# Patient Record
Sex: FEMALE | Race: WHITE | NOT HISPANIC OR LATINO | Employment: OTHER | URBAN - NONMETROPOLITAN AREA
[De-identification: names, ages, dates, MRNs, and addresses within clinical notes are randomized per-mention and may not be internally consistent; named-entity substitution may affect disease eponyms.]

---

## 2017-01-05 ENCOUNTER — OFFICE VISIT (OUTPATIENT)
Dept: GASTROENTEROLOGY | Facility: CLINIC | Age: 32
End: 2017-01-05

## 2017-01-05 VITALS
BODY MASS INDEX: 23.54 KG/M2 | WEIGHT: 150 LBS | SYSTOLIC BLOOD PRESSURE: 119 MMHG | HEART RATE: 108 BPM | DIASTOLIC BLOOD PRESSURE: 71 MMHG | OXYGEN SATURATION: 98 % | HEIGHT: 67 IN

## 2017-01-05 DIAGNOSIS — R74.8 ABNORMAL AST AND ALT: ICD-10-CM

## 2017-01-05 DIAGNOSIS — R10.11 RIGHT UPPER QUADRANT ABDOMINAL PAIN: ICD-10-CM

## 2017-01-05 DIAGNOSIS — K90.0 CELIAC DISEASE: ICD-10-CM

## 2017-01-05 DIAGNOSIS — B17.10 ACUTE HEPATITIS C VIRUS INFECTION WITHOUT HEPATIC COMA: Primary | ICD-10-CM

## 2017-01-05 DIAGNOSIS — E08.00 DIABETES MELLITUS DUE TO UNDERLYING CONDITION WITH HYPEROSMOLARITY WITHOUT COMA, WITHOUT LONG-TERM CURRENT USE OF INSULIN (HCC): ICD-10-CM

## 2017-01-05 DIAGNOSIS — R11.0 NAUSEA: ICD-10-CM

## 2017-01-05 PROCEDURE — 99244 OFF/OP CNSLTJ NEW/EST MOD 40: CPT | Performed by: INTERNAL MEDICINE

## 2017-01-05 RX ORDER — BUPRENORPHINE HYDROCHLORIDE AND NALOXONE HYDROCHLORIDE 5.7; 1.4 MG/1; MG/1
1 TABLET, ORALLY DISINTEGRATING SUBLINGUAL 2 TIMES DAILY
Refills: 0 | COMMUNITY
Start: 2016-12-22

## 2017-01-05 RX ORDER — INSULIN LISPRO 100 [IU]/ML
INJECTION, SOLUTION INTRAVENOUS; SUBCUTANEOUS 3 TIMES DAILY
Refills: 3 | COMMUNITY
Start: 2016-11-21 | End: 2020-08-30

## 2017-01-05 RX ORDER — LEVOTHYROXINE SODIUM 0.1 MG/1
1 TABLET ORAL DAILY
Refills: 2 | COMMUNITY
Start: 2016-12-22 | End: 2020-08-30

## 2017-01-05 RX ORDER — ONDANSETRON 4 MG/1
TABLET, FILM COATED ORAL
Qty: 90 TABLET | Refills: 0 | Status: SHIPPED | OUTPATIENT
Start: 2017-01-05

## 2017-01-05 RX ORDER — INSULIN GLARGINE 100 [IU]/ML
25 INJECTION, SOLUTION SUBCUTANEOUS DAILY
Refills: 0 | COMMUNITY
Start: 2016-11-28 | End: 2020-08-30

## 2017-01-05 NOTE — LETTER
2017     RONY Celis  [address]  [address]  [facility] [address]    Patient: Jes Moreno   YOB: 1985   Date of Visit: 2017       Dear RONY Adams:    Jes Moreno was in my office today. Below is a copy of my note.    If you have questions, please do not hesitate to call me. I look forward to following Jes along with you.         Sincerely,        Myrna Perera,         CC: No Recipients    : 1985    Chief Complaint   Patient presents with   • Hepatitis C       Jes Moreno is a 31 y.o. female who presents to the office today as a consultation from RONY Celis for evaluation of Hepatitis C    History of Present Illness:    She was diagnosed with hepatitis C in 2016.  She was nauseated all the time that prompted her PCP to draw labs on her.  She has used intranasal illicit drugs since she was 21. She does not drink alcohol.  The last 6 months she has been taking Zubsolv 8 mg daily and participates at a clinic. She plans to taper off the Zubsolv.     She was diagnosed with diabetes mellitus at the age of 7 years of age. She has average glucose of 209 with a hemoglobin A1C of 8.9 drawn on 2016.  On the same date and acute hepatitis profile reveals hepatitis B surface antigen nonreactive, hepatitis C antibody reactive.  CMP revealed a glucose of 223 and AST of 43.  She is aware that she had celiac disease and tries to maintain a gluten free diet. She is on Synthroid for hypothyroidism.    She has abdominal pain in the right upper quadrant.      Review of Systems   Constitutional: Positive for appetite change and chills. Negative for fatigue, fever and unexpected weight change.   HENT: Negative for hearing loss, mouth sores and nosebleeds.    Eyes: Negative for itching and visual disturbance.   Respiratory: Positive for chest tightness. Negative for cough, shortness of breath and wheezing.    Cardiovascular: Positive for leg swelling.  Negative for chest pain and palpitations.   Endocrine: Negative for cold intolerance, heat intolerance, polydipsia and polyuria.   Genitourinary: Negative for dysuria, frequency and hematuria.   Musculoskeletal: Negative for arthralgias, joint swelling and myalgias.   Skin: Negative for rash and wound.   Allergic/Immunologic: Negative for food allergies and immunocompromised state.   Neurological: Negative for seizures, syncope, weakness and light-headedness.   Hematological: Negative for adenopathy. Does not bruise/bleed easily.   Psychiatric/Behavioral: Negative for confusion and sleep disturbance. The patient is not nervous/anxious.         Depression   Gastrointestinal: Positive for abdominal pain and nausea.    Past Medical History   Diagnosis Date   • Asthma    • Celiac disease    • Diabetes mellitus type 1    • Disease of thyroid gland    • Hepatitis C    • Infectious viral hepatitis      hepititis C   • Neuropathy    • Reflux gastritis        Past Surgical History   Procedure Laterality Date   •  section       X 2       Family History   Problem Relation Age of Onset   • Diabetes type I Maternal Grandmother    • Breast cancer Maternal Grandmother    • Hypertension Maternal Grandmother    • Diabetes type II Paternal Grandfather        History   Smoking Status   • Current Every Day Smoker   • Packs/day: 1.00   • Years: 10.00   • Types: Cigarettes   • Start date:    Smokeless Tobacco   • Never Used     History   Alcohol Use No     History   Drug Use No     Marital Status: Single      Current Outpatient Prescriptions:   •  albuterol (ACCUNEB) 1.25 MG/3ML nebulizer solution, Take 1 ampule by nebulization every 6 (six) hours as needed for wheezing., Disp: , Rfl:   •  albuterol (PROVENTIL HFA;VENTOLIN HFA) 108 (90 BASE) MCG/ACT inhaler, Inhale 2 puffs every 4 (four) hours as needed for wheezing., Disp: , Rfl:   •  gabapentin (NEURONTIN) 300 MG capsule, Take 300 mg by mouth 3 (three) times a day., Disp: ,  "Rfl:   •  HUMALOG KWIKPEN 100 UNIT/ML solution pen-injector, 3 (Three) Times a Day. SLIDING SCALE, Disp: , Rfl: 3  •  ipratropium-albuterol (COMBIVENT RESPIMAT)  MCG/ACT inhaler, Inhale 1 puff 4 (four) times a day as needed for wheezing., Disp: , Rfl:   •  ipratropium-albuterol (COMBIVENT)  MCG/ACT inhaler, Inhale 2 puffs every 6 (six) hours as needed for wheezing., Disp: , Rfl:   •  LANTUS SOLOSTAR 100 UNIT/ML injection pen, Inject 25 Units as directed Daily., Disp: , Rfl: 0  •  levothyroxine (SYNTHROID, LEVOTHROID) 100 MCG tablet, Take 1 tablet by mouth Daily., Disp: , Rfl: 2  •  omeprazole (PriLOSEC) 20 MG capsule, Take 20 mg by mouth daily., Disp: , Rfl:   •  ZUBSOLV 5.7-1.4 MG sublingual tablet, Take 1 tablet by mouth 2 (Two) Times a Day., Disp: , Rfl: 0  •  insulin aspart (NOVOLOG FLEXPEN) 100 UNIT/ML solution pen-injector sc pen, Inject  under the skin 3 (three) times a day with meals., Disp: , Rfl:   •  insulin detemir (LEVEMIR) 100 UNIT/ML injection, Inject  under the skin daily., Disp: , Rfl:   •  Insulin Glargine (TOUJEO SOLOSTAR SC), Inject  under the skin., Disp: , Rfl:   •  levothyroxine (SYNTHROID, LEVOTHROID) 88 MCG tablet, Take 88 mcg by mouth daily., Disp: , Rfl:   •  ondansetron (ZOFRAN) 4 MG tablet, Take 1 tablet PRN nausea every 8 hours., Disp: 90 tablet, Rfl: 0    Allergies:   Sulfa antibiotics    Visit Vitals   • /71 (BP Location: Left arm)   • Pulse 108   • Ht 67\" (170.2 cm)   • Wt 150 lb (68 kg)   • SpO2 98%   • BMI 23.49 kg/m2       Physical Exam   Constitutional: She is oriented to person, place, and time. She appears well-developed and well-nourished. No distress.   HENT:   Head: Normocephalic and atraumatic.   Right Ear: External ear normal.   Left Ear: External ear normal.   Nose: Nose normal.   Mouth/Throat: Oropharynx is clear and moist.   Eyes: Conjunctivae and EOM are normal. Right eye exhibits no discharge. Left eye exhibits no discharge. No scleral icterus. "   Neck: Normal range of motion. Neck supple.   Cardiovascular: Normal rate, regular rhythm and normal heart sounds.  Exam reveals no gallop and no friction rub.    No murmur heard.  Pulmonary/Chest: Effort normal and breath sounds normal. No respiratory distress. She has no wheezes. She has no rales. She exhibits no tenderness.   Abdominal: Soft. Normal appearance and bowel sounds are normal. She exhibits no distension, no ascites and no mass. There is no tenderness. There is no rigidity and no guarding. No hernia.   Musculoskeletal: Normal range of motion. She exhibits no edema or deformity.   Neurological: She is alert and oriented to person, place, and time. She exhibits normal muscle tone. Coordination normal.   Skin: Skin is warm and dry. No rash noted. No erythema. No pallor.   A burn on her forehead   Psychiatric: She has a normal mood and affect. Her behavior is normal. Judgment and thought content normal.   Nursing note and vitals reviewed.      Assessment:  1. Acute hepatitis C virus infection without hepatic coma    2. Abnormal AST and ALT    3. Right upper quadrant abdominal pain    4. Nausea    5. Celiac disease    6. Diabetes mellitus due to underlying condition with hyperosmolarity without coma, without long-term current use of insulin        Plan:  · Jes Moreno has abnormal AST and ALT. She will be tested for the common liver diagnoses including; Alpha 1 antitrypsin deficiency, autoimmune hepatitis, primary biliary cirrhosis, Franklin's disease, hereditary hemochromatosis, celiac disease, hepatitis A immunity, hepatitis B, and hepatitis C with serum tests.   · She will have an ultrasound of her liver.  · We discussed the progression of liver disease.  Celiac disease can cause problems with the liver also.  She has both diabetes and hypothyroidism placing her at greater risk of having autoimmune hepatitis.  She will be checked for the common liver disorders.  She is aware of the importance of getting  off the Zubsolv. After her genotype has been determined, a treatment plan will be set up.   · She will be need to be immunized against hepatitis A and B if she has not been exposed to the virus or immunized against hepatitis A and B.   · She will continue current medications.  · I discussed the patient’s findings and my recommendations with the patient. All of their questions were answered to their satisfaction and they understand the plan. She will call with any interval concerns. The patient will continue their current medications.    Return in about 2 weeks (around 1/19/2017) for follow up labs.            Electronically signed by: Myrna Perera D.O. 1/5/2017 at 10:27 AM  .

## 2017-01-05 NOTE — MR AVS SNAPSHOT
Jes Josh   1/5/2017 9:40 AM   Office Visit    Dept Phone:  [phone]   Encounter #:  [id number]    Provider:  Myrna Perera DO   Department:  [facility]                Your Full Care Plan              Today's Medication Changes          These changes are accurate as of: 1/5/17 10:49 AM.  If you have any questions, ask your nurse or doctor.               New Medication(s)Ordered:     ondansetron 4 MG tablet   Commonly known as:  ZOFRAN   Take 1 tablet PRN nausea every 8 hours.   Started by:  Myrna Perera DO            Where to Get Your Medications      These medications were sent to [place], [place] - [address] - [phone] [facility] [phone]   [address] [facility] [address]     Phone:  [phone]     ondansetron 4 MG tablet                  Your Updated Medication List          This list is accurate as of: 1/5/17 10:49 AM.  Always use your most recent med list.                * albuterol 1.25 MG/3ML nebulizer solution   Commonly known as:  ACCUNEB       * albuterol 108 (90 BASE) MCG/ACT inhaler   Commonly known as:  PROVENTIL HFA;VENTOLIN HFA       gabapentin 300 MG capsule   Commonly known as:  NEURONTIN       HUMALOG KWIKPEN 100 UNIT/ML solution pen-injector   Generic drug:  Insulin Lispro       insulin aspart 100 UNIT/ML solution pen-injector sc pen   Commonly known as:  novoLOG FLEXPEN       * ipratropium-albuterol  MCG/ACT inhaler   Commonly known as:  COMBIVENT RESPIMAT       * COMBIVENT  MCG/ACT inhaler   Generic drug:  ipratropium-albuterol       LEVEMIR 100 UNIT/ML injection   Generic drug:  insulin detemir       * levothyroxine 88 MCG tablet   Commonly known as:  SYNTHROID, LEVOTHROID       * levothyroxine 100 MCG tablet   Commonly known as:  SYNTHROID, LEVOTHROID       omeprazole 20 MG capsule   Commonly known as:  priLOSEC       ondansetron 4 MG tablet   Commonly known as:   ZOFRAN   Take 1 tablet PRN nausea every 8 hours.       * TOUJEO SOLOSTAR SC       * LANTUS SOLOSTAR 100 UNIT/ML injection pen   Generic drug:  Insulin Glargine       ZUBSOLV 5.7-1.4 MG sublingual tablet   Generic drug:  Buprenorphine HCl-Naloxone HCl       * Notice:  This list has 8 medication(s) that are the same as other medications prescribed for you. Read the directions carefully, and ask your doctor or other care provider to review them with you.            We Performed the Following     Alpha - 1 - Antitrypsin Phenotype     Anti-Smooth Muscle Antibody Titer     C-reactive Protein     CBC (No Diff)     Celiac Comprehensive Panel     Ceruloplasmin     Comprehensive Metabolic Panel     Ferritin     HCV RNA By PCR, Qn Rfx Mayra     Hepatitis A Antibody, Total     Hepatitis B E Antigen     Hepatitis B Surface Antibody     Hepatitis B Surface Antigen     Iron Profile     Mitochondrial Antibodies, M2     Nuclear Antigen Antibody, IFA       You Were Diagnosed With        Codes Comments    Acute hepatitis C virus infection without hepatic coma    -  Primary ICD-10-CM: B17.10  ICD-9-CM: 070.51     Abnormal AST and ALT     ICD-10-CM: R74.0  ICD-9-CM: 790.4     Right upper quadrant abdominal pain     ICD-10-CM: R10.11  ICD-9-CM: 789.01     Nausea     ICD-10-CM: R11.0  ICD-9-CM: 787.02       Instructions     None    Patient Instructions History      Upcoming Appointments     Visit Type Date Time Department    NEW PATIENT 1/5/2017  9:40 AM [facility] [place]    FOLLOW UP 2/10/2017 10:40 AM [facility]     [facility] [employer] allows you to send messages to your doctor, view your test results, renew your prescriptions, schedule appointments, and more. To sign up, go to [employer] and click on the Sign Up Now link in the New User? box. Enter your [employer] Activation Code exactly as it appears below along with the last four digits of your Social Security Number and your Date of  "Birth () to complete the sign-up process. If you do not sign up before the expiration date, you must request a new code.    [employer] Activation Code: [email or web address]  Expires: 2017 10:49 AM    If you have questions, you can email [email or web address] or call [phone] to talk to our [employer] staff. Remember, [employer] is NOT to be used for urgent needs. For medical emergencies, dial 911.               Other Info from Your Visit           Your Appointments     Feb 10, 2017 10:40 AM EST   Follow Up with Cassandra Dunn PA-C   [facility] (--)    [address] [place] [address] [phone]           Arrive 15 minutes prior to appointment.              Allergies     Sulfa Antibiotics        Reason for Visit     Hepatitis C           Vital Signs     Blood Pressure Pulse Height Weight Oxygen Saturation Body Mass Index    119/71 (BP Location: Left arm) 108 67\" (170.2 cm) 150 lb (68 kg) 98% 23.49 kg/m2    Smoking Status                   Current Every Day Smoker           Problems and Diagnoses Noted     Hepatitis C    -  Primary    Abnormal AST and ALT        Abdominal pain, right upper quadrant        Nauseous            "

## 2017-01-05 NOTE — PROGRESS NOTES
: 1985    Chief Complaint   Patient presents with   • Hepatitis C       Jes Moreno is a 31 y.o. female who presents to the office today as a consultation from RONY Celis for evaluation of Hepatitis C    History of Present Illness:    She was diagnosed with hepatitis C in 2016.  She was nauseated all the time that prompted her PCP to draw labs on her.  She has used intranasal illicit drugs since she was 21. She does not drink alcohol.  The last 6 months she has been taking Zubsolv 8 mg daily and participates at a clinic. She plans to taper off the Zubsolv.     She was diagnosed with diabetes mellitus at the age of 7 years of age. She has average glucose of 209 with a hemoglobin A1C of 8.9 drawn on 2016.  On the same date and acute hepatitis profile reveals hepatitis B surface antigen nonreactive, hepatitis C antibody reactive.  CMP revealed a glucose of 223 and AST of 43.  She is aware that she had celiac disease and tries to maintain a gluten free diet. She is on Synthroid for hypothyroidism.    She has abdominal pain in the right upper quadrant.      Review of Systems   Constitutional: Positive for appetite change and chills. Negative for fatigue, fever and unexpected weight change.   HENT: Negative for hearing loss, mouth sores and nosebleeds.    Eyes: Negative for itching and visual disturbance.   Respiratory: Positive for chest tightness. Negative for cough, shortness of breath and wheezing.    Cardiovascular: Positive for leg swelling. Negative for chest pain and palpitations.   Endocrine: Negative for cold intolerance, heat intolerance, polydipsia and polyuria.   Genitourinary: Negative for dysuria, frequency and hematuria.   Musculoskeletal: Negative for arthralgias, joint swelling and myalgias.   Skin: Negative for rash and wound.   Allergic/Immunologic: Negative for food allergies and immunocompromised state.   Neurological: Negative for seizures, syncope, weakness and  light-headedness.   Hematological: Negative for adenopathy. Does not bruise/bleed easily.   Psychiatric/Behavioral: Negative for confusion and sleep disturbance. The patient is not nervous/anxious.         Depression   Gastrointestinal: Positive for abdominal pain and nausea.    Past Medical History   Diagnosis Date   • Asthma    • Celiac disease    • Diabetes mellitus type 1    • Disease of thyroid gland    • Hepatitis C    • Infectious viral hepatitis      hepititis C   • Neuropathy    • Reflux gastritis        Past Surgical History   Procedure Laterality Date   •  section       X 2       Family History   Problem Relation Age of Onset   • Diabetes type I Maternal Grandmother    • Breast cancer Maternal Grandmother    • Hypertension Maternal Grandmother    • Diabetes type II Paternal Grandfather        History   Smoking Status   • Current Every Day Smoker   • Packs/day: 1.00   • Years: 10.00   • Types: Cigarettes   • Start date:    Smokeless Tobacco   • Never Used     History   Alcohol Use No     History   Drug Use No     Marital Status: Single      Current Outpatient Prescriptions:   •  albuterol (ACCUNEB) 1.25 MG/3ML nebulizer solution, Take 1 ampule by nebulization every 6 (six) hours as needed for wheezing., Disp: , Rfl:   •  albuterol (PROVENTIL HFA;VENTOLIN HFA) 108 (90 BASE) MCG/ACT inhaler, Inhale 2 puffs every 4 (four) hours as needed for wheezing., Disp: , Rfl:   •  gabapentin (NEURONTIN) 300 MG capsule, Take 300 mg by mouth 3 (three) times a day., Disp: , Rfl:   •  HUMALOG KWIKPEN 100 UNIT/ML solution pen-injector, 3 (Three) Times a Day. SLIDING SCALE, Disp: , Rfl: 3  •  ipratropium-albuterol (COMBIVENT RESPIMAT)  MCG/ACT inhaler, Inhale 1 puff 4 (four) times a day as needed for wheezing., Disp: , Rfl:   •  ipratropium-albuterol (COMBIVENT)  MCG/ACT inhaler, Inhale 2 puffs every 6 (six) hours as needed for wheezing., Disp: , Rfl:   •  LANTUS SOLOSTAR 100 UNIT/ML injection pen,  "Inject 25 Units as directed Daily., Disp: , Rfl: 0  •  levothyroxine (SYNTHROID, LEVOTHROID) 100 MCG tablet, Take 1 tablet by mouth Daily., Disp: , Rfl: 2  •  omeprazole (PriLOSEC) 20 MG capsule, Take 20 mg by mouth daily., Disp: , Rfl:   •  ZUBSOLV 5.7-1.4 MG sublingual tablet, Take 1 tablet by mouth 2 (Two) Times a Day., Disp: , Rfl: 0  •  insulin aspart (NOVOLOG FLEXPEN) 100 UNIT/ML solution pen-injector sc pen, Inject  under the skin 3 (three) times a day with meals., Disp: , Rfl:   •  insulin detemir (LEVEMIR) 100 UNIT/ML injection, Inject  under the skin daily., Disp: , Rfl:   •  Insulin Glargine (TOUJEO SOLOSTAR SC), Inject  under the skin., Disp: , Rfl:   •  levothyroxine (SYNTHROID, LEVOTHROID) 88 MCG tablet, Take 88 mcg by mouth daily., Disp: , Rfl:   •  ondansetron (ZOFRAN) 4 MG tablet, Take 1 tablet PRN nausea every 8 hours., Disp: 90 tablet, Rfl: 0    Allergies:   Sulfa antibiotics    Visit Vitals   • /71 (BP Location: Left arm)   • Pulse 108   • Ht 67\" (170.2 cm)   • Wt 150 lb (68 kg)   • SpO2 98%   • BMI 23.49 kg/m2       Physical Exam   Constitutional: She is oriented to person, place, and time. She appears well-developed and well-nourished. No distress.   HENT:   Head: Normocephalic and atraumatic.   Right Ear: External ear normal.   Left Ear: External ear normal.   Nose: Nose normal.   Mouth/Throat: Oropharynx is clear and moist.   Eyes: Conjunctivae and EOM are normal. Right eye exhibits no discharge. Left eye exhibits no discharge. No scleral icterus.   Neck: Normal range of motion. Neck supple.   Cardiovascular: Normal rate, regular rhythm and normal heart sounds.  Exam reveals no gallop and no friction rub.    No murmur heard.  Pulmonary/Chest: Effort normal and breath sounds normal. No respiratory distress. She has no wheezes. She has no rales. She exhibits no tenderness.   Abdominal: Soft. Normal appearance and bowel sounds are normal. She exhibits no distension, no ascites and no mass. " There is no tenderness. There is no rigidity and no guarding. No hernia.   Musculoskeletal: Normal range of motion. She exhibits no edema or deformity.   Neurological: She is alert and oriented to person, place, and time. She exhibits normal muscle tone. Coordination normal.   Skin: Skin is warm and dry. No rash noted. No erythema. No pallor.   A burn on her forehead   Psychiatric: She has a normal mood and affect. Her behavior is normal. Judgment and thought content normal.   Nursing note and vitals reviewed.      Assessment:  1. Acute hepatitis C virus infection without hepatic coma    2. Abnormal AST and ALT    3. Right upper quadrant abdominal pain    4. Nausea    5. Celiac disease    6. Diabetes mellitus due to underlying condition with hyperosmolarity without coma, without long-term current use of insulin        Plan:  · Jes Moreno has abnormal AST and ALT. She will be tested for the common liver diagnoses including; Alpha 1 antitrypsin deficiency, autoimmune hepatitis, primary biliary cirrhosis, Franklin's disease, hereditary hemochromatosis, celiac disease, hepatitis A immunity, hepatitis B, and hepatitis C with serum tests.   · She will have an ultrasound of her liver.  · We discussed the progression of liver disease.  Celiac disease can cause problems with the liver also.  She has both diabetes and hypothyroidism placing her at greater risk of having autoimmune hepatitis.  She will be checked for the common liver disorders.  She is aware of the importance of getting off the Zubsolv. After her genotype has been determined, a treatment plan will be set up.   · She will be need to be immunized against hepatitis A and B if she has not been exposed to the virus or immunized against hepatitis A and B.   · She will continue current medications.  · I discussed the patient’s findings and my recommendations with the patient. All of their questions were answered to their satisfaction and they understand the plan. She  will call with any interval concerns. The patient will continue their current medications.    Return in about 2 weeks (around 1/19/2017) for follow up labs.            Electronically signed by: Myrna Perera D.O. 1/5/2017 at 10:27 AM  .

## 2017-01-13 ENCOUNTER — TELEPHONE (OUTPATIENT)
Dept: GASTROENTEROLOGY | Facility: CLINIC | Age: 32
End: 2017-01-13

## 2017-01-13 NOTE — TELEPHONE ENCOUNTER
I called patient about blood work to see if she had these done she states she is going to have these done on monday

## 2017-01-25 NOTE — TELEPHONE ENCOUNTER
I called to see if she had her labs drawn yet she states she is going this week. Her daughter was sick recently and she wasn't able to go.

## 2017-04-04 NOTE — TELEPHONE ENCOUNTER
US abdomen was ordered at last appt. Please try to call patient again regarding getting this completed. If cannot reach her, please send letter.

## 2017-04-05 NOTE — TELEPHONE ENCOUNTER
I called patient and left message with our phone number to call us back. I also mailed the patient a letter asking her to call the office.

## 2017-05-14 ENCOUNTER — APPOINTMENT (OUTPATIENT)
Dept: CT IMAGING | Facility: HOSPITAL | Age: 32
End: 2017-05-14

## 2017-05-14 ENCOUNTER — APPOINTMENT (OUTPATIENT)
Dept: GENERAL RADIOLOGY | Facility: HOSPITAL | Age: 32
End: 2017-05-14

## 2017-05-14 ENCOUNTER — HOSPITAL ENCOUNTER (EMERGENCY)
Facility: HOSPITAL | Age: 32
Discharge: ANOTHER HOSPITAL | End: 2017-05-14
Attending: EMERGENCY MEDICINE | Admitting: EMERGENCY MEDICINE

## 2017-05-14 VITALS
HEIGHT: 62 IN | OXYGEN SATURATION: 100 % | SYSTOLIC BLOOD PRESSURE: 112 MMHG | HEART RATE: 121 BPM | WEIGHT: 115 LBS | DIASTOLIC BLOOD PRESSURE: 63 MMHG | TEMPERATURE: 98.5 F | BODY MASS INDEX: 21.16 KG/M2 | RESPIRATION RATE: 20 BRPM

## 2017-05-14 DIAGNOSIS — F19.10 POLYSUBSTANCE ABUSE (HCC): ICD-10-CM

## 2017-05-14 DIAGNOSIS — E10.11 DIABETIC KETOACIDOSIS WITH COMA ASSOCIATED WITH TYPE 1 DIABETES MELLITUS (HCC): Primary | ICD-10-CM

## 2017-05-14 DIAGNOSIS — N17.9 ACUTE RENAL FAILURE, UNSPECIFIED ACUTE RENAL FAILURE TYPE (HCC): ICD-10-CM

## 2017-05-14 LAB
6-ACETYL MORPHINE: NEGATIVE
A-A DO2: 10.9 MMHG (ref 0–300)
A-A DO2: 39.2 MMHG (ref 0–300)
ACETONE BLD QL: ABNORMAL
ALBUMIN SERPL-MCNC: 4.2 G/DL (ref 3.5–5)
ALBUMIN/GLOB SERPL: 1.4 G/DL (ref 1.5–2.5)
ALP SERPL-CCNC: 116 U/L (ref 35–104)
ALT SERPL W P-5'-P-CCNC: 28 U/L (ref 10–36)
AMPHET+METHAMPHET UR QL: POSITIVE
ANION GAP SERPL CALCULATED.3IONS-SCNC: ABNORMAL MMOL/L (ref 3.6–11.2)
ANION GAP SERPL CALCULATED.3IONS-SCNC: ABNORMAL MMOL/L (ref 3.6–11.2)
ARTERIAL PATENCY WRIST A: ABNORMAL
ARTERIAL PATENCY WRIST A: ABNORMAL
AST SERPL-CCNC: 33 U/L (ref 10–30)
ATMOSPHERIC PRESS: 723 MMHG
ATMOSPHERIC PRESS: 723 MMHG
B-HCG UR QL: NEGATIVE
BACTERIA UR QL AUTO: ABNORMAL /HPF
BARBITURATES UR QL SCN: NEGATIVE
BASE EXCESS BLDA CALC-SCNC: -20 MMOL/L
BASE EXCESS BLDA CALC-SCNC: -22.3 MMOL/L
BASOPHILS # BLD AUTO: 0.04 10*3/MM3 (ref 0–0.3)
BASOPHILS NFR BLD AUTO: 0.2 % (ref 0–2)
BDY SITE: ABNORMAL
BDY SITE: ABNORMAL
BENZODIAZ UR QL SCN: POSITIVE
BILIRUB SERPL-MCNC: 0.4 MG/DL (ref 0.2–1.8)
BILIRUB UR QL STRIP: NEGATIVE
BODY TEMPERATURE: 98.6 C
BODY TEMPERATURE: 98.6 C
BUN BLD-MCNC: 46 MG/DL (ref 7–21)
BUN BLD-MCNC: 47 MG/DL (ref 7–21)
BUN/CREAT SERPL: 24.6 (ref 7–25)
BUN/CREAT SERPL: 28.3 (ref 7–25)
BUPRENORPHINE SERPL-MCNC: POSITIVE NG/ML
CALCIUM SPEC-SCNC: 8 MG/DL (ref 7.7–10)
CALCIUM SPEC-SCNC: 9.4 MG/DL (ref 7.7–10)
CANNABINOIDS SERPL QL: NEGATIVE
CHLORIDE SERPL-SCNC: 112 MMOL/L (ref 99–112)
CHLORIDE SERPL-SCNC: 122 MMOL/L (ref 99–112)
CK MB SERPL-CCNC: 6.55 NG/ML (ref 0–5)
CLARITY UR: ABNORMAL
CO2 SERPL-SCNC: <10 MMOL/L (ref 24.3–31.9)
CO2 SERPL-SCNC: <10 MMOL/L (ref 24.3–31.9)
COCAINE UR QL: NEGATIVE
COHGB MFR BLD: 0.6 % (ref 0–5)
COHGB MFR BLD: 0.9 % (ref 0–5)
COLOR UR: YELLOW
CREAT BLD-MCNC: 1.66 MG/DL (ref 0.43–1.29)
CREAT BLD-MCNC: 1.87 MG/DL (ref 0.43–1.29)
D-LACTATE SERPL-SCNC: 2 MMOL/L (ref 0.5–2)
DEPRECATED RDW RBC AUTO: 47.6 FL (ref 37–54)
EOSINOPHIL # BLD AUTO: 0.02 10*3/MM3 (ref 0–0.7)
EOSINOPHIL NFR BLD AUTO: 0.1 % (ref 0–5)
ERYTHROCYTE [DISTWIDTH] IN BLOOD BY AUTOMATED COUNT: 13.2 % (ref 11.5–14.5)
ETHANOL BLD-MCNC: <10 MG/DL
ETHANOL UR QL: <0.01 %
GFR SERPL CREATININE-BSD FRML MDRD: 32 ML/MIN/1.73
GFR SERPL CREATININE-BSD FRML MDRD: 37 ML/MIN/1.73
GLOBULIN UR ELPH-MCNC: 3.1 GM/DL
GLUCOSE BLD-MCNC: 602 MG/DL (ref 70–110)
GLUCOSE BLD-MCNC: 718 MG/DL (ref 70–110)
GLUCOSE BLDC GLUCOMTR-MCNC: 450 MG/DL (ref 70–130)
GLUCOSE BLDC GLUCOMTR-MCNC: 494 MG/DL (ref 70–130)
GLUCOSE BLDC GLUCOMTR-MCNC: 544 MG/DL (ref 70–130)
GLUCOSE UR STRIP-MCNC: ABNORMAL MG/DL
HCO3 BLDA-SCNC: 5.3 MMOL/L (ref 22–26)
HCO3 BLDA-SCNC: 7.8 MMOL/L (ref 22–26)
HCT VFR BLD AUTO: 42.5 % (ref 37–47)
HCT VFR BLD CALC: 40 % (ref 37–47)
HCT VFR BLD CALC: 43 % (ref 37–47)
HGB BLD-MCNC: 13.5 G/DL (ref 12–16)
HGB BLDA-MCNC: 13.6 G/DL (ref 12–16)
HGB BLDA-MCNC: 14.6 G/DL (ref 12–16)
HGB UR QL STRIP.AUTO: ABNORMAL
HOROWITZ INDEX BLD+IHG-RTO: 21 %
HOROWITZ INDEX BLD+IHG-RTO: 50 %
HYALINE CASTS UR QL AUTO: ABNORMAL /LPF
IMM GRANULOCYTES # BLD: 0.17 10*3/MM3 (ref 0–0.03)
IMM GRANULOCYTES NFR BLD: 0.9 % (ref 0–0.5)
KETONES UR QL STRIP: ABNORMAL
LEUKOCYTE ESTERASE UR QL STRIP.AUTO: NEGATIVE
LYMPHOCYTES # BLD AUTO: 2.08 10*3/MM3 (ref 1–3)
LYMPHOCYTES NFR BLD AUTO: 11.3 % (ref 21–51)
MAGNESIUM SERPL-MCNC: 2.6 MG/DL (ref 1.7–2.6)
MCH RBC QN AUTO: 32.1 PG (ref 27–33)
MCHC RBC AUTO-ENTMCNC: 31.8 G/DL (ref 33–37)
MCV RBC AUTO: 101 FL (ref 80–94)
MDMA UR QL SCN: NEGATIVE
METHADONE UR QL SCN: NEGATIVE
METHGB BLD QL: 0.4 % (ref 0–3)
METHGB BLD QL: 0.6 % (ref 0–3)
MODALITY: ABNORMAL
MODALITY: ABNORMAL
MONOCYTES # BLD AUTO: 1.49 10*3/MM3 (ref 0.1–0.9)
MONOCYTES NFR BLD AUTO: 8.1 % (ref 0–10)
NEUTROPHILS # BLD AUTO: 14.55 10*3/MM3 (ref 1.4–6.5)
NEUTROPHILS NFR BLD AUTO: 79.4 % (ref 30–70)
NITRITE UR QL STRIP: NEGATIVE
OPIATES UR QL: NEGATIVE
OSMOLALITY SERPL CALC.SUM OF ELEC: 336.9 MOSM/KG (ref 273–305)
OSMOLALITY SERPL CALC.SUM OF ELEC: 342 MOSM/KG (ref 273–305)
OXYCODONE UR QL SCN: NEGATIVE
OXYHGB MFR BLDV: 96.4 % (ref 85–100)
OXYHGB MFR BLDV: 98.1 % (ref 85–100)
PCO2 BLDA: 13.6 MM HG (ref 35–45)
PCO2 BLDA: 31.4 MM HG (ref 35–45)
PCP UR QL SCN: NEGATIVE
PEEP RESPIRATORY: 5 CM[H2O]
PH BLDA: 7.01 PH UNITS (ref 7.35–7.45)
PH BLDA: 7.21 PH UNITS (ref 7.35–7.45)
PH UR STRIP.AUTO: <=5 [PH] (ref 5–8)
PHOSPHATE SERPL-MCNC: 4.8 MG/DL (ref 2.7–4.5)
PLATELET # BLD AUTO: 485 10*3/MM3 (ref 130–400)
PMV BLD AUTO: 10.4 FL (ref 6–10)
PO2 BLDA: 114.8 MM HG (ref 80–100)
PO2 BLDA: 263.5 MM HG (ref 80–100)
POTASSIUM BLD-SCNC: 4.6 MMOL/L (ref 3.5–5.3)
POTASSIUM BLD-SCNC: 5.5 MMOL/L (ref 3.5–5.3)
PROT SERPL-MCNC: 7.3 G/DL (ref 6–8)
PROT UR QL STRIP: ABNORMAL
RBC # BLD AUTO: 4.21 10*6/MM3 (ref 4.2–5.4)
RBC # UR: ABNORMAL /HPF
REF LAB TEST METHOD: ABNORMAL
SAO2 % BLDCOA: 97.7 % (ref 90–100)
SAO2 % BLDCOA: 99.3 % (ref 90–100)
SET MECH RESP RATE: 20
SODIUM BLD-SCNC: 146 MMOL/L (ref 135–153)
SODIUM BLD-SCNC: 152 MMOL/L (ref 135–153)
SP GR UR STRIP: 1.02 (ref 1–1.03)
SQUAMOUS #/AREA URNS HPF: ABNORMAL /HPF
TOTAL RATE: 20 BREATHS/MINUTE
TROPONIN I SERPL-MCNC: <0.006 NG/ML
UROBILINOGEN UR QL STRIP: ABNORMAL
VENTILATOR MODE: ABNORMAL
VT ON VENT VENT: 450 ML
WBC NRBC COR # BLD: 18.35 10*3/MM3 (ref 4.5–12.5)
WBC UR QL AUTO: ABNORMAL /HPF

## 2017-05-14 PROCEDURE — 84484 ASSAY OF TROPONIN QUANT: CPT | Performed by: NURSE PRACTITIONER

## 2017-05-14 PROCEDURE — 83735 ASSAY OF MAGNESIUM: CPT | Performed by: NURSE PRACTITIONER

## 2017-05-14 PROCEDURE — 80307 DRUG TEST PRSMV CHEM ANLYZR: CPT | Performed by: NURSE PRACTITIONER

## 2017-05-14 PROCEDURE — 25010000002 PROPOFOL 10 MG/ML EMULSION: Performed by: NURSE PRACTITIONER

## 2017-05-14 PROCEDURE — 83605 ASSAY OF LACTIC ACID: CPT | Performed by: NURSE PRACTITIONER

## 2017-05-14 PROCEDURE — 82553 CREATINE MB FRACTION: CPT | Performed by: NURSE PRACTITIONER

## 2017-05-14 PROCEDURE — 94799 UNLISTED PULMONARY SVC/PX: CPT

## 2017-05-14 PROCEDURE — 81001 URINALYSIS AUTO W/SCOPE: CPT | Performed by: NURSE PRACTITIONER

## 2017-05-14 PROCEDURE — 70450 CT HEAD/BRAIN W/O DYE: CPT

## 2017-05-14 PROCEDURE — 93005 ELECTROCARDIOGRAM TRACING: CPT | Performed by: NURSE PRACTITIONER

## 2017-05-14 PROCEDURE — 84100 ASSAY OF PHOSPHORUS: CPT | Performed by: NURSE PRACTITIONER

## 2017-05-14 PROCEDURE — 25010000002 MIDAZOLAM PER 1 MG: Performed by: EMERGENCY MEDICINE

## 2017-05-14 PROCEDURE — 82805 BLOOD GASES W/O2 SATURATION: CPT | Performed by: NURSE PRACTITIONER

## 2017-05-14 PROCEDURE — 96366 THER/PROPH/DIAG IV INF ADDON: CPT

## 2017-05-14 PROCEDURE — 71010 HC CHEST PA OR AP: CPT

## 2017-05-14 PROCEDURE — 96375 TX/PRO/DX INJ NEW DRUG ADDON: CPT

## 2017-05-14 PROCEDURE — 83050 HGB METHEMOGLOBIN QUAN: CPT | Performed by: NURSE PRACTITIONER

## 2017-05-14 PROCEDURE — 36600 WITHDRAWAL OF ARTERIAL BLOOD: CPT | Performed by: NURSE PRACTITIONER

## 2017-05-14 PROCEDURE — 94002 VENT MGMT INPAT INIT DAY: CPT

## 2017-05-14 PROCEDURE — 82009 KETONE BODYS QUAL: CPT | Performed by: NURSE PRACTITIONER

## 2017-05-14 PROCEDURE — 96376 TX/PRO/DX INJ SAME DRUG ADON: CPT

## 2017-05-14 PROCEDURE — 87040 BLOOD CULTURE FOR BACTERIA: CPT | Performed by: NURSE PRACTITIONER

## 2017-05-14 PROCEDURE — 82375 ASSAY CARBOXYHB QUANT: CPT | Performed by: NURSE PRACTITIONER

## 2017-05-14 PROCEDURE — 82962 GLUCOSE BLOOD TEST: CPT

## 2017-05-14 PROCEDURE — 96368 THER/DIAG CONCURRENT INF: CPT

## 2017-05-14 PROCEDURE — 31500 INSERT EMERGENCY AIRWAY: CPT | Performed by: EMERGENCY MEDICINE

## 2017-05-14 PROCEDURE — 96365 THER/PROPH/DIAG IV INF INIT: CPT

## 2017-05-14 PROCEDURE — 93010 ELECTROCARDIOGRAM REPORT: CPT | Performed by: INTERNAL MEDICINE

## 2017-05-14 PROCEDURE — 63710000001 INSULIN REGULAR HUMAN PER 5 UNITS: Performed by: NURSE PRACTITIONER

## 2017-05-14 PROCEDURE — 85025 COMPLETE CBC W/AUTO DIFF WBC: CPT | Performed by: NURSE PRACTITIONER

## 2017-05-14 PROCEDURE — 99291 CRITICAL CARE FIRST HOUR: CPT

## 2017-05-14 PROCEDURE — 80053 COMPREHEN METABOLIC PANEL: CPT | Performed by: NURSE PRACTITIONER

## 2017-05-14 PROCEDURE — 81025 URINE PREGNANCY TEST: CPT | Performed by: NURSE PRACTITIONER

## 2017-05-14 PROCEDURE — 51702 INSERT TEMP BLADDER CATH: CPT

## 2017-05-14 RX ORDER — POTASSIUM CHLORIDE 1.5 G/1.77G
20 POWDER, FOR SOLUTION ORAL AS NEEDED
Status: DISCONTINUED | OUTPATIENT
Start: 2017-05-14 | End: 2017-05-14 | Stop reason: HOSPADM

## 2017-05-14 RX ORDER — SODIUM CHLORIDE 450 MG/100ML
INJECTION, SOLUTION INTRAVENOUS
Status: DISCONTINUED
Start: 2017-05-14 | End: 2017-05-14 | Stop reason: HOSPADM

## 2017-05-14 RX ORDER — SODIUM CHLORIDE 450 MG/100ML
250 INJECTION, SOLUTION INTRAVENOUS CONTINUOUS
Status: DISCONTINUED | OUTPATIENT
Start: 2017-05-14 | End: 2017-05-14 | Stop reason: HOSPADM

## 2017-05-14 RX ORDER — DEXTROSE AND SODIUM CHLORIDE 5; .45 G/100ML; G/100ML
150 INJECTION, SOLUTION INTRAVENOUS CONTINUOUS PRN
Status: DISCONTINUED | OUTPATIENT
Start: 2017-05-14 | End: 2017-05-14 | Stop reason: HOSPADM

## 2017-05-14 RX ORDER — POTASSIUM CHLORIDE 7.46 G/1000ML
10 INJECTION, SOLUTION INTRAVENOUS
Status: DISCONTINUED | OUTPATIENT
Start: 2017-05-14 | End: 2017-05-14 | Stop reason: HOSPADM

## 2017-05-14 RX ORDER — MIDAZOLAM HYDROCHLORIDE 1 MG/ML
10 INJECTION INTRAMUSCULAR; INTRAVENOUS ONCE
Status: COMPLETED | OUTPATIENT
Start: 2017-05-14 | End: 2017-05-14

## 2017-05-14 RX ORDER — POTASSIUM CHLORIDE 1.5 G/1.77G
10 POWDER, FOR SOLUTION ORAL AS NEEDED
Status: DISCONTINUED | OUTPATIENT
Start: 2017-05-14 | End: 2017-05-14 | Stop reason: HOSPADM

## 2017-05-14 RX ORDER — ETOMIDATE 2 MG/ML
40 INJECTION INTRAVENOUS ONCE
Status: COMPLETED | OUTPATIENT
Start: 2017-05-14 | End: 2017-05-14

## 2017-05-14 RX ORDER — ETOMIDATE 2 MG/ML
10 INJECTION INTRAVENOUS ONCE
Status: DISCONTINUED | OUTPATIENT
Start: 2017-05-14 | End: 2017-05-14

## 2017-05-14 RX ORDER — SODIUM CHLORIDE AND POTASSIUM CHLORIDE 150; 450 MG/100ML; MG/100ML
250 INJECTION, SOLUTION INTRAVENOUS CONTINUOUS PRN
Status: DISCONTINUED | OUTPATIENT
Start: 2017-05-14 | End: 2017-05-14 | Stop reason: HOSPADM

## 2017-05-14 RX ORDER — MIDAZOLAM HYDROCHLORIDE 1 MG/ML
INJECTION INTRAMUSCULAR; INTRAVENOUS
Status: DISCONTINUED
Start: 2017-05-14 | End: 2017-05-14 | Stop reason: HOSPADM

## 2017-05-14 RX ORDER — DEXTROSE, SODIUM CHLORIDE, AND POTASSIUM CHLORIDE 5; .45; .15 G/100ML; G/100ML; G/100ML
150 INJECTION INTRAVENOUS CONTINUOUS PRN
Status: DISCONTINUED | OUTPATIENT
Start: 2017-05-14 | End: 2017-05-14 | Stop reason: HOSPADM

## 2017-05-14 RX ORDER — VECURONIUM BROMIDE 1 MG/ML
10 INJECTION, POWDER, LYOPHILIZED, FOR SOLUTION INTRAVENOUS ONCE
Status: COMPLETED | OUTPATIENT
Start: 2017-05-14 | End: 2017-05-14

## 2017-05-14 RX ORDER — MIDAZOLAM IN 0.9 % SOD.CHLORID 1 MG/ML
PLASTIC BAG, INJECTION (ML) INTRAVENOUS
Status: DISCONTINUED
Start: 2017-05-14 | End: 2017-05-14 | Stop reason: HOSPADM

## 2017-05-14 RX ORDER — POTASSIUM CHLORIDE 1.5 G/1.77G
40 POWDER, FOR SOLUTION ORAL AS NEEDED
Status: DISCONTINUED | OUTPATIENT
Start: 2017-05-14 | End: 2017-05-14 | Stop reason: HOSPADM

## 2017-05-14 RX ORDER — POTASSIUM CHLORIDE 750 MG/1
40 CAPSULE, EXTENDED RELEASE ORAL AS NEEDED
Status: DISCONTINUED | OUTPATIENT
Start: 2017-05-14 | End: 2017-05-14 | Stop reason: HOSPADM

## 2017-05-14 RX ORDER — DEXTROSE MONOHYDRATE 25 G/50ML
12.5 INJECTION, SOLUTION INTRAVENOUS
Status: DISCONTINUED | OUTPATIENT
Start: 2017-05-14 | End: 2017-05-14 | Stop reason: HOSPADM

## 2017-05-14 RX ORDER — SODIUM CHLORIDE 0.9 % (FLUSH) 0.9 %
10 SYRINGE (ML) INJECTION AS NEEDED
Status: DISCONTINUED | OUTPATIENT
Start: 2017-05-14 | End: 2017-05-14 | Stop reason: HOSPADM

## 2017-05-14 RX ORDER — POTASSIUM CHLORIDE 750 MG/1
10 CAPSULE, EXTENDED RELEASE ORAL AS NEEDED
Status: DISCONTINUED | OUTPATIENT
Start: 2017-05-14 | End: 2017-05-14 | Stop reason: HOSPADM

## 2017-05-14 RX ORDER — POTASSIUM CHLORIDE 750 MG/1
20 CAPSULE, EXTENDED RELEASE ORAL AS NEEDED
Status: DISCONTINUED | OUTPATIENT
Start: 2017-05-14 | End: 2017-05-14 | Stop reason: HOSPADM

## 2017-05-14 RX ADMIN — SODIUM CHLORIDE 1000 ML: 9 INJECTION, SOLUTION INTRAVENOUS at 03:45

## 2017-05-14 RX ADMIN — SODIUM CHLORIDE 0.1 UNITS/KG/HR: 9 INJECTION, SOLUTION INTRAVENOUS at 04:10

## 2017-05-14 RX ADMIN — SODIUM CHLORIDE 1000 ML: 9 INJECTION, SOLUTION INTRAVENOUS at 02:52

## 2017-05-14 RX ADMIN — SODIUM BICARBONATE 50 MEQ: 84 INJECTION, SOLUTION INTRAVENOUS at 03:15

## 2017-05-14 RX ADMIN — SODIUM CHLORIDE 1000 ML: 9 INJECTION, SOLUTION INTRAVENOUS at 01:54

## 2017-05-14 RX ADMIN — ETOMIDATE 40 MG: 2 INJECTION, SOLUTION INTRAVENOUS at 01:18

## 2017-05-14 RX ADMIN — MIDAZOLAM HYDROCHLORIDE 10 MG: 1 INJECTION, SOLUTION INTRAMUSCULAR; INTRAVENOUS at 01:16

## 2017-05-14 RX ADMIN — HUMAN INSULIN 5.2 UNITS: 100 INJECTION, SOLUTION SUBCUTANEOUS at 03:50

## 2017-05-14 RX ADMIN — VECURONIUM BROMIDE 10 MG: 1 INJECTION, POWDER, LYOPHILIZED, FOR SOLUTION INTRAVENOUS at 01:26

## 2017-05-14 RX ADMIN — PROPOFOL 5 MCG/KG/MIN: 10 INJECTION, EMULSION INTRAVENOUS at 01:50

## 2017-05-14 RX ADMIN — SODIUM CHLORIDE 250 ML/HR: 4.5 INJECTION, SOLUTION INTRAVENOUS at 05:01

## 2017-05-19 LAB
BACTERIA SPEC AEROBE CULT: NORMAL
BACTERIA SPEC AEROBE CULT: NORMAL

## 2017-05-25 ENCOUNTER — HOSPITAL ENCOUNTER (EMERGENCY)
Facility: HOSPITAL | Age: 32
Discharge: LEFT BEFORE DISCHARGE | End: 2017-05-25
Attending: FAMILY MEDICINE | Admitting: FAMILY MEDICINE

## 2017-05-25 ENCOUNTER — APPOINTMENT (OUTPATIENT)
Dept: ULTRASOUND IMAGING | Facility: HOSPITAL | Age: 32
End: 2017-05-25

## 2017-05-25 ENCOUNTER — APPOINTMENT (OUTPATIENT)
Dept: CT IMAGING | Facility: HOSPITAL | Age: 32
End: 2017-05-25

## 2017-05-25 ENCOUNTER — APPOINTMENT (OUTPATIENT)
Dept: GENERAL RADIOLOGY | Facility: HOSPITAL | Age: 32
End: 2017-05-25

## 2017-05-25 VITALS
TEMPERATURE: 97.8 F | DIASTOLIC BLOOD PRESSURE: 82 MMHG | SYSTOLIC BLOOD PRESSURE: 121 MMHG | HEART RATE: 119 BPM | BODY MASS INDEX: 21.97 KG/M2 | HEIGHT: 67 IN | RESPIRATION RATE: 20 BRPM | OXYGEN SATURATION: 96 % | WEIGHT: 140 LBS

## 2017-05-25 DIAGNOSIS — R07.9 CHEST PAIN, UNSPECIFIED TYPE: ICD-10-CM

## 2017-05-25 DIAGNOSIS — E11.8 TYPE 2 DIABETES MELLITUS WITH COMPLICATION, UNSPECIFIED LONG TERM INSULIN USE STATUS: ICD-10-CM

## 2017-05-25 DIAGNOSIS — R10.11 RIGHT UPPER QUADRANT ABDOMINAL PAIN: Primary | ICD-10-CM

## 2017-05-25 LAB
A-A DO2: 23.3 MMHG (ref 0–300)
ACETONE BLD QL: NEGATIVE
ALBUMIN SERPL-MCNC: 3.3 G/DL (ref 3.5–5)
ALBUMIN/GLOB SERPL: 1.3 G/DL (ref 1.5–2.5)
ALP SERPL-CCNC: 81 U/L (ref 35–104)
ALT SERPL W P-5'-P-CCNC: 33 U/L (ref 10–36)
AMYLASE SERPL-CCNC: 24 U/L (ref 28–100)
ANION GAP SERPL CALCULATED.3IONS-SCNC: 9.5 MMOL/L (ref 3.6–11.2)
ARTERIAL PATENCY WRIST A: ABNORMAL
AST SERPL-CCNC: 34 U/L (ref 10–30)
ATMOSPHERIC PRESS: 717 MMHG
B-HCG UR QL: NEGATIVE
BACTERIA UR QL AUTO: ABNORMAL /HPF
BASE EXCESS BLDA CALC-SCNC: 1.6 MMOL/L
BASOPHILS # BLD AUTO: 0.05 10*3/MM3 (ref 0–0.3)
BASOPHILS NFR BLD AUTO: 0.6 % (ref 0–2)
BDY SITE: ABNORMAL
BILIRUB SERPL-MCNC: 0.3 MG/DL (ref 0.2–1.8)
BILIRUB UR QL STRIP: NEGATIVE
BODY TEMPERATURE: 98.6 C
BUN BLD-MCNC: 11 MG/DL (ref 7–21)
BUN/CREAT SERPL: 16.7 (ref 7–25)
CALCIUM SPEC-SCNC: 8.2 MG/DL (ref 7.7–10)
CHLORIDE SERPL-SCNC: 108 MMOL/L (ref 99–112)
CLARITY UR: ABNORMAL
CO2 SERPL-SCNC: 24.5 MMOL/L (ref 24.3–31.9)
COHGB MFR BLD: 2.4 % (ref 0–5)
COLOR UR: YELLOW
CREAT BLD-MCNC: 0.66 MG/DL (ref 0.43–1.29)
D DIMER PPP FEU-MCNC: 0.6 MG/L (FEU) (ref 0–0.5)
DEPRECATED RDW RBC AUTO: 43.1 FL (ref 37–54)
EOSINOPHIL # BLD AUTO: 0.23 10*3/MM3 (ref 0–0.7)
EOSINOPHIL NFR BLD AUTO: 2.7 % (ref 0–5)
ERYTHROCYTE [DISTWIDTH] IN BLOOD BY AUTOMATED COUNT: 13.1 % (ref 11.5–14.5)
GFR SERPL CREATININE-BSD FRML MDRD: 106 ML/MIN/1.73
GLOBULIN UR ELPH-MCNC: 2.5 GM/DL
GLUCOSE BLD-MCNC: 131 MG/DL (ref 70–110)
GLUCOSE BLDC GLUCOMTR-MCNC: 45 MG/DL (ref 70–130)
GLUCOSE BLDC GLUCOMTR-MCNC: 536 MG/DL (ref 70–130)
GLUCOSE UR STRIP-MCNC: ABNORMAL MG/DL
HCO3 BLDA-SCNC: 24.8 MMOL/L (ref 22–26)
HCT VFR BLD AUTO: 34.4 % (ref 37–47)
HCT VFR BLD CALC: 36 % (ref 37–47)
HGB BLD-MCNC: 11.3 G/DL (ref 12–16)
HGB BLDA-MCNC: 12.2 G/DL (ref 12–16)
HGB UR QL STRIP.AUTO: NEGATIVE
HOROWITZ INDEX BLD+IHG-RTO: 21 %
HYALINE CASTS UR QL AUTO: ABNORMAL /LPF
IMM GRANULOCYTES # BLD: 0.11 10*3/MM3 (ref 0–0.03)
IMM GRANULOCYTES NFR BLD: 1.3 % (ref 0–0.5)
KETONES UR QL STRIP: NEGATIVE
LEUKOCYTE ESTERASE UR QL STRIP.AUTO: ABNORMAL
LIPASE SERPL-CCNC: 25 U/L (ref 13–60)
LYMPHOCYTES # BLD AUTO: 1.83 10*3/MM3 (ref 1–3)
LYMPHOCYTES NFR BLD AUTO: 21.4 % (ref 21–51)
MCH RBC QN AUTO: 32.7 PG (ref 27–33)
MCHC RBC AUTO-ENTMCNC: 32.8 G/DL (ref 33–37)
MCV RBC AUTO: 99.4 FL (ref 80–94)
METHGB BLD QL: 0.3 % (ref 0–3)
MODALITY: ABNORMAL
MONOCYTES # BLD AUTO: 0.57 10*3/MM3 (ref 0.1–0.9)
MONOCYTES NFR BLD AUTO: 6.7 % (ref 0–10)
NEUTROPHILS # BLD AUTO: 5.77 10*3/MM3 (ref 1.4–6.5)
NEUTROPHILS NFR BLD AUTO: 67.3 % (ref 30–70)
NITRITE UR QL STRIP: NEGATIVE
OSMOLALITY SERPL CALC.SUM OF ELEC: 284.3 MOSM/KG (ref 273–305)
OXYHGB MFR BLDV: 92.7 % (ref 85–100)
PCO2 BLDA: 34.2 MM HG (ref 35–45)
PH BLDA: 7.48 PH UNITS (ref 7.35–7.45)
PH UR STRIP.AUTO: 6.5 [PH] (ref 5–8)
PLATELET # BLD AUTO: 450 10*3/MM3 (ref 130–400)
PMV BLD AUTO: 9.2 FL (ref 6–10)
PO2 BLDA: 76.4 MM HG (ref 80–100)
POTASSIUM BLD-SCNC: 3.8 MMOL/L (ref 3.5–5.3)
PROT SERPL-MCNC: 5.8 G/DL (ref 6–8)
PROT UR QL STRIP: NEGATIVE
RBC # BLD AUTO: 3.46 10*6/MM3 (ref 4.2–5.4)
RBC # UR: ABNORMAL /HPF
REF LAB TEST METHOD: ABNORMAL
SAO2 % BLDCOA: 95.3 % (ref 90–100)
SODIUM BLD-SCNC: 142 MMOL/L (ref 135–153)
SP GR UR STRIP: 1.03 (ref 1–1.03)
SQUAMOUS #/AREA URNS HPF: ABNORMAL /HPF
TROPONIN I SERPL-MCNC: <0.006 NG/ML
UROBILINOGEN UR QL STRIP: ABNORMAL
WBC NRBC COR # BLD: 8.56 10*3/MM3 (ref 4.5–12.5)
WBC UR QL AUTO: ABNORMAL /HPF

## 2017-05-25 PROCEDURE — 99284 EMERGENCY DEPT VISIT MOD MDM: CPT

## 2017-05-25 PROCEDURE — 96375 TX/PRO/DX INJ NEW DRUG ADDON: CPT

## 2017-05-25 PROCEDURE — 71020 XR CHEST 2 VW: CPT | Performed by: RADIOLOGY

## 2017-05-25 PROCEDURE — 25010000002 HYDROMORPHONE PER 4 MG: Performed by: FAMILY MEDICINE

## 2017-05-25 PROCEDURE — 93005 ELECTROCARDIOGRAM TRACING: CPT | Performed by: PHYSICIAN ASSISTANT

## 2017-05-25 PROCEDURE — 96376 TX/PRO/DX INJ SAME DRUG ADON: CPT

## 2017-05-25 PROCEDURE — 96374 THER/PROPH/DIAG INJ IV PUSH: CPT

## 2017-05-25 PROCEDURE — 36415 COLL VENOUS BLD VENIPUNCTURE: CPT

## 2017-05-25 PROCEDURE — 76705 ECHO EXAM OF ABDOMEN: CPT | Performed by: RADIOLOGY

## 2017-05-25 PROCEDURE — 87086 URINE CULTURE/COLONY COUNT: CPT | Performed by: PHYSICIAN ASSISTANT

## 2017-05-25 PROCEDURE — 82150 ASSAY OF AMYLASE: CPT | Performed by: PHYSICIAN ASSISTANT

## 2017-05-25 PROCEDURE — 82009 KETONE BODYS QUAL: CPT | Performed by: PHYSICIAN ASSISTANT

## 2017-05-25 PROCEDURE — 63710000001 INSULIN REGULAR HUMAN PER 5 UNITS: Performed by: PHYSICIAN ASSISTANT

## 2017-05-25 PROCEDURE — 81025 URINE PREGNANCY TEST: CPT | Performed by: PHYSICIAN ASSISTANT

## 2017-05-25 PROCEDURE — 36600 WITHDRAWAL OF ARTERIAL BLOOD: CPT | Performed by: PHYSICIAN ASSISTANT

## 2017-05-25 PROCEDURE — C1751 CATH, INF, PER/CENT/MIDLINE: HCPCS

## 2017-05-25 PROCEDURE — 96361 HYDRATE IV INFUSION ADD-ON: CPT

## 2017-05-25 PROCEDURE — 83050 HGB METHEMOGLOBIN QUAN: CPT | Performed by: PHYSICIAN ASSISTANT

## 2017-05-25 PROCEDURE — 71020 HC CHEST PA AND LATERAL: CPT

## 2017-05-25 PROCEDURE — 82375 ASSAY CARBOXYHB QUANT: CPT | Performed by: PHYSICIAN ASSISTANT

## 2017-05-25 PROCEDURE — 81001 URINALYSIS AUTO W/SCOPE: CPT | Performed by: PHYSICIAN ASSISTANT

## 2017-05-25 PROCEDURE — 82962 GLUCOSE BLOOD TEST: CPT

## 2017-05-25 PROCEDURE — 85379 FIBRIN DEGRADATION QUANT: CPT | Performed by: PHYSICIAN ASSISTANT

## 2017-05-25 PROCEDURE — 85025 COMPLETE CBC W/AUTO DIFF WBC: CPT | Performed by: PHYSICIAN ASSISTANT

## 2017-05-25 PROCEDURE — 25010000002 HYDROMORPHONE PER 4 MG

## 2017-05-25 PROCEDURE — 80053 COMPREHEN METABOLIC PANEL: CPT | Performed by: PHYSICIAN ASSISTANT

## 2017-05-25 PROCEDURE — 84484 ASSAY OF TROPONIN QUANT: CPT | Performed by: PHYSICIAN ASSISTANT

## 2017-05-25 PROCEDURE — 76705 ECHO EXAM OF ABDOMEN: CPT

## 2017-05-25 PROCEDURE — 82805 BLOOD GASES W/O2 SATURATION: CPT | Performed by: PHYSICIAN ASSISTANT

## 2017-05-25 PROCEDURE — 25010000002 ONDANSETRON PER 1 MG: Performed by: PHYSICIAN ASSISTANT

## 2017-05-25 PROCEDURE — 83690 ASSAY OF LIPASE: CPT | Performed by: PHYSICIAN ASSISTANT

## 2017-05-25 RX ORDER — SODIUM CHLORIDE 0.9 % (FLUSH) 0.9 %
10 SYRINGE (ML) INJECTION AS NEEDED
Status: DISCONTINUED | OUTPATIENT
Start: 2017-05-25 | End: 2017-05-25 | Stop reason: HOSPADM

## 2017-05-25 RX ORDER — ONDANSETRON 2 MG/ML
4 INJECTION INTRAMUSCULAR; INTRAVENOUS ONCE
Status: COMPLETED | OUTPATIENT
Start: 2017-05-25 | End: 2017-05-25

## 2017-05-25 RX ORDER — DEXTROSE MONOHYDRATE 25 G/50ML
25 INJECTION, SOLUTION INTRAVENOUS ONCE
Status: COMPLETED | OUTPATIENT
Start: 2017-05-25 | End: 2017-05-25

## 2017-05-25 RX ORDER — SODIUM CHLORIDE 0.9 % (FLUSH) 0.9 %
10 SYRINGE (ML) INJECTION EVERY 12 HOURS SCHEDULED
Status: DISCONTINUED | OUTPATIENT
Start: 2017-05-25 | End: 2017-05-25 | Stop reason: HOSPADM

## 2017-05-25 RX ADMIN — HYDROMORPHONE HYDROCHLORIDE 1 MG: 1 INJECTION, SOLUTION INTRAMUSCULAR; INTRAVENOUS; SUBCUTANEOUS at 13:15

## 2017-05-25 RX ADMIN — ONDANSETRON 4 MG: 2 INJECTION, SOLUTION INTRAMUSCULAR; INTRAVENOUS at 11:45

## 2017-05-25 RX ADMIN — HYDROMORPHONE HYDROCHLORIDE 1 MG: 1 INJECTION, SOLUTION INTRAMUSCULAR; INTRAVENOUS; SUBCUTANEOUS at 11:44

## 2017-05-25 RX ADMIN — DEXTROSE MONOHYDRATE 25 G: 25 INJECTION, SOLUTION INTRAVENOUS at 14:00

## 2017-05-25 RX ADMIN — SODIUM CHLORIDE 1000 ML: 9 INJECTION, SOLUTION INTRAVENOUS at 11:45

## 2017-05-25 RX ADMIN — HUMAN INSULIN 10 UNITS: 100 INJECTION, SOLUTION SUBCUTANEOUS at 11:44

## 2017-05-25 RX ADMIN — Medication 1 MG: at 13:15

## 2017-05-25 RX ADMIN — Medication 10 ML: at 13:15

## 2017-05-27 LAB — BACTERIA SPEC AEROBE CULT: NORMAL

## 2018-03-14 ENCOUNTER — APPOINTMENT (OUTPATIENT)
Dept: GENERAL RADIOLOGY | Facility: HOSPITAL | Age: 33
End: 2018-03-14

## 2018-03-14 ENCOUNTER — APPOINTMENT (OUTPATIENT)
Dept: CT IMAGING | Facility: HOSPITAL | Age: 33
End: 2018-03-14

## 2018-03-14 ENCOUNTER — HOSPITAL ENCOUNTER (INPATIENT)
Facility: HOSPITAL | Age: 33
LOS: 1 days | Discharge: LEFT BEFORE DISCHARGE | End: 2018-03-14
Attending: EMERGENCY MEDICINE | Admitting: INTERNAL MEDICINE

## 2018-03-14 VITALS
SYSTOLIC BLOOD PRESSURE: 112 MMHG | HEIGHT: 67 IN | RESPIRATION RATE: 18 BRPM | WEIGHT: 140 LBS | TEMPERATURE: 98.6 F | OXYGEN SATURATION: 99 % | HEART RATE: 115 BPM | DIASTOLIC BLOOD PRESSURE: 64 MMHG | BODY MASS INDEX: 21.97 KG/M2

## 2018-03-14 DIAGNOSIS — T39.011A ASPIRIN TOXICITY, ACCIDENTAL OR UNINTENTIONAL, INITIAL ENCOUNTER: Primary | ICD-10-CM

## 2018-03-14 DIAGNOSIS — E16.2 HYPOGLYCEMIA: ICD-10-CM

## 2018-03-14 PROBLEM — B18.2 CHRONIC HEPATITIS C VIRUS INFECTION: Chronic | Status: ACTIVE | Noted: 2018-03-14

## 2018-03-14 PROBLEM — E10.9 TYPE I DIABETES MELLITUS (HCC): Chronic | Status: ACTIVE | Noted: 2018-03-14

## 2018-03-14 PROBLEM — E03.9 HYPOTHYROIDISM: Chronic | Status: ACTIVE | Noted: 2018-03-14

## 2018-03-14 PROBLEM — K21.9 GASTROESOPHAGEAL REFLUX DISEASE WITHOUT ESOPHAGITIS: Chronic | Status: ACTIVE | Noted: 2018-03-14

## 2018-03-14 PROBLEM — J45.909 ASTHMA: Chronic | Status: ACTIVE | Noted: 2018-03-14

## 2018-03-14 PROBLEM — K90.0 CELIAC DISEASE: Chronic | Status: ACTIVE | Noted: 2018-03-14

## 2018-03-14 PROBLEM — E11.42 DIABETIC PERIPHERAL NEUROPATHY (HCC): Chronic | Status: ACTIVE | Noted: 2018-03-14

## 2018-03-14 LAB
6-ACETYL MORPHINE: NEGATIVE
A-A DO2: 9.9 MMHG (ref 0–300)
ALBUMIN SERPL-MCNC: 3.8 G/DL (ref 3.5–5)
ALBUMIN/GLOB SERPL: 1.7 G/DL (ref 1.5–2.5)
ALP SERPL-CCNC: 71 U/L (ref 35–104)
ALT SERPL W P-5'-P-CCNC: 34 U/L (ref 10–36)
AMPHET+METHAMPHET UR QL: POSITIVE
ANION GAP SERPL CALCULATED.3IONS-SCNC: 3.2 MMOL/L (ref 3.6–11.2)
APAP SERPL-MCNC: <10 MCG/ML (ref 0–200)
APTT PPP: 30.5 SECONDS (ref 23.8–36.1)
ARTERIAL PATENCY WRIST A: ABNORMAL
AST SERPL-CCNC: 34 U/L (ref 10–30)
ATMOSPHERIC PRESS: 729 MMHG
B-HCG UR QL: NEGATIVE
BARBITURATES UR QL SCN: NEGATIVE
BASE EXCESS BLDA CALC-SCNC: -2.8 MMOL/L
BASOPHILS # BLD AUTO: 0.02 10*3/MM3 (ref 0–0.3)
BASOPHILS NFR BLD AUTO: 0.2 % (ref 0–2)
BDY SITE: ABNORMAL
BENZODIAZ UR QL SCN: POSITIVE
BILIRUB SERPL-MCNC: 0.1 MG/DL (ref 0.2–1.8)
BILIRUB UR QL STRIP: NEGATIVE
BODY TEMPERATURE: 98.6 C
BUN BLD-MCNC: 18 MG/DL (ref 7–21)
BUN/CREAT SERPL: 24 (ref 7–25)
BUPRENORPHINE SERPL-MCNC: POSITIVE NG/ML
CALCIUM SPEC-SCNC: 8.4 MG/DL (ref 7.7–10)
CANNABINOIDS SERPL QL: NEGATIVE
CHLORIDE SERPL-SCNC: 115 MMOL/L (ref 99–112)
CLARITY UR: CLEAR
CO2 SERPL-SCNC: 20.8 MMOL/L (ref 24.3–31.9)
COCAINE UR QL: NEGATIVE
COHGB MFR BLD: 1.8 % (ref 0–5)
COLOR UR: YELLOW
CREAT BLD-MCNC: 0.75 MG/DL (ref 0.43–1.29)
DEPRECATED RDW RBC AUTO: 51 FL (ref 37–54)
EOSINOPHIL # BLD AUTO: 0.14 10*3/MM3 (ref 0–0.7)
EOSINOPHIL NFR BLD AUTO: 1.6 % (ref 0–5)
ERYTHROCYTE [DISTWIDTH] IN BLOOD BY AUTOMATED COUNT: 15.8 % (ref 11.5–14.5)
ETHANOL BLD-MCNC: <10 MG/DL
ETHANOL UR QL: <0.01 %
GFR SERPL CREATININE-BSD FRML MDRD: 91 ML/MIN/1.73
GLOBULIN UR ELPH-MCNC: 2.3 GM/DL
GLUCOSE BLD-MCNC: 228 MG/DL (ref 70–110)
GLUCOSE BLDC GLUCOMTR-MCNC: 236 MG/DL (ref 70–130)
GLUCOSE BLDC GLUCOMTR-MCNC: 49 MG/DL (ref 70–130)
GLUCOSE UR STRIP-MCNC: ABNORMAL MG/DL
HCO3 BLDA-SCNC: 20.3 MMOL/L (ref 22–26)
HCT VFR BLD AUTO: 37.5 % (ref 37–47)
HCT VFR BLD CALC: 37 % (ref 37–47)
HGB BLD-MCNC: 12 G/DL (ref 12–16)
HGB BLDA-MCNC: 12.5 G/DL (ref 12–16)
HGB UR QL STRIP.AUTO: NEGATIVE
HOROWITZ INDEX BLD+IHG-RTO: 21 %
IMM GRANULOCYTES # BLD: 0.01 10*3/MM3 (ref 0–0.03)
IMM GRANULOCYTES NFR BLD: 0.1 % (ref 0–0.5)
INR PPP: 1.17 (ref 0.9–1.1)
KETONES UR QL STRIP: ABNORMAL
LEUKOCYTE ESTERASE UR QL STRIP.AUTO: NEGATIVE
LYMPHOCYTES # BLD AUTO: 1.05 10*3/MM3 (ref 1–3)
LYMPHOCYTES NFR BLD AUTO: 12.2 % (ref 21–51)
MCH RBC QN AUTO: 28 PG (ref 27–33)
MCHC RBC AUTO-ENTMCNC: 32 G/DL (ref 33–37)
MCV RBC AUTO: 87.4 FL (ref 80–94)
METHADONE UR QL SCN: NEGATIVE
METHGB BLD QL: 0.1 % (ref 0–3)
MODALITY: ABNORMAL
MONOCYTES # BLD AUTO: 0.37 10*3/MM3 (ref 0.1–0.9)
MONOCYTES NFR BLD AUTO: 4.3 % (ref 0–10)
NEUTROPHILS # BLD AUTO: 7.04 10*3/MM3 (ref 1.4–6.5)
NEUTROPHILS NFR BLD AUTO: 81.6 % (ref 30–70)
NITRITE UR QL STRIP: NEGATIVE
OPIATES UR QL: NEGATIVE
OSMOLALITY SERPL CALC.SUM OF ELEC: 286.6 MOSM/KG (ref 273–305)
OXYCODONE UR QL SCN: NEGATIVE
OXYHGB MFR BLDV: 95.1 % (ref 85–100)
PCO2 BLDA: 30.3 MM HG (ref 35–45)
PCP UR QL SCN: NEGATIVE
PH BLDA: 7.44 PH UNITS (ref 7.35–7.45)
PH UR STRIP.AUTO: <=5 [PH] (ref 5–8)
PLATELET # BLD AUTO: 323 10*3/MM3 (ref 130–400)
PMV BLD AUTO: 9.4 FL (ref 6–10)
PO2 BLDA: 97 MM HG (ref 80–100)
POTASSIUM BLD-SCNC: 3.7 MMOL/L (ref 3.5–5.3)
PROT SERPL-MCNC: 6.1 G/DL (ref 6–8)
PROT UR QL STRIP: NEGATIVE
PROTHROMBIN TIME: 15.1 SECONDS (ref 11–15.4)
RBC # BLD AUTO: 4.29 10*6/MM3 (ref 4.2–5.4)
SALICYLATES SERPL-MCNC: 53.9 MG/DL (ref 0–30)
SAO2 % BLDCOA: 96.9 % (ref 90–100)
SODIUM BLD-SCNC: 139 MMOL/L (ref 135–153)
SP GR UR STRIP: 1.03 (ref 1–1.03)
UROBILINOGEN UR QL STRIP: ABNORMAL
WBC NRBC COR # BLD: 8.63 10*3/MM3 (ref 4.5–12.5)

## 2018-03-14 PROCEDURE — 70450 CT HEAD/BRAIN W/O DYE: CPT | Performed by: RADIOLOGY

## 2018-03-14 PROCEDURE — 85610 PROTHROMBIN TIME: CPT | Performed by: PHYSICIAN ASSISTANT

## 2018-03-14 PROCEDURE — 71045 X-RAY EXAM CHEST 1 VIEW: CPT | Performed by: RADIOLOGY

## 2018-03-14 PROCEDURE — 80307 DRUG TEST PRSMV CHEM ANLYZR: CPT | Performed by: PHYSICIAN ASSISTANT

## 2018-03-14 PROCEDURE — 82375 ASSAY CARBOXYHB QUANT: CPT | Performed by: PHYSICIAN ASSISTANT

## 2018-03-14 PROCEDURE — 70450 CT HEAD/BRAIN W/O DYE: CPT

## 2018-03-14 PROCEDURE — 81003 URINALYSIS AUTO W/O SCOPE: CPT | Performed by: PHYSICIAN ASSISTANT

## 2018-03-14 PROCEDURE — 82805 BLOOD GASES W/O2 SATURATION: CPT | Performed by: PHYSICIAN ASSISTANT

## 2018-03-14 PROCEDURE — 99285 EMERGENCY DEPT VISIT HI MDM: CPT

## 2018-03-14 PROCEDURE — 81025 URINE PREGNANCY TEST: CPT | Performed by: PHYSICIAN ASSISTANT

## 2018-03-14 PROCEDURE — 82962 GLUCOSE BLOOD TEST: CPT

## 2018-03-14 PROCEDURE — 80053 COMPREHEN METABOLIC PANEL: CPT | Performed by: PHYSICIAN ASSISTANT

## 2018-03-14 PROCEDURE — 36600 WITHDRAWAL OF ARTERIAL BLOOD: CPT | Performed by: PHYSICIAN ASSISTANT

## 2018-03-14 PROCEDURE — 85730 THROMBOPLASTIN TIME PARTIAL: CPT | Performed by: PHYSICIAN ASSISTANT

## 2018-03-14 PROCEDURE — 85025 COMPLETE CBC W/AUTO DIFF WBC: CPT | Performed by: PHYSICIAN ASSISTANT

## 2018-03-14 PROCEDURE — 83050 HGB METHEMOGLOBIN QUAN: CPT | Performed by: PHYSICIAN ASSISTANT

## 2018-03-14 PROCEDURE — 71045 X-RAY EXAM CHEST 1 VIEW: CPT

## 2018-03-14 RX ORDER — DEXTROSE MONOHYDRATE 25 G/50ML
25 INJECTION, SOLUTION INTRAVENOUS ONCE
Status: COMPLETED | OUTPATIENT
Start: 2018-03-14 | End: 2018-03-14

## 2018-03-14 RX ORDER — NICOTINE 21 MG/24HR
1 PATCH, TRANSDERMAL 24 HOURS TRANSDERMAL ONCE
Status: DISCONTINUED | OUTPATIENT
Start: 2018-03-14 | End: 2018-03-14 | Stop reason: HOSPADM

## 2018-03-14 RX ORDER — SODIUM CHLORIDE 0.9 % (FLUSH) 0.9 %
1-10 SYRINGE (ML) INJECTION AS NEEDED
Status: CANCELLED | OUTPATIENT
Start: 2018-03-14

## 2018-03-14 RX ORDER — DEXTROSE MONOHYDRATE 25 G/50ML
25 INJECTION, SOLUTION INTRAVENOUS
Status: CANCELLED | OUTPATIENT
Start: 2018-03-14

## 2018-03-14 RX ORDER — NITROGLYCERIN 0.4 MG/1
0.4 TABLET SUBLINGUAL
Status: CANCELLED | OUTPATIENT
Start: 2018-03-14

## 2018-03-14 RX ORDER — SODIUM CHLORIDE 0.9 % (FLUSH) 0.9 %
10 SYRINGE (ML) INJECTION AS NEEDED
Status: DISCONTINUED | OUTPATIENT
Start: 2018-03-14 | End: 2018-03-14

## 2018-03-14 RX ORDER — SODIUM CHLORIDE 9 MG/ML
125 INJECTION, SOLUTION INTRAVENOUS CONTINUOUS
Status: DISCONTINUED | OUTPATIENT
Start: 2018-03-14 | End: 2018-03-14 | Stop reason: HOSPADM

## 2018-03-14 RX ORDER — NICOTINE POLACRILEX 4 MG
15 LOZENGE BUCCAL
Status: CANCELLED | OUTPATIENT
Start: 2018-03-14

## 2018-03-14 RX ORDER — HEPARIN SODIUM 5000 [USP'U]/ML
5000 INJECTION, SOLUTION INTRAVENOUS; SUBCUTANEOUS EVERY 12 HOURS SCHEDULED
Status: CANCELLED | OUTPATIENT
Start: 2018-03-14

## 2018-03-14 RX ADMIN — SODIUM CHLORIDE 1000 ML: 9 INJECTION, SOLUTION INTRAVENOUS at 15:30

## 2018-03-14 RX ADMIN — NICOTINE 1 PATCH: 21 PATCH TRANSDERMAL at 16:45

## 2018-03-14 RX ADMIN — DEXTROSE MONOHYDRATE 25 G: 25 INJECTION, SOLUTION INTRAVENOUS at 13:39

## 2018-03-14 RX ADMIN — SODIUM BICARBONATE: 84 INJECTION, SOLUTION INTRAVENOUS at 16:45

## 2018-03-14 RX ADMIN — SODIUM CHLORIDE 125 ML/HR: 9 INJECTION, SOLUTION INTRAVENOUS at 15:30

## 2018-03-14 NOTE — H&P
[facility] HOSPITALIST HISTORY AND PHYSICAL    Patient Identification:  Name:  Jes Moreno  Age:  32 y.o.  Sex:  female  :  1985  MRN:  [id number]   Visit Number:  [id number]  Primary Care Physician:  RONY Moe     Chief complaint:   Chief Complaint   Patient presents with   • Hypoglycemia and Salicylate toxicity      History of presenting illness:   Patient is a 32 y.o. female with past medical history significant for type I diabetes mellitus, hypothyroidism, hepatitis C, diabetic peripheral neuropathy, Celiac disease, and GERD that presented to the [facility] emergency department via EMS for evaluation of Altered mental status.    Per report from ER physician, patient was not waking up and there was a concern for drug overdose.  EMS was called and patient was found to have low blood sugar.  She was given D50 and her blood sugar improved.  Patient woke up and did not had any further episodes of altered mental status thereafter.  In the ER patient complained of ringing in her ear as well as numbness in her fingers and lab data showed elevated salicylate level.    Patient had stable vital in the emergency department.  Initial blood glucose was 49 which improved with D50.  Rest of her lab data was pretty unremarkable.  EtOH within normal limits.  HCG negative.  Urine drug screen positive for amphetamines, benzodiazepines, and buprenorphine.  Urinalysis with no signs of infection, 1+ glucose and 1+ ketones.  Acetaminophen level within normal limits.  Salicylate level elevated at 53.9.  ABG with pH 7.443, PCO2 30.3, PO2 97.0, HCO3 20.3, oxygen saturation 96.9% on room air.  CT of the head without contrast with no evidence of acute intracranial abnormality.  Chest x-ray with no evidence of acute cardiopulmonary disease.  While in the emergency department, patient was given 25 g of dextrose as well as a 1 L bolus of normal saline.  Per poison control recommendations,  patient was started on a sodium bicarbonate drip    Patient was transferred to PCU and within a few minutes of arrival she started taking off her telemetry monitor as well as IV lines and was wanting to leave the floor to go smoke and leave the hospital.  I went in to talk to the patient and at that time patient was just very 2 leave the hospital.  She stated that she is wanting to go to [facility] where she is allowed to go out to smoke whenever she wants and she does not want to stay in [facility].  I explained to her that her salicylate level was elevated which can cause acid accumulation as well as end organ damage.  Patient patient stated that she will go to [facility] and does not want to stay here and signed paper to leave AGAINST MEDICAL ADVICE.    Patient has been admitted to the progressive care unit for further evaluation and treatment.   ---------------------------------------------------------------------------------------------------------------------   Review of Systems   Could not be done since patient refused.  ---------------------------------------------------------------------------------------------------------------------   Past Medical History:   Diagnosis Date   • Asthma    • Celiac disease    • Diabetes mellitus type 1    • Diabetic ketoacidosis    • Disease of thyroid gland    • Hepatitis C    • Infectious viral hepatitis     hepititis C   • Neuropathy    • Reflux gastritis      Past Surgical History:   Procedure Laterality Date   •  SECTION      X 2     Family History   Problem Relation Age of Onset   • No Known Problems Mother    • No Known Problems Father    • No Known Problems Sister    • No Known Problems Brother    • No Known Problems Son    • No Known Problems Daughter    • No Known Problems Maternal Grandfather    • No Known Problems Paternal Grandmother    • No Known Problems Cousin    • Rheum arthritis Neg Hx    • Osteoarthritis Neg Hx    • Asthma Neg Hx     • Diabetes Neg Hx    • Heart failure Neg Hx    • Hyperlipidemia Neg Hx    • Migraines Neg Hx    • Rashes / Skin problems Neg Hx    • Seizures Neg Hx    • Stroke Neg Hx    • Thyroid disease Neg Hx    • Diabetes type I Maternal Grandmother    • Breast cancer Maternal Grandmother    • Hypertension Maternal Grandmother    • Diabetes type II Paternal Grandfather      Social History     Social History   • Marital status: Single     Social History Main Topics   • Smoking status: Unknown If Ever Smoked   • Smokeless tobacco: Never Used      Comment: unable to assess    • Alcohol use No      Comment: unable to assess    • Drug use: No      Comment: unable to assess    • Sexual activity: Defer      Comment: unable to assess      Other Topics Concern   • Not on file     Social History Narrative    ** Merged History Encounter **          ---------------------------------------------------------------------------------------------------------------------   Allergies:  Sulfa antibiotics  ---------------------------------------------------------------------------------------------------------------------   Prior to Admission Medications     Prescriptions Last Dose Informant Patient Reported? Taking?    albuterol (ACCUNEB) 1.25 MG/3ML nebulizer solution   Yes No    Take 1 ampule by nebulization every 6 (six) hours as needed for wheezing.    albuterol (PROVENTIL HFA;VENTOLIN HFA) 108 (90 BASE) MCG/ACT inhaler   Yes No    Inhale 2 puffs every 4 (four) hours as needed for wheezing.    gabapentin (NEURONTIN) 300 MG capsule   Yes No    Take 300 mg by mouth 3 (three) times a day.    HUMALOG KWIKPEN 100 UNIT/ML solution pen-injector   Yes No    3 (Three) Times a Day. SLIDING SCALE    insulin aspart (NOVOLOG FLEXPEN) 100 UNIT/ML solution pen-injector sc pen   Yes No    Inject  under the skin 3 (three) times a day with meals.    insulin detemir (LEVEMIR) 100 UNIT/ML injection   Yes No    Inject  under the skin daily.    Insulin Glargine  (TOUJEO SOLOSTAR SC)   Yes No    Inject  under the skin.    ipratropium-albuterol (COMBIVENT RESPIMAT)  MCG/ACT inhaler   Yes No    Inhale 1 puff 4 (four) times a day as needed for wheezing.    ipratropium-albuterol (COMBIVENT)  MCG/ACT inhaler   Yes No    Inhale 2 puffs every 6 (six) hours as needed for wheezing.    LANTUS SOLOSTAR 100 UNIT/ML injection pen   Yes No    Inject 25 Units as directed Daily.    levothyroxine (SYNTHROID, LEVOTHROID) 100 MCG tablet   Yes No    Take 1 tablet by mouth Daily.    levothyroxine (SYNTHROID, LEVOTHROID) 88 MCG tablet   Yes No    Take 88 mcg by mouth daily.    omeprazole (PriLOSEC) 20 MG capsule   Yes No    Take 20 mg by mouth daily.    ondansetron (ZOFRAN) 4 MG tablet   No No    Take 1 tablet PRN nausea every 8 hours.    ZUBSOLV 5.7-1.4 MG sublingual tablet   Yes No    Take 1 tablet by mouth 2 (Two) Times a Day.        Hospital Scheduled Meds:    nicotine 1 patch Transdermal Once       custom IV infusion builder  Last Rate: 125 mL/hr at 03/14/18 1645   sodium chloride 125 mL/hr Last Rate: 125 mL/hr (03/14/18 1530)     ---------------------------------------------------------------------------------------------------------------------   Vital Signs:  Temp:  [98.6 °F (37 °C)] 98.6 °F (37 °C)  Heart Rate:  [115] 115  Resp:  [18] 18  BP: (101-126)/(56-75) 109/56  1    03/14/18  1339   Weight: 63.5 kg (140 lb)     Body mass index is 21.93 kg/m².    SpO2 Percentage    03/14/18 1608 03/14/18 1617 03/14/18 1631   SpO2: 100% 99% 99%     ---------------------------------------------------------------------------------------------------------------------   Physical Exam:    Physical examination could not be performed due to the above reason.  ---------------------------------------------------------------------------------------------------------------------  EKG:      Could not be  done  ---------------------------------------------------------------------------------------------------------------------     Results from last 7 days  Lab Units 03/14/18  1513 03/14/18  1404   WBC 10*3/mm3  --  8.63   HEMOGLOBIN g/dL  --  12.0   HEMATOCRIT %  --  37.5   MCV fL  --  87.4   MCHC g/dL  --  32.0*   PLATELETS 10*3/mm3  --  323   INR  1.17*  --        Results from last 7 days  Lab Units 03/14/18  1502   PH, ARTERIAL pH units 7.443   PO2 ART mm Hg 97.0   PCO2, ARTERIAL mm Hg 30.3*   HCO3 ART mmol/L 20.3*           Results from last 7 days  Lab Units 03/14/18  1404   SODIUM mmol/L 139   POTASSIUM mmol/L 3.7   CHLORIDE mmol/L 115*   CO2 mmol/L 20.8*   BUN mg/dL 18   CREATININE mg/dL 0.75   EGFR IF NONAFRICN AM mL/min/1.73 91   CALCIUM mg/dL 8.4   GLUCOSE mg/dL 228*   ALBUMIN g/dL 3.80   BILIRUBIN mg/dL 0.1*   ALK PHOS U/L 71   AST (SGOT) U/L 34*   ALT (SGPT) U/L 34   Estimated Creatinine Clearance: 109.9 mL/min (by C-G formula based on SCr of 0.75 mg/dL).  No results found for: AMMONIA          No results found for: HGBA1C  No results found for: TSH, FREET4  Lab Results   Component Value Date    PREGTESTUR Negative 03/14/2018     Pain Management Panel     Pain Management Panel Latest Ref Rng & Units 3/14/2018 5/14/2017    AMPHETAMINES SCREEN, URINE Negative Positive(A) Positive(A)    BARBITURATES SCREEN Negative Negative Negative    BENZODIAZEPINE SCREEN, URINE Negative Positive(A) Positive(A)    BUPRENORPHINE Negative Positive(A) Positive(A)    COCAINE SCREEN, URINE Negative Negative Negative    METHADONE SCREEN, URINE Negative Negative Negative              I have personally reviewed the above laboratory results.   ---------------------------------------------------------------------------------------------------------------------  Imaging Results (last 7 days)     Procedure Component Value Units Date/Time    CT Head Without Contrast [[id number]] Collected:  03/14/18 1551     Updated:  03/14/18 1460     Narrative:       FINDINGS:   No acute intracranial abnormality. No midline shift or mass  effect. No hydrocephalus or intracranial hemorrhage. There is no CT  evidence of acute vascular territory infarct.  Bone windows show no  acute osseous abnormality.    Impression:       Stable exam. No CT evidence of acute intracranial  abnormality.     This report was finalized on 3/14/2018 3:55 PM by Dr. Madhu Crockett MD.    XR Chest 1 View [[id number]] Collected:  03/14/18 1600     Updated:  03/14/18 1616    Narrative:       FINDINGS:   Lungs are aerated.   Heart and mediastinal contours are unremarkable.   No pneumothorax.   No pleural effusion.   No acute osseous findings.    Impression:       Stable appearance of the chest. No acute cardiopulmonary  findings identified.     This report was finalized on 3/14/2018 4:00 PM by Dr. Madhu Crockett MD.      I have personally reviewed the above radiology results.   ---------------------------------------------------------------------------------------------------------------------  Assessment:    -Salicylate toxicity  -Type 1 diabetes mellitus with hypoglycemia present on admission  -Diabetic peripheral neuropathy  -Hypothyroidism  -History of celiac disease  -History of asthma  -Hepatitis C  -GERD  -Tobacco abuse  -Substance abuse,  urine drug screen positive for amphetamine, benzodiazepines, and Suboxone on admission      Pertinent discussion with the ER physician poison control was contacted who had advised to monitor salicylate level every 2 hours along with electrolytes and BMP.  Poison control has also advised to keep patient on bicarbonate drip.  However, patient refused any treatment and any evaluation and left AGAINST MEDICAL ADVICE and therefore no further management could be carried out.  Patient was explained the risks of leaving without completing treatment by the nursing staff as well as by myself but she refused to stay for any type of treatment.      Rachele EVANS  RONY Henson  03/14/18  4:49 PM  ---------------------------------------------------------------------------------------------------------------------

## 2018-03-14 NOTE — ED PROVIDER NOTES
Subjective     Hypoglycemia   Initial blood sugar:  20  Severity:  Moderate  Onset quality:  Sudden  Duration:  1 hour  Timing:  Constant  Progression:  Unchanged  Chronicity:  New  Diabetic status:  Controlled with insulin  Context comment:  Patient reports that she had taken insulin earlier though has not eaten since last evening.  She states that she has had problems with her sugar dropping before.  Her initial glucose was 20.  EMS was unable to obtain an IV and gave her oral glucose.  Relieved by:  Nothing  Associated symptoms: altered mental status    Associated symptoms: no seizures        Review of Systems   Constitutional: Negative.  Negative for fever.   HENT: Negative.    Respiratory: Negative.    Cardiovascular: Negative.  Negative for chest pain.   Gastrointestinal: Negative.  Negative for abdominal pain.   Endocrine: Negative.    Genitourinary: Negative.  Negative for dysuria.   Skin: Negative.    Neurological: Negative.  Negative for seizures.   All other systems reviewed and are negative.      Past Medical History:   Diagnosis Date   • Asthma    • Celiac disease    • Diabetes mellitus type 1    • Diabetic ketoacidosis    • Disease of thyroid gland    • Hepatitis C    • Infectious viral hepatitis     hepititis C   • Neuropathy    • Reflux gastritis        Allergies   Allergen Reactions   • Sulfa Antibiotics        Past Surgical History:   Procedure Laterality Date   •  SECTION      X 2       Family History   Problem Relation Age of Onset   • No Known Problems Mother    • No Known Problems Father    • No Known Problems Sister    • No Known Problems Brother    • No Known Problems Son    • No Known Problems Daughter    • No Known Problems Maternal Grandfather    • No Known Problems Paternal Grandmother    • No Known Problems Cousin    • Rheum arthritis Neg Hx    • Osteoarthritis Neg Hx    • Asthma Neg Hx    • Diabetes Neg Hx    • Heart failure Neg Hx    • Hyperlipidemia Neg Hx    • Migraines Neg  Hx    • Rashes / Skin problems Neg Hx    • Seizures Neg Hx    • Stroke Neg Hx    • Thyroid disease Neg Hx    • Diabetes type I Maternal Grandmother    • Breast cancer Maternal Grandmother    • Hypertension Maternal Grandmother    • Diabetes type II Paternal Grandfather        Social History     Social History   • Marital status: Single     Social History Main Topics   • Smoking status: Unknown If Ever Smoked   • Smokeless tobacco: Never Used      Comment: unable to assess    • Alcohol use No      Comment: unable to assess    • Drug use: No      Comment: unable to assess    • Sexual activity: Defer      Comment: unable to assess      Other Topics Concern   • Not on file     Social History Narrative    ** Merged History Encounter **                Objective   Physical Exam   Constitutional: She is oriented to person, place, and time. She appears well-developed and well-nourished. No distress.   Patient was somnolent prior to D50 administration and subsequently became alert and oriented.  She was able to provide history that she had taken her insulin earlier though unfortunately had not eaten.   HENT:   Head: Normocephalic and atraumatic.   Right Ear: External ear normal.   Left Ear: External ear normal.   Nose: Nose normal.   Eyes: Conjunctivae and EOM are normal. Pupils are equal, round, and reactive to light.   Neck: Normal range of motion. Neck supple. No JVD present. No tracheal deviation present.   Cardiovascular: Normal rate, regular rhythm and normal heart sounds.    No murmur heard.  Pulmonary/Chest: Effort normal and breath sounds normal. No respiratory distress. She has no wheezes.   Abdominal: Soft. Bowel sounds are normal. There is no tenderness.   Musculoskeletal: Normal range of motion. She exhibits no edema or deformity.   Neurological: She is alert and oriented to person, place, and time. No cranial nerve deficit.   Skin: Skin is warm and dry. No rash noted. She is not diaphoretic. No erythema. No  pallor.   Psychiatric: She has a normal mood and affect. Her behavior is normal. Thought content normal.   Nursing note and vitals reviewed.      Procedures         ED Course  ED Course   Comment By Time   Patient is feeling much better.  She is alert and oriented ×3.  She states that this morning she took insulin and her sugar was 126.  She unfortunately did not eat RONY Gorman 03/14 1422   Upon receiving patient's elevated salicylate level further,  inquiry reveals patient has been taking at least 5 extra strength [employer] powders daily for 2 weeks.to combat her withdrawal symptoms from Suboxone.  She denies acute ingestion.  She has had ringing in her ears and hearing loss along with nausea. RONY Gorman 03/14 1537   Paged RONY Cole 03/14 1616                  MDM  Number of Diagnoses or Management Options  Aspirin toxicity, accidental or unintentional, initial encounter: new and requires workup  Hypoglycemia: new and requires workup     Amount and/or Complexity of Data Reviewed  Clinical lab tests: reviewed and ordered  Tests in the radiology section of CPT®: reviewed and ordered  Tests in the medicine section of CPT®: ordered and reviewed  Discuss the patient with other providers: yes  Independent visualization of images, tracings, or specimens: yes    Risk of Complications, Morbidity, and/or Mortality  Presenting problems: moderate    Critical Care  Total time providing critical care: 30-74 minutes (45)      Final diagnoses:   Aspirin toxicity, accidental or unintentional, initial encounter   Hypoglycemia            RONY Gorman  03/14/18 1713

## 2018-03-14 NOTE — DISCHARGE SUMMARY
[facility] HOSPITALIST MEDICINE DISCHARGE SUMMARY    Patient Identification:  Name:  Jes Moreno  Age:  32 y.o.  Sex:  female  :  1985  MRN:  [id number]  Visit Number:  [id number]    Date of Admission: 3/14/2018  Date of leaving AMA:  3/14/2018     PCP: RONY Moe    DIAGNOSIS  -Salicylate toxicity  -Type 1 diabetes mellitus with hypoglycemia present on admission  -Diabetic peripheral neuropathy  -Hypothyroidism  -History of celiac disease  -History of asthma  -Hepatitis C  -GERD  -Tobacco abuse  -Substance abuse,  urine drug screen positive for amphetamine, benzodiazepines, and Suboxone on admission      CONSULTS   · None    PROCEDURES PERFORMED  · None    REASON FOR ADMISSION  Patient is a 32 y.o. female presented to [facility] with altered mental status.  Please see the admitting history and physical for further details.       HOSPITAL COURSE   EMS was called due to patient not waking up and she was found to have low blood sugar.  She was given D50 and her blood sugar improved.  Patient woke up and did not had any further episodes of altered mental status thereafter.  In the ER patient complained of ringing in her ear as well as numbness in her fingers and lab data showed elevated salicylate level.    Patient was transferred to PCU and within a few minutes of arrival she started taking off her telemetry monitor as well as IV lines and was wanting to leave the floor to go smoke and leave the hospital.  I went in to talk to the patient and at that time patient was just very 2 leave the hospital.  She stated that she is wanting to go to [facility] where she is allowed to go out to smoke whenever she wants and she does not want to stay in [facility].  I explained to her that her salicylate level was elevated which can cause acid accumulation as well as end organ damage.  Patient patient stated that she will go to [facility] and does not want to stay  here and signed paper to leave AGAINST MEDICAL ADVICE.    I called and discussed with ER provider who told me that patient had not mentioned transferred to [facility] in the ER and the plan for inpatient admission was discussed with her and she had agreed in the emergency department.       DISPOSITION   Patient left AMA.  No discharge medications of follow-up appointments could be made as patient did not complete her treatment.  Pt was advised to fup with her PCP.     TEST  RESULTS PENDING AT DISCHARGE       Nel Renteria MD  03/14/18  7:09 PM    Please note that this discharge summary required more than 30 minutes to complete.    Please send a copy of this dictation to the following providers:    RONY Moe

## 2018-03-14 NOTE — ED NOTES
Pt advised of the need for urine and given urine cup with clean catch wipes      Taylor Kelly  03/14/18 3798

## 2018-03-15 NOTE — PAYOR COMM NOTE
"  [facility]  NPI: [id number]    Utilization Review   Contact:Guadalupe Colunga RN, BSN  Phone: [phone]  Fax: [phone]    Henna cs/Attn: Mary  Inpatient Auth Req  REF: [id number]  Dx: T39.01, E16.2    Jes Adams (32 y.o. Female)     Date of Birth Social Security Number Address Home Phone MRN    1985  [address] [facility] [address] [phone] [id number]    [employer] Mar[employer] Status          None Single       Admission Date Admission Type Admitting Provider Attending Provider Department, Room/Bed    3/14/18 Emergency Nel Renteria MD  [facility] PROGRESS CARE, P202/S1    Discharge Date Discharge Disposition Discharge Destination        3/14/2018 Left Against Medical Advice              Attending Provider:  (none)   Allergies:  Sulfa Antibiotics    Isolation:  None   Infection:  None   Code Status:  Not on file    Ht:  170.2 cm (67\")   Wt:  63.5 kg (140 lb)    Admission Cmt:  None   Principal Problem:  None                Active Insurance as of 3/14/2018     Primary Coverage     Payor Plan Insurance Group Employer/Plan Group    [employer] MEDICAID [employer] [id number]     Payor Plan Address Payor Plan Phone Number Effective From Effective To    PO  [phone] 1/3/2017     [place] [address]       Subscriber Name Subscriber Birth Date Member ID       JES ADAMS 1985 [id number]                 Emergency Contacts      (Rel.) Home Phone Work Phone Mobile Phone    Jumana Carbone (Mother) [phone] -- --               History & Physical      Nel Renteria MD at 3/14/2018  4:49 PM               [facility] HOSPITALIST HISTORY AND PHYSICAL    Patient Identification:  Name:  Jes Adams  Age:  32 y.o.  Sex:  female  :  1985  MRN:  [id number]   Visit Number:  [id number]  Primary Care Physician:  RONY Moe     Chief complaint:   Chief Complaint   Patient presents with   • Hypoglycemia and Salicylate " toxicity      History of presenting illness:   Patient is a 32 y.o. female with past medical history significant for type I diabetes mellitus, hypothyroidism, hepatitis C, diabetic peripheral neuropathy, Celiac disease, and GERD that presented to the [facility] emergency department via EMS for evaluation of Altered mental status.    Per report from ER physician, patient was not waking up and there was a concern for drug overdose.  EMS was called and patient was found to have low blood sugar.  She was given D50 and her blood sugar improved.  Patient woke up and did not had any further episodes of altered mental status thereafter.  In the ER patient complained of ringing in her ear as well as numbness in her fingers and lab data showed elevated salicylate level.    Patient had stable vital in the emergency department.  Initial blood glucose was 49 which improved with D50.  Rest of her lab data was pretty unremarkable.  EtOH within normal limits.  HCG negative.  Urine drug screen positive for amphetamines, benzodiazepines, and buprenorphine.  Urinalysis with no signs of infection, 1+ glucose and 1+ ketones.  Acetaminophen level within normal limits.  Salicylate level elevated at 53.9.  ABG with pH 7.443, PCO2 30.3, PO2 97.0, HCO3 20.3, oxygen saturation 96.9% on room air.  CT of the head without contrast with no evidence of acute intracranial abnormality.  Chest x-ray with no evidence of acute cardiopulmonary disease.  While in the emergency department, patient was given 25 g of dextrose as well as a 1 L bolus of normal saline.  Per poison control recommendations, patient was started on a sodium bicarbonate drip    Patient was transferred to PCU and within a few minutes of arrival she started taking off her telemetry monitor as well as IV lines and was wanting to leave the floor to go smoke and leave the hospital.  I went in to talk to the patient and at that time patient was just very 2 leave the hospital.   She stated that she is wanting to go to [facility] where she is allowed to go out to smoke whenever she wants and she does not want to stay in [facility].  I explained to her that her salicylate level was elevated which can cause acid accumulation as well as end organ damage.  Patient patient stated that she will go to [facility] and does not want to stay here and signed paper to leave AGAINST MEDICAL ADVICE.    Patient has been admitted to the progressive care unit for further evaluation and treatment.   ---------------------------------------------------------------------------------------------------------------------   Review of Systems   Could not be done since patient refused.  ---------------------------------------------------------------------------------------------------------------------   Past Medical History:   Diagnosis Date   • Asthma    • Celiac disease    • Diabetes mellitus type 1    • Diabetic ketoacidosis    • Disease of thyroid gland    • Hepatitis C    • Infectious viral hepatitis     hepititis C   • Neuropathy    • Reflux gastritis      Past Surgical History:   Procedure Laterality Date   •  SECTION      X 2     Family History   Problem Relation Age of Onset   • No Known Problems Mother    • No Known Problems Father    • No Known Problems Sister    • No Known Problems Brother    • No Known Problems Son    • No Known Problems Daughter    • No Known Problems Maternal Grandfather    • No Known Problems Paternal Grandmother    • No Known Problems Cousin    • Rheum arthritis Neg Hx    • Osteoarthritis Neg Hx    • Asthma Neg Hx    • Diabetes Neg Hx    • Heart failure Neg Hx    • Hyperlipidemia Neg Hx    • Migraines Neg Hx    • Rashes / Skin problems Neg Hx    • Seizures Neg Hx    • Stroke Neg Hx    • Thyroid disease Neg Hx    • Diabetes type I Maternal Grandmother    • Breast cancer Maternal Grandmother    • Hypertension Maternal Grandmother    • Diabetes type II Paternal  Grandfather      Social History     Social History   • Marital status: Single     Social History Main Topics   • Smoking status: Unknown If Ever Smoked   • Smokeless tobacco: Never Used      Comment: unable to assess    • Alcohol use No      Comment: unable to assess    • Drug use: No      Comment: unable to assess    • Sexual activity: Defer      Comment: unable to assess      Other Topics Concern   • Not on file     Social History Narrative    ** Merged History Encounter **          ---------------------------------------------------------------------------------------------------------------------   Allergies:  Sulfa antibiotics  ---------------------------------------------------------------------------------------------------------------------   Prior to Admission Medications     Prescriptions Last Dose Informant Patient Reported? Taking?    albuterol (ACCUNEB) 1.25 MG/3ML nebulizer solution   Yes No    Take 1 ampule by nebulization every 6 (six) hours as needed for wheezing.    albuterol (PROVENTIL HFA;VENTOLIN HFA) 108 (90 BASE) MCG/ACT inhaler   Yes No    Inhale 2 puffs every 4 (four) hours as needed for wheezing.    gabapentin (NEURONTIN) 300 MG capsule   Yes No    Take 300 mg by mouth 3 (three) times a day.    HUMALOG KWIKPEN 100 UNIT/ML solution pen-injector   Yes No    3 (Three) Times a Day. SLIDING SCALE    insulin aspart (NOVOLOG FLEXPEN) 100 UNIT/ML solution pen-injector sc pen   Yes No    Inject  under the skin 3 (three) times a day with meals.    insulin detemir (LEVEMIR) 100 UNIT/ML injection   Yes No    Inject  under the skin daily.    Insulin Glargine (TOUJEO SOLOSTAR SC)   Yes No    Inject  under the skin.    ipratropium-albuterol (COMBIVENT RESPIMAT)  MCG/ACT inhaler   Yes No    Inhale 1 puff 4 (four) times a day as needed for wheezing.    ipratropium-albuterol (COMBIVENT)  MCG/ACT inhaler   Yes No    Inhale 2 puffs every 6 (six) hours as needed for wheezing.    LANTUS SOLOSTAR 100  UNIT/ML injection pen   Yes No    Inject 25 Units as directed Daily.    levothyroxine (SYNTHROID, LEVOTHROID) 100 MCG tablet   Yes No    Take 1 tablet by mouth Daily.    levothyroxine (SYNTHROID, LEVOTHROID) 88 MCG tablet   Yes No    Take 88 mcg by mouth daily.    omeprazole (PriLOSEC) 20 MG capsule   Yes No    Take 20 mg by mouth daily.    ondansetron (ZOFRAN) 4 MG tablet   No No    Take 1 tablet PRN nausea every 8 hours.    ZUBSOLV 5.7-1.4 MG sublingual tablet   Yes No    Take 1 tablet by mouth 2 (Two) Times a Day.        [facility] Scheduled Meds:    nicotine 1 patch Transdermal Once       custom IV infusion builder  Last Rate: 125 mL/hr at 03/14/18 1645   sodium chloride 125 mL/hr Last Rate: 125 mL/hr (03/14/18 1530)     ---------------------------------------------------------------------------------------------------------------------   Vital Signs:  Temp:  [98.6 °F (37 °C)] 98.6 °F (37 °C)  Heart Rate:  [115] 115  Resp:  [18] 18  BP: (101-126)/(56-75) 109/56  1    03/14/18  1339   Weight: 63.5 kg (140 lb)     Body mass index is 21.93 kg/m².    SpO2 Percentage    03/14/18 1608 03/14/18 1617 03/14/18 1631   SpO2: 100% 99% 99%     ---------------------------------------------------------------------------------------------------------------------   Physical Exam:    Physical examination could not be performed due to the above reason.  ---------------------------------------------------------------------------------------------------------------------  EKG:      Could not be done  ---------------------------------------------------------------------------------------------------------------------     Results from last 7 days  Lab Units 03/14/18  1513 03/14/18  1404   WBC 10*3/mm3  --  8.63   HEMOGLOBIN g/dL  --  12.0   HEMATOCRIT %  --  37.5   MCV fL  --  87.4   MCHC g/dL  --  32.0*   PLATELETS 10*3/mm3  --  323   INR  1.17*  --        Results from last 7 days  Lab Units 03/14/18  1502   PH, ARTERIAL pH units 7.443    PO2 ART mm Hg 97.0   PCO2, ARTERIAL mm Hg 30.3*   HCO3 ART mmol/L 20.3*           Results from last 7 days  Lab Units 03/14/18  1404   SODIUM mmol/L 139   POTASSIUM mmol/L 3.7   CHLORIDE mmol/L 115*   CO2 mmol/L 20.8*   BUN mg/dL 18   CREATININE mg/dL 0.75   EGFR IF NONAFRICN AM mL/min/1.73 91   CALCIUM mg/dL 8.4   GLUCOSE mg/dL 228*   ALBUMIN g/dL 3.80   BILIRUBIN mg/dL 0.1*   ALK PHOS U/L 71   AST (SGOT) U/L 34*   ALT (SGPT) U/L 34   Estimated Creatinine Clearance: 109.9 mL/min (by C-G formula based on SCr of 0.75 mg/dL).  No results found for: AMMONIA          No results found for: HGBA1C  No results found for: TSH, FREET4  Lab Results   Component Value Date    PREGTESTUR Negative 03/14/2018     Pain Management Panel     Pain Management Panel Latest Ref Rng & Units 3/14/2018 5/14/2017    AMPHETAMINES SCREEN, URINE Negative Positive(A) Positive(A)    BARBITURATES SCREEN Negative Negative Negative    BENZODIAZEPINE SCREEN, URINE Negative Positive(A) Positive(A)    BUPRENORPHINE Negative Positive(A) Positive(A)    COCAINE SCREEN, URINE Negative Negative Negative    METHADONE SCREEN, URINE Negative Negative Negative              I have personally reviewed the above laboratory results.   ---------------------------------------------------------------------------------------------------------------------  Imaging Results (last 7 days)     Procedure Component Value Units Date/Time    CT Head Without Contrast [[id number]] Collected:  03/14/18 1554     Updated:  03/14/18 1616    Narrative:       FINDINGS:   No acute intracranial abnormality. No midline shift or mass  effect. No hydrocephalus or intracranial hemorrhage. There is no CT  evidence of acute vascular territory infarct.  Bone windows show no  acute osseous abnormality.    Impression:       Stable exam. No CT evidence of acute intracranial  abnormality.     This report was finalized on 3/14/2018 3:55 PM by Dr. Madhu Crockett MD.    XR Chest 1 View [[id number]]  Collected:  03/14/18 1600     Updated:  03/14/18 1616    Narrative:       FINDINGS:   Lungs are aerated.   Heart and mediastinal contours are unremarkable.   No pneumothorax.   No pleural effusion.   No acute osseous findings.    Impression:       Stable appearance of the chest. No acute cardiopulmonary  findings identified.     This report was finalized on 3/14/2018 4:00 PM by Dr. Madhu Crockett MD.      I have personally reviewed the above radiology results.   ---------------------------------------------------------------------------------------------------------------------  Assessment:    -Salicylate toxicity  -Type 1 diabetes mellitus with hypoglycemia present on admission  -Diabetic peripheral neuropathy  -Hypothyroidism  -History of celiac disease  -History of asthma  -Hepatitis C  -GERD  -Tobacco abuse  -Substance abuse,  urine drug screen positive for amphetamine, benzodiazepines, and Suboxone on admission      Pertinent discussion with the ER physician poison control was contacted who had advised to monitor salicylate level every 2 hours along with electrolytes and BMP.  Poison control has also advised to keep patient on bicarbonate drip.  However, patient refused any treatment and any evaluation and left AGAINST MEDICAL ADVICE and therefore no further management could be carried out.  Patient was explained the risks of leaving without completing treatment by the nursing staff as well as by myself but she refused to stay for any type of treatment.      RONY Gillis  03/14/18  4:49 PM  ---------------------------------------------------------------------------------------------------------------------         Electronically signed by Nel Renteria MD at 3/14/2018  7:08 PM          Emergency Department Notes      Yony Talamantes at 3/14/2018  1:48 PM        Contacted nutrition for tray per provider      Yony Talamantes  03/14/18 1348      Electronically signed by Yony Talamantes at 3/14/2018   1:48 PM     RONY Gorman at 3/14/2018  1:48 PM     Attestation signed by Isidro eBst MD at 3/14/2018  7:17 PM          For this patient encounter, I reviewed the NP or PA documentation, treatment plan, and medical decision making. Isidro Best MD 3/14/2018 7:17 PM                  Subjective     Hypoglycemia   Initial blood sugar:  20  Severity:  Moderate  Onset quality:  Sudden  Duration:  1 hour  Timing:  Constant  Progression:  Unchanged  Chronicity:  New  Diabetic status:  Controlled with insulin  Context comment:  Patient reports that she had taken insulin earlier though has not eaten since last evening.  She states that she has had problems with her sugar dropping before.  Her initial glucose was 20.  EMS was unable to obtain an IV and gave her oral glucose.  Relieved by:  Nothing  Associated symptoms: altered mental status    Associated symptoms: no seizures        Review of Systems   Constitutional: Negative.  Negative for fever.   HENT: Negative.    Respiratory: Negative.    Cardiovascular: Negative.  Negative for chest pain.   Gastrointestinal: Negative.  Negative for abdominal pain.   Endocrine: Negative.    Genitourinary: Negative.  Negative for dysuria.   Skin: Negative.    Neurological: Negative.  Negative for seizures.   All other systems reviewed and are negative.      Past Medical History:   Diagnosis Date   • Asthma    • Celiac disease    • Diabetes mellitus type 1    • Diabetic ketoacidosis    • Disease of thyroid gland    • Hepatitis C    • Infectious viral hepatitis     hepititis C   • Neuropathy    • Reflux gastritis        Allergies   Allergen Reactions   • Sulfa Antibiotics        Past Surgical History:   Procedure Laterality Date   •  SECTION      X 2       Family History   Problem Relation Age of Onset   • No Known Problems Mother    • No Known Problems Father    • No Known Problems Sister    • No Known Problems Brother    • No Known Problems Son    • No Known Problems  Daughter    • No Known Problems Maternal Grandfather    • No Known Problems Paternal Grandmother    • No Known Problems Cousin    • Rheum arthritis Neg Hx    • Osteoarthritis Neg Hx    • Asthma Neg Hx    • Diabetes Neg Hx    • Heart failure Neg Hx    • Hyperlipidemia Neg Hx    • Migraines Neg Hx    • Rashes / Skin problems Neg Hx    • Seizures Neg Hx    • Stroke Neg Hx    • Thyroid disease Neg Hx    • Diabetes type I Maternal Grandmother    • Breast cancer Maternal Grandmother    • Hypertension Maternal Grandmother    • Diabetes type II Paternal Grandfather        Social History     Social History   • Marital status: Single     Social History Main Topics   • Smoking status: Unknown If Ever Smoked   • Smokeless tobacco: Never Used      Comment: unable to assess    • Alcohol use No      Comment: unable to assess    • Drug use: No      Comment: unable to assess    • Sexual activity: Defer      Comment: unable to assess      Other Topics Concern   • Not on file     Social History Narrative    ** Merged History Encounter **                Objective   Physical Exam   Constitutional: She is oriented to person, place, and time. She appears well-developed and well-nourished. No distress.   Patient was somnolent prior to D50 administration and subsequently became alert and oriented.  She was able to provide history that she had taken her insulin earlier though unfortunately had not eaten.   HENT:   Head: Normocephalic and atraumatic.   Right Ear: External ear normal.   Left Ear: External ear normal.   Nose: Nose normal.   Eyes: Conjunctivae and EOM are normal. Pupils are equal, round, and reactive to light.   Neck: Normal range of motion. Neck supple. No JVD present. No tracheal deviation present.   Cardiovascular: Normal rate, regular rhythm and normal heart sounds.    No murmur heard.  Pulmonary/Chest: Effort normal and breath sounds normal. No respiratory distress. She has no wheezes.   Abdominal: Soft. Bowel sounds are  normal. There is no tenderness.   Musculoskeletal: Normal range of motion. She exhibits no edema or deformity.   Neurological: She is alert and oriented to person, place, and time. No cranial nerve deficit.   Skin: Skin is warm and dry. No rash noted. She is not diaphoretic. No erythema. No pallor.   Psychiatric: She has a normal mood and affect. Her behavior is normal. Thought content normal.   Nursing note and vitals reviewed.      Procedures        ED Course  ED Course   Comment By Time   Patient is feeling much better.  She is alert and oriented ×3.  She states that this morning she took insulin and her sugar was 126.  She unfortunately did not eat RONY Gorman 03/14 1422   Upon receiving patient's elevated salicylate level further,  inquiry reveals patient has been taking at least 5 extra strength Goody's powders daily for 2 weeks.to combat her withdrawal symptoms from Suboxone.  She denies acute ingestion.  She has had ringing in her ears and hearing loss along with nausea. RONY Gorman 03/14 1537   Paged RONY Cole 03/14 1616                  MDM  Number of Diagnoses or Management Options  Aspirin toxicity, accidental or unintentional, initial encounter: new and requires workup  Hypoglycemia: new and requires workup     Amount and/or Complexity of Data Reviewed  Clinical lab tests: reviewed and ordered  Tests in the radiology section of CPT®:  reviewed and ordered  Tests in the medicine section of CPT®:  ordered and reviewed  Discuss the patient with other providers: yes  Independent visualization of images, tracings, or specimens: yes    Risk of Complications, Morbidity, and/or Mortality  Presenting problems: moderate    Critical Care  Total time providing critical care: 30-74 minutes (45)      Final diagnoses:   Aspirin toxicity, accidental or unintentional, initial encounter   Hypoglycemia            RONY Gorman  03/14/18 1713      Electronically signed by Isidro Best  MD at 3/14/2018  7:17 PM     Dee Gifford RN at 3/14/2018  2:06 PM        Diet offered, pt eating at this time.     Dee Gifford RN  03/14/18 1406      Electronically signed by Dee Gifford RN at 3/14/2018  2:06 PM     Taylor Kelly at 3/14/2018  2:08 PM        Pt advised of the need for urine and given urine cup with clean catch wipes      Taylor Kelly  03/14/18 1409      Electronically signed by Taylor Kelly at 3/14/2018  2:09 PM     Callie Blanca RN at 3/14/2018  3:30 PM        Patient asking to speak with the provider notified Yazan Blanca RN  03/14/18 1545      Electronically signed by Callie Blanca RN at 3/14/2018  3:45 PM       Scheduled Meds Sorted by Name   for Jes Moreno as of 3/12/18 through 3/14/18     1 Day 3 Days 7 Days 10 Days < Today >    Legend:                           Inactive     Active     Other Encounter    Linked               Medications 03/12/18 03/13/18 03/14/18   dextrose (D50W) solution 25 g  Dose: 25 g Freq: Once Route: IV  Start: 03/14/18 1349 End: 03/14/18 1339      1339              nicotine (NICODERM CQ) 21 MG/24HR patch 1 patch  Dose: 1 patch Freq: Once Route: TD  Start: 03/14/18 1638 End: 03/14/18 1954    Admin Instructions:   Apply to clean, dry, nonhairy area of skin (typically upper arm or shoulder)        1645 [C]     1954-D/C'd        sodium chloride 0.9 % bolus 1,000 mL  Dose: 1,000 mL Freq: Once Route: IV  Last Dose: Stopped (03/14/18 1641)  Start: 03/14/18 1459 End: 03/14/18 1641      1530     1641                Continuous Meds Sorted by Name   for Jes Moreno as of 3/12/18 through 3/14/18    Legend:                           Inactive     Active     Other Encounter    Linked               Medications 03/12/18 03/13/18 03/14/18   dextrose 5 % 850 mL with sodium bicarbonate 8.4 % 150 mEq infusion  Rate: 125 mL/hr Freq: Continuous Route: IV  Start: 03/14/18 1519 End: 03/14/18 1954       1645     1737     -D/C'd      sodium chloride 0.9 % infusion  Rate: 125 mL/hr Freq: Continuous Route: IV  Last Dose: 125 mL/hr (18 1530)  Start: 18 1459 End: 18      1530     1737-D/C'd          PRN Meds Sorted by Name   for Jes Moreno as of 3/12/18 through 3/14/18    Legend:                           Inactive     Active     Other Encounter    Linked               Medications 18   sodium chloride 0.9 % flush 10 mL  Dose: 10 mL Freq: As Needed Route: IV  PRN Reason: Line Care  Start: 18 1347 End: 18-D/C'd                   Nel Renteria MD Physician Signed Medicine  Discharge Summaries Date of Service: 3/14/2018  5:37 PM         ManualTemplate   Copied       [facility] HOSPITALIST MEDICINE DISCHARGE SUMMARY     Patient Identification:  Name:  Jes Moreno  Age:  32 y.o.  Sex:  female  :  1985  MRN:  [id number]  Visit Number:  [id number]     Date of Admission: 3/14/2018  Date of leaving AMA:  3/14/2018      PCP: RONY Moe     DIAGNOSIS  -Salicylate toxicity  -Type 1 diabetes mellitus with hypoglycemia present on admission  -Diabetic peripheral neuropathy  -Hypothyroidism  -History of celiac disease  -History of asthma  -Hepatitis C  -GERD  -Tobacco abuse  -Substance abuse,  urine drug screen positive for amphetamine, benzodiazepines, and Suboxone on admission        CONSULTS   · None     PROCEDURES PERFORMED  · None     REASON FOR ADMISSION  Patient is a 32 y.o. female presented to [facility] with altered mental status.  Please see the admitting history and physical for further details.         HOSPITAL COURSE   EMS was called due to patient not waking up and she was found to have low blood sugar.  She was given D50 and her blood sugar improved.  Patient woke up and did not had any further episodes of altered mental status thereafter.  In the ER patient complained of ringing in  her ear as well as numbness in her fingers and lab data showed elevated salicylate level.     Patient was transferred to PCU and within a few minutes of arrival she started taking off her telemetry monitor as well as IV lines and was wanting to leave the floor to go smoke and leave the hospital.  I went in to talk to the patient and at that time patient was just very 2 leave the hospital.  She stated that she is wanting to go to [facility] where she is allowed to go out to smoke whenever she wants and she does not want to stay in [facility].  I explained to her that her salicylate level was elevated which can cause acid accumulation as well as end organ damage.  Patient patient stated that she will go to [facility] and does not want to stay here and signed paper to leave AGAINST MEDICAL ADVICE.     I called and discussed with ER provider who told me that patient had not mentioned transferred to [facility] in the ER and the plan for inpatient admission was discussed with her and she had agreed in the emergency department.         DISPOSITION   Patient left AMA.  No discharge medications of follow-up appointments could be made as patient did not complete her treatment.  Pt was advised to fup with her PCP.      TEST  RESULTS PENDING AT DISCHARGE     Nel Renteria MD  03/14/18  7:09 PM     Please note that this discharge summary required more than 30 minutes to complete.     Please send a copy of this dictation to the following providers:    RONY Moe            Routing History    Date/Time From To Method   3/14/2018  7:11 PM MD Denita Solis PA Fax

## 2018-03-15 NOTE — PROGRESS NOTES
Discharge Planning Assessment  SUE Garcia     Patient Name: Jes Moreno  MRN: [id number]  Today's Date: 3/15/2018    Admit Date: 3/14/2018    Discharge Plan     Row Name 03/15/18 0815       Plan    Patient/Family in Agreement with Plan yes    Final Discharge Disposition Code 07 - left AMA    Final Note Pt left against medical advice. No other needs identified.      Tanisha Niño

## 2019-02-04 ENCOUNTER — TRANSCRIBE ORDERS (OUTPATIENT)
Dept: ADMINISTRATIVE | Facility: HOSPITAL | Age: 34
End: 2019-02-04

## 2019-02-04 DIAGNOSIS — R01.1 CARDIAC MURMUR: Primary | ICD-10-CM

## 2019-02-15 ENCOUNTER — APPOINTMENT (OUTPATIENT)
Dept: CARDIOLOGY | Facility: HOSPITAL | Age: 34
End: 2019-02-15

## 2019-02-22 ENCOUNTER — APPOINTMENT (OUTPATIENT)
Dept: CARDIOLOGY | Facility: HOSPITAL | Age: 34
End: 2019-02-22

## 2019-03-12 ENCOUNTER — HOSPITAL ENCOUNTER (OUTPATIENT)
Dept: CARDIOLOGY | Facility: HOSPITAL | Age: 34
Setting detail: HOSPITAL OUTPATIENT SURGERY
Discharge: HOME | End: 2019-03-12
Admitting: PHYSICIAN ASSISTANT

## 2019-03-12 DIAGNOSIS — R01.1 CARDIAC MURMUR: ICD-10-CM

## 2019-03-12 PROCEDURE — 93306 TTE W/DOPPLER COMPLETE: CPT | Performed by: INTERNAL MEDICINE

## 2019-03-12 PROCEDURE — 93306 TTE W/DOPPLER COMPLETE: CPT

## 2019-03-13 LAB
BH CV ECHO MEAS - % IVS THICK: -6.8 %
BH CV ECHO MEAS - % LVPW THICK: -1.3 %
BH CV ECHO MEAS - ACS: 1.6 CM
BH CV ECHO MEAS - AO MAX PG: 12.5 MMHG
BH CV ECHO MEAS - AO MEAN PG: 6.1 MMHG
BH CV ECHO MEAS - AO ROOT AREA (BSA CORRECTED): 1.5
BH CV ECHO MEAS - AO ROOT AREA: 5.3 CM^2
BH CV ECHO MEAS - AO ROOT DIAM: 2.6 CM
BH CV ECHO MEAS - AO V2 MAX: 176.5 CM/SEC
BH CV ECHO MEAS - AO V2 MEAN: 113.5 CM/SEC
BH CV ECHO MEAS - AO V2 VTI: 40.4 CM
BH CV ECHO MEAS - BSA(HAYCOCK): 1.7 M^2
BH CV ECHO MEAS - BSA: 1.7 M^2
BH CV ECHO MEAS - BZI_BMI: 21.9 KILOGRAMS/M^2
BH CV ECHO MEAS - BZI_METRIC_HEIGHT: 170.2 CM
BH CV ECHO MEAS - BZI_METRIC_WEIGHT: 63.5 KG
BH CV ECHO MEAS - EDV(CUBED): 85.8 ML
BH CV ECHO MEAS - EDV(MOD-SP4): 74 ML
BH CV ECHO MEAS - EDV(TEICH): 88.2 ML
BH CV ECHO MEAS - EF(CUBED): 79.2 %
BH CV ECHO MEAS - EF(MOD-SP4): 75.7 %
BH CV ECHO MEAS - EF(TEICH): 71.8 %
BH CV ECHO MEAS - ESV(CUBED): 17.8 ML
BH CV ECHO MEAS - ESV(MOD-SP4): 18 ML
BH CV ECHO MEAS - ESV(TEICH): 24.9 ML
BH CV ECHO MEAS - FS: 40.8 %
BH CV ECHO MEAS - IVS/LVPW: 1.1
BH CV ECHO MEAS - IVSD: 1 CM
BH CV ECHO MEAS - IVSS: 0.95 CM
BH CV ECHO MEAS - LA DIMENSION: 2.6 CM
BH CV ECHO MEAS - LA/AO: 0.99
BH CV ECHO MEAS - LV DIASTOLIC VOL/BSA (35-75): 42.6 ML/M^2
BH CV ECHO MEAS - LV MASS(C)D: 139.6 GRAMS
BH CV ECHO MEAS - LV MASS(C)DI: 80.3 GRAMS/M^2
BH CV ECHO MEAS - LV MASS(C)S: 58.8 GRAMS
BH CV ECHO MEAS - LV MASS(C)SI: 33.9 GRAMS/M^2
BH CV ECHO MEAS - LV SYSTOLIC VOL/BSA (12-30): 10.4 ML/M^2
BH CV ECHO MEAS - LVIDD: 4.4 CM
BH CV ECHO MEAS - LVIDS: 2.6 CM
BH CV ECHO MEAS - LVLD AP4: 7 CM
BH CV ECHO MEAS - LVLS AP4: 4.5 CM
BH CV ECHO MEAS - LVOT AREA (M): 2.3 CM^2
BH CV ECHO MEAS - LVOT AREA: 2.3 CM^2
BH CV ECHO MEAS - LVOT DIAM: 1.7 CM
BH CV ECHO MEAS - LVPWD: 0.9 CM
BH CV ECHO MEAS - LVPWS: 0.89 CM
BH CV ECHO MEAS - MV A MAX VEL: 64.7 CM/SEC
BH CV ECHO MEAS - MV E MAX VEL: 106.1 CM/SEC
BH CV ECHO MEAS - MV E/A: 1.6
BH CV ECHO MEAS - PA ACC SLOPE: 1110 CM/SEC^2
BH CV ECHO MEAS - PA ACC TIME: 0.12 SEC
BH CV ECHO MEAS - PA PR(ACCEL): 26.7 MMHG
BH CV ECHO MEAS - RAP SYSTOLE: 10 MMHG
BH CV ECHO MEAS - RVSP: 34.7 MMHG
BH CV ECHO MEAS - SI(AO): 124.1 ML/M^2
BH CV ECHO MEAS - SI(CUBED): 39.1 ML/M^2
BH CV ECHO MEAS - SI(MOD-SP4): 32.2 ML/M^2
BH CV ECHO MEAS - SI(TEICH): 36.4 ML/M^2
BH CV ECHO MEAS - SV(AO): 215.6 ML
BH CV ECHO MEAS - SV(CUBED): 68 ML
BH CV ECHO MEAS - SV(MOD-SP4): 56 ML
BH CV ECHO MEAS - SV(TEICH): 63.3 ML
BH CV ECHO MEAS - TR MAX VEL: 248.4 CM/SEC

## 2019-09-21 ENCOUNTER — HOSPITAL ENCOUNTER (EMERGENCY)
Facility: HOSPITAL | Age: 34
Discharge: HOME | End: 2019-09-21
Attending: EMERGENCY MEDICINE | Admitting: EMERGENCY MEDICINE

## 2019-09-21 VITALS
SYSTOLIC BLOOD PRESSURE: 117 MMHG | TEMPERATURE: 98.7 F | RESPIRATION RATE: 18 BRPM | BODY MASS INDEX: 22.76 KG/M2 | HEIGHT: 67 IN | WEIGHT: 145 LBS | DIASTOLIC BLOOD PRESSURE: 70 MMHG | OXYGEN SATURATION: 99 % | HEART RATE: 80 BPM

## 2019-09-21 DIAGNOSIS — M25.532 LEFT WRIST PAIN: Primary | ICD-10-CM

## 2019-09-21 PROCEDURE — 96372 THER/PROPH/DIAG INJ SC/IM: CPT

## 2019-09-21 PROCEDURE — 93005 ELECTROCARDIOGRAM TRACING: CPT | Performed by: EMERGENCY MEDICINE

## 2019-09-21 PROCEDURE — 99284 EMERGENCY DEPT VISIT MOD MDM: CPT

## 2019-09-21 PROCEDURE — 25010000002 DEXAMETHASONE PER 1 MG: Performed by: EMERGENCY MEDICINE

## 2019-09-21 RX ORDER — CYCLOBENZAPRINE HCL 5 MG
5 TABLET ORAL 3 TIMES DAILY PRN
Qty: 15 TABLET | Refills: 0 | OUTPATIENT
Start: 2019-09-21 | End: 2020-08-30

## 2019-09-21 RX ORDER — DEXAMETHASONE SODIUM PHOSPHATE 4 MG/ML
4 INJECTION, SOLUTION INTRA-ARTICULAR; INTRALESIONAL; INTRAMUSCULAR; INTRAVENOUS; SOFT TISSUE ONCE
Status: COMPLETED | OUTPATIENT
Start: 2019-09-21 | End: 2019-09-21

## 2019-09-21 RX ORDER — HYDROCODONE BITARTRATE AND ACETAMINOPHEN 7.5; 325 MG/1; MG/1
1 TABLET ORAL ONCE
Status: COMPLETED | OUTPATIENT
Start: 2019-09-21 | End: 2019-09-21

## 2019-09-21 RX ADMIN — HYDROCODONE BITARTRATE AND ACETAMINOPHEN 1 TABLET: 7.5; 325 TABLET ORAL at 21:07

## 2019-09-21 RX ADMIN — DEXAMETHASONE SODIUM PHOSPHATE 4 MG: 4 INJECTION, SOLUTION INTRAMUSCULAR; INTRAVENOUS at 21:11

## 2019-09-22 NOTE — ED PROVIDER NOTES
Subjective   pATIENT PRESENTS TO er WITH LEFT WRIST PAIN        Wrist Pain   Severity:  Severe  Onset quality:  Gradual  Chronicity:  Chronic  Associated symptoms: fatigue        Review of Systems   Constitutional: Positive for activity change and fatigue.   HENT: Negative.    Eyes: Negative.    Respiratory: Negative.    Cardiovascular: Negative.    Gastrointestinal: Negative.    Endocrine: Negative.    Genitourinary: Negative.    Musculoskeletal:        LEFT WRIST PAIN   Allergic/Immunologic: Negative.    Neurological: Negative.    Hematological: Negative.    Psychiatric/Behavioral: Negative.        Past Medical History:   Diagnosis Date   • Asthma    • Celiac disease    • Diabetes mellitus type 1 (CMS/HCC)    • Diabetic ketoacidosis (CMS/HCC)    • Disease of thyroid gland    • Hepatitis C    • Infectious viral hepatitis     hepititis C   • Neuropathy    • Reflux gastritis        Allergies   Allergen Reactions   • Sulfa Antibiotics        Past Surgical History:   Procedure Laterality Date   •  SECTION      X 2       Family History   Problem Relation Age of Onset   • No Known Problems Mother    • No Known Problems Father    • No Known Problems Sister    • No Known Problems Brother    • No Known Problems Son    • No Known Problems Daughter    • No Known Problems Maternal Grandfather    • No Known Problems Paternal Grandmother    • No Known Problems Cousin    • Rheum arthritis Neg Hx    • Osteoarthritis Neg Hx    • Asthma Neg Hx    • Diabetes Neg Hx    • Heart failure Neg Hx    • Hyperlipidemia Neg Hx    • Migraines Neg Hx    • Rashes / Skin problems Neg Hx    • Seizures Neg Hx    • Stroke Neg Hx    • Thyroid disease Neg Hx    • Diabetes type I Maternal Grandmother    • Breast cancer Maternal Grandmother    • Hypertension Maternal Grandmother    • Diabetes type II Paternal Grandfather        Social History     Socioeconomic History   • Marital status: Single     Spouse name: Not on file   • Number of children:  Not on file   • Years of education: Not on file   • Highest education level: Not on file   Tobacco Use   • Smoking status: Unknown If Ever Smoked   • Smokeless tobacco: Never Used   • Tobacco comment: unable to assess    Substance and Sexual Activity   • Alcohol use: No     Comment: unable to assess    • Drug use: No     Comment: unable to assess    • Sexual activity: Defer     Comment: unable to assess    Social History Narrative    ** Merged History Encounter **                Objective   Physical Exam   Constitutional: She appears well-developed and well-nourished.   HENT:   Head: Normocephalic and atraumatic.   Eyes: Pupils are equal, round, and reactive to light.   Neck: Normal range of motion.   Cardiovascular: Regular rhythm and normal pulses.   Pulmonary/Chest: Effort normal.   Abdominal: Soft.   Musculoskeletal:   Left wrist swollen, tender   Neurological: She is alert.   Skin: Skin is warm.   Nursing note and vitals reviewed.      Procedures           ED Course                  MDM    Final diagnoses:   Left wrist pain              Matias Caicedo MD  09/21/19 4012

## 2020-08-20 ENCOUNTER — HOSPITAL ENCOUNTER (EMERGENCY)
Facility: HOSPITAL | Age: 35
Discharge: HOME | End: 2020-08-20
Attending: EMERGENCY MEDICINE | Admitting: EMERGENCY MEDICINE

## 2020-08-20 VITALS
HEIGHT: 67 IN | BODY MASS INDEX: 23.7 KG/M2 | TEMPERATURE: 97.9 F | HEART RATE: 87 BPM | WEIGHT: 151 LBS | SYSTOLIC BLOOD PRESSURE: 116 MMHG | OXYGEN SATURATION: 100 % | DIASTOLIC BLOOD PRESSURE: 68 MMHG | RESPIRATION RATE: 18 BRPM

## 2020-08-20 DIAGNOSIS — E11.649 HYPOGLYCEMIC EPISODE IN PATIENT WITH DIABETES MELLITUS (HCC): Primary | ICD-10-CM

## 2020-08-20 LAB
6-ACETYL MORPHINE: NEGATIVE
AMPHET+METHAMPHET UR QL: POSITIVE
B-HCG UR QL: NEGATIVE
BARBITURATES UR QL SCN: POSITIVE
BENZODIAZ UR QL SCN: POSITIVE
BILIRUB UR QL STRIP: NEGATIVE
BUPRENORPHINE SERPL-MCNC: POSITIVE NG/ML
CANNABINOIDS SERPL QL: NEGATIVE
CLARITY UR: CLEAR
COCAINE UR QL: NEGATIVE
COLOR UR: YELLOW
GLUCOSE BLDC GLUCOMTR-MCNC: 244 MG/DL (ref 70–130)
GLUCOSE BLDC GLUCOMTR-MCNC: 458 MG/DL (ref 70–130)
GLUCOSE BLDC GLUCOMTR-MCNC: 462 MG/DL (ref 70–130)
GLUCOSE UR STRIP-MCNC: ABNORMAL MG/DL
HGB UR QL STRIP.AUTO: NEGATIVE
KETONES UR QL STRIP: NEGATIVE
LEUKOCYTE ESTERASE UR QL STRIP.AUTO: NEGATIVE
METHADONE UR QL SCN: NEGATIVE
NITRITE UR QL STRIP: NEGATIVE
OPIATES UR QL: NEGATIVE
OXYCODONE UR QL SCN: NEGATIVE
PCP UR QL SCN: NEGATIVE
PH UR STRIP.AUTO: 6.5 [PH] (ref 5–8)
PROT UR QL STRIP: NEGATIVE
SP GR UR STRIP: 1.01 (ref 1–1.03)
UROBILINOGEN UR QL STRIP: ABNORMAL

## 2020-08-20 PROCEDURE — 80307 DRUG TEST PRSMV CHEM ANLYZR: CPT | Performed by: EMERGENCY MEDICINE

## 2020-08-20 PROCEDURE — 63710000001 INSULIN ASPART PER 5 UNITS: Performed by: EMERGENCY MEDICINE

## 2020-08-20 PROCEDURE — 82962 GLUCOSE BLOOD TEST: CPT

## 2020-08-20 PROCEDURE — 81003 URINALYSIS AUTO W/O SCOPE: CPT | Performed by: EMERGENCY MEDICINE

## 2020-08-20 PROCEDURE — 99285 EMERGENCY DEPT VISIT HI MDM: CPT

## 2020-08-20 PROCEDURE — 81025 URINE PREGNANCY TEST: CPT | Performed by: EMERGENCY MEDICINE

## 2020-08-20 RX ADMIN — INSULIN ASPART 8 UNITS: 100 INJECTION, SOLUTION INTRAVENOUS; SUBCUTANEOUS at 14:11

## 2020-08-20 NOTE — ED NOTES
Pts mother on phone at this time. She states she will be on her way to transport pt home.     Jes Grullon, RN  08/20/20 6808

## 2020-08-20 NOTE — ED NOTES
Pt's mother calls ED, states that she will be coming to ED.      Mitali Villanueva RN  08/20/20 6353

## 2020-08-20 NOTE — DISCHARGE INSTRUCTIONS
Home in care of family.  Eat a healthy diet.  Monitor your blood sugar closely.  See your primary care provider, Lauren Moreno, tomorrow for recheck.  Return to the emergency department right away if symptoms worsen/any problems.

## 2020-08-20 NOTE — ED NOTES
Pt given Orange Juice 8 oz. Pt begins drinking, tolerates well.      Mitali Villanueva RN  08/20/20 1111

## 2020-08-20 NOTE — ED PROVIDER NOTES
"Subjective   Patient is a 35-year-old diabetic female who was found at home unresponsive, EMS was called.  They found her to be unresponsive, diaphoretic, blood sugar read \"low\" on their glucometer, they advised that this is less than 20.  They were unable to establish IV access, administered intramuscular glucagon.  Patient began to wake up, they gave her oral glucose.  Upon arrival here she is awake, alert, well oriented.  She states that she does not want to be here, she wants to be discharged home soon as possible, she is extremely fearful of being here as she is afraid that she will catch the coronavirus here.  Patient refuses an IV, she refuses any sort of diagnostic work-up.  She agrees to eat a meal and to allow us to recheck her sugar afterwards, but this is all that she will allow us to do.  Patient is alert and well oriented, able to make her own decisions.  Fingerstick blood sugar on arrival here is 66.          Review of Systems   Constitutional: Negative for chills, diaphoresis and fever.   HENT: Negative for ear pain, sore throat and trouble swallowing.    Eyes: Negative for photophobia and pain.   Respiratory: Negative for shortness of breath, wheezing and stridor.    Cardiovascular: Negative for chest pain and palpitations.   Gastrointestinal: Negative for abdominal distention, abdominal pain, blood in stool, diarrhea, nausea and vomiting.   Endocrine: Negative for polydipsia and polyphagia.   Genitourinary: Negative for difficulty urinating and flank pain.   Musculoskeletal: Negative for back pain, neck pain and neck stiffness.   Skin: Negative for color change and pallor.   Neurological: Negative for facial asymmetry, weakness, numbness and headaches.   Psychiatric/Behavioral: Negative for confusion.   All other systems reviewed and are negative.      Past Medical History:   Diagnosis Date   • Asthma    • Celiac disease    • Diabetes mellitus type 1 (CMS/HCC)    • Diabetic ketoacidosis (CMS/HCC)  "   • Disease of thyroid gland    • Hepatitis C    • Infectious viral hepatitis     hepititis C   • Neuropathy    • Reflux gastritis        Allergies   Allergen Reactions   • Sulfa Antibiotics        Past Surgical History:   Procedure Laterality Date   •  SECTION      X 2       Family History   Problem Relation Age of Onset   • No Known Problems Mother    • No Known Problems Father    • No Known Problems Sister    • No Known Problems Brother    • No Known Problems Son    • No Known Problems Daughter    • No Known Problems Maternal Grandfather    • No Known Problems Paternal Grandmother    • No Known Problems Cousin    • Rheum arthritis Neg Hx    • Osteoarthritis Neg Hx    • Asthma Neg Hx    • Diabetes Neg Hx    • Heart failure Neg Hx    • Hyperlipidemia Neg Hx    • Migraines Neg Hx    • Rashes / Skin problems Neg Hx    • Seizures Neg Hx    • Stroke Neg Hx    • Thyroid disease Neg Hx    • Diabetes type I Maternal Grandmother    • Breast cancer Maternal Grandmother    • Hypertension Maternal Grandmother    • Diabetes type II Paternal Grandfather        Social History     Socioeconomic History   • Marital status: Single     Spouse name: Not on file   • Number of children: Not on file   • Years of education: Not on file   • Highest education level: Not on file   Tobacco Use   • Smoking status: Unknown If Ever Smoked   • Smokeless tobacco: Never Used   • Tobacco comment: unable to assess    Substance and Sexual Activity   • Alcohol use: No     Comment: unable to assess    • Drug use: No     Comment: unable to assess    • Sexual activity: Defer     Comment: unable to assess    Social History Narrative    ** Merged History Encounter **                Objective   Physical Exam   Constitutional: She is oriented to person, place, and time. She appears well-developed and well-nourished. No distress.   Well-developed well-nourished female, alert and well oriented.  Still mildly diaphoretic on arrival.  She does not appear  to be in acute distress.   HENT:   Head: Normocephalic and atraumatic.   Eyes: Pupils are equal, round, and reactive to light. EOM are normal. No scleral icterus.   Neck: Normal range of motion. Neck supple. No neck rigidity. No tracheal deviation present.   Cardiovascular: Normal rate, regular rhythm and intact distal pulses.   Pulmonary/Chest: Effort normal and breath sounds normal. No respiratory distress. She exhibits no tenderness.   Abdominal: Soft. Bowel sounds are normal. There is no tenderness. There is no rebound and no guarding.   Musculoskeletal: Normal range of motion. She exhibits no tenderness.   Neurological: She is alert and oriented to person, place, and time. She has normal strength. No sensory deficit. She exhibits normal muscle tone. Coordination normal. GCS eye subscore is 4. GCS verbal subscore is 5. GCS motor subscore is 6.   Skin: Skin is warm. Capillary refill takes less than 2 seconds. No cyanosis. No pallor.   Psychiatric: She has a normal mood and affect. Her behavior is normal.   Nursing note and vitals reviewed.      Procedures  Results for orders placed or performed during the hospital encounter of 08/20/20   Urinalysis With Microscopic If Indicated (No Culture) - Urine, Clean Catch   Result Value Ref Range    Color, UA Yellow Yellow, Straw    Appearance, UA Clear Clear    pH, UA 6.5 5.0 - 8.0    Specific Gravity, UA 1.013 1.005 - 1.030    Glucose,  mg/dL (Trace) (A) Negative    Ketones, UA Negative Negative    Bilirubin, UA Negative Negative    Blood, UA Negative Negative    Protein, UA Negative Negative    Leuk Esterase, UA Negative Negative    Nitrite, UA Negative Negative    Urobilinogen, UA 0.2 E.U./dL 0.2 - 1.0 E.U./dL   Pregnancy, Urine - Urine, Clean Catch   Result Value Ref Range    HCG, Urine QL Negative Negative   POC Glucose Once   Result Value Ref Range    Glucose 244 (H) 70 - 130 mg/dL              ED Course  ED Course as of Aug 20 1305   Thu Aug 20, 2020   1242  Patient ate a lunch tray, tolerated this well.  Fingerstick blood sugar afterwards 244.  Patient continues to refuse further work-up, insists on being discharged now.  Patient did urinate, we sent this to the lab for studies.    [CM]      ED Course User Index  [CM] Ochoa Lazar MD                                           [facility]    Final diagnoses:   Hypoglycemic episode in patient with diabetes mellitus (CMS/[facility])             Please note that portions of this note were completed with a voice recognition program.        Ochoa Lazar MD  08/20/20 0010       Ochoa Lazar MD  08/20/20 3192

## 2020-08-20 NOTE — ED NOTES
Pt refused IV access en route via EMS. Pt continues to refuse IV access and blood work at this time. Dr Lazar at bedside and aware. RBVO per Dr Lazar to order pt a food tray with orange juice at this time.      Jes Grullon, SARIKA  08/20/20 1111

## 2020-08-20 NOTE — ED NOTES
Attempted to call pt's mother Jumana carey with no answer/voicemail. Will attempt again.  [phone]     Jes Grullon, RN  08/20/20 6879

## 2020-08-20 NOTE — ED NOTES
Pt continues to remove all physiological monitoring. Discussed need and importance for physiological monitoring.     Jes Grullon RN  08/20/20 2001

## 2020-08-30 ENCOUNTER — HOSPITAL ENCOUNTER (EMERGENCY)
Facility: HOSPITAL | Age: 35
Discharge: HOME | End: 2020-08-30
Attending: EMERGENCY MEDICINE | Admitting: EMERGENCY MEDICINE

## 2020-08-30 ENCOUNTER — APPOINTMENT (OUTPATIENT)
Dept: CT IMAGING | Facility: HOSPITAL | Age: 35
End: 2020-08-30

## 2020-08-30 VITALS
BODY MASS INDEX: 24.01 KG/M2 | HEIGHT: 67 IN | OXYGEN SATURATION: 98 % | TEMPERATURE: 98.3 F | SYSTOLIC BLOOD PRESSURE: 116 MMHG | DIASTOLIC BLOOD PRESSURE: 94 MMHG | HEART RATE: 78 BPM | RESPIRATION RATE: 18 BRPM | WEIGHT: 153 LBS

## 2020-08-30 DIAGNOSIS — J01.00 ACUTE NON-RECURRENT MAXILLARY SINUSITIS: ICD-10-CM

## 2020-08-30 DIAGNOSIS — E16.2 HYPOGLYCEMIA: Primary | ICD-10-CM

## 2020-08-30 DIAGNOSIS — E10.649 TYPE 1 DIABETES MELLITUS WITH HYPOGLYCEMIA AND WITHOUT COMA (HCC): ICD-10-CM

## 2020-08-30 DIAGNOSIS — N39.0 URINARY TRACT INFECTION WITHOUT HEMATURIA, SITE UNSPECIFIED: ICD-10-CM

## 2020-08-30 LAB
B-HCG UR QL: NEGATIVE
BACTERIA UR QL AUTO: ABNORMAL /HPF
BILIRUB UR QL STRIP: NEGATIVE
CLARITY UR: CLEAR
COLOR UR: YELLOW
GLUCOSE BLDC GLUCOMTR-MCNC: 105 MG/DL (ref 70–130)
GLUCOSE BLDC GLUCOMTR-MCNC: 199 MG/DL (ref 70–130)
GLUCOSE UR STRIP-MCNC: NEGATIVE MG/DL
HGB UR QL STRIP.AUTO: NEGATIVE
HYALINE CASTS UR QL AUTO: ABNORMAL /LPF
KETONES UR QL STRIP: NEGATIVE
LEUKOCYTE ESTERASE UR QL STRIP.AUTO: ABNORMAL
NITRITE UR QL STRIP: NEGATIVE
PH UR STRIP.AUTO: 6.5 [PH] (ref 5–8)
PROT UR QL STRIP: NEGATIVE
RBC # UR: ABNORMAL /HPF
REF LAB TEST METHOD: ABNORMAL
SP GR UR STRIP: 1.01 (ref 1–1.03)
SQUAMOUS #/AREA URNS HPF: ABNORMAL /HPF
UROBILINOGEN UR QL STRIP: ABNORMAL
WBC UR QL AUTO: ABNORMAL /HPF

## 2020-08-30 PROCEDURE — 82962 GLUCOSE BLOOD TEST: CPT

## 2020-08-30 PROCEDURE — 81001 URINALYSIS AUTO W/SCOPE: CPT | Performed by: EMERGENCY MEDICINE

## 2020-08-30 PROCEDURE — 99283 EMERGENCY DEPT VISIT LOW MDM: CPT

## 2020-08-30 PROCEDURE — 81025 URINE PREGNANCY TEST: CPT | Performed by: EMERGENCY MEDICINE

## 2020-08-30 PROCEDURE — 70450 CT HEAD/BRAIN W/O DYE: CPT

## 2020-08-30 RX ORDER — CIPROFLOXACIN 500 MG/1
500 TABLET, FILM COATED ORAL 2 TIMES DAILY
Qty: 20 TABLET | Refills: 0 | Status: SHIPPED | OUTPATIENT
Start: 2020-08-30

## 2020-09-01 LAB — GLUCOSE BLDC GLUCOMTR-MCNC: 85 MG/DL (ref 70–130)

## 2021-06-20 ENCOUNTER — APPOINTMENT (OUTPATIENT)
Dept: GENERAL RADIOLOGY | Facility: HOSPITAL | Age: 36
End: 2021-06-20

## 2021-06-20 ENCOUNTER — HOSPITAL ENCOUNTER (EMERGENCY)
Facility: HOSPITAL | Age: 36
Discharge: LEFT BEFORE DISCHARGE | End: 2021-06-21
Attending: EMERGENCY MEDICINE | Admitting: EMERGENCY MEDICINE

## 2021-06-20 DIAGNOSIS — E16.2 HYPOGLYCEMIA: Primary | ICD-10-CM

## 2021-06-20 DIAGNOSIS — S90.31XA CONTUSION OF RIGHT FOOT, INITIAL ENCOUNTER: ICD-10-CM

## 2021-06-20 DIAGNOSIS — N39.0 BACTERIAL UTI: ICD-10-CM

## 2021-06-20 DIAGNOSIS — F19.10 POLYSUBSTANCE ABUSE (HCC): ICD-10-CM

## 2021-06-20 DIAGNOSIS — A49.9 BACTERIAL UTI: ICD-10-CM

## 2021-06-20 LAB
ALBUMIN SERPL-MCNC: 4.2 G/DL (ref 3.5–5.2)
ALBUMIN/GLOB SERPL: 1.5 G/DL
ALP SERPL-CCNC: 119 U/L (ref 39–117)
ALT SERPL W P-5'-P-CCNC: 39 U/L (ref 1–33)
ANION GAP SERPL CALCULATED.3IONS-SCNC: 13.4 MMOL/L (ref 5–15)
AST SERPL-CCNC: 45 U/L (ref 1–32)
BASOPHILS # BLD AUTO: 0.06 10*3/MM3 (ref 0–0.2)
BASOPHILS NFR BLD AUTO: 0.5 % (ref 0–1.5)
BILIRUB SERPL-MCNC: 0.5 MG/DL (ref 0–1.2)
BILIRUB UR QL STRIP: ABNORMAL
BUN SERPL-MCNC: 12 MG/DL (ref 6–20)
BUN/CREAT SERPL: 12.5 (ref 7–25)
CALCIUM SPEC-SCNC: 9.6 MG/DL (ref 8.6–10.5)
CHLORIDE SERPL-SCNC: 106 MMOL/L (ref 98–107)
CK SERPL-CCNC: 299 U/L (ref 20–180)
CLARITY UR: ABNORMAL
CO2 SERPL-SCNC: 23.6 MMOL/L (ref 22–29)
COLOR UR: ABNORMAL
CREAT SERPL-MCNC: 0.96 MG/DL (ref 0.57–1)
CRP SERPL-MCNC: 0.31 MG/DL (ref 0–0.5)
D-LACTATE SERPL-SCNC: 1.8 MMOL/L (ref 0.5–2)
DEPRECATED RDW RBC AUTO: 41.2 FL (ref 37–54)
EOSINOPHIL # BLD AUTO: 0.09 10*3/MM3 (ref 0–0.4)
EOSINOPHIL NFR BLD AUTO: 0.8 % (ref 0.3–6.2)
ERYTHROCYTE [DISTWIDTH] IN BLOOD BY AUTOMATED COUNT: 12.9 % (ref 12.3–15.4)
ERYTHROCYTE [SEDIMENTATION RATE] IN BLOOD: 7 MM/HR (ref 0–20)
ETHANOL BLD-MCNC: <10 MG/DL (ref 0–10)
ETHANOL UR QL: <0.01 %
GFR SERPL CREATININE-BSD FRML MDRD: 67 ML/MIN/1.73
GLOBULIN UR ELPH-MCNC: 2.8 GM/DL
GLUCOSE BLDC GLUCOMTR-MCNC: 37 MG/DL (ref 70–130)
GLUCOSE SERPL-MCNC: 111 MG/DL (ref 65–99)
GLUCOSE UR STRIP-MCNC: ABNORMAL MG/DL
HCG SERPL QL: NEGATIVE
HCT VFR BLD AUTO: 39.4 % (ref 34–46.6)
HGB BLD-MCNC: 13.5 G/DL (ref 12–15.9)
HGB UR QL STRIP.AUTO: NEGATIVE
IMM GRANULOCYTES # BLD AUTO: 0.03 10*3/MM3 (ref 0–0.05)
IMM GRANULOCYTES NFR BLD AUTO: 0.3 % (ref 0–0.5)
KETONES UR QL STRIP: ABNORMAL
LEUKOCYTE ESTERASE UR QL STRIP.AUTO: ABNORMAL
LIPASE SERPL-CCNC: 6 U/L (ref 13–60)
LYMPHOCYTES # BLD AUTO: 2.07 10*3/MM3 (ref 0.7–3.1)
LYMPHOCYTES NFR BLD AUTO: 18.4 % (ref 19.6–45.3)
MAGNESIUM SERPL-MCNC: 1.8 MG/DL (ref 1.6–2.6)
MCH RBC QN AUTO: 30 PG (ref 26.6–33)
MCHC RBC AUTO-ENTMCNC: 34.3 G/DL (ref 31.5–35.7)
MCV RBC AUTO: 87.6 FL (ref 79–97)
MONOCYTES # BLD AUTO: 1.03 10*3/MM3 (ref 0.1–0.9)
MONOCYTES NFR BLD AUTO: 9.1 % (ref 5–12)
NEUTROPHILS NFR BLD AUTO: 70.9 % (ref 42.7–76)
NEUTROPHILS NFR BLD AUTO: 8 10*3/MM3 (ref 1.7–7)
NITRITE UR QL STRIP: POSITIVE
NRBC BLD AUTO-RTO: 0 /100 WBC (ref 0–0.2)
PH UR STRIP.AUTO: <=5 [PH] (ref 5–8)
PLATELET # BLD AUTO: 323 10*3/MM3 (ref 140–450)
PMV BLD AUTO: 8.9 FL (ref 6–12)
POTASSIUM SERPL-SCNC: 3.6 MMOL/L (ref 3.5–5.2)
PROT SERPL-MCNC: 7 G/DL (ref 6–8.5)
PROT UR QL STRIP: ABNORMAL
RBC # BLD AUTO: 4.5 10*6/MM3 (ref 3.77–5.28)
SODIUM SERPL-SCNC: 143 MMOL/L (ref 136–145)
SP GR UR STRIP: >1.03 (ref 1–1.03)
T4 FREE SERPL-MCNC: 1.8 NG/DL (ref 0.93–1.7)
TROPONIN T SERPL-MCNC: <0.01 NG/ML (ref 0–0.03)
TSH SERPL DL<=0.05 MIU/L-ACNC: 2.3 UIU/ML (ref 0.27–4.2)
UROBILINOGEN UR QL STRIP: ABNORMAL
WBC # BLD AUTO: 11.28 10*3/MM3 (ref 3.4–10.8)

## 2021-06-20 PROCEDURE — 93010 ELECTROCARDIOGRAM REPORT: CPT | Performed by: INTERNAL MEDICINE

## 2021-06-20 PROCEDURE — 84439 ASSAY OF FREE THYROXINE: CPT | Performed by: EMERGENCY MEDICINE

## 2021-06-20 PROCEDURE — 80307 DRUG TEST PRSMV CHEM ANLYZR: CPT | Performed by: EMERGENCY MEDICINE

## 2021-06-20 PROCEDURE — 83605 ASSAY OF LACTIC ACID: CPT | Performed by: EMERGENCY MEDICINE

## 2021-06-20 PROCEDURE — 87040 BLOOD CULTURE FOR BACTERIA: CPT | Performed by: EMERGENCY MEDICINE

## 2021-06-20 PROCEDURE — 86140 C-REACTIVE PROTEIN: CPT | Performed by: EMERGENCY MEDICINE

## 2021-06-20 PROCEDURE — 82077 ASSAY SPEC XCP UR&BREATH IA: CPT | Performed by: EMERGENCY MEDICINE

## 2021-06-20 PROCEDURE — 83690 ASSAY OF LIPASE: CPT | Performed by: EMERGENCY MEDICINE

## 2021-06-20 PROCEDURE — 83735 ASSAY OF MAGNESIUM: CPT | Performed by: EMERGENCY MEDICINE

## 2021-06-20 PROCEDURE — 87186 SC STD MICRODIL/AGAR DIL: CPT | Performed by: EMERGENCY MEDICINE

## 2021-06-20 PROCEDURE — 73610 X-RAY EXAM OF ANKLE: CPT

## 2021-06-20 PROCEDURE — 71045 X-RAY EXAM CHEST 1 VIEW: CPT

## 2021-06-20 PROCEDURE — 80053 COMPREHEN METABOLIC PANEL: CPT | Performed by: EMERGENCY MEDICINE

## 2021-06-20 PROCEDURE — 84443 ASSAY THYROID STIM HORMONE: CPT | Performed by: EMERGENCY MEDICINE

## 2021-06-20 PROCEDURE — 87086 URINE CULTURE/COLONY COUNT: CPT | Performed by: EMERGENCY MEDICINE

## 2021-06-20 PROCEDURE — 85652 RBC SED RATE AUTOMATED: CPT | Performed by: EMERGENCY MEDICINE

## 2021-06-20 PROCEDURE — 93005 ELECTROCARDIOGRAM TRACING: CPT | Performed by: EMERGENCY MEDICINE

## 2021-06-20 PROCEDURE — 84484 ASSAY OF TROPONIN QUANT: CPT | Performed by: EMERGENCY MEDICINE

## 2021-06-20 PROCEDURE — 82550 ASSAY OF CK (CPK): CPT | Performed by: EMERGENCY MEDICINE

## 2021-06-20 PROCEDURE — 84145 PROCALCITONIN (PCT): CPT | Performed by: EMERGENCY MEDICINE

## 2021-06-20 PROCEDURE — 84703 CHORIONIC GONADOTROPIN ASSAY: CPT | Performed by: EMERGENCY MEDICINE

## 2021-06-20 PROCEDURE — 73630 X-RAY EXAM OF FOOT: CPT

## 2021-06-20 PROCEDURE — 82962 GLUCOSE BLOOD TEST: CPT

## 2021-06-20 PROCEDURE — 99283 EMERGENCY DEPT VISIT LOW MDM: CPT

## 2021-06-20 PROCEDURE — 99284 EMERGENCY DEPT VISIT MOD MDM: CPT

## 2021-06-20 PROCEDURE — 87088 URINE BACTERIA CULTURE: CPT | Performed by: EMERGENCY MEDICINE

## 2021-06-20 PROCEDURE — 81001 URINALYSIS AUTO W/SCOPE: CPT | Performed by: EMERGENCY MEDICINE

## 2021-06-20 PROCEDURE — 85025 COMPLETE CBC W/AUTO DIFF WBC: CPT | Performed by: EMERGENCY MEDICINE

## 2021-06-20 RX ORDER — SODIUM CHLORIDE 0.9 % (FLUSH) 0.9 %
10 SYRINGE (ML) INJECTION AS NEEDED
Status: DISCONTINUED | OUTPATIENT
Start: 2021-06-20 | End: 2021-06-21 | Stop reason: HOSPADM

## 2021-06-20 RX ORDER — DEXTROSE MONOHYDRATE 25 G/50ML
25 INJECTION, SOLUTION INTRAVENOUS ONCE
Status: DISCONTINUED | OUTPATIENT
Start: 2021-06-20 | End: 2021-06-21 | Stop reason: HOSPADM

## 2021-06-21 VITALS
SYSTOLIC BLOOD PRESSURE: 133 MMHG | DIASTOLIC BLOOD PRESSURE: 62 MMHG | HEIGHT: 64 IN | WEIGHT: 135 LBS | OXYGEN SATURATION: 100 % | RESPIRATION RATE: 18 BRPM | BODY MASS INDEX: 23.05 KG/M2 | HEART RATE: 128 BPM | TEMPERATURE: 97.8 F

## 2021-06-21 LAB
6-ACETYL MORPHINE: NEGATIVE
AMPHET+METHAMPHET UR QL: POSITIVE
BACTERIA UR QL AUTO: ABNORMAL /HPF
BARBITURATES UR QL SCN: NEGATIVE
BENZODIAZ UR QL SCN: NEGATIVE
BUPRENORPHINE SERPL-MCNC: POSITIVE NG/ML
CANNABINOIDS SERPL QL: NEGATIVE
COCAINE UR QL: NEGATIVE
HYALINE CASTS UR QL AUTO: ABNORMAL /LPF
METHADONE UR QL SCN: NEGATIVE
OPIATES UR QL: NEGATIVE
OXYCODONE UR QL SCN: NEGATIVE
PCP UR QL SCN: NEGATIVE
PROCALCITONIN SERPL-MCNC: 0.19 NG/ML (ref 0–0.25)
RBC # UR: ABNORMAL /HPF
REF LAB TEST METHOD: ABNORMAL
SQUAMOUS #/AREA URNS HPF: ABNORMAL /HPF
WBC UR QL AUTO: ABNORMAL /HPF

## 2021-06-21 NOTE — ED PROVIDER NOTES
Subjective     History provided by:  Patient   used: No    Foot Pain  Location:  Right foot pain.  Quality:  Reports her pain is a 5/10 on the pain severity scale.  Severity:  Moderate  Onset quality:  Gradual  Timing:  Constant  Progression:  Worsening  Chronicity:  New  Context:  Patient reports that she hit her right foot earlier today.  Relieved by:  Nothing.  Worsened by:  Movement and ambulation.  Ineffective treatments:  None tried at this time.  Associated symptoms: no abdominal pain, no chest pain, no congestion, no cough, no diarrhea, no ear pain, no fatigue, no fever, no headaches, no loss of consciousness, no myalgias, no nausea, no rash, no rhinorrhea, no shortness of breath, no sore throat, no vomiting and no wheezing    Risk factors:  Polysubstance abuse and hypoglycemia.      Review of Systems   Constitutional: Negative for activity change, appetite change, chills, diaphoresis, fatigue and fever.   HENT: Negative for congestion, ear pain, rhinorrhea and sore throat.    Eyes: Negative for redness.   Respiratory: Negative for cough, chest tightness, shortness of breath and wheezing.    Cardiovascular: Negative for chest pain, palpitations and leg swelling.   Gastrointestinal: Negative for abdominal pain, diarrhea, nausea and vomiting.   Genitourinary: Negative for dysuria and urgency.   Musculoskeletal: Negative for arthralgias, back pain, myalgias and neck pain.   Skin: Negative for pallor, rash and wound.   Neurological: Negative for dizziness, loss of consciousness, speech difficulty, weakness and headaches.   Psychiatric/Behavioral: Positive for agitation. Negative for behavioral problems, confusion, decreased concentration, self-injury and suicidal ideas. The patient is nervous/anxious.    All other systems reviewed and are negative.      Past Medical History:   Diagnosis Date   • Asthma    • Celiac disease    • Diabetes mellitus type 1 (CMS/HCC)    • Diabetic ketoacidosis  (CMS/HCC)    • Disease of thyroid gland    • Hepatitis C    • Infectious viral hepatitis     hepititis C   • Neuropathy    • Reflux gastritis        Allergies   Allergen Reactions   • Sulfa Antibiotics        Past Surgical History:   Procedure Laterality Date   •  SECTION      X 2       Family History   Problem Relation Age of Onset   • No Known Problems Mother    • No Known Problems Father    • No Known Problems Sister    • No Known Problems Brother    • No Known Problems Son    • No Known Problems Daughter    • No Known Problems Maternal Grandfather    • No Known Problems Paternal Grandmother    • No Known Problems Cousin    • Rheum arthritis Neg Hx    • Osteoarthritis Neg Hx    • Asthma Neg Hx    • Diabetes Neg Hx    • Heart failure Neg Hx    • Hyperlipidemia Neg Hx    • Migraines Neg Hx    • Rashes / Skin problems Neg Hx    • Seizures Neg Hx    • Stroke Neg Hx    • Thyroid disease Neg Hx    • Diabetes type I Maternal Grandmother    • Breast cancer Maternal Grandmother    • Hypertension Maternal Grandmother    • Diabetes type II Paternal Grandfather        Social History     Socioeconomic History   • Marital status: Single     Spouse name: Not on file   • Number of children: Not on file   • Years of education: Not on file   • Highest education level: Not on file   Tobacco Use   • Smoking status: Unknown If Ever Smoked   • Smokeless tobacco: Never Used   • Tobacco comment: unable to assess    Vaping Use   • Vaping Use: Never used   Substance and Sexual Activity   • Alcohol use: No     Comment: unable to assess    • Drug use: No     Comment: unable to assess    • Sexual activity: Defer     Comment: unable to assess            Objective   Physical Exam  Vitals and nursing note reviewed.   Constitutional:       General: She is not in acute distress.     Appearance: Normal appearance. She is well-developed. She is not toxic-appearing or diaphoretic.   HENT:      Head: Normocephalic and atraumatic.      Right  Ear: External ear normal.      Left Ear: External ear normal.      Nose: Nose normal.      Mouth/Throat:      Pharynx: No oropharyngeal exudate.      Tonsils: No tonsillar exudate.   Eyes:      General: Lids are normal.      Conjunctiva/sclera: Conjunctivae normal.      Pupils: Pupils are equal, round, and reactive to light.   Neck:      Thyroid: No thyromegaly.   Cardiovascular:      Rate and Rhythm: Normal rate and regular rhythm.      Pulses: Normal pulses.      Heart sounds: Normal heart sounds, S1 normal and S2 normal.   Pulmonary:      Effort: Pulmonary effort is normal. No tachypnea or respiratory distress.      Breath sounds: Normal breath sounds. No decreased breath sounds, wheezing or rales.   Chest:      Chest wall: No tenderness.   Abdominal:      General: Bowel sounds are normal. There is no distension.      Palpations: Abdomen is soft.      Tenderness: There is no abdominal tenderness. There is no guarding or rebound.   Musculoskeletal:         General: No tenderness or deformity. Normal range of motion.      Cervical back: Full passive range of motion without pain, normal range of motion and neck supple.   Lymphadenopathy:      Cervical: No cervical adenopathy.   Skin:     General: Skin is warm and dry.      Coloration: Skin is not pale.      Findings: No erythema or rash.   Neurological:      Mental Status: She is alert and oriented to person, place, and time.      GCS: GCS eye subscore is 4. GCS verbal subscore is 5. GCS motor subscore is 6.      Cranial Nerves: No cranial nerve deficit.      Sensory: No sensory deficit.   Psychiatric:         Speech: Speech normal.         Behavior: Behavior normal.         Thought Content: Thought content normal.         Judgment: Judgment normal.         Procedures           ED Course  ED Course as of Jun 20 2358   Sun Jun 20, 2021   2356 Patient left AGAINST MEDICAL ADVICE.  Patient was alert and oriented x4 with a GCS of 15.  Verbal of making all of her own  medical decisions at this time.  Informed her of risk which included worsening blood glucose level and level of consciousness which could lead to sudden death.  Patient still decided to leave AGAINST MEDICAL ADVICE at this time, and was instructed to return to the emergency department with any worsening symptoms.    [ES]   2356 Sinus tachycardia  Otherwise normal ECG  When compared with ECG of 21-SEP-2019 19:29,  No significant change was found  Vent. rate 111 BPM  OK interval 144 ms  QRS duration 80 ms  QT/QTcB 354/481 ms  P-R-T axes 75 87 61   ECG 12 Lead [ES]   2357 IMPRESSION:  Negative plain film assessment of the right foot and ankle.   XR Ankle 3+ View Right [ES]   2357 IMPRESSION:  Negative plain film assessment of the right foot and ankle.   XR Foot 3+ View Right [ES]   2357 IMPRESSION:  No acute cardiopulmonary findings.   XR Chest 1 View [ES]      ED Course User Index  [ES] Michael King MD                                           MDM  Number of Diagnoses or Management Options  Bacterial UTI: new and requires workup  Contusion of right foot, initial encounter: new and requires workup  Hypoglycemia: new and requires workup  Polysubstance abuse (CMS/HCC): new and requires workup     Amount and/or Complexity of Data Reviewed  Clinical lab tests: reviewed  Tests in the radiology section of CPT®: reviewed  Tests in the medicine section of CPT®: reviewed        Final diagnoses:   Hypoglycemia   Bacterial UTI   Polysubstance abuse (CMS/HCC)   Contusion of right foot, initial encounter       ED Disposition  ED Disposition     ED Disposition Condition Comment    Juany Ventura, APRN  [address] [address]  [phone]    Schedule an appointment as soon as possible for a visit in 1 day  EVALUATE         Medication List      No changes were made to your prescriptions during this visit.          Michael King MD  06/20/21 1239

## 2021-06-21 NOTE — ED NOTES
Iv attempts x 3 unsuccessful, will get another nurse to try to attempt access       Marleni Mckeon, SARIKA  06/20/21 2286

## 2021-06-21 NOTE — ED NOTES
Dr. King aware that multiple attempts per nursing staff unsuccessful and pt does not have an iv.      Marleni Mckeon, RN  06/20/21 9647

## 2021-06-21 NOTE — ED NOTES
Pt given large snack, pt given two sandwich boxes, 2 applesauces, 2 orange juices, and a milk. Also peanut butter and crackers given. Pt alert, awake, moving around in gbed, completely oriented and following commands. Pt tolerating po intake well.      Marleni Mckeon, SARIKA  06/20/21 8692

## 2021-06-21 NOTE — ED NOTES
Pt eloped. Pt told staff as she came by nursing station that she was leaving. Dr. King spoke with her as she walked out and states she was aaorx3. Pt refused to stop and sign AMA as she walked out.      Marleni Mckeon, RN  06/20/21 1345

## 2021-06-22 LAB
QT INTERVAL: 354 MS
QTC INTERVAL: 481 MS

## 2021-06-23 LAB — BACTERIA SPEC AEROBE CULT: ABNORMAL

## 2021-06-25 LAB
BACTERIA SPEC AEROBE CULT: NORMAL
BACTERIA SPEC AEROBE CULT: NORMAL

## 2022-11-21 ENCOUNTER — TRANSCRIBE ORDERS (OUTPATIENT)
Dept: ADMINISTRATIVE | Facility: HOSPITAL | Age: 37
End: 2022-11-21

## 2022-11-21 DIAGNOSIS — R10.32 ABDOMINAL PAIN, LEFT LOWER QUADRANT: Primary | ICD-10-CM

## 2022-11-22 ENCOUNTER — HOSPITAL ENCOUNTER (OUTPATIENT)
Dept: ULTRASOUND IMAGING | Facility: HOSPITAL | Age: 37
Discharge: HOME | End: 2022-11-22
Admitting: NURSE PRACTITIONER

## 2022-11-22 DIAGNOSIS — R10.32 ABDOMINAL PAIN, LEFT LOWER QUADRANT: ICD-10-CM

## 2022-11-22 PROCEDURE — 76700 US EXAM ABDOM COMPLETE: CPT

## 2022-11-22 PROCEDURE — 76700 US EXAM ABDOM COMPLETE: CPT | Performed by: RADIOLOGY

## 2023-08-11 ENCOUNTER — PREP FOR SURGERY (OUTPATIENT)
Dept: OTHER | Facility: HOSPITAL | Age: 38
End: 2023-08-11
Payer: MEDICAID

## 2023-08-11 ENCOUNTER — HOSPITAL ENCOUNTER (INPATIENT)
Facility: HOSPITAL | Age: 38
LOS: 2 days | Discharge: HOME | DRG: 637 | End: 2023-08-13
Attending: INTERNAL MEDICINE | Admitting: INTERNAL MEDICINE
Payer: MEDICAID

## 2023-08-12 ENCOUNTER — APPOINTMENT (OUTPATIENT)
Dept: GENERAL RADIOLOGY | Facility: HOSPITAL | Age: 38
DRG: 637 | End: 2023-08-12
Payer: MEDICAID

## 2023-08-12 ENCOUNTER — APPOINTMENT (OUTPATIENT)
Dept: CT IMAGING | Facility: HOSPITAL | Age: 38
DRG: 637 | End: 2023-08-12
Payer: MEDICAID

## 2023-08-12 PROBLEM — E11.10 DKA (DIABETIC KETOACIDOSIS): Status: ACTIVE | Noted: 2023-08-12

## 2023-08-12 LAB
A-A DO2: 73.4 MMHG (ref 0–300)
ACETONE BLD QL: ABNORMAL
ALBUMIN SERPL-MCNC: 4.2 G/DL (ref 3.5–5.2)
ALBUMIN/GLOB SERPL: 1.8 G/DL
ALP SERPL-CCNC: 139 U/L (ref 39–117)
ALT SERPL W P-5'-P-CCNC: 55 U/L (ref 1–33)
AMPHET+METHAMPHET UR QL: NEGATIVE
AMPHETAMINES UR QL: NEGATIVE
AMYLASE SERPL-CCNC: 10 U/L (ref 28–100)
ANION GAP SERPL CALCULATED.3IONS-SCNC: 10.6 MMOL/L (ref 5–15)
ANION GAP SERPL CALCULATED.3IONS-SCNC: 12.3 MMOL/L (ref 5–15)
ANION GAP SERPL CALCULATED.3IONS-SCNC: 12.5 MMOL/L (ref 5–15)
ANION GAP SERPL CALCULATED.3IONS-SCNC: 15.1 MMOL/L (ref 5–15)
ANION GAP SERPL CALCULATED.3IONS-SCNC: 8.9 MMOL/L (ref 5–15)
APTT PPP: 20 SECONDS (ref 26.5–34.5)
ARTERIAL PATENCY WRIST A: ABNORMAL
AST SERPL-CCNC: 32 U/L (ref 1–32)
ATMOSPHERIC PRESS: 724 MMHG
BACTERIA UR QL AUTO: ABNORMAL /HPF
BARBITURATES UR QL SCN: NEGATIVE
BASE EXCESS BLDA CALC-SCNC: 1.8 MMOL/L (ref 0–2)
BASOPHILS # BLD AUTO: 0.03 10*3/MM3 (ref 0–0.2)
BASOPHILS NFR BLD AUTO: 0.2 % (ref 0–1.5)
BDY SITE: ABNORMAL
BENZODIAZ UR QL SCN: POSITIVE
BILIRUB SERPL-MCNC: 0.2 MG/DL (ref 0–1.2)
BILIRUB UR QL STRIP: NEGATIVE
BUN SERPL-MCNC: 18 MG/DL (ref 6–20)
BUN SERPL-MCNC: 20 MG/DL (ref 6–20)
BUN SERPL-MCNC: 22 MG/DL (ref 6–20)
BUN SERPL-MCNC: 24 MG/DL (ref 6–20)
BUN SERPL-MCNC: 28 MG/DL (ref 6–20)
BUN/CREAT SERPL: 20.9 (ref 7–25)
BUN/CREAT SERPL: 22 (ref 7–25)
BUN/CREAT SERPL: 23.3 (ref 7–25)
BUN/CREAT SERPL: 23.7 (ref 7–25)
BUN/CREAT SERPL: 25 (ref 7–25)
BUPRENORPHINE SERPL-MCNC: POSITIVE NG/ML
CALCIUM SPEC-SCNC: 8.1 MG/DL (ref 8.6–10.5)
CALCIUM SPEC-SCNC: 8.2 MG/DL (ref 8.6–10.5)
CALCIUM SPEC-SCNC: 8.3 MG/DL (ref 8.6–10.5)
CALCIUM SPEC-SCNC: 8.9 MG/DL (ref 8.6–10.5)
CALCIUM SPEC-SCNC: 9.6 MG/DL (ref 8.6–10.5)
CANNABINOIDS SERPL QL: NEGATIVE
CHLORIDE SERPL-SCNC: 103 MMOL/L (ref 98–107)
CHLORIDE SERPL-SCNC: 103 MMOL/L (ref 98–107)
CHLORIDE SERPL-SCNC: 110 MMOL/L (ref 98–107)
CHLORIDE SERPL-SCNC: 112 MMOL/L (ref 98–107)
CHLORIDE SERPL-SCNC: 98 MMOL/L (ref 98–107)
CLARITY UR: ABNORMAL
CO2 BLDA-SCNC: 29 MMOL/L (ref 22–33)
CO2 SERPL-SCNC: 21.7 MMOL/L (ref 22–29)
CO2 SERPL-SCNC: 22.5 MMOL/L (ref 22–29)
CO2 SERPL-SCNC: 23.4 MMOL/L (ref 22–29)
CO2 SERPL-SCNC: 24.9 MMOL/L (ref 22–29)
CO2 SERPL-SCNC: 25.1 MMOL/L (ref 22–29)
COCAINE UR QL: NEGATIVE
COHGB MFR BLD: 1.4 % (ref 0–5)
COLOR UR: ABNORMAL
CREAT SERPL-MCNC: 0.86 MG/DL (ref 0.57–1)
CREAT SERPL-MCNC: 0.91 MG/DL (ref 0.57–1)
CREAT SERPL-MCNC: 0.93 MG/DL (ref 0.57–1)
CREAT SERPL-MCNC: 0.96 MG/DL (ref 0.57–1)
CREAT SERPL-MCNC: 1.2 MG/DL (ref 0.57–1)
D-LACTATE SERPL-SCNC: 1.8 MMOL/L (ref 0.5–2)
DEPRECATED RDW RBC AUTO: 38.6 FL (ref 37–54)
EGFRCR SERPLBLD CKD-EPI 2021: 60.7 ML/MIN/1.73
EGFRCR SERPLBLD CKD-EPI 2021: 79.3 ML/MIN/1.73
EGFRCR SERPLBLD CKD-EPI 2021: 82.4 ML/MIN/1.73
EGFRCR SERPLBLD CKD-EPI 2021: 84.5 ML/MIN/1.73
EGFRCR SERPLBLD CKD-EPI 2021: 90.5 ML/MIN/1.73
EOSINOPHIL # BLD AUTO: 0 10*3/MM3 (ref 0–0.4)
EOSINOPHIL NFR BLD AUTO: 0 % (ref 0.3–6.2)
ERYTHROCYTE [DISTWIDTH] IN BLOOD BY AUTOMATED COUNT: 11.9 % (ref 12.3–15.4)
GEN 5 2HR TROPONIN T REFLEX: 9 NG/L
GLOBULIN UR ELPH-MCNC: 2.4 GM/DL
GLUCOSE BLDC GLUCOMTR-MCNC: 104 MG/DL (ref 70–130)
GLUCOSE BLDC GLUCOMTR-MCNC: 119 MG/DL (ref 70–130)
GLUCOSE BLDC GLUCOMTR-MCNC: 143 MG/DL (ref 70–130)
GLUCOSE BLDC GLUCOMTR-MCNC: 170 MG/DL (ref 70–130)
GLUCOSE BLDC GLUCOMTR-MCNC: 172 MG/DL (ref 70–130)
GLUCOSE BLDC GLUCOMTR-MCNC: 183 MG/DL (ref 70–130)
GLUCOSE BLDC GLUCOMTR-MCNC: 223 MG/DL (ref 70–130)
GLUCOSE BLDC GLUCOMTR-MCNC: 226 MG/DL (ref 70–130)
GLUCOSE BLDC GLUCOMTR-MCNC: 264 MG/DL (ref 70–130)
GLUCOSE BLDC GLUCOMTR-MCNC: 323 MG/DL (ref 70–130)
GLUCOSE BLDC GLUCOMTR-MCNC: 324 MG/DL (ref 70–130)
GLUCOSE BLDC GLUCOMTR-MCNC: 352 MG/DL (ref 70–130)
GLUCOSE BLDC GLUCOMTR-MCNC: 492 MG/DL (ref 70–130)
GLUCOSE BLDC GLUCOMTR-MCNC: 89 MG/DL (ref 70–130)
GLUCOSE SERPL-MCNC: 148 MG/DL (ref 65–99)
GLUCOSE SERPL-MCNC: 172 MG/DL (ref 65–99)
GLUCOSE SERPL-MCNC: 203 MG/DL (ref 65–99)
GLUCOSE SERPL-MCNC: 269 MG/DL (ref 65–99)
GLUCOSE SERPL-MCNC: 702 MG/DL (ref 65–99)
GLUCOSE UR STRIP-MCNC: ABNORMAL MG/DL
GRAN CASTS URNS QL MICRO: ABNORMAL /LPF
HBA1C MFR BLD: 10.2 % (ref 4.8–5.6)
HCO3 BLDA-SCNC: 27.6 MMOL/L (ref 20–26)
HCT VFR BLD AUTO: 35.8 % (ref 34–46.6)
HCT VFR BLD CALC: 39.6 % (ref 38–51)
HGB BLD-MCNC: 12.4 G/DL (ref 12–15.9)
HGB BLDA-MCNC: 12.9 G/DL (ref 13.5–17.5)
HGB UR QL STRIP.AUTO: ABNORMAL
HYALINE CASTS UR QL AUTO: ABNORMAL /LPF
IMM GRANULOCYTES # BLD AUTO: 0.06 10*3/MM3 (ref 0–0.05)
IMM GRANULOCYTES NFR BLD AUTO: 0.5 % (ref 0–0.5)
INHALED O2 CONCENTRATION: 32 %
INR PPP: 1 (ref 0.9–1.1)
KETONES UR QL STRIP: ABNORMAL
LEUKOCYTE ESTERASE UR QL STRIP.AUTO: ABNORMAL
LYMPHOCYTES # BLD AUTO: 1.18 10*3/MM3 (ref 0.7–3.1)
LYMPHOCYTES NFR BLD AUTO: 9.6 % (ref 19.6–45.3)
Lab: ABNORMAL
MAGNESIUM SERPL-MCNC: 2.5 MG/DL (ref 1.6–2.6)
MAGNESIUM SERPL-MCNC: 2.7 MG/DL (ref 1.6–2.6)
MCH RBC QN AUTO: 30.4 PG (ref 26.6–33)
MCHC RBC AUTO-ENTMCNC: 34.6 G/DL (ref 31.5–35.7)
MCV RBC AUTO: 87.7 FL (ref 79–97)
METHADONE UR QL SCN: NEGATIVE
METHGB BLD QL: 0.1 % (ref 0–3)
MODALITY: ABNORMAL
MONOCYTES # BLD AUTO: 1.35 10*3/MM3 (ref 0.1–0.9)
MONOCYTES NFR BLD AUTO: 10.9 % (ref 5–12)
NEUTROPHILS NFR BLD AUTO: 78.8 % (ref 42.7–76)
NEUTROPHILS NFR BLD AUTO: 9.71 10*3/MM3 (ref 1.7–7)
NITRITE UR QL STRIP: NEGATIVE
NOTE: ABNORMAL
NRBC BLD AUTO-RTO: 0 /100 WBC (ref 0–0.2)
OPIATES UR QL: NEGATIVE
OXYCODONE UR QL SCN: NEGATIVE
OXYHGB MFR BLDV: 96.5 % (ref 94–99)
PCO2 BLDA: 46.7 MM HG (ref 35–45)
PCO2 TEMP ADJ BLD: ABNORMAL MM[HG]
PCP UR QL SCN: NEGATIVE
PH BLDA: 7.38 PH UNITS (ref 7.35–7.45)
PH UR STRIP.AUTO: 5.5 [PH] (ref 5–8)
PH, TEMP CORRECTED: ABNORMAL
PHOSPHATE SERPL-MCNC: 3.2 MG/DL (ref 2.5–4.5)
PLATELET # BLD AUTO: 314 10*3/MM3 (ref 140–450)
PMV BLD AUTO: 9.8 FL (ref 6–12)
PO2 BLDA: 91.5 MM HG (ref 83–108)
PO2 TEMP ADJ BLD: ABNORMAL MM[HG]
POTASSIUM SERPL-SCNC: 4 MMOL/L (ref 3.5–5.2)
POTASSIUM SERPL-SCNC: 4.2 MMOL/L (ref 3.5–5.2)
POTASSIUM SERPL-SCNC: 4.2 MMOL/L (ref 3.5–5.2)
POTASSIUM SERPL-SCNC: 4.3 MMOL/L (ref 3.5–5.2)
POTASSIUM SERPL-SCNC: 4.4 MMOL/L (ref 3.5–5.2)
PROCALCITONIN SERPL-MCNC: 7.31 NG/ML (ref 0–0.25)
PROPOXYPH UR QL: NEGATIVE
PROT SERPL-MCNC: 6.6 G/DL (ref 6–8.5)
PROT UR QL STRIP: ABNORMAL
PROTHROMBIN TIME: 13.7 SECONDS (ref 12.1–14.7)
QT INTERVAL: 370 MS
QTC INTERVAL: 472 MS
RBC # BLD AUTO: 4.08 10*6/MM3 (ref 3.77–5.28)
RBC # UR STRIP: ABNORMAL /HPF
REF LAB TEST METHOD: ABNORMAL
SAO2 % BLDCOA: 98 % (ref 94–99)
SODIUM SERPL-SCNC: 137 MMOL/L (ref 136–145)
SODIUM SERPL-SCNC: 138 MMOL/L (ref 136–145)
SODIUM SERPL-SCNC: 138 MMOL/L (ref 136–145)
SODIUM SERPL-SCNC: 144 MMOL/L (ref 136–145)
SODIUM SERPL-SCNC: 146 MMOL/L (ref 136–145)
SP GR UR STRIP: 1.03 (ref 1–1.03)
SQUAMOUS #/AREA URNS HPF: ABNORMAL /HPF
TRICYCLICS UR QL SCN: NEGATIVE
TROPONIN T DELTA: -1 NG/L
TROPONIN T SERPL HS-MCNC: 10 NG/L
TSH SERPL DL<=0.05 MIU/L-ACNC: 0.73 UIU/ML (ref 0.27–4.2)
UROBILINOGEN UR QL STRIP: ABNORMAL
VENTILATOR MODE: ABNORMAL
WBC # UR STRIP: ABNORMAL /HPF
WBC NRBC COR # BLD: 12.33 10*3/MM3 (ref 3.4–10.8)

## 2023-08-12 PROCEDURE — 0 CEFEPIME PER 500 MG: Performed by: INTERNAL MEDICINE

## 2023-08-12 PROCEDURE — 71045 X-RAY EXAM CHEST 1 VIEW: CPT

## 2023-08-12 PROCEDURE — 80306 DRUG TEST PRSMV INSTRMNT: CPT | Performed by: STUDENT IN AN ORGANIZED HEALTH CARE EDUCATION/TRAINING PROGRAM

## 2023-08-12 PROCEDURE — 63710000001 INSULIN GLARGINE PER 5 UNITS: Performed by: STUDENT IN AN ORGANIZED HEALTH CARE EDUCATION/TRAINING PROGRAM

## 2023-08-12 PROCEDURE — 93005 ELECTROCARDIOGRAM TRACING: CPT | Performed by: INTERNAL MEDICINE

## 2023-08-12 PROCEDURE — 84484 ASSAY OF TROPONIN QUANT: CPT | Performed by: INTERNAL MEDICINE

## 2023-08-12 PROCEDURE — 85025 COMPLETE CBC W/AUTO DIFF WBC: CPT | Performed by: INTERNAL MEDICINE

## 2023-08-12 PROCEDURE — 94799 UNLISTED PULMONARY SVC/PX: CPT

## 2023-08-12 PROCEDURE — 82150 ASSAY OF AMYLASE: CPT | Performed by: INTERNAL MEDICINE

## 2023-08-12 PROCEDURE — 70450 CT HEAD/BRAIN W/O DYE: CPT

## 2023-08-12 PROCEDURE — 83605 ASSAY OF LACTIC ACID: CPT | Performed by: INTERNAL MEDICINE

## 2023-08-12 PROCEDURE — 25010000002 HEPARIN (PORCINE) PER 1000 UNITS: Performed by: INTERNAL MEDICINE

## 2023-08-12 PROCEDURE — 80053 COMPREHEN METABOLIC PANEL: CPT | Performed by: INTERNAL MEDICINE

## 2023-08-12 PROCEDURE — 36600 WITHDRAWAL OF ARTERIAL BLOOD: CPT

## 2023-08-12 PROCEDURE — 70450 CT HEAD/BRAIN W/O DYE: CPT | Performed by: RADIOLOGY

## 2023-08-12 PROCEDURE — 85730 THROMBOPLASTIN TIME PARTIAL: CPT | Performed by: INTERNAL MEDICINE

## 2023-08-12 PROCEDURE — 82375 ASSAY CARBOXYHB QUANT: CPT

## 2023-08-12 PROCEDURE — 82948 REAGENT STRIP/BLOOD GLUCOSE: CPT

## 2023-08-12 PROCEDURE — 84100 ASSAY OF PHOSPHORUS: CPT | Performed by: INTERNAL MEDICINE

## 2023-08-12 PROCEDURE — 84145 PROCALCITONIN (PCT): CPT | Performed by: INTERNAL MEDICINE

## 2023-08-12 PROCEDURE — 83050 HGB METHEMOGLOBIN QUAN: CPT

## 2023-08-12 PROCEDURE — 63710000001 INSULIN LISPRO (HUMAN) PER 5 UNITS: Performed by: INTERNAL MEDICINE

## 2023-08-12 PROCEDURE — 63710000001 INSULIN GLARGINE PER 5 UNITS: Performed by: INTERNAL MEDICINE

## 2023-08-12 PROCEDURE — 94761 N-INVAS EAR/PLS OXIMETRY MLT: CPT

## 2023-08-12 PROCEDURE — 81001 URINALYSIS AUTO W/SCOPE: CPT | Performed by: INTERNAL MEDICINE

## 2023-08-12 PROCEDURE — 84443 ASSAY THYROID STIM HORMONE: CPT | Performed by: INTERNAL MEDICINE

## 2023-08-12 PROCEDURE — 85610 PROTHROMBIN TIME: CPT | Performed by: INTERNAL MEDICINE

## 2023-08-12 PROCEDURE — 94664 DEMO&/EVAL PT USE INHALER: CPT

## 2023-08-12 PROCEDURE — 83036 HEMOGLOBIN GLYCOSYLATED A1C: CPT | Performed by: INTERNAL MEDICINE

## 2023-08-12 PROCEDURE — 87040 BLOOD CULTURE FOR BACTERIA: CPT | Performed by: INTERNAL MEDICINE

## 2023-08-12 PROCEDURE — 25010000002 VANCOMYCIN 5 G RECONSTITUTED SOLUTION: Performed by: INTERNAL MEDICINE

## 2023-08-12 PROCEDURE — 71045 X-RAY EXAM CHEST 1 VIEW: CPT | Performed by: RADIOLOGY

## 2023-08-12 PROCEDURE — 82805 BLOOD GASES W/O2 SATURATION: CPT

## 2023-08-12 PROCEDURE — 93010 ELECTROCARDIOGRAM REPORT: CPT | Performed by: SPECIALIST

## 2023-08-12 PROCEDURE — 83735 ASSAY OF MAGNESIUM: CPT | Performed by: INTERNAL MEDICINE

## 2023-08-12 PROCEDURE — 82009 KETONE BODYS QUAL: CPT | Performed by: INTERNAL MEDICINE

## 2023-08-12 PROCEDURE — 25010000002 VANCOMYCIN 1 G RECONSTITUTED SOLUTION 1 EACH VIAL: Performed by: STUDENT IN AN ORGANIZED HEALTH CARE EDUCATION/TRAINING PROGRAM

## 2023-08-12 RX ORDER — NICOTINE POLACRILEX 4 MG
15 LOZENGE BUCCAL
Status: DISCONTINUED | OUTPATIENT
Start: 2023-08-12 | End: 2023-08-13 | Stop reason: HOSPADM

## 2023-08-12 RX ORDER — BISACODYL 10 MG
10 SUPPOSITORY, RECTAL RECTAL DAILY PRN
Status: CANCELLED | OUTPATIENT
Start: 2023-08-11

## 2023-08-12 RX ORDER — SODIUM CHLORIDE 0.9 % (FLUSH) 0.9 %
10 SYRINGE (ML) INJECTION AS NEEDED
Status: CANCELLED | OUTPATIENT
Start: 2023-08-11

## 2023-08-12 RX ORDER — SODIUM CHLORIDE 450 MG/100ML
250 INJECTION, SOLUTION INTRAVENOUS CONTINUOUS PRN
Status: DISCONTINUED | OUTPATIENT
Start: 2023-08-12 | End: 2023-08-12

## 2023-08-12 RX ORDER — BUDESONIDE AND FORMOTEROL FUMARATE DIHYDRATE 160; 4.5 UG/1; UG/1
2 AEROSOL RESPIRATORY (INHALATION)
Status: DISCONTINUED | OUTPATIENT
Start: 2023-08-12 | End: 2023-08-13 | Stop reason: HOSPADM

## 2023-08-12 RX ORDER — HYDROXYZINE HYDROCHLORIDE 25 MG/1
25 TABLET, FILM COATED ORAL 4 TIMES DAILY PRN
Status: DISCONTINUED | OUTPATIENT
Start: 2023-08-12 | End: 2023-08-13 | Stop reason: HOSPADM

## 2023-08-12 RX ORDER — INSULIN LISPRO 100 [IU]/ML
16 INJECTION, SOLUTION INTRAVENOUS; SUBCUTANEOUS 3 TIMES DAILY
Status: CANCELLED | OUTPATIENT
Start: 2023-08-12

## 2023-08-12 RX ORDER — MONTELUKAST SODIUM 10 MG/1
10 TABLET ORAL NIGHTLY
Status: DISCONTINUED | OUTPATIENT
Start: 2023-08-12 | End: 2023-08-13 | Stop reason: HOSPADM

## 2023-08-12 RX ORDER — POLYETHYLENE GLYCOL 3350 17 G/17G
17 POWDER, FOR SOLUTION ORAL DAILY
Status: DISCONTINUED | OUTPATIENT
Start: 2023-08-12 | End: 2023-08-13 | Stop reason: HOSPADM

## 2023-08-12 RX ORDER — DEXTROSE AND SODIUM CHLORIDE 5; .9 G/100ML; G/100ML
150 INJECTION, SOLUTION INTRAVENOUS CONTINUOUS PRN
Status: DISCONTINUED | OUTPATIENT
Start: 2023-08-12 | End: 2023-08-12

## 2023-08-12 RX ORDER — FLUOXETINE HYDROCHLORIDE 20 MG/1
40 CAPSULE ORAL DAILY
Status: DISCONTINUED | OUTPATIENT
Start: 2023-08-12 | End: 2023-08-13 | Stop reason: HOSPADM

## 2023-08-12 RX ORDER — FUROSEMIDE 20 MG/1
20 TABLET ORAL DAILY
COMMUNITY
Start: 2023-07-10

## 2023-08-12 RX ORDER — GABAPENTIN 300 MG/1
300 CAPSULE ORAL 3 TIMES DAILY
Status: DISCONTINUED | OUTPATIENT
Start: 2023-08-12 | End: 2023-08-13 | Stop reason: HOSPADM

## 2023-08-12 RX ORDER — CETIRIZINE HYDROCHLORIDE 10 MG/1
10 TABLET ORAL DAILY
Status: DISCONTINUED | OUTPATIENT
Start: 2023-08-12 | End: 2023-08-13 | Stop reason: HOSPADM

## 2023-08-12 RX ORDER — POTASSIUM CHLORIDE 20 MEQ/1
10 TABLET, EXTENDED RELEASE ORAL DAILY
Status: DISCONTINUED | OUTPATIENT
Start: 2023-08-12 | End: 2023-08-13 | Stop reason: HOSPADM

## 2023-08-12 RX ORDER — ATORVASTATIN CALCIUM 20 MG/1
20 TABLET, FILM COATED ORAL NIGHTLY
COMMUNITY
Start: 2023-07-24

## 2023-08-12 RX ORDER — DEXTROSE MONOHYDRATE 25 G/50ML
10-50 INJECTION, SOLUTION INTRAVENOUS
Status: CANCELLED | OUTPATIENT
Start: 2023-08-11

## 2023-08-12 RX ORDER — DEXTROSE MONOHYDRATE, SODIUM CHLORIDE, AND POTASSIUM CHLORIDE 50; 2.98; 9 G/1000ML; G/1000ML; G/1000ML
150 INJECTION, SOLUTION INTRAVENOUS CONTINUOUS PRN
Status: DISCONTINUED | OUTPATIENT
Start: 2023-08-12 | End: 2023-08-12

## 2023-08-12 RX ORDER — SODIUM CHLORIDE 9 MG/ML
40 INJECTION, SOLUTION INTRAVENOUS AS NEEDED
Status: CANCELLED | OUTPATIENT
Start: 2023-08-11

## 2023-08-12 RX ORDER — TOPIRAMATE 25 MG/1
25 TABLET ORAL DAILY
Status: DISCONTINUED | OUTPATIENT
Start: 2023-08-12 | End: 2023-08-13 | Stop reason: HOSPADM

## 2023-08-12 RX ORDER — INSULIN LISPRO 100 [IU]/ML
1-200 INJECTION, SOLUTION INTRAVENOUS; SUBCUTANEOUS
Status: DISCONTINUED | OUTPATIENT
Start: 2023-08-12 | End: 2023-08-13 | Stop reason: HOSPADM

## 2023-08-12 RX ORDER — BISACODYL 10 MG
10 SUPPOSITORY, RECTAL RECTAL DAILY PRN
Status: DISCONTINUED | OUTPATIENT
Start: 2023-08-12 | End: 2023-08-13 | Stop reason: HOSPADM

## 2023-08-12 RX ORDER — SODIUM CHLORIDE AND POTASSIUM CHLORIDE 300; 900 MG/100ML; MG/100ML
250 INJECTION, SOLUTION INTRAVENOUS CONTINUOUS PRN
Status: DISCONTINUED | OUTPATIENT
Start: 2023-08-12 | End: 2023-08-12

## 2023-08-12 RX ORDER — ACETAMINOPHEN 325 MG/1
650 TABLET ORAL EVERY 6 HOURS PRN
Status: DISCONTINUED | OUTPATIENT
Start: 2023-08-12 | End: 2023-08-13 | Stop reason: HOSPADM

## 2023-08-12 RX ORDER — PANTOPRAZOLE SODIUM 40 MG/1
1 TABLET, DELAYED RELEASE ORAL DAILY
COMMUNITY
Start: 2023-07-15

## 2023-08-12 RX ORDER — AMOXICILLIN 250 MG
2 CAPSULE ORAL 2 TIMES DAILY
Status: DISCONTINUED | OUTPATIENT
Start: 2023-08-12 | End: 2023-08-13 | Stop reason: HOSPADM

## 2023-08-12 RX ORDER — SODIUM CHLORIDE 0.9 % (FLUSH) 0.9 %
10 SYRINGE (ML) INJECTION AS NEEDED
Status: DISCONTINUED | OUTPATIENT
Start: 2023-08-12 | End: 2023-08-13 | Stop reason: HOSPADM

## 2023-08-12 RX ORDER — GLUCAGON 1 MG/ML
1 KIT INJECTION
Status: CANCELLED | OUTPATIENT
Start: 2023-08-11

## 2023-08-12 RX ORDER — DEXTROSE AND SODIUM CHLORIDE 5; .45 G/100ML; G/100ML
150 INJECTION, SOLUTION INTRAVENOUS CONTINUOUS PRN
Status: DISCONTINUED | OUTPATIENT
Start: 2023-08-12 | End: 2023-08-12

## 2023-08-12 RX ORDER — PROMETHAZINE HYDROCHLORIDE 25 MG/1
25 TABLET ORAL EVERY 8 HOURS PRN
Status: DISCONTINUED | OUTPATIENT
Start: 2023-08-12 | End: 2023-08-13 | Stop reason: HOSPADM

## 2023-08-12 RX ORDER — SODIUM CHLORIDE 9 MG/ML
250 INJECTION, SOLUTION INTRAVENOUS CONTINUOUS PRN
Status: DISCONTINUED | OUTPATIENT
Start: 2023-08-12 | End: 2023-08-12

## 2023-08-12 RX ORDER — BISACODYL 5 MG/1
5 TABLET, DELAYED RELEASE ORAL DAILY PRN
Status: CANCELLED | OUTPATIENT
Start: 2023-08-11

## 2023-08-12 RX ORDER — AMOXICILLIN 250 MG
2 CAPSULE ORAL 2 TIMES DAILY
Status: CANCELLED | OUTPATIENT
Start: 2023-08-12

## 2023-08-12 RX ORDER — SODIUM CHLORIDE AND POTASSIUM CHLORIDE 150; 450 MG/100ML; MG/100ML
250 INJECTION, SOLUTION INTRAVENOUS CONTINUOUS PRN
Status: DISCONTINUED | OUTPATIENT
Start: 2023-08-12 | End: 2023-08-12

## 2023-08-12 RX ORDER — POTASSIUM CHLORIDE 750 MG/1
10 TABLET, FILM COATED, EXTENDED RELEASE ORAL DAILY
COMMUNITY
Start: 2023-07-15

## 2023-08-12 RX ORDER — INSULIN LISPRO 100 [IU]/ML
16 INJECTION, SOLUTION INTRAVENOUS; SUBCUTANEOUS 3 TIMES DAILY
Status: ON HOLD | COMMUNITY
Start: 2023-08-07 | End: 2023-08-13 | Stop reason: SDUPTHER

## 2023-08-12 RX ORDER — HEPARIN SODIUM 5000 [USP'U]/ML
5000 INJECTION, SOLUTION INTRAVENOUS; SUBCUTANEOUS EVERY 12 HOURS SCHEDULED
Status: DISCONTINUED | OUTPATIENT
Start: 2023-08-12 | End: 2023-08-13 | Stop reason: HOSPADM

## 2023-08-12 RX ORDER — INSULIN LISPRO 100 [IU]/ML
1-200 INJECTION, SOLUTION INTRAVENOUS; SUBCUTANEOUS AS NEEDED
Status: DISCONTINUED | OUTPATIENT
Start: 2023-08-12 | End: 2023-08-13 | Stop reason: HOSPADM

## 2023-08-12 RX ORDER — SODIUM CHLORIDE 0.9 % (FLUSH) 0.9 %
10 SYRINGE (ML) INJECTION EVERY 12 HOURS SCHEDULED
Status: CANCELLED | OUTPATIENT
Start: 2023-08-12

## 2023-08-12 RX ORDER — ATORVASTATIN CALCIUM 20 MG/1
20 TABLET, FILM COATED ORAL NIGHTLY
Status: DISCONTINUED | OUTPATIENT
Start: 2023-08-12 | End: 2023-08-13 | Stop reason: HOSPADM

## 2023-08-12 RX ORDER — SODIUM CHLORIDE 9 MG/ML
40 INJECTION, SOLUTION INTRAVENOUS AS NEEDED
Status: DISCONTINUED | OUTPATIENT
Start: 2023-08-12 | End: 2023-08-12

## 2023-08-12 RX ORDER — HEPARIN SODIUM 5000 [USP'U]/ML
5000 INJECTION, SOLUTION INTRAVENOUS; SUBCUTANEOUS EVERY 12 HOURS SCHEDULED
Status: CANCELLED | OUTPATIENT
Start: 2023-08-12

## 2023-08-12 RX ORDER — DEXTROSE MONOHYDRATE 25 G/50ML
10-50 INJECTION, SOLUTION INTRAVENOUS
Status: DISCONTINUED | OUTPATIENT
Start: 2023-08-12 | End: 2023-08-13 | Stop reason: HOSPADM

## 2023-08-12 RX ORDER — ONDANSETRON 4 MG/1
4 TABLET, FILM COATED ORAL EVERY 6 HOURS PRN
COMMUNITY

## 2023-08-12 RX ORDER — CETIRIZINE HYDROCHLORIDE 10 MG/1
1 TABLET ORAL DAILY
COMMUNITY
Start: 2023-07-10

## 2023-08-12 RX ORDER — IPRATROPIUM BROMIDE AND ALBUTEROL SULFATE 2.5; .5 MG/3ML; MG/3ML
3 SOLUTION RESPIRATORY (INHALATION) 4 TIMES DAILY PRN
Status: DISCONTINUED | OUTPATIENT
Start: 2023-08-12 | End: 2023-08-13 | Stop reason: HOSPADM

## 2023-08-12 RX ORDER — INSULIN GLARGINE 100 [IU]/ML
50 INJECTION, SOLUTION SUBCUTANEOUS DAILY
Status: ON HOLD | COMMUNITY
Start: 2023-08-09 | End: 2023-08-13 | Stop reason: SDUPTHER

## 2023-08-12 RX ORDER — POLYETHYLENE GLYCOL 3350 17 G/17G
17 POWDER ORAL DAILY
COMMUNITY
Start: 2023-07-27

## 2023-08-12 RX ORDER — FLUTICASONE PROPIONATE 50 MCG
2 SPRAY, SUSPENSION (ML) NASAL DAILY
Status: DISCONTINUED | OUTPATIENT
Start: 2023-08-12 | End: 2023-08-13 | Stop reason: HOSPADM

## 2023-08-12 RX ORDER — ALBUTEROL SULFATE 2.5 MG/3ML
2.5 SOLUTION RESPIRATORY (INHALATION) EVERY 6 HOURS PRN
Status: DISCONTINUED | OUTPATIENT
Start: 2023-08-12 | End: 2023-08-13 | Stop reason: HOSPADM

## 2023-08-12 RX ORDER — DEXTROSE MONOHYDRATE, SODIUM CHLORIDE, AND POTASSIUM CHLORIDE 50; 1.49; 4.5 G/1000ML; G/1000ML; G/1000ML
150 INJECTION, SOLUTION INTRAVENOUS CONTINUOUS PRN
Status: DISCONTINUED | OUTPATIENT
Start: 2023-08-12 | End: 2023-08-12

## 2023-08-12 RX ORDER — DEXTROSE MONOHYDRATE 25 G/50ML
10-50 INJECTION, SOLUTION INTRAVENOUS
Status: DISCONTINUED | OUTPATIENT
Start: 2023-08-12 | End: 2023-08-13

## 2023-08-12 RX ORDER — DEXTROSE MONOHYDRATE, SODIUM CHLORIDE, AND POTASSIUM CHLORIDE 50; 1.49; 9 G/1000ML; G/1000ML; G/1000ML
150 INJECTION, SOLUTION INTRAVENOUS CONTINUOUS PRN
Status: DISCONTINUED | OUTPATIENT
Start: 2023-08-12 | End: 2023-08-12

## 2023-08-12 RX ORDER — FLUOXETINE HYDROCHLORIDE 20 MG/1
20 CAPSULE ORAL DAILY
COMMUNITY
Start: 2023-07-10

## 2023-08-12 RX ORDER — FLUOXETINE HYDROCHLORIDE 40 MG/1
40 CAPSULE ORAL DAILY
COMMUNITY
Start: 2023-07-27

## 2023-08-12 RX ORDER — VANCOMYCIN/0.9 % SOD CHLORIDE 1.5G/250ML
20 PLASTIC BAG, INJECTION (ML) INTRAVENOUS ONCE
Status: COMPLETED | OUTPATIENT
Start: 2023-08-12 | End: 2023-08-12

## 2023-08-12 RX ORDER — LEVOTHYROXINE SODIUM 0.12 MG/1
125 TABLET ORAL
Status: DISCONTINUED | OUTPATIENT
Start: 2023-08-13 | End: 2023-08-13 | Stop reason: HOSPADM

## 2023-08-12 RX ORDER — MONTELUKAST SODIUM 10 MG/1
10 TABLET ORAL NIGHTLY
COMMUNITY

## 2023-08-12 RX ORDER — DOCUSATE SODIUM 100 MG/1
200 CAPSULE, LIQUID FILLED ORAL DAILY PRN
COMMUNITY
Start: 2023-07-24

## 2023-08-12 RX ORDER — BISACODYL 5 MG/1
5 TABLET, DELAYED RELEASE ORAL DAILY PRN
Status: DISCONTINUED | OUTPATIENT
Start: 2023-08-12 | End: 2023-08-13 | Stop reason: HOSPADM

## 2023-08-12 RX ORDER — DOCUSATE SODIUM 100 MG/1
200 CAPSULE, LIQUID FILLED ORAL DAILY PRN
Status: DISCONTINUED | OUTPATIENT
Start: 2023-08-12 | End: 2023-08-13 | Stop reason: HOSPADM

## 2023-08-12 RX ORDER — SODIUM CHLORIDE 0.9 % (FLUSH) 0.9 %
10 SYRINGE (ML) INJECTION EVERY 12 HOURS SCHEDULED
Status: DISCONTINUED | OUTPATIENT
Start: 2023-08-12 | End: 2023-08-13

## 2023-08-12 RX ORDER — BENZONATATE 100 MG/1
100 CAPSULE ORAL 3 TIMES DAILY PRN
COMMUNITY
Start: 2023-07-27

## 2023-08-12 RX ORDER — HYDROXYZINE HYDROCHLORIDE 25 MG/1
1 TABLET, FILM COATED ORAL 4 TIMES DAILY PRN
COMMUNITY
Start: 2023-07-27

## 2023-08-12 RX ORDER — ONDANSETRON 4 MG/1
4 TABLET, FILM COATED ORAL EVERY 6 HOURS PRN
Status: DISCONTINUED | OUTPATIENT
Start: 2023-08-12 | End: 2023-08-13 | Stop reason: HOSPADM

## 2023-08-12 RX ORDER — NICOTINE POLACRILEX 4 MG
15 LOZENGE BUCCAL
Status: CANCELLED | OUTPATIENT
Start: 2023-08-11

## 2023-08-12 RX ORDER — FLUOXETINE HYDROCHLORIDE 20 MG/1
20 CAPSULE ORAL DAILY
Status: DISCONTINUED | OUTPATIENT
Start: 2023-08-12 | End: 2023-08-13 | Stop reason: HOSPADM

## 2023-08-12 RX ORDER — BENZONATATE 100 MG/1
100 CAPSULE ORAL 3 TIMES DAILY PRN
Status: DISCONTINUED | OUTPATIENT
Start: 2023-08-12 | End: 2023-08-13 | Stop reason: HOSPADM

## 2023-08-12 RX ORDER — BUPRENORPHINE HYDROCHLORIDE AND NALOXONE HYDROCHLORIDE DIHYDRATE 8; 2 MG/1; MG/1
3 TABLET SUBLINGUAL DAILY
Status: DISCONTINUED | OUTPATIENT
Start: 2023-08-12 | End: 2023-08-13 | Stop reason: HOSPADM

## 2023-08-12 RX ORDER — NITROGLYCERIN 0.4 MG/1
0.4 TABLET SUBLINGUAL
Status: CANCELLED | OUTPATIENT
Start: 2023-08-11

## 2023-08-12 RX ORDER — PANTOPRAZOLE SODIUM 40 MG/1
40 TABLET, DELAYED RELEASE ORAL DAILY
Status: DISCONTINUED | OUTPATIENT
Start: 2023-08-12 | End: 2023-08-13 | Stop reason: HOSPADM

## 2023-08-12 RX ORDER — BUPROPION HYDROCHLORIDE 100 MG/1
200 TABLET, EXTENDED RELEASE ORAL 2 TIMES DAILY
Status: DISCONTINUED | OUTPATIENT
Start: 2023-08-12 | End: 2023-08-13 | Stop reason: HOSPADM

## 2023-08-12 RX ORDER — NITROGLYCERIN 0.4 MG/1
0.4 TABLET SUBLINGUAL
Status: DISCONTINUED | OUTPATIENT
Start: 2023-08-12 | End: 2023-08-13 | Stop reason: HOSPADM

## 2023-08-12 RX ORDER — FLUTICASONE PROPIONATE AND SALMETEROL 50; 250 UG/1; UG/1
1 POWDER RESPIRATORY (INHALATION)
COMMUNITY
Start: 2023-05-02

## 2023-08-12 RX ORDER — TOPIRAMATE 25 MG/1
25 TABLET ORAL DAILY
COMMUNITY
Start: 2023-07-24

## 2023-08-12 RX ORDER — DEXTROSE MONOHYDRATE, SODIUM CHLORIDE, AND POTASSIUM CHLORIDE 50; 2.98; 4.5 G/1000ML; G/1000ML; G/1000ML
150 INJECTION, SOLUTION INTRAVENOUS CONTINUOUS PRN
Status: DISCONTINUED | OUTPATIENT
Start: 2023-08-12 | End: 2023-08-12

## 2023-08-12 RX ORDER — POLYETHYLENE GLYCOL 3350 17 G/17G
17 POWDER, FOR SOLUTION ORAL DAILY PRN
Status: DISCONTINUED | OUTPATIENT
Start: 2023-08-12 | End: 2023-08-13 | Stop reason: HOSPADM

## 2023-08-12 RX ORDER — PROMETHAZINE HYDROCHLORIDE 25 MG/1
25 TABLET ORAL EVERY 8 HOURS PRN
COMMUNITY
Start: 2023-08-07

## 2023-08-12 RX ORDER — FUROSEMIDE 20 MG/1
20 TABLET ORAL DAILY
Status: CANCELLED | OUTPATIENT
Start: 2023-08-12

## 2023-08-12 RX ORDER — BUPROPION HYDROCHLORIDE 200 MG/1
1 TABLET, EXTENDED RELEASE ORAL 2 TIMES DAILY
COMMUNITY
Start: 2023-08-07

## 2023-08-12 RX ORDER — POLYETHYLENE GLYCOL 3350 17 G/17G
17 POWDER, FOR SOLUTION ORAL DAILY PRN
Status: CANCELLED | OUTPATIENT
Start: 2023-08-11

## 2023-08-12 RX ORDER — BUPRENORPHINE HYDROCHLORIDE AND NALOXONE HYDROCHLORIDE DIHYDRATE 8; 2 MG/1; MG/1
3 TABLET SUBLINGUAL DAILY
COMMUNITY
Start: 2023-08-07

## 2023-08-12 RX ORDER — SODIUM CHLORIDE AND POTASSIUM CHLORIDE 150; 900 MG/100ML; MG/100ML
250 INJECTION, SOLUTION INTRAVENOUS CONTINUOUS PRN
Status: DISCONTINUED | OUTPATIENT
Start: 2023-08-12 | End: 2023-08-12

## 2023-08-12 RX ORDER — SODIUM CHLORIDE 0.9 % (FLUSH) 0.9 %
10 SYRINGE (ML) INJECTION AS NEEDED
Status: DISCONTINUED | OUTPATIENT
Start: 2023-08-12 | End: 2023-08-13

## 2023-08-12 RX ORDER — FLUTICASONE PROPIONATE 50 MCG
2 SPRAY, SUSPENSION (ML) NASAL DAILY
COMMUNITY
Start: 2023-07-24

## 2023-08-12 RX ORDER — GLUCAGON 3 MG/1
1 POWDER NASAL AS NEEDED
COMMUNITY
Start: 2023-08-07

## 2023-08-12 RX ORDER — NICOTINE POLACRILEX 4 MG
15 LOZENGE BUCCAL
Status: DISCONTINUED | OUTPATIENT
Start: 2023-08-12 | End: 2023-08-13

## 2023-08-12 RX ORDER — SODIUM CHLORIDE 0.9 % (FLUSH) 0.9 %
10 SYRINGE (ML) INJECTION EVERY 12 HOURS SCHEDULED
Status: DISCONTINUED | OUTPATIENT
Start: 2023-08-12 | End: 2023-08-13 | Stop reason: HOSPADM

## 2023-08-12 RX ADMIN — TOPIRAMATE 25 MG: 25 TABLET, FILM COATED ORAL at 16:24

## 2023-08-12 RX ADMIN — Medication 10 ML: at 09:29

## 2023-08-12 RX ADMIN — POTASSIUM CHLORIDE, DEXTROSE MONOHYDRATE AND SODIUM CHLORIDE 150 ML/HR: 150; 5; 450 INJECTION, SOLUTION INTRAVENOUS at 08:30

## 2023-08-12 RX ADMIN — FLUOXETINE HYDROCHLORIDE 40 MG: 20 CAPSULE ORAL at 16:25

## 2023-08-12 RX ADMIN — FLUTICASONE PROPIONATE 2 SPRAY: 50 SPRAY, METERED NASAL at 16:25

## 2023-08-12 RX ADMIN — CETIRIZINE HYDROCHLORIDE 10 MG: 10 TABLET, FILM COATED ORAL at 16:25

## 2023-08-12 RX ADMIN — VANCOMYCIN HYDROCHLORIDE 1500 MG: 5 INJECTION, POWDER, LYOPHILIZED, FOR SOLUTION INTRAVENOUS at 06:05

## 2023-08-12 RX ADMIN — INSULIN GLARGINE 10 UNITS: 100 INJECTION, SOLUTION SUBCUTANEOUS at 20:55

## 2023-08-12 RX ADMIN — INSULIN LISPRO 2 UNITS: 100 INJECTION, SOLUTION INTRAVENOUS; SUBCUTANEOUS at 20:56

## 2023-08-12 RX ADMIN — ACETAMINOPHEN 650 MG: 325 TABLET ORAL at 20:57

## 2023-08-12 RX ADMIN — HEPARIN SODIUM 5000 UNITS: 5000 INJECTION INTRAVENOUS; SUBCUTANEOUS at 20:30

## 2023-08-12 RX ADMIN — ATORVASTATIN CALCIUM 20 MG: 20 TABLET, FILM COATED ORAL at 20:30

## 2023-08-12 RX ADMIN — HEPARIN SODIUM 5000 UNITS: 5000 INJECTION INTRAVENOUS; SUBCUTANEOUS at 09:30

## 2023-08-12 RX ADMIN — Medication 10 ML: at 20:31

## 2023-08-12 RX ADMIN — SODIUM CHLORIDE 1000 ML/HR: 9 INJECTION, SOLUTION INTRAVENOUS at 02:57

## 2023-08-12 RX ADMIN — BUPROPION HYDROCHLORIDE 200 MG: 100 TABLET, EXTENDED RELEASE ORAL at 20:30

## 2023-08-12 RX ADMIN — INSULIN GLARGINE 10 UNITS: 100 INJECTION, SOLUTION SUBCUTANEOUS at 22:04

## 2023-08-12 RX ADMIN — INSULIN LISPRO 1 UNITS: 100 INJECTION, SOLUTION INTRAVENOUS; SUBCUTANEOUS at 11:41

## 2023-08-12 RX ADMIN — CEFEPIME 2 G: 2 INJECTION, POWDER, FOR SOLUTION INTRAVENOUS at 11:54

## 2023-08-12 RX ADMIN — Medication 10 ML: at 02:14

## 2023-08-12 RX ADMIN — INSULIN LISPRO 1 UNITS: 100 INJECTION, SOLUTION INTRAVENOUS; SUBCUTANEOUS at 17:25

## 2023-08-12 RX ADMIN — FLUOXETINE HYDROCHLORIDE 20 MG: 20 CAPSULE ORAL at 16:24

## 2023-08-12 RX ADMIN — DOCUSATE SODIUM 50 MG AND SENNOSIDES 8.6 MG 2 TABLET: 8.6; 5 TABLET, FILM COATED ORAL at 09:29

## 2023-08-12 RX ADMIN — CEFEPIME 2000 MG: 2 INJECTION, POWDER, FOR SOLUTION INTRAVENOUS at 05:23

## 2023-08-12 RX ADMIN — BUDESONIDE AND FORMOTEROL FUMARATE DIHYDRATE 2 PUFF: 160; 4.5 AEROSOL RESPIRATORY (INHALATION) at 18:47

## 2023-08-12 RX ADMIN — POTASSIUM CHLORIDE 10 MEQ: 1500 TABLET, EXTENDED RELEASE ORAL at 16:24

## 2023-08-12 RX ADMIN — INSULIN HUMAN 10.8 UNITS/HR: 1 INJECTION, SOLUTION INTRAVENOUS at 02:13

## 2023-08-12 RX ADMIN — GABAPENTIN 300 MG: 300 CAPSULE ORAL at 20:30

## 2023-08-12 RX ADMIN — BUPRENORPHINE HYDROCHLORIDE AND NALOXONE HYDROCHLORIDE DIHYDRATE 3 TABLET: 8; 2 TABLET SUBLINGUAL at 16:24

## 2023-08-12 RX ADMIN — VANCOMYCIN HYDROCHLORIDE 1000 MG: 1 INJECTION, POWDER, LYOPHILIZED, FOR SOLUTION INTRAVENOUS at 17:26

## 2023-08-12 RX ADMIN — INSULIN GLARGINE 10 UNITS: 100 INJECTION, SOLUTION SUBCUTANEOUS at 09:29

## 2023-08-12 RX ADMIN — GABAPENTIN 300 MG: 300 CAPSULE ORAL at 16:24

## 2023-08-12 RX ADMIN — MONTELUKAST SODIUM 10 MG: 10 TABLET, COATED ORAL at 20:29

## 2023-08-12 RX ADMIN — HEPARIN SODIUM 5000 UNITS: 5000 INJECTION INTRAVENOUS; SUBCUTANEOUS at 02:13

## 2023-08-12 RX ADMIN — PANTOPRAZOLE SODIUM 40 MG: 40 TABLET, DELAYED RELEASE ORAL at 16:24

## 2023-08-12 NOTE — PLAN OF CARE
Problem: Adult Inpatient Plan of Care  Goal: Plan of Care Review  Outcome: Ongoing, Not Progressing  Flowsheets (Taken 8/12/2023 1837)  Progress: no change  Plan of Care Reviewed With: patient  Outcome Evaluation: patient on room air, patient changed to med surg spencer in place.  Goal: Patient-Specific Goal (Individualized)  Outcome: Ongoing, Not Progressing  Goal: Absence of Hospital-Acquired Illness or Injury  Outcome: Ongoing, Not Progressing  Intervention: Identify and Manage Fall Risk  Description: Perform standard risk assessment on admission using a validated tool or comprehensive approach appropriate to the patient; reassess fall risk frequently, with change in status or transfer to another level of care.  Communicate fall injury risk to interprofessional healthcare team.  Determine need for increased observation, equipment and environmental modification, such as low bed, signage and supportive, nonskid footwear.  Adjust safety measures to individual developmental age, stage and identified risk factors.  Reinforce the importance of safety and physical activity with patient and family.  Perform regular intentional rounding to assess need for position change, pain assessment and personal needs, including assistance with toileting.  Recent Flowsheet Documentation  Taken 8/12/2023 1800 by Hesham Hollis, RN  Safety Promotion/Fall Prevention:   safety round/check completed   fall prevention program maintained  Taken 8/12/2023 1700 by Hesham Hollis, RN  Safety Promotion/Fall Prevention:   safety round/check completed   fall prevention program maintained  Taken 8/12/2023 1600 by Hesham Hollis, RN  Safety Promotion/Fall Prevention:   safety round/check completed   fall prevention program maintained  Taken 8/12/2023 1500 by Hesham Hollis, RN  Safety Promotion/Fall Prevention:   safety round/check completed   fall prevention program maintained  Taken 8/12/2023 1400 by Hesham Hollis, RN  Safety  Promotion/Fall Prevention:   safety round/check completed   fall prevention program maintained  Taken 8/12/2023 1300 by Hesham Hollis RN  Safety Promotion/Fall Prevention:   safety round/check completed   fall prevention program maintained  Taken 8/12/2023 1200 by Hesham Hollis RN  Safety Promotion/Fall Prevention:   safety round/check completed   fall prevention program maintained  Taken 8/12/2023 1100 by Hesham Hollis RN  Safety Promotion/Fall Prevention:   safety round/check completed   fall prevention program maintained  Taken 8/12/2023 1000 by Hesham Hollis RN  Safety Promotion/Fall Prevention:   safety round/check completed   fall prevention program maintained  Taken 8/12/2023 0900 by Hesham Hollis RN  Safety Promotion/Fall Prevention:   safety round/check completed   fall prevention program maintained  Taken 8/12/2023 0800 by Hesham Hollis RN  Safety Promotion/Fall Prevention:   safety round/check completed   fall prevention program maintained  Intervention: Prevent Skin Injury  Description: Perform a screening for skin injury risk, such as pressure or moisture associated skin damage on admission and at regular intervals throughout hospital stay.  Keep all areas of skin (especially folds) clean and dry.  Maintain adequate skin hydration.  Relieve and redistribute pressure and protect bony prominences; implement measures based on patient-specific risk factors.  Match turning and repositioning schedule to clinical condition.  Encourage weight shift frequently; assist with reposition if unable to complete independently.  Float heels off bed; avoid pressure on the Achilles tendon.  Keep skin free from extended contact with medical devices.  Encourage functional activity and mobility, as early as tolerated.  Use aids (e.g., slide boards, mechanical lift) during transfer.  Recent Flowsheet Documentation  Taken 8/12/2023 1800 by Hesham Hollis, RN  Body Position: position changed  independently  Taken 8/12/2023 1600 by Hesham Hollis RN  Body Position: position changed independently  Taken 8/12/2023 1400 by Hesham Hollis RN  Body Position: position changed independently  Skin Protection: adhesive use limited  Taken 8/12/2023 1200 by Hesham Hollis RN  Body Position: position changed independently  Taken 8/12/2023 1000 by Hesham Hollis RN  Body Position: position changed independently  Taken 8/12/2023 0800 by Hesham Hollis RN  Body Position: position changed independently  Skin Protection: adhesive use limited  Intervention: Prevent and Manage VTE (Venous Thromboembolism) Risk  Description: Assess for VTE (venous thromboembolism) risk.  Encourage and assist with early ambulation.  Initiate and maintain compression or other therapy, as indicated, based on identified risk in accordance with organizational protocol and provider order.  Encourage both active and passive leg exercises while in bed, if unable to ambulate.  Recent Flowsheet Documentation  Taken 8/12/2023 1400 by Hesham Hollis RN  Range of Motion: active ROM (range of motion) encouraged  Taken 8/12/2023 0800 by Hesham Hollis RN  Activity Management: up in chair  Range of Motion: active ROM (range of motion) encouraged  Goal: Optimal Comfort and Wellbeing  Outcome: Ongoing, Not Progressing  Intervention: Provide Person-Centered Care  Description: Use a family-focused approach to care.  Develop trust and rapport by proactively providing information, encouraging questions, addressing concerns and offering reassurance.  Acknowledge emotional response to hospitalization.  Recognize and utilize personal coping strategies.  Honor spiritual and cultural preferences.  Recent Flowsheet Documentation  Taken 8/12/2023 0800 by Hesham Hollis RN  Trust Relationship/Rapport:   care explained   reassurance provided  Goal: Readiness for Transition of Care  Outcome: Ongoing, Not Progressing   Goal Outcome  Evaluation:  Plan of Care Reviewed With: patient        Progress: no change  Outcome Evaluation: patient on room air, patient changed to med surg spencer in place.

## 2023-08-12 NOTE — H&P
[facility]    Patient Identification:  Name:  Jes Moreno  Age:  37 y.o.  Sex:  female  :  1985  MRN:  [id number]   Visit Number:  [id number]  Admit Date: 2023   Room number:  CC08/1C  Primary Care Physician:  Juany Sheehan APRN    Date of Admission: 2023     Subjective     Chief complaint: Transferred from [facility] for DKA, originally arrived at that facility for confusion    History of presenting illness:  37 y.o. female who was admitted on 2023 directly from [facility] for DKA.  The patient has a past medical history of type 1 diabetes mellitus, hepatitis C, polysubstance abuse, hypothyroidism, medical noncompliance, and tobacco addiction.  The patient arrived at [facility] on 2023 with confusion.  Patient was found to have a glucose level of over 700.  She was given a total of 30 units of IV regular insulin and then she was started on insulin drip at 4 units/h.  Unfortunately, the outside hospital had limited resources and was not able to perform an ABG.  The sodium was reported to be 118 but then 2-1/2 hours later it was reported to be 134.  When she arrived at our facility, her sodium level was 138.  The patient's initial glucose level at our facility was 707 with an anion gap of 15.1.  The patient also met sepsis criteria and had an elevated procalcitonin level.    Labs from [facility] is as follows:  Urinalysis with 1+ ketones, negative nitrites, 1+ leukocytes, 2-5 white blood cells, 3+ glucose  Urine drug screen negative for amphetamines, cocaine, marijuana, benzodiazepines, tricyclic antidepressants, barbiturates, MDMA, opiates, PCP, oxycodone, propoxyphene  Sodium 118 and then 2.5 hours later the sodium was reported as 134, potassium 4.2, anion gap 18, creatinine 1.14, glucose greater than 700, BUN 35, creatinine 1.3, lactic acid 4.04, CO2 24 (please note that both  of these were i-STAT's)  White blood cell count 10,100, hemoglobin 12.6, hematocrit 46.7, .6, platelets 284,000, no bands  Her urine pregnancy test at [place] was negative.  ---------------------------------------------------------------------------------------------------------------------   Review of Systems   Unable to perform ROS: Mental status change   ---------------------------------------------------------------------------------------------------------------------   Past Medical History:   Diagnosis Date    Asthma     Celiac disease     Diabetes mellitus type 1     Diabetic ketoacidosis     Disease of thyroid gland     Hepatitis C     Infectious viral hepatitis     hepititis C    Neuropathy     Reflux gastritis      Past Surgical History:   Procedure Laterality Date     SECTION      X 2     Family History   Problem Relation Age of Onset    Diabetes type I Maternal Grandmother     Breast cancer Maternal Grandmother     Hypertension Maternal Grandmother     Diabetes type II Paternal Grandfather      Social History     Socioeconomic History    Marital status: Single   Tobacco Use    Smoking status: Unknown    Smokeless tobacco: Never    Tobacco comments:     unable to assess    Vaping Use    Vaping Use: Never used   Substance and Sexual Activity    Alcohol use: No     Comment: unable to assess     Drug use: No     Comment: unable to assess     Sexual activity: Defer     Comment: unable to assess      ---------------------------------------------------------------------------------------------------------------------   Allergies:  Sulfa antibiotics  ---------------------------------------------------------------------------------------------------------------------   Medications below are reported home medications pulling from within the system; at this time, these medications have not been reconciled unless otherwise specified and are in the verification process for further verification as  current home medications.      Prior to Admission Medications       Prescriptions Last Dose Informant Patient Reported? Taking?    albuterol (ACCUNEB) 1.25 MG/3ML nebulizer solution   Yes No    Take 1 ampule by nebulization every 6 (six) hours as needed for wheezing.    albuterol (PROVENTIL HFA;VENTOLIN HFA) 108 (90 BASE) MCG/ACT inhaler   Yes No    Inhale 2 puffs every 4 (four) hours as needed for wheezing.    ciprofloxacin (CIPRO) 500 MG tablet   No No    Take 1 tablet by mouth 2 (Two) Times a Day.    gabapentin (NEURONTIN) 300 MG capsule   Yes No    Take 300 mg by mouth 3 (three) times a day.    ipratropium-albuterol (COMBIVENT RESPIMAT)  MCG/ACT inhaler   Yes No    Inhale 1 puff 4 (four) times a day as needed for wheezing.    levothyroxine (SYNTHROID, LEVOTHROID) 88 MCG tablet   Yes No    Take 88 mcg by mouth daily.    omeprazole (PriLOSEC) 20 MG capsule   Yes No    Take 20 mg by mouth daily.    ondansetron (ZOFRAN) 4 MG tablet   No No    Take 1 tablet PRN nausea every 8 hours.    ZUBSOLV 5.7-1.4 MG sublingual tablet   Yes No    Take 1 tablet by mouth 2 (Two) Times a Day.          Objective     Vital Signs:  Temp:  [98.2 øF (36.8 øC)-99.5 øF (37.5 øC)] 98.2 øF (36.8 øC)  Heart Rate:  [] 82  Resp:  [12] 12  BP: (101-127)/() 115/70    Mean Arterial Pressure (Non-Invasive) for the past 24 hrs (Last 3 readings):   Noninvasive MAP (mmHg)   08/12/23 0600 90   08/12/23 0530 91   08/12/23 0500 88     SpO2:  [96 %-100 %] 100 %  on  Flow (L/min):  [3] 3;   Device (Oxygen Therapy): nasal cannula  Body mass index is 23.16 kg/mý.    Wt Readings from Last 3 Encounters:   08/12/23 69.1 kg (152 lb 4.8 oz)   06/20/21 61.2 kg (135 lb)   08/30/20 69.4 kg (153 lb)      ----------------------------------------------------------------------------------------------------------------------  Physical Exam  Vitals and nursing note reviewed. Exam conducted with a chaperone present.   Constitutional:       General: She is  in acute distress.      Appearance: She is well-developed and normal weight. She is ill-appearing. She is not toxic-appearing or diaphoretic.      Comments: Disheveled.  On room air.   HENT:      Head: Normocephalic and atraumatic.      Right Ear: External ear normal.      Left Ear: External ear normal.      Nose: Nose normal.   Eyes:      General: No scleral icterus.        Right eye: No discharge.         Left eye: No discharge.      Pupils: Pupils are equal, round, and reactive to light.   Cardiovascular:      Rate and Rhythm: Normal rate and regular rhythm.      Pulses: Normal pulses.      Heart sounds: No murmur heard.  Pulmonary:      Effort: Pulmonary effort is normal. No respiratory distress.      Breath sounds: No wheezing or rales.   Abdominal:      General: Bowel sounds are normal. There is no distension.      Palpations: Abdomen is soft.   Musculoskeletal:         General: No swelling or deformity.   Skin:     Capillary Refill: Capillary refill takes less than 2 seconds.      Coloration: Skin is not jaundiced or pale.      Findings: No bruising.   Neurological:      Mental Status: She is confused.      Cranial Nerves: No facial asymmetry.      Comments: She was able to move her hands and feet and arms and legs equally on both sides.   Psychiatric:         Attention and Perception: She is inattentive.         Mood and Affect: Affect is flat.         Speech: Speech is slurred.         Behavior: Behavior is slowed and withdrawn.         Cognition and Memory: Cognition is impaired.      Comments: The patient was constantly moving her hands and feet.     ---------------------------------------------------------------------------------------------------------------------  EKG: Sinus tachycardia with a heart rate of 98 and a QTc of 422 ms.  There are inverted T waves in leads V1-V3 and possibly V4.  There are flat T waves in the inferior leads.  When compared to EKGs dated 6/20/2021 and 9/21/2019, the T wave  changes are new findings.  Please note that I have personally looked at the EKG from this admission, the comparison EKGs in the medical records, and the above is my interpretation of this admission's EKG; I await the final cardiology read.      Telemetry:  NS with heart rates in the 80's.  Please note that I personally looked at the telemetry strips.      Last echocardiogram:  Results for orders placed during the hospital encounter of 03/12/19    Adult Transthoracic Echo Complete W/ Cont if Necessary Per Protocol    Interpretation Summary  ú Normal left ventricular cavity size and wall thickness noted. All left ventricular wall segments contract normally.  ú Estimated EF appears to be in the range of 61 - 65%.  ú The aortic valve is structurally normal. No aortic valve regurgitation is present. No aortic valve stenosis is present.  ú The mitral valve is normal in structure. No mitral valve regurgitation is present. No significant mitral valve stenosis is present.  ú The tricuspid valve is normal. No evidence of tricuspid valve stenosis is present. Trace tricuspid valve regurgitation is present. Estimated right ventricular systolic pressure from tricuspid regurgitation is normal (<35 mmHg).  ú There is no evidence of pericardial effusion.    I have personally read the ECHO final report.  --------------------------------------------------------------------------------------------------------------------  LABS:    CBC and coagulation:  Results from last 7 days   Lab Units 08/12/23  0116   PROCALCITONIN ng/mL 7.31*   LACTATE mmol/L 1.8   WBC 10*3/mm3 12.33*   HEMOGLOBIN g/dL 12.4   HEMATOCRIT % 35.8   MCV fL 87.7   MCHC g/dL 34.6   PLATELETS 10*3/mm3 314   INR  1.00     Acid/base balance:  Results from last 7 days   Lab Units 08/12/23  0106   PH, ARTERIAL pH units 7.380   PCO2, ARTERIAL mm Hg 46.7*   PO2 ART mm Hg 91.5   HCO3 ART mmol/L 27.6*     Renal and electrolytes:  Results from last 7 days   Lab Units  08/12/23  0715 08/12/23  0116   SODIUM mmol/L 146* 138   POTASSIUM mmol/L 4.2 4.0   MAGNESIUM mg/dL  --  2.7*   CHLORIDE mmol/L 112* 98   CO2 mmol/L 25.1 24.9   BUN mg/dL 24* 28*   CREATININE mg/dL 0.96 1.20*   CALCIUM mg/dL 8.9 9.6   PHOSPHORUS mg/dL  --  3.2   GLUCOSE mg/dL 148* 702*   ANION GAP mmol/L 8.9 15.1*     Estimated Creatinine Clearance: 89.2 mL/min (by C-G formula based on SCr of 0.96 mg/dL).    Liver and pancreatic function:  Results from last 7 days   Lab Units 08/12/23  0116   ALBUMIN g/dL 4.2   BILIRUBIN mg/dL 0.2   ALK PHOS U/L 139*   AST (SGOT) U/L 32   ALT (SGPT) U/L 55*   AMYLASE U/L 10*     Endocrine function:  Point of care bedside glucose levels:  Results from last 7 days   Lab Units 08/12/23  0825 08/12/23  0721 08/12/23  0618 08/12/23  0543 08/12/23  0438 08/12/23  0405 08/12/23  0316   GLUCOSE mg/dL 89 143* 172* 226* 352* 323* 492*     Cardiac:  Results from last 7 days   Lab Units 08/12/23  0316 08/12/23  0116   HSTROP T ng/L 9 10*       Cultures:  Lab Results   Component Value Date    COLORU Dark Yellow (A) 08/12/2023    CLARITYU Cloudy (A) 08/12/2023    PHUR 5.5 08/12/2023    GLUCOSEU 250 mg/dL (1+) (A) 08/12/2023    KETONESU Trace (A) 08/12/2023    BLOODU Moderate (2+) (A) 08/12/2023    NITRITEU Negative 08/12/2023    LEUKOCYTESUR Small (1+) (A) 08/12/2023    BILIRUBINUR Negative 08/12/2023    UROBILINOGEN 0.2 E.U./dL 08/12/2023    RBCUA 6-12 (A) 08/12/2023    WBCUA 6-12 (A) 08/12/2023    BACTERIA 1+ (A) 08/12/2023       Pain Management Panel  More data exists         I have personally looked at the labs and they are summarized above.  ----------------------------------------------------------------------------------------------------------------------  Detailed radiology reports for the last 24 hours:    Imaging Results (Last 24 Hours)       Procedure Component Value Units Date/Time    CT Head Without Contrast [[id number]] Collected: 08/12/23 0344     Updated: 08/12/23 0347     Narrative:      Procedure: CT of the brain performed without IV contrast on 08/12/2023.  The examination was performed with 3 mm axial imaging and 3 mm sagittal  and coronal reconstruction images. Examination was performed according  to as low as reasonably achievable protocol.     HISTORY: Altered mental status. Sepsis. DKA.     FINDINGS:     There is normal brain volume with preserved gray-white matter  differentiation.  No features to suggest acute intracranial hemorrhage, mass, infarct, or  edema.  No hydrocephalus.  No shift of midline structures.  Mild mucosal thickening in the paranasal sinuses.  The mastoid air cells are clear.  No fracture or foreign body.  No scalp hematoma.       Impression:         1.  No acute process seen in the brain.  2.  No acute hemorrhage or fracture.  3.  Mild mucosal thickening in the paranasal sinuses.     This report was finalized on 8/12/2023 3:45 AM by Ja Harmon MD.       XR Chest 1 View [[id number]] Collected: 08/12/23 0300     Updated: 08/12/23 0303    Narrative:      Procedure: Portable chest x-ray examination performed on 08/12/2023.  Single view. Upright position.     HISTORY: DKA.     FINDINGS:     Normal heart size.  No lobar consolidation or edema.  Possible mild bronchial inflammation.  No features of edema, pleural effusion, or pneumothorax.  No free air in the upper abdomen.       Impression:         1.  Normal heart size.  2.  No lobar consolidation or edema.  3.  Possible mild bronchial inflammation.     This report was finalized on 8/12/2023 3:01 AM by Ja Harmon MD.             I have personally looked at the radiology images and I have read the available final reports.    Assessment & Plan       -DKA that was present on admission  -Systemic inflammatory response syndrome criteria met on admission (white blood cell count 12,330, heart rates 90s to 100s, procalcitonin 7.31) with no obvious infection present  -Acute encephalopathy present on admission,  unclear etiology, suspect due to metabolic cephalopathy from the DKA and Suboxone/benzodiazepine use plus or minus an occult infection  -History of type 1 diabetes mellitus with diabetic peripheral neuropathy  -History of noncompliance  -History of hypothyroidism  -History of celiac disease  -History of hepatitis C, currently with mildly elevated liver enzymes  -History of substance abuse (benzodiazepines, Suboxone, and methamphetamines)  -Tobacco addiction    Admitted to the critical care unit.  Glucomander insulin drip protocol started.  We will start empiric cefepime and vancomycin as patient is high risk for an infection due to the uncontrolled type 1 diabetes mellitus.  Will obtain serial EKGs and troponin levels as acute myocardial infarction's can cause DKA.  Patient will receive IV fluids per Glucomander protocol; she received 2 L while at [facility] and thus we are giving her another 1 L bolus.  Will monitor blood pressures closely, especially since she meets SIRS criteria.  Await admission medicine reconciliation to be performed.  Serial electrolyte determinations will be performed and the electrolytes will be replaced aggressively per the Glucomander protocol.  Because of the encephalopathy, I ordered a stat head CT.    VTE Prophylaxis:  Pharmalogical Order History:        Ordered     Dose Route Frequency Stop    08/12/23 0056  heparin (porcine) 5000 UNIT/ML injection 5,000 Units         5,000 Units SC Every 12 Hours Scheduled --                  The patient is high risk due to the following diagnoses/reasons: DKA    Total critical care time is 66 minutes.    Harry Krishna MD  [facility]  08/12/23  08:50 EDT

## 2023-08-12 NOTE — PROGRESS NOTES
Pharmacy to dose Vancomycin for sepsis - dosed as follows: Vancomycin 1500mg iv x 1 then 750mg iv q12h.  Pharmacy will monitor and adjust dosing to maintain AUC of 400-600.     Demario Bishop RPH  06:45 EDT  08/12/23

## 2023-08-12 NOTE — PROGRESS NOTES
[facility] HOSPITALIST PROGRESS NOTE     Patient Identification:  Name:  Jes Moreno  Age:  37 y.o.  Sex:  female  :  1985  MRN:  [id number]  Visit Number:  [id number]  ROOM: [address]     Primary Care Provider:  Juany Sheehan APRN    Length of stay in inpatient status:  1    Subjective     Chief Compliant:  No chief complaint on file.      History of Presenting Illness: Patient seen evaluated follow-up for DKA and acute metabolic encephalopathy.  Patient with return to baseline mental status and DKA resolved with closure of gap.  Patient talk screen positive for buprenorphine and benzodiazepines and patient not prescribed benzodiazepines however she denies using them.    Objective     Current Hospital Meds:  atorvastatin, 20 mg, Oral, Nightly  budesonide-formoterol, 2 puff, Inhalation, BID - RT  buprenorphine-naloxone, 3 tablet, Sublingual, Daily  buPROPion SR, 200 mg, Oral, BID  cetirizine, 10 mg, Oral, Daily  FLUoxetine, 20 mg, Oral, Daily  FLUoxetine, 40 mg, Oral, Daily  fluticasone, 2 spray, Each Nare, Daily  gabapentin, 300 mg, Oral, TID  heparin (porcine), 5,000 Units, Subcutaneous, Q12H  insulin glargine, 1-200 Units, Subcutaneous, Nightly - Glucommander  insulin glargine, 10 Units, Subcutaneous, Q12H  insulin lispro, 1-200 Units, Subcutaneous, Q4H  [START ON 2023] levothyroxine, 125 mcg, Oral, Q AM  montelukast, 10 mg, Oral, Nightly  pantoprazole, 40 mg, Oral, Daily  polyethylene glycol, 17 g, Oral, Daily  potassium chloride, 10 mEq, Oral, Daily  senna-docusate sodium, 2 tablet, Oral, BID  sodium chloride, 10 mL, Intravenous, Q12H  sodium chloride, 10 mL, Intravenous, Q12H  topiramate, 25 mg, Oral, Daily  vancomycin, 1,000 mg, Intravenous, Q12H      dextrose 5 % and sodium chloride 0.45 %, 150 mL/hr  dextrose 5 % and sodium chloride 0.45 % with KCl 20 mEq/L, 150 mL/hr, Last Rate: Stopped (23 1136)  dextrose 5 % and sodium chloride 0.45 % with KCl 40 mEq/L, 150  mL/hr  dextrose 5 % and sodium chloride 0.9 %, 150 mL/hr  dextrose 5 % and sodium chloride 0.9 % with KCl 20 mEq, 150 mL/hr  dextrose 5% and sodium chloride 0.9% with KCl 40 mEq/L, 150 mL/hr  insulin, 0-100 Units/hr, Last Rate: Stopped (08/12/23 1136)  custom IV KCl infusion builder, 250 mL/hr  sodium chloride, 250 mL/hr  sodium chloride 0.45 % with KCl 20 mEq, 250 mL/hr  sodium chloride, 250 mL/hr  sodium chloride 0.9 % with KCl 20 mEq, 250 mL/hr  sodium chloride 0.9 % with KCl 40 mEq/L, 250 mL/hr      ----------------------------------------------------------------------------------------------------------------------  Vital Signs:  Temp:  [98.1 øF (36.7 øC)-99.5 øF (37.5 øC)] 98.1 øF (36.7 øC)  Heart Rate:  [] 67  Resp:  [12-16] 14  BP: (101-146)/() 122/67  SpO2:  [96 %-100 %] 98 %  on  Flow (L/min):  [3] 3;   Device (Oxygen Therapy): nasal cannula  Body mass index is 23.16 kg/mý.      Intake/Output Summary (Last 24 hours) at 8/12/2023 1902  Last data filed at 8/12/2023 1800  Gross per 24 hour   Intake 2448.64 ml   Output 1000 ml   Net 1448.64 ml      ----------------------------------------------------------------------------------------------------------------------  Physical exam:  Constitutional: Adult female, appears older than stated age, disheveled and unkempt  HENT:  Head:  Normocephalic and atraumatic.  Mouth:  Moist mucous membranes.    Eyes:  Conjunctivae and EOM are normal. No scleral icterus.    Neck:  Neck supple.  No JVD present.    Cardiovascular:  Normal rate, regular rhythm and normal heart sounds with no murmur.  Pulmonary/Chest:  No respiratory distress, no wheezes, no crackles, with normal breath sounds and good air movement.  Abdominal:  Soft.  Bowel sounds are normal.  No distension and no tenderness.   Musculoskeletal:  No tenderness and no deformity.  No red or swollen joints anywhere.  Functional ROM intact.   Neurological:  Alert and oriented to person, place, and time.  No  cranial nerve deficit.  No tongue deviation.  No facial droop.  No slurred speech. Intact Sensation throughout  Skin:  Skin is warm and dry. No rash or lesion noted. No pallor.   Peripheral vascular:  Pulses in all 4 extremities with no clubbing, no cyanosis, no edema.  Psychiatric: Somewhat emotionally labile and frequently tearful.   ----------------------------------------------------------------------------------------------------------------------  WBC/HGB/HCT/PLT   12.33/12.4/35.8/314 (08/12 0116)  BUN/CREAT/GLUC/ALT/AST/ANN/LIP    20/0.91/203/55/32/10/-- (08/12 0116-08/12 1552)  LYTES - Na/K/Cl/CO2: 137/4.3/103/21.7* (08/12 1552)  COAG - PT/INR/PTT: 13.7/1.00/20.0* (08/12 0116)  pH/pCO2/pO2/HCO3   7.380/46.7/91.5/27.6 (08/12 0106)  No results found for: URINECX  No results found for: BLOODCX    I have personally looked at the labs and they are summarized above.  ----------------------------------------------------------------------------------------------------------------------  Detailed radiology reports for the last 24 hours:  CT Head Without Contrast    Result Date: 8/12/2023   1.  No acute process seen in the brain. 2.  No acute hemorrhage or fracture. 3.  Mild mucosal thickening in the paranasal sinuses.  This report was finalized on 8/12/2023 3:45 AM by Ja Harmon MD.      XR Chest 1 View    Result Date: 8/12/2023   1.  Normal heart size. 2.  No lobar consolidation or edema. 3.  Possible mild bronchial inflammation.  This report was finalized on 8/12/2023 3:01 AM by Ja Harmon MD.     Assessment & Plan      DKA  Diabetes mellitus type 1  SIRS without infection  Acute metabolic encephalopathy, resolved  Substance abuse with benzodiazepines  History of medical noncompliance  History of hypothyroidism history of celiac disease  History hepatitis C  Smoking tobacco abuse    Patient gap closed today on IV insulin drip and transition to subcu insulin and eating.  Will monitor overnight and likely  discharge home in the a.m. if gap remains closed and glucose controlled.    Copied text in portions of the note has been reviewed and is accurate as of 08/12/23    VTE Prophylaxis:   Mechanical Order History:       None          Pharmalogical Order History:        Ordered     Dose Route Frequency Stop    08/12/23 0056  heparin (porcine) 5000 UNIT/ML injection 5,000 Units         5,000 Units SC Every 12 Hours Scheduled --                    Disposition likely home in the a.m.    Carroll Mario DO  [facility] Hospitalist  08/12/23  19:02 EDT

## 2023-08-13 ENCOUNTER — READMISSION MANAGEMENT (OUTPATIENT)
Dept: CALL CENTER | Facility: HOSPITAL | Age: 38
End: 2023-08-13
Payer: MEDICAID

## 2023-08-13 VITALS
BODY MASS INDEX: 25.43 KG/M2 | HEART RATE: 79 BPM | SYSTOLIC BLOOD PRESSURE: 109 MMHG | WEIGHT: 162 LBS | HEIGHT: 67 IN | OXYGEN SATURATION: 97 % | DIASTOLIC BLOOD PRESSURE: 67 MMHG | RESPIRATION RATE: 16 BRPM | TEMPERATURE: 98.3 F

## 2023-08-13 LAB
ANION GAP SERPL CALCULATED.3IONS-SCNC: 8 MMOL/L (ref 5–15)
BUN SERPL-MCNC: 14 MG/DL (ref 6–20)
BUN/CREAT SERPL: 20.6 (ref 7–25)
CALCIUM SPEC-SCNC: 8.3 MG/DL (ref 8.6–10.5)
CHLORIDE SERPL-SCNC: 105 MMOL/L (ref 98–107)
CO2 SERPL-SCNC: 23 MMOL/L (ref 22–29)
CREAT SERPL-MCNC: 0.68 MG/DL (ref 0.57–1)
DEPRECATED RDW RBC AUTO: 46.2 FL (ref 37–54)
EGFRCR SERPLBLD CKD-EPI 2021: 115.9 ML/MIN/1.73
ERYTHROCYTE [DISTWIDTH] IN BLOOD BY AUTOMATED COUNT: 12.9 % (ref 12.3–15.4)
GLUCOSE BLDC GLUCOMTR-MCNC: 176 MG/DL (ref 70–130)
GLUCOSE BLDC GLUCOMTR-MCNC: 212 MG/DL (ref 70–130)
GLUCOSE BLDC GLUCOMTR-MCNC: 398 MG/DL (ref 70–130)
GLUCOSE BLDC GLUCOMTR-MCNC: 421 MG/DL (ref 70–130)
GLUCOSE BLDC GLUCOMTR-MCNC: 76 MG/DL (ref 70–130)
GLUCOSE SERPL-MCNC: 194 MG/DL (ref 65–99)
HCT VFR BLD AUTO: 35.6 % (ref 34–46.6)
HGB BLD-MCNC: 11.5 G/DL (ref 12–15.9)
MCH RBC QN AUTO: 31.6 PG (ref 26.6–33)
MCHC RBC AUTO-ENTMCNC: 32.3 G/DL (ref 31.5–35.7)
MCV RBC AUTO: 97.8 FL (ref 79–97)
PLATELET # BLD AUTO: 190 10*3/MM3 (ref 140–450)
PMV BLD AUTO: 9.8 FL (ref 6–12)
POTASSIUM SERPL-SCNC: 3.5 MMOL/L (ref 3.5–5.2)
RBC # BLD AUTO: 3.64 10*6/MM3 (ref 3.77–5.28)
SODIUM SERPL-SCNC: 136 MMOL/L (ref 136–145)
WBC NRBC COR # BLD: 8.17 10*3/MM3 (ref 3.4–10.8)

## 2023-08-13 PROCEDURE — 63710000001 INSULIN LISPRO (HUMAN) PER 5 UNITS

## 2023-08-13 PROCEDURE — 82948 REAGENT STRIP/BLOOD GLUCOSE: CPT

## 2023-08-13 PROCEDURE — 63710000001 INSULIN LISPRO (HUMAN) PER 5 UNITS: Performed by: STUDENT IN AN ORGANIZED HEALTH CARE EDUCATION/TRAINING PROGRAM

## 2023-08-13 PROCEDURE — 80048 BASIC METABOLIC PNL TOTAL CA: CPT | Performed by: STUDENT IN AN ORGANIZED HEALTH CARE EDUCATION/TRAINING PROGRAM

## 2023-08-13 PROCEDURE — 94799 UNLISTED PULMONARY SVC/PX: CPT

## 2023-08-13 PROCEDURE — 94761 N-INVAS EAR/PLS OXIMETRY MLT: CPT

## 2023-08-13 PROCEDURE — 85027 COMPLETE CBC AUTOMATED: CPT | Performed by: STUDENT IN AN ORGANIZED HEALTH CARE EDUCATION/TRAINING PROGRAM

## 2023-08-13 PROCEDURE — 63710000001 INSULIN GLARGINE PER 5 UNITS: Performed by: STUDENT IN AN ORGANIZED HEALTH CARE EDUCATION/TRAINING PROGRAM

## 2023-08-13 PROCEDURE — 25010000002 VANCOMYCIN 1 G RECONSTITUTED SOLUTION 1 EACH VIAL: Performed by: STUDENT IN AN ORGANIZED HEALTH CARE EDUCATION/TRAINING PROGRAM

## 2023-08-13 PROCEDURE — 25010000002 HEPARIN (PORCINE) PER 1000 UNITS: Performed by: STUDENT IN AN ORGANIZED HEALTH CARE EDUCATION/TRAINING PROGRAM

## 2023-08-13 RX ORDER — INSULIN LISPRO 100 [IU]/ML
12 INJECTION, SOLUTION INTRAVENOUS; SUBCUTANEOUS 3 TIMES DAILY
Start: 2023-08-13

## 2023-08-13 RX ORDER — INSULIN GLARGINE 100 [IU]/ML
20 INJECTION, SOLUTION SUBCUTANEOUS DAILY
Start: 2023-08-13

## 2023-08-13 RX ORDER — POTASSIUM CHLORIDE 20 MEQ/1
40 TABLET, EXTENDED RELEASE ORAL EVERY 4 HOURS
Status: COMPLETED | OUTPATIENT
Start: 2023-08-13 | End: 2023-08-13

## 2023-08-13 RX ORDER — INSULIN LISPRO 100 [IU]/ML
5 INJECTION, SOLUTION INTRAVENOUS; SUBCUTANEOUS ONCE
Status: COMPLETED | OUTPATIENT
Start: 2023-08-13 | End: 2023-08-13

## 2023-08-13 RX ADMIN — INSULIN LISPRO 7 UNITS: 100 INJECTION, SOLUTION INTRAVENOUS; SUBCUTANEOUS at 00:38

## 2023-08-13 RX ADMIN — POTASSIUM CHLORIDE 10 MEQ: 1500 TABLET, EXTENDED RELEASE ORAL at 08:39

## 2023-08-13 RX ADMIN — BUDESONIDE AND FORMOTEROL FUMARATE DIHYDRATE 2 PUFF: 160; 4.5 AEROSOL RESPIRATORY (INHALATION) at 07:37

## 2023-08-13 RX ADMIN — Medication 10 ML: at 08:49

## 2023-08-13 RX ADMIN — BENZOCAINE 6 MG-MENTHOL 10 MG LOZENGES 1 LOZENGE: at 10:48

## 2023-08-13 RX ADMIN — POTASSIUM CHLORIDE 40 MEQ: 1500 TABLET, EXTENDED RELEASE ORAL at 08:47

## 2023-08-13 RX ADMIN — INSULIN LISPRO 5 UNITS: 100 INJECTION, SOLUTION INTRAVENOUS; SUBCUTANEOUS at 01:31

## 2023-08-13 RX ADMIN — HEPARIN SODIUM 5000 UNITS: 5000 INJECTION INTRAVENOUS; SUBCUTANEOUS at 08:39

## 2023-08-13 RX ADMIN — DOCUSATE SODIUM 50 MG AND SENNOSIDES 8.6 MG 2 TABLET: 8.6; 5 TABLET, FILM COATED ORAL at 08:47

## 2023-08-13 RX ADMIN — FLUOXETINE HYDROCHLORIDE 40 MG: 20 CAPSULE ORAL at 08:40

## 2023-08-13 RX ADMIN — INSULIN LISPRO 1 UNITS: 100 INJECTION, SOLUTION INTRAVENOUS; SUBCUTANEOUS at 03:53

## 2023-08-13 RX ADMIN — FLUOXETINE HYDROCHLORIDE 20 MG: 20 CAPSULE ORAL at 08:41

## 2023-08-13 RX ADMIN — TOPIRAMATE 25 MG: 25 TABLET, FILM COATED ORAL at 08:39

## 2023-08-13 RX ADMIN — BUPRENORPHINE HYDROCHLORIDE AND NALOXONE HYDROCHLORIDE DIHYDRATE 3 TABLET: 8; 2 TABLET SUBLINGUAL at 08:40

## 2023-08-13 RX ADMIN — VANCOMYCIN HYDROCHLORIDE 1000 MG: 1 INJECTION, POWDER, LYOPHILIZED, FOR SOLUTION INTRAVENOUS at 06:33

## 2023-08-13 RX ADMIN — LEVOTHYROXINE SODIUM 125 MCG: 125 TABLET ORAL at 06:33

## 2023-08-13 RX ADMIN — INSULIN GLARGINE 10 UNITS: 100 INJECTION, SOLUTION SUBCUTANEOUS at 08:42

## 2023-08-13 RX ADMIN — ACETAMINOPHEN 650 MG: 325 TABLET ORAL at 10:48

## 2023-08-13 RX ADMIN — POTASSIUM CHLORIDE 40 MEQ: 1500 TABLET, EXTENDED RELEASE ORAL at 06:08

## 2023-08-13 RX ADMIN — BUPROPION HYDROCHLORIDE 200 MG: 100 TABLET, EXTENDED RELEASE ORAL at 08:40

## 2023-08-13 RX ADMIN — PANTOPRAZOLE SODIUM 40 MG: 40 TABLET, DELAYED RELEASE ORAL at 08:39

## 2023-08-13 RX ADMIN — GABAPENTIN 300 MG: 300 CAPSULE ORAL at 08:40

## 2023-08-13 RX ADMIN — CETIRIZINE HYDROCHLORIDE 10 MG: 10 TABLET, FILM COATED ORAL at 08:40

## 2023-08-13 NOTE — OUTREACH NOTE
Prep Survey      Flowsheet Row Responses   [facility] from? [facility]   Is LACE score < 7 ? No   Eligibility Readm Mgmt   Discharge diagnosis DKA and acute metabolic encephalopathy   Does the patient have one of the following disease processes/diagnoses(primary or secondary)? Other   Does the patient have Home health ordered? No   Is there a DME ordered? No   Prep survey completed? Yes            Margarita KEYS - Registered Nurse

## 2023-08-13 NOTE — PLAN OF CARE
Goal Outcome Evaluation:  Plan of Care Reviewed With: patient        Progress: no change          Pt Has had high blood sugar during shift will continue to monitor and follow current plan of care.

## 2023-08-13 NOTE — PLAN OF CARE
Goal Outcome Evaluation:           Progress: no change          Pt. On insulin drip, 3L O2, no acute events noted

## 2023-08-13 NOTE — DISCHARGE SUMMARY
[facility] HOSPITALISTS DISCHARGE SUMMARY    Patient Identification:  Name:  Jes Moreno  Age:  38 y.o.  Sex:  female  :  1985  MRN:  [id number]  Visit Number:  [id number]    Date of Admission: 2023  Date of Discharge:  2023     PCP: Juany Sheehan APRN    DISCHARGE DIAGNOSIS  DKA  Diabetes mellitus type 1  SIRS without infection  Acute metabolic encephalopathy, resolved  Substance abuse with benzodiazepines  History of medical noncompliance  History of hypothyroidism history of celiac disease  History hepatitis C  Smoking tobacco abuse    CONSULTS   None    PROCEDURES PERFORMED      HOSPITAL COURSE  Patient is a 38 y.o. female presented to [facility] as transfer from outside emergency department for diabetic ketoacidosis and acute encephalopathy.  Please see the admitting history and physical for further details.      Patient presenting from outside facility with altered mental status consistent with acute metabolic encephalopathy in the setting of diabetic ketoacidosis with known history of diabetes mellitus type 1 as well as history of polysubstance abuse.  Patient's outside drug screen both here and at outside facility positive for benzodiazepines and Suboxone.  Patient is prescribed Suboxone however they are not prescribed benzodiazepines.  Patient has a long history of substance abuse and appears to be receiving Suboxone despite recurrent previous drug screens positive for substances she is not prescribed.  Patient in this facility placed on insulin drip with quick closure of gap and by the following morning after being on an insulin drip overnight patient's gap had closed and she was transition to subcu insulin and given a diet which she did tolerate without issue.  By the following day after that patient's glucose was reasonably controlled on subcu basal and bolus insulin regiment and review of her home prescription showed much larger doses than what is required  here in hospital for control consistent with patient's known noncompliance.  Patient was counseled on the importance of avoiding nonprescribed controlled substances as this would only precipitate issues such as DKA as well as her acute metabolic encephalopathy that was secondary to benzodiazepine abuse.  On day of discharge patient was medically stable and no longer in DKA and had return to her baseline mental status and glucose was well controlled on regiment here in hospital and she was therefore felt to achieve maximal benefit of continued hospitalization and discharged home in stable medical condition.  Patient remains at high risk for readmission due to her chronic continued history of medical noncompliance as well as substance abuse with nonprescribed controlled substances.      VITAL SIGNS:  Temp:  [98.1 øF (36.7 øC)-98.6 øF (37 øC)] 98.3 øF (36.8 øC)  Heart Rate:  [58-83] 79  Resp:  [14-18] 16  BP: ()/() 109/67  SpO2:  [96 %-100 %] 97 %  on  Flow (L/min):  [3] 3;   Device (Oxygen Therapy): room air    Body mass index is 25.37 kg/mý.  Wt Readings from Last 3 Encounters:   08/13/23 73.5 kg (162 lb)   06/20/21 61.2 kg (135 lb)   08/30/20 69.4 kg (153 lb)       PHYSICAL EXAM:  Constitutional: Adult female, appears older than stated age, disheveled and unkempt  HENT:  Head:  Normocephalic and atraumatic.  Mouth:  Moist mucous membranes.    Eyes:  Conjunctivae and EOM are normal. No scleral icterus.    Neck:  Neck supple.  No JVD present.    Cardiovascular:  Normal rate, regular rhythm and normal heart sounds with no murmur.  Pulmonary/Chest:  No respiratory distress, no wheezes, no crackles, with normal breath sounds and good air movement.  Abdominal:  Soft.  Bowel sounds are normal.  No distension and no tenderness.   Musculoskeletal:  No tenderness and no deformity.  No red or swollen joints anywhere.  Functional ROM intact.   Neurological:  Alert and oriented to person, place, and time.  No cranial  nerve deficit.  No tongue deviation.  No facial droop.  No slurred speech. Intact Sensation throughout  Skin:  Skin is warm and dry. No rash or lesion noted. No pallor.   Peripheral vascular:  Pulses in all 4 extremities with no clubbing, no cyanosis, no edema.  Psychiatric: Somewhat emotionally labile and frequently tearful.     DISCHARGE DISPOSITION   Stable    DISCHARGE MEDICATIONS:     Discharge Medications        Changes to Medications        Instructions Start Date   BASAGLAR KWIKPEN 100 UNIT/ML injection pen  What changed: how much to take   20 Units, Subcutaneous, Daily      Insulin Lispro (1 Unit Dial) 100 UNIT/ML solution pen-injector  Commonly known as: HUMALOG  What changed: how much to take   12 Units, Subcutaneous, 3 Times Daily             Continue These Medications        Instructions Start Date   Advair Diskus 250-50 MCG/ACT DISKUS  Generic drug: Fluticasone-Salmeterol   1 puff, Inhalation, 2 Times Daily - RT      albuterol (2.5 MG/3ML) 0.083% nebulizer solution  Commonly known as: PROVENTIL   1 ampule, Nebulization, Every 6 Hours PRN      atorvastatin 20 MG tablet  Commonly known as: LIPITOR   20 mg, Oral, Nightly      Baqsimi Two Pack 3 MG/DOSE powder  Generic drug: Glucagon   1 spray into the nostril(s) as directed by provider As Needed (Low Blood Glucose).      benzonatate 100 MG capsule  Commonly known as: TESSALON   100 mg, Oral, 3 Times Daily PRN      buprenorphine-naloxone 8-2 MG per SL tablet  Commonly known as: SUBOXONE   3 tablets, Sublingual, Daily      buPROPion  MG 12 hr tablet  Commonly known as: WELLBUTRIN SR   1 tablet, Oral, 2 Times Daily      cetirizine 10 MG tablet  Commonly known as: zyrTEC   1 tablet, Oral, Daily      docusate sodium 100 MG capsule  Commonly known as: COLACE   200 mg, Oral, Daily PRN      FLUoxetine 20 MG capsule  Commonly known as: PROzac   20 mg, Oral, Daily      FLUoxetine 40 MG capsule  Commonly known as: PROzac   40 mg, Oral, Daily      fluticasone  50 MCG/ACT nasal spray  Commonly known as: FLONASE   2 sprays, Each Nare, Daily      furosemide 20 MG tablet  Commonly known as: LASIX   20 mg, Oral, Daily, Take with potassium      gabapentin 300 MG capsule  Commonly known as: NEURONTIN   300 mg, Oral, 3 Times Daily      hydrOXYzine 25 MG tablet  Commonly known as: ATARAX   1 tablet, Oral, 4 Times Daily PRN      ipratropium-albuterol  MCG/ACT inhaler  Commonly known as: COMBIVENT RESPIMAT   1 puff, Inhalation, 4 Times Daily PRN      levothyroxine 125 MCG tablet  Commonly known as: SYNTHROID, LEVOTHROID   125 mcg, Oral, Daily      montelukast 10 MG tablet  Commonly known as: SINGULAIR   10 mg, Oral, Nightly      ondansetron 4 MG tablet  Commonly known as: ZOFRAN   4 mg, Oral, Every 6 Hours PRN      pantoprazole 40 MG EC tablet  Commonly known as: PROTONIX   1 tablet, Oral, Daily      polyethylene glycol 17 GM/SCOOP powder  Commonly known as: MIRALAX   17 g, Oral, Daily      potassium chloride 10 MEQ CR tablet   10 mEq, Oral, Daily, Take with lasix      promethazine 25 MG tablet  Commonly known as: PHENERGAN   25 mg, Oral, Every 8 Hours PRN, Take 1 tablet by mouth every 6-8 hours as needed      topiramate 25 MG tablet  Commonly known as: TOPAMAX   25 mg, Oral, Daily                 Additional Instructions for the Follow-ups that You Need to Schedule       Discharge Follow-up with PCP   As directed       Currently Documented PCP:    Juany Sheehan APRN    PCP Phone Number:    [phone]     Follow Up Details: 1 week post hospital follow up               Follow-up Information       Juany Sheehan APRN .    Specialty: Nurse Practitioner  Why: 1 week post hospital follow up  Contact information:  [address] [place] [id number]  [phone]                              TEST  RESULTS PENDING AT DISCHARGE  Pending Labs       Order Current Status    Blood Culture - Blood, Arm, Left Preliminary result    Blood Culture - Blood, Arm, Right Preliminary result              The ASCVD Risk score (Anila ARRIAGA, et al., 2019) failed to calculate for the following reasons:    The 2019 ASCVD risk score is only valid for ages 40 to 79     CODE STATUS  Code Status and Medical Interventions:   Ordered at: 08/12/23 0056     Level Of Support Discussed With:    Patient     Code Status (Patient has no pulse and is not breathing):    CPR (Attempt to Resuscitate)     Medical Interventions (Patient has pulse or is breathing):    Full Support       Carroll Mario DO  [facility]  08/13/23  11:48 EDT    Please note that this discharge summary required more than 30 minutes to complete.

## 2023-08-14 ENCOUNTER — TELEPHONE (OUTPATIENT)
Dept: MEDSURG UNIT | Facility: HOSPITAL | Age: 38
End: 2023-08-14
Payer: MEDICAID

## 2023-08-14 LAB — GLUCOSE BLDC GLUCOMTR-MCNC: >599 MG/DL (ref 70–130)

## 2023-08-14 NOTE — PAYOR COMM NOTE
"[facility]  NPI:[id number]    Utilization Review  Contact: Candy Pickett RN  Phone: [phone]  Fax:[phone]    DISCHARGE NOTIFICATION   PENDING AUTH #   [id number]     Jes Adams (38 y.o. Female)       Date of Birth   1985    Social Security Number       Address   [address] [place] [address]    Home Phone   [phone]    MRN   [id number]       Lutheran   None    Marital Status   Single                            Admission Date   8/11/23    Admission Type   Urgent    Admitting Provider   Harry Krishna MD    Attending Provider       Department, Room/Bed   [address], 3350/1S       Discharge Date   8/13/2023    Discharge Disposition   Home or Self Care    Discharge Destination                                 Attending Provider: (none)   Allergies: Sulfa Antibiotics    Isolation: None   Infection: None   Code Status: Prior    Ht: 170.2 cm (67\")   Wt: 73.5 kg (162 lb)    Admission Cmt: None   Principal Problem: DKA (diabetic ketoacidosis) [E11.10]                   Active Insurance as of 8/11/2023       Primary Coverage       Payor Plan Insurance Group Employer/Plan Group    [place] [id number]       Payor Plan Address Payor Plan Phone Number Payor Plan Fax Number Effective Dates    [employer] [address] [address] [phone]  4/1/2020 - None Entered    [facility] [id number]         Subscriber Name Subscriber Birth Date Member ID       JES ADAMS 1985 [id number]                     Emergency Contacts        (Rel.) Home Phone Work Phone Mobile Phone    CHELY ADAMS (Grandparent) [phone] -- --    RafJumana (Mother) [phone] -- --    Jose Alejandro Luciano (Significant Other) -- -- [phone]              Discharge Summary    No notes of this type exist for this encounter.       "

## 2023-08-16 ENCOUNTER — READMISSION MANAGEMENT (OUTPATIENT)
Dept: CALL CENTER | Facility: HOSPITAL | Age: 38
End: 2023-08-16
Payer: MEDICAID

## 2023-08-16 NOTE — OUTREACH NOTE
Medical Week 1 Survey      Flowsheet Row Responses   [facility] from? [facility]   Does the patient have one of the following disease processes/diagnoses(primary or secondary)? Other   Week 1 attempt successful? No   Unsuccessful attempts Attempt 1            Ruth KEYS - Registered Nurse

## 2023-08-17 ENCOUNTER — READMISSION MANAGEMENT (OUTPATIENT)
Dept: CALL CENTER | Facility: HOSPITAL | Age: 38
End: 2023-08-17
Payer: MEDICAID

## 2023-08-17 LAB
BACTERIA SPEC AEROBE CULT: NORMAL
BACTERIA SPEC AEROBE CULT: NORMAL

## 2023-08-17 NOTE — OUTREACH NOTE
Medical Week 1 Survey      Flowsheet Row Responses   [facility] patient discharged from? [facility]   Does the patient have one of the following disease processes/diagnoses(primary or secondary)? Other   Week 1 attempt successful? Yes   Call start time 1539   Call end time 1552   Discharge diagnosis DKA and acute metabolic encephalopathy   Meds reviewed with patient/caregiver? Yes   Is the patient having any side effects they believe may be caused by any medication additions or changes? No   Does the patient have all medications ordered at discharge? Yes   Is the patient taking all medications as directed (includes completed medication regime)? Yes   Does the patient have a primary care provider?  Yes   Does the patient have an appointment with their PCP within 7 days of discharge? Yes   Comments regarding PCP 8/21/23   Has the patient kept scheduled appointments due by today? N/A   Has home health visited the patient within 72 hours of discharge? N/A   Psychosocial issues? No   Did the patient receive a copy of their discharge instructions? Yes   Nursing interventions Reviewed instructions with patient   What is the patient's perception of their health status since discharge? Improving   Is the patient/caregiver able to teach back the hierarchy of who to call/visit for symptoms/problems? PCP, Specialist, Home health nurse, Urgent Care, ED, 911 Yes   Week 1 call completed? Yes   Call end time 1552            Abeba KEYS - Registered Nurse

## 2023-08-25 ENCOUNTER — READMISSION MANAGEMENT (OUTPATIENT)
Dept: CALL CENTER | Facility: HOSPITAL | Age: 38
End: 2023-08-25
Payer: MEDICAID

## 2023-08-25 NOTE — OUTREACH NOTE
Medical Week 2 Survey      Flowsheet Row Responses   [facility] facility patient discharged from? [facility]   Does the patient have one of the following disease processes/diagnoses(primary or secondary)? Other   Week 2 attempt successful? No   Unsuccessful attempts Attempt 1            Ruth KEYS - Registered Nurse

## 2023-08-28 ENCOUNTER — READMISSION MANAGEMENT (OUTPATIENT)
Dept: CALL CENTER | Facility: HOSPITAL | Age: 38
End: 2023-08-28
Payer: MEDICAID

## 2023-08-28 NOTE — OUTREACH NOTE
Medical Week 2 Survey      Flowsheet Row Responses   [facility] facility patient discharged from? [facility]   Does the patient have one of the following disease processes/diagnoses(primary or secondary)? Other   Week 2 attempt successful? No   Unsuccessful attempts Attempt 2            Ruth KEYS - Registered Nurse

## 2023-09-06 ENCOUNTER — READMISSION MANAGEMENT (OUTPATIENT)
Dept: CALL CENTER | Facility: HOSPITAL | Age: 38
End: 2023-09-06
Payer: MEDICAID

## 2023-09-06 NOTE — OUTREACH NOTE
Medical Week 3 Survey      Flowsheet Row Responses   [facility] facility patient discharged from? [facility]   Does the patient have one of the following disease processes/diagnoses(primary or secondary)? Other   Week 3 attempt successful? No   Unsuccessful attempts Attempt 1            Ruth KEYS - Registered Nurse

## 2024-02-21 ENCOUNTER — HOSPITAL ENCOUNTER (OUTPATIENT)
Dept: ULTRASOUND IMAGING | Facility: HOSPITAL | Age: 39
Discharge: HOME | End: 2024-02-21
Payer: MEDICAID

## 2024-02-21 ENCOUNTER — SPECIALTY PHARMACY (OUTPATIENT)
Dept: PHARMACY | Facility: HOSPITAL | Age: 39
End: 2024-02-21
Payer: MEDICAID

## 2024-02-21 ENCOUNTER — LAB (OUTPATIENT)
Dept: LAB | Facility: HOSPITAL | Age: 39
End: 2024-02-21
Payer: MEDICAID

## 2024-02-21 VITALS
WEIGHT: 145.8 LBS | TEMPERATURE: 98.8 F | HEART RATE: 81 BPM | SYSTOLIC BLOOD PRESSURE: 122 MMHG | DIASTOLIC BLOOD PRESSURE: 73 MMHG | BODY MASS INDEX: 22.84 KG/M2 | OXYGEN SATURATION: 97 %

## 2024-02-21 DIAGNOSIS — B18.2 CHRONIC HEPATITIS C WITHOUT HEPATIC COMA: ICD-10-CM

## 2024-02-21 DIAGNOSIS — E10.69 TYPE 1 DIABETES MELLITUS WITH OTHER SPECIFIED COMPLICATION: Primary | ICD-10-CM

## 2024-02-21 LAB
ALBUMIN SERPL-MCNC: 4.4 G/DL (ref 3.5–5.2)
ALBUMIN/GLOB SERPL: 1.6 G/DL
ALP SERPL-CCNC: 121 U/L (ref 39–117)
ALPHA-FETOPROTEIN: <2 NG/ML (ref 0–8.3)
ALT SERPL W P-5'-P-CCNC: 38 U/L (ref 1–33)
AMPHET+METHAMPHET UR QL: NEGATIVE
AMPHETAMINES UR QL: NEGATIVE
ANION GAP SERPL CALCULATED.3IONS-SCNC: 15.6 MMOL/L (ref 5–15)
AST SERPL-CCNC: 43 U/L (ref 1–32)
BARBITURATES UR QL SCN: NEGATIVE
BASOPHILS # BLD AUTO: 0.05 10*3/MM3 (ref 0–0.2)
BASOPHILS NFR BLD AUTO: 0.6 % (ref 0–1.5)
BENZODIAZ UR QL SCN: NEGATIVE
BILIRUB SERPL-MCNC: <0.2 MG/DL (ref 0–1.2)
BUN SERPL-MCNC: 13 MG/DL (ref 6–20)
BUN/CREAT SERPL: 15.3 (ref 7–25)
BUPRENORPHINE SERPL-MCNC: POSITIVE NG/ML
CALCIUM SPEC-SCNC: 10 MG/DL (ref 8.6–10.5)
CANNABINOIDS SERPL QL: NEGATIVE
CHLORIDE SERPL-SCNC: 106 MMOL/L (ref 98–107)
CO2 SERPL-SCNC: 22.4 MMOL/L (ref 22–29)
COCAINE UR QL: NEGATIVE
CREAT SERPL-MCNC: 0.85 MG/DL (ref 0.57–1)
DEPRECATED RDW RBC AUTO: 41.1 FL (ref 37–54)
EGFRCR SERPLBLD CKD-EPI 2021: 91.2 ML/MIN/1.73
EOSINOPHIL # BLD AUTO: 0.26 10*3/MM3 (ref 0–0.4)
EOSINOPHIL NFR BLD AUTO: 3.1 % (ref 0.3–6.2)
ERYTHROCYTE [DISTWIDTH] IN BLOOD BY AUTOMATED COUNT: 12.3 % (ref 12.3–15.4)
FENTANYL UR-MCNC: NEGATIVE NG/ML
GLOBULIN UR ELPH-MCNC: 2.7 GM/DL
GLUCOSE SERPL-MCNC: 139 MG/DL (ref 65–99)
HBV SURFACE AB SER RIA-ACNC: REACTIVE
HBV SURFACE AG SERPL QL IA: NORMAL
HCG SERPL QL: NEGATIVE
HCT VFR BLD AUTO: 44.1 % (ref 34–46.6)
HGB BLD-MCNC: 14.5 G/DL (ref 12–15.9)
HIV 1+2 AB+HIV1 P24 AG SERPL QL IA: NORMAL
IMM GRANULOCYTES # BLD AUTO: 0.03 10*3/MM3 (ref 0–0.05)
IMM GRANULOCYTES NFR BLD AUTO: 0.4 % (ref 0–0.5)
INR PPP: 0.93 (ref 0.9–1.1)
LYMPHOCYTES # BLD AUTO: 2.53 10*3/MM3 (ref 0.7–3.1)
LYMPHOCYTES NFR BLD AUTO: 29.8 % (ref 19.6–45.3)
MCH RBC QN AUTO: 30.3 PG (ref 26.6–33)
MCHC RBC AUTO-ENTMCNC: 32.9 G/DL (ref 31.5–35.7)
MCV RBC AUTO: 92.3 FL (ref 79–97)
METHADONE UR QL SCN: NEGATIVE
MONOCYTES # BLD AUTO: 0.49 10*3/MM3 (ref 0.1–0.9)
MONOCYTES NFR BLD AUTO: 5.8 % (ref 5–12)
NEUTROPHILS NFR BLD AUTO: 5.13 10*3/MM3 (ref 1.7–7)
NEUTROPHILS NFR BLD AUTO: 60.3 % (ref 42.7–76)
NRBC BLD AUTO-RTO: 0 /100 WBC (ref 0–0.2)
OPIATES UR QL: NEGATIVE
OXYCODONE UR QL SCN: NEGATIVE
PCP UR QL SCN: NEGATIVE
PLATELET # BLD AUTO: 355 10*3/MM3 (ref 140–450)
PMV BLD AUTO: 10.3 FL (ref 6–12)
POTASSIUM SERPL-SCNC: 5.1 MMOL/L (ref 3.5–5.2)
PROT SERPL-MCNC: 7.1 G/DL (ref 6–8.5)
PROTHROMBIN TIME: 13 SECONDS (ref 12.1–14.7)
RBC # BLD AUTO: 4.78 10*6/MM3 (ref 3.77–5.28)
SODIUM SERPL-SCNC: 144 MMOL/L (ref 136–145)
TRICYCLICS UR QL SCN: NEGATIVE
WBC NRBC COR # BLD AUTO: 8.49 10*3/MM3 (ref 3.4–10.8)

## 2024-02-21 PROCEDURE — 80053 COMPREHEN METABOLIC PANEL: CPT

## 2024-02-21 PROCEDURE — 82105 ALPHA-FETOPROTEIN SERUM: CPT

## 2024-02-21 PROCEDURE — 85025 COMPLETE CBC W/AUTO DIFF WBC: CPT

## 2024-02-21 PROCEDURE — 80307 DRUG TEST PRSMV CHEM ANLYZR: CPT

## 2024-02-21 PROCEDURE — 84703 CHORIONIC GONADOTROPIN ASSAY: CPT

## 2024-02-21 PROCEDURE — 86706 HEP B SURFACE ANTIBODY: CPT

## 2024-02-21 PROCEDURE — 87522 HEPATITIS C REVRS TRNSCRPJ: CPT

## 2024-02-21 PROCEDURE — 76705 ECHO EXAM OF ABDOMEN: CPT

## 2024-02-21 PROCEDURE — 76705 ECHO EXAM OF ABDOMEN: CPT | Performed by: RADIOLOGY

## 2024-02-21 PROCEDURE — 87902 NFCT AGT GNTYP ALYS HEP C: CPT

## 2024-02-21 PROCEDURE — G0432 EIA HIV-1/HIV-2 SCREEN: HCPCS

## 2024-02-21 PROCEDURE — 87340 HEPATITIS B SURFACE AG IA: CPT

## 2024-02-21 PROCEDURE — 85610 PROTHROMBIN TIME: CPT

## 2024-02-21 NOTE — PROGRESS NOTES
Chief Complaint   Patient presents with    Initial Evaluation     Jes Moreno is a 38 y.o. female who presents to the office today at the request of GAURAV Little for Initial Evaluation.    HPI  She found out about having Hepatitis C infection approx 2 years ago. She has not had prior treatment for hepatitis. Reports Celiac Disease but no known personal history of liver disease including other viral hepatitis. There is no known family history of liver disease or cirrhosis. She admits to previous IVDU and intranasal drug use. She does not have nonprofessional tattoos. Denies having previous alcoholism. She does not currently drink alcohol. She denies current illicit drug use including marijuana. She takes prescribed suboxone. No recent liver imaging. She has had recent labs. She has not had previous vaccinations for Hepatitis A and B. She is currently unemployed. The patient receives support from her significant other and children.      Review of Systems   Constitutional:  Negative for activity change, appetite change and fatigue.   HENT:  Negative for trouble swallowing and voice change.    Respiratory:  Negative for cough and choking.    Cardiovascular:  Negative for leg swelling.   Gastrointestinal:  Negative for abdominal distention, abdominal pain, anal bleeding, blood in stool, constipation, diarrhea, nausea, rectal pain and vomiting.   Endocrine: Negative for polyphagia.   Genitourinary:  Negative for flank pain.   Musculoskeletal:  Negative for back pain.   Skin:  Negative for color change and pallor.   Allergic/Immunologic: Negative for food allergies.   Neurological:  Negative for weakness.   Psychiatric/Behavioral:  Negative for confusion.        ACTIVE PROBLEMS:   Specialty Problems    None      PAST MEDICAL HISTORY:  Past Medical History:   Diagnosis Date    Asthma     Celiac disease     Diabetes mellitus type 1     Diabetic ketoacidosis     Disease of thyroid gland     Hepatitis C     Infectious  viral hepatitis     hepititis C    Neuropathy     Reflux gastritis        SURGICAL HISTORY:  Past Surgical History:   Procedure Laterality Date     SECTION      X 2       FAMILY HISTORY:  Family History   Problem Relation Age of Onset    No Known Problems Mother     Lung cancer Father     No Known Problems Sister     No Known Problems Brother     Diabetes type I Maternal Grandmother     Breast cancer Maternal Grandmother     Hypertension Maternal Grandmother     No Known Problems Maternal Grandfather     No Known Problems Paternal Grandmother     Diabetes type II Paternal Grandfather     No Known Problems Daughter     No Known Problems Son     No Known Problems Cousin     Rheum arthritis Neg Hx     Osteoarthritis Neg Hx     Asthma Neg Hx     Diabetes Neg Hx     Heart failure Neg Hx     Hyperlipidemia Neg Hx     Migraines Neg Hx     Rashes / Skin problems Neg Hx     Seizures Neg Hx     Stroke Neg Hx     Thyroid disease Neg Hx        SOCIAL HISTORY:  Social History     Tobacco Use    Smoking status: Every Day     Packs/day: 1.00     Years: 3.00     Additional pack years: 0.00     Total pack years: 3.00     Types: Cigarettes    Smokeless tobacco: Never    Tobacco comments:     unable to assess    Substance Use Topics    Alcohol use: No     Comment: unable to assess        CURRENT MEDICATION:    Current Outpatient Medications:     Advair Diskus 250-50 MCG/ACT DISKUS, Inhale 1 puff 2 (Two) Times a Day., Disp: , Rfl:     albuterol (PROVENTIL) (2.5 MG/3ML) 0.083% nebulizer solution, Take 1 ampule by nebulization Every 6 (Six) Hours As Needed for Wheezing., Disp: , Rfl:     atorvastatin (LIPITOR) 20 MG tablet, Take 1 tablet by mouth Every Night., Disp: , Rfl:     Baqsimi Two Pack 3 MG/DOSE powder, 1 spray into the nostril(s) as directed by provider As Needed (Low Blood Glucose)., Disp: , Rfl:     benzonatate (TESSALON) 100 MG capsule, Take 1 capsule by mouth 3 (Three) Times a Day As Needed for Cough., Disp: , Rfl:      buprenorphine-naloxone (SUBOXONE) 8-2 MG per SL tablet, Place 3 tablets under the tongue Daily., Disp: , Rfl:     buPROPion SR (WELLBUTRIN SR) 200 MG 12 hr tablet, Take 1 tablet by mouth 2 (Two) Times a Day., Disp: , Rfl:     cetirizine (zyrTEC) 10 MG tablet, Take 1 tablet by mouth Daily., Disp: , Rfl:     docusate sodium (COLACE) 100 MG capsule, Take 2 capsules by mouth Daily As Needed for Constipation., Disp: , Rfl:     FLUoxetine (PROzac) 20 MG capsule, Take 1 capsule by mouth Daily., Disp: , Rfl:     FLUoxetine (PROzac) 40 MG capsule, Take 1 capsule by mouth Daily., Disp: , Rfl:     fluticasone (FLONASE) 50 MCG/ACT nasal spray, 2 sprays by Each Nare route Daily., Disp: , Rfl:     furosemide (LASIX) 20 MG tablet, Take 1 tablet by mouth Daily. Take with potassium, Disp: , Rfl:     Insulin Glargine (BASAGLAR KWIKPEN) 100 UNIT/ML injection pen, Inject 20 Units under the skin into the appropriate area as directed Daily., Disp: , Rfl:     Insulin Lispro, 1 Unit Dial, (HUMALOG) 100 UNIT/ML solution pen-injector, Inject 12 Units under the skin into the appropriate area as directed 3 (Three) Times a Day., Disp: , Rfl:     ipratropium-albuterol (COMBIVENT RESPIMAT)  MCG/ACT inhaler, Inhale 1 puff 4 (Four) Times a Day As Needed for Wheezing., Disp: , Rfl:     levothyroxine (SYNTHROID, LEVOTHROID) 125 MCG tablet, Take 1 tablet by mouth Daily. Pt states she takes 200 mcg, Disp: , Rfl:     montelukast (SINGULAIR) 10 MG tablet, Take 1 tablet by mouth Every Night., Disp: , Rfl:     ondansetron (ZOFRAN) 4 MG tablet, Take 1 tablet by mouth Every 6 (Six) Hours As Needed for Nausea or Vomiting., Disp: , Rfl:     pantoprazole (PROTONIX) 40 MG EC tablet, Take 1 tablet by mouth Daily., Disp: , Rfl:     PEG 3350 17 GM/SCOOP powder, Take 17 g by mouth Daily., Disp: , Rfl:     potassium chloride 10 MEQ CR tablet, Take 1 tablet by mouth Daily. Take with lasix, Disp: , Rfl:     promethazine (PHENERGAN) 25 MG tablet, Take 1 tablet  by mouth Every 8 (Eight) Hours As Needed for Nausea or Vomiting. Take 1 tablet by mouth every 6-8 hours as needed, Disp: , Rfl:     topiramate (TOPAMAX) 25 MG tablet, Take 1 tablet by mouth Daily., Disp: , Rfl:     gabapentin (NEURONTIN) 300 MG capsule, Take 1 capsule by mouth 3 (Three) Times a Day. (Patient not taking: Reported on 2/21/2024), Disp: , Rfl:     hydrOXYzine (ATARAX) 25 MG tablet, Take 1 tablet by mouth 4 (Four) Times a Day As Needed for Anxiety. (Patient not taking: Reported on 2/21/2024), Disp: , Rfl:     ALLERGIES:  Sulfa antibiotics    VISIT VITALS:  Blood Pressure 122/73 (BP Location: Left arm, Patient Position: Sitting, Cuff Size: Adult)   Pulse 81   Temperature 98.8 °F (37.1 °C) (Temporal)   Weight 66.1 kg (145 lb 12.8 oz)   Oxygen Saturation 97%   Body Mass Index 22.84 kg/m²     PHYSICAL EXAMINATION:  Physical Exam  Constitutional:       General: She is not in acute distress.     Appearance: She is well-developed. She is not diaphoretic.   HENT:      Head: Normocephalic and atraumatic.      Right Ear: External ear normal.      Left Ear: External ear normal.      Nose: Nose normal.      Mouth/Throat:      Pharynx: No oropharyngeal exudate.   Eyes:      General: No scleral icterus.        Right eye: No discharge.         Left eye: No discharge.      Conjunctiva/sclera: Conjunctivae normal.      Pupils: Pupils are equal, round, and reactive to light.   Neck:      Thyroid: No thyromegaly.      Vascular: No JVD.      Trachea: No tracheal deviation.   Cardiovascular:      Rate and Rhythm: Normal rate and regular rhythm.      Heart sounds: Normal heart sounds. No murmur heard.     No friction rub. No gallop.   Pulmonary:      Effort: Pulmonary effort is normal. No respiratory distress.      Breath sounds: Normal breath sounds. No stridor. No wheezing or rales.   Chest:      Chest wall: No tenderness.   Abdominal:      General: Bowel sounds are normal. There is no distension.      Palpations:  Abdomen is soft. There is no mass.      Tenderness: There is abdominal tenderness. There is no guarding or rebound.      Hernia: No hernia is present.   Genitourinary:     Rectum: Guaiac result negative.   Musculoskeletal:      Cervical back: Normal range of motion and neck supple.   Lymphadenopathy:      Cervical: No cervical adenopathy.   Skin:     General: Skin is warm and dry.      Coloration: Skin is not pale.      Findings: No erythema or rash.   Neurological:      Mental Status: She is alert and oriented to person, place, and time.      Cranial Nerves: No cranial nerve deficit.      Motor: No abnormal muscle tone.      Coordination: Coordination normal.      Deep Tendon Reflexes: Reflexes are normal and symmetric. Reflexes normal.   Psychiatric:         Behavior: Behavior normal.         Thought Content: Thought content normal.         Judgment: Judgment normal.         Assessment & Plan      Diagnosis Plan   1. Type 1 diabetes mellitus with other specified complication  Ambulatory Referral to Endocrinology      2. Chronic hepatitis C without hepatic coma  AFP Tumor Marker    CBC & Differential    Comprehensive Metabolic Panel    hCG, Serum, Qualitative    HCV FibroSURE    HCV RNA By PCR, Qn Rfx Mayra    Hepatitis A Antibody, Total    Hepatitis B Core Antibody, Total    Hepatitis B Surface Antibody    Hepatitis B Surface Antigen    HIV-1 & HIV-2 Antibodies    Protime-INR    Urine Drug Screen - Urine, Clean Catch    US Liver        The patient will complete initial lab work and liver US in order to begin Hepatitis C treatment.  Patient will be referred to Endocrinology for Diabetes Management. The patient will return in two weeks to discuss results and begin treatment if warranted.   Return in about 2 weeks (around 3/6/2024) for Recheck.           Kathy Hamm PA-C

## 2024-02-24 LAB
HCV GENTYP SERPL NAA+PROBE: 3
HCV GENTYP SERPL NAA+PROBE: NORMAL
HCV RNA SERPL NAA+PROBE-ACNC: NORMAL IU/ML
HCV RNA SERPL NAA+PROBE-LOG IU: 5.89 LOG10 IU/ML
LABORATORY COMMENT REPORT: NORMAL
LABORATORY COMMENT REPORT: NORMAL

## 2024-03-06 ENCOUNTER — TELEPHONE (OUTPATIENT)
Dept: PHARMACY | Facility: HOSPITAL | Age: 39
End: 2024-03-06
Payer: MEDICAID

## 2024-04-19 ENCOUNTER — APPOINTMENT (OUTPATIENT)
Dept: CT IMAGING | Facility: HOSPITAL | Age: 39
DRG: 894 | End: 2024-04-19
Payer: MEDICAID

## 2024-04-19 ENCOUNTER — HOSPITAL ENCOUNTER (INPATIENT)
Facility: HOSPITAL | Age: 39
LOS: 11 days | Discharge: LEFT BEFORE DISCHARGE | DRG: 894 | End: 2024-04-30
Attending: STUDENT IN AN ORGANIZED HEALTH CARE EDUCATION/TRAINING PROGRAM | Admitting: STUDENT IN AN ORGANIZED HEALTH CARE EDUCATION/TRAINING PROGRAM
Payer: MEDICAID

## 2024-04-19 ENCOUNTER — APPOINTMENT (OUTPATIENT)
Dept: GENERAL RADIOLOGY | Facility: HOSPITAL | Age: 39
DRG: 894 | End: 2024-04-19
Payer: MEDICAID

## 2024-04-19 DIAGNOSIS — J96.00 ACUTE RESPIRATORY FAILURE, UNSPECIFIED WHETHER WITH HYPOXIA OR HYPERCAPNIA: ICD-10-CM

## 2024-04-19 DIAGNOSIS — E10.10 DIABETIC KETOACIDOSIS WITHOUT COMA ASSOCIATED WITH TYPE 1 DIABETES MELLITUS: ICD-10-CM

## 2024-04-19 DIAGNOSIS — F19.929: Primary | ICD-10-CM

## 2024-04-19 DIAGNOSIS — A41.9 SEPSIS, DUE TO UNSPECIFIED ORGANISM, UNSPECIFIED WHETHER ACUTE ORGAN DYSFUNCTION PRESENT: ICD-10-CM

## 2024-04-19 PROBLEM — G93.40 ACUTE ENCEPHALOPATHY: Status: ACTIVE | Noted: 2024-04-19

## 2024-04-19 LAB
A-A DO2: 54.3 MMHG (ref 0–300)
ACETONE BLD QL: ABNORMAL
ALBUMIN SERPL-MCNC: 3.5 G/DL (ref 3.5–5.2)
ALBUMIN/GLOB SERPL: 1.4 G/DL
ALP SERPL-CCNC: 131 U/L (ref 39–117)
ALT SERPL W P-5'-P-CCNC: 23 U/L (ref 1–33)
AMPHET+METHAMPHET UR QL: POSITIVE
AMPHETAMINES UR QL: POSITIVE
ANION GAP SERPL CALCULATED.3IONS-SCNC: 15.2 MMOL/L (ref 5–15)
ANION GAP SERPL CALCULATED.3IONS-SCNC: 18.4 MMOL/L (ref 5–15)
ANION GAP SERPL CALCULATED.3IONS-SCNC: 7 MMOL/L (ref 5–15)
ANION GAP SERPL CALCULATED.3IONS-SCNC: 8.3 MMOL/L (ref 5–15)
APTT PPP: 22.3 SECONDS (ref 26.5–34.5)
ARTERIAL PATENCY WRIST A: ABNORMAL
AST SERPL-CCNC: 25 U/L (ref 1–32)
ATMOSPHERIC PRESS: 727 MMHG
BARBITURATES UR QL SCN: NEGATIVE
BASE EXCESS BLDA CALC-SCNC: -6.2 MMOL/L (ref 0–2)
BASOPHILS # BLD AUTO: 0.04 10*3/MM3 (ref 0–0.2)
BASOPHILS NFR BLD AUTO: 0.3 % (ref 0–1.5)
BDY SITE: ABNORMAL
BENZODIAZ UR QL SCN: POSITIVE
BILIRUB SERPL-MCNC: 0.2 MG/DL (ref 0–1.2)
BILIRUB UR QL STRIP: NEGATIVE
BUN SERPL-MCNC: 19 MG/DL (ref 6–20)
BUN SERPL-MCNC: 21 MG/DL (ref 6–20)
BUN SERPL-MCNC: 21 MG/DL (ref 6–20)
BUN SERPL-MCNC: 22 MG/DL (ref 6–20)
BUN/CREAT SERPL: 21.8 (ref 7–25)
BUN/CREAT SERPL: 24.4 (ref 7–25)
BUN/CREAT SERPL: 25.3 (ref 7–25)
BUN/CREAT SERPL: 25.6 (ref 7–25)
BUPRENORPHINE SERPL-MCNC: POSITIVE NG/ML
CALCIUM SPEC-SCNC: 8.1 MG/DL (ref 8.6–10.5)
CALCIUM SPEC-SCNC: 8.1 MG/DL (ref 8.6–10.5)
CALCIUM SPEC-SCNC: 8.3 MG/DL (ref 8.6–10.5)
CALCIUM SPEC-SCNC: 9 MG/DL (ref 8.6–10.5)
CANNABINOIDS SERPL QL: NEGATIVE
CHLORIDE SERPL-SCNC: 102 MMOL/L (ref 98–107)
CHLORIDE SERPL-SCNC: 107 MMOL/L (ref 98–107)
CHLORIDE SERPL-SCNC: 113 MMOL/L (ref 98–107)
CHLORIDE SERPL-SCNC: 113 MMOL/L (ref 98–107)
CLARITY UR: CLEAR
CO2 BLDA-SCNC: 20.4 MMOL/L (ref 22–33)
CO2 SERPL-SCNC: 14.8 MMOL/L (ref 22–29)
CO2 SERPL-SCNC: 16.6 MMOL/L (ref 22–29)
CO2 SERPL-SCNC: 20 MMOL/L (ref 22–29)
CO2 SERPL-SCNC: 20.7 MMOL/L (ref 22–29)
COCAINE UR QL: NEGATIVE
COHGB MFR BLD: 0.9 % (ref 0–5)
COLOR UR: ABNORMAL
CREAT SERPL-MCNC: 0.75 MG/DL (ref 0.57–1)
CREAT SERPL-MCNC: 0.82 MG/DL (ref 0.57–1)
CREAT SERPL-MCNC: 0.86 MG/DL (ref 0.57–1)
CREAT SERPL-MCNC: 1.01 MG/DL (ref 0.57–1)
D-LACTATE SERPL-SCNC: 1.4 MMOL/L (ref 0.5–2)
D-LACTATE SERPL-SCNC: 3.1 MMOL/L (ref 0.5–2)
DEPRECATED RDW RBC AUTO: 42.8 FL (ref 37–54)
EGFRCR SERPLBLD CKD-EPI 2021: 106 ML/MIN/1.73
EGFRCR SERPLBLD CKD-EPI 2021: 74.1 ML/MIN/1.73
EGFRCR SERPLBLD CKD-EPI 2021: 89.9 ML/MIN/1.73
EGFRCR SERPLBLD CKD-EPI 2021: 95.2 ML/MIN/1.73
EOSINOPHIL # BLD AUTO: 0.01 10*3/MM3 (ref 0–0.4)
EOSINOPHIL NFR BLD AUTO: 0.1 % (ref 0.3–6.2)
ERYTHROCYTE [DISTWIDTH] IN BLOOD BY AUTOMATED COUNT: 12.9 % (ref 12.3–15.4)
FENTANYL UR-MCNC: NEGATIVE NG/ML
GEN 5 2HR TROPONIN T REFLEX: 10 NG/L
GLOBULIN UR ELPH-MCNC: 2.5 GM/DL
GLUCOSE BLDC GLUCOMTR-MCNC: 115 MG/DL (ref 70–130)
GLUCOSE BLDC GLUCOMTR-MCNC: 130 MG/DL (ref 70–130)
GLUCOSE BLDC GLUCOMTR-MCNC: 136 MG/DL (ref 70–130)
GLUCOSE BLDC GLUCOMTR-MCNC: 144 MG/DL (ref 70–130)
GLUCOSE BLDC GLUCOMTR-MCNC: 146 MG/DL (ref 70–130)
GLUCOSE BLDC GLUCOMTR-MCNC: 180 MG/DL (ref 70–130)
GLUCOSE BLDC GLUCOMTR-MCNC: 184 MG/DL (ref 70–130)
GLUCOSE BLDC GLUCOMTR-MCNC: 191 MG/DL (ref 70–130)
GLUCOSE BLDC GLUCOMTR-MCNC: 219 MG/DL (ref 70–130)
GLUCOSE BLDC GLUCOMTR-MCNC: 301 MG/DL (ref 70–130)
GLUCOSE BLDC GLUCOMTR-MCNC: 399 MG/DL (ref 70–130)
GLUCOSE BLDC GLUCOMTR-MCNC: 457 MG/DL (ref 70–130)
GLUCOSE SERPL-MCNC: 143 MG/DL (ref 65–99)
GLUCOSE SERPL-MCNC: 172 MG/DL (ref 65–99)
GLUCOSE SERPL-MCNC: 464 MG/DL (ref 65–99)
GLUCOSE SERPL-MCNC: 542 MG/DL (ref 65–99)
GLUCOSE UR STRIP-MCNC: ABNORMAL MG/DL
HBA1C MFR BLD: 9 % (ref 4.8–5.6)
HCG SERPL QL: NEGATIVE
HCO3 BLDA-SCNC: 19.3 MMOL/L (ref 20–26)
HCT VFR BLD AUTO: 36.1 % (ref 34–46.6)
HCT VFR BLD CALC: 35.6 % (ref 38–51)
HGB BLD-MCNC: 11.7 G/DL (ref 12–15.9)
HGB BLDA-MCNC: 11.6 G/DL (ref 13.5–17.5)
HGB UR QL STRIP.AUTO: NEGATIVE
HOLD SPECIMEN: NORMAL
HOLD SPECIMEN: NORMAL
IMM GRANULOCYTES # BLD AUTO: 0.08 10*3/MM3 (ref 0–0.05)
IMM GRANULOCYTES NFR BLD AUTO: 0.5 % (ref 0–0.5)
INHALED O2 CONCENTRATION: 35 %
INR PPP: 1.04 (ref 0.9–1.1)
KETONES UR QL STRIP: ABNORMAL
LEUKOCYTE ESTERASE UR QL STRIP.AUTO: NEGATIVE
LYMPHOCYTES # BLD AUTO: 1.58 10*3/MM3 (ref 0.7–3.1)
LYMPHOCYTES NFR BLD AUTO: 10.1 % (ref 19.6–45.3)
Lab: ABNORMAL
MAGNESIUM SERPL-MCNC: 2 MG/DL (ref 1.6–2.6)
MCH RBC QN AUTO: 29.6 PG (ref 26.6–33)
MCHC RBC AUTO-ENTMCNC: 32.4 G/DL (ref 31.5–35.7)
MCV RBC AUTO: 91.4 FL (ref 79–97)
METHADONE UR QL SCN: NEGATIVE
METHGB BLD QL: 0.2 % (ref 0–3)
MODALITY: ABNORMAL
MONOCYTES # BLD AUTO: 1.14 10*3/MM3 (ref 0.1–0.9)
MONOCYTES NFR BLD AUTO: 7.3 % (ref 5–12)
NEUTROPHILS NFR BLD AUTO: 12.86 10*3/MM3 (ref 1.7–7)
NEUTROPHILS NFR BLD AUTO: 81.7 % (ref 42.7–76)
NITRITE UR QL STRIP: NEGATIVE
NRBC BLD AUTO-RTO: 0 /100 WBC (ref 0–0.2)
OPIATES UR QL: NEGATIVE
OXYCODONE UR QL SCN: NEGATIVE
OXYHGB MFR BLDV: 98.2 % (ref 94–99)
PCO2 BLDA: 37.4 MM HG (ref 35–45)
PCO2 TEMP ADJ BLD: ABNORMAL MM[HG]
PCP UR QL SCN: NEGATIVE
PEEP RESPIRATORY: 5 CM[H2O]
PH BLDA: 7.32 PH UNITS (ref 7.35–7.45)
PH UR STRIP.AUTO: 6 [PH] (ref 5–8)
PH, TEMP CORRECTED: ABNORMAL
PHOSPHATE SERPL-MCNC: 2 MG/DL (ref 2.5–4.5)
PHOSPHATE SERPL-MCNC: 3.1 MG/DL (ref 2.5–4.5)
PHOSPHATE SERPL-MCNC: 3.3 MG/DL (ref 2.5–4.5)
PLATELET # BLD AUTO: 409 10*3/MM3 (ref 140–450)
PMV BLD AUTO: 9.1 FL (ref 6–12)
PO2 BLDA: 142 MM HG (ref 83–108)
PO2 TEMP ADJ BLD: ABNORMAL MM[HG]
POTASSIUM SERPL-SCNC: 3.5 MMOL/L (ref 3.5–5.2)
POTASSIUM SERPL-SCNC: 3.7 MMOL/L (ref 3.5–5.2)
POTASSIUM SERPL-SCNC: 3.7 MMOL/L (ref 3.5–5.2)
POTASSIUM SERPL-SCNC: 3.8 MMOL/L (ref 3.5–5.2)
PROCALCITONIN SERPL-MCNC: 0.08 NG/ML (ref 0–0.25)
PROT SERPL-MCNC: 6 G/DL (ref 6–8.5)
PROT UR QL STRIP: ABNORMAL
PROTHROMBIN TIME: 14.1 SECONDS (ref 12.1–14.7)
QT INTERVAL: 358 MS
QTC INTERVAL: 488 MS
RBC # BLD AUTO: 3.95 10*6/MM3 (ref 3.77–5.28)
SAO2 % BLDCOA: >99.2 % (ref 94–99)
SET MECH RESP RATE: 18
SODIUM SERPL-SCNC: 137 MMOL/L (ref 136–145)
SODIUM SERPL-SCNC: 137 MMOL/L (ref 136–145)
SODIUM SERPL-SCNC: 140 MMOL/L (ref 136–145)
SODIUM SERPL-SCNC: 142 MMOL/L (ref 136–145)
SP GR UR STRIP: >=1.03 (ref 1–1.03)
TRICYCLICS UR QL SCN: NEGATIVE
TROPONIN T DELTA: -1 NG/L
TROPONIN T SERPL HS-MCNC: 11 NG/L
UROBILINOGEN UR QL STRIP: ABNORMAL
VENTILATOR MODE: ABNORMAL
VT ON VENT VENT: 450 ML
WBC NRBC COR # BLD AUTO: 15.71 10*3/MM3 (ref 3.4–10.8)
WHOLE BLOOD HOLD COAG: NORMAL
WHOLE BLOOD HOLD SPECIMEN: NORMAL

## 2024-04-19 PROCEDURE — 36600 WITHDRAWAL OF ARTERIAL BLOOD: CPT

## 2024-04-19 PROCEDURE — 71045 X-RAY EXAM CHEST 1 VIEW: CPT

## 2024-04-19 PROCEDURE — 83930 ASSAY OF BLOOD OSMOLALITY: CPT | Performed by: STUDENT IN AN ORGANIZED HEALTH CARE EDUCATION/TRAINING PROGRAM

## 2024-04-19 PROCEDURE — 80307 DRUG TEST PRSMV CHEM ANLYZR: CPT | Performed by: STUDENT IN AN ORGANIZED HEALTH CARE EDUCATION/TRAINING PROGRAM

## 2024-04-19 PROCEDURE — 82948 REAGENT STRIP/BLOOD GLUCOSE: CPT

## 2024-04-19 PROCEDURE — 85025 COMPLETE CBC W/AUTO DIFF WBC: CPT | Performed by: STUDENT IN AN ORGANIZED HEALTH CARE EDUCATION/TRAINING PROGRAM

## 2024-04-19 PROCEDURE — 94799 UNLISTED PULMONARY SVC/PX: CPT

## 2024-04-19 PROCEDURE — 84484 ASSAY OF TROPONIN QUANT: CPT | Performed by: STUDENT IN AN ORGANIZED HEALTH CARE EDUCATION/TRAINING PROGRAM

## 2024-04-19 PROCEDURE — 93010 ELECTROCARDIOGRAM REPORT: CPT | Performed by: INTERNAL MEDICINE

## 2024-04-19 PROCEDURE — 84703 CHORIONIC GONADOTROPIN ASSAY: CPT | Performed by: STUDENT IN AN ORGANIZED HEALTH CARE EDUCATION/TRAINING PROGRAM

## 2024-04-19 PROCEDURE — 87186 SC STD MICRODIL/AGAR DIL: CPT | Performed by: STUDENT IN AN ORGANIZED HEALTH CARE EDUCATION/TRAINING PROGRAM

## 2024-04-19 PROCEDURE — 93005 ELECTROCARDIOGRAM TRACING: CPT | Performed by: STUDENT IN AN ORGANIZED HEALTH CARE EDUCATION/TRAINING PROGRAM

## 2024-04-19 PROCEDURE — 51702 INSERT TEMP BLADDER CATH: CPT

## 2024-04-19 PROCEDURE — 94002 VENT MGMT INPAT INIT DAY: CPT

## 2024-04-19 PROCEDURE — 83050 HGB METHEMOGLOBIN QUAN: CPT

## 2024-04-19 PROCEDURE — 25810000003 SEPSIS FLUID NS 0.9 % SOLUTION: Performed by: STUDENT IN AN ORGANIZED HEALTH CARE EDUCATION/TRAINING PROGRAM

## 2024-04-19 PROCEDURE — 81003 URINALYSIS AUTO W/O SCOPE: CPT | Performed by: STUDENT IN AN ORGANIZED HEALTH CARE EDUCATION/TRAINING PROGRAM

## 2024-04-19 PROCEDURE — 71045 X-RAY EXAM CHEST 1 VIEW: CPT | Performed by: RADIOLOGY

## 2024-04-19 PROCEDURE — 87040 BLOOD CULTURE FOR BACTERIA: CPT | Performed by: STUDENT IN AN ORGANIZED HEALTH CARE EDUCATION/TRAINING PROGRAM

## 2024-04-19 PROCEDURE — 70450 CT HEAD/BRAIN W/O DYE: CPT

## 2024-04-19 PROCEDURE — 87154 CUL TYP ID BLD PTHGN 6+ TRGT: CPT | Performed by: STUDENT IN AN ORGANIZED HEALTH CARE EDUCATION/TRAINING PROGRAM

## 2024-04-19 PROCEDURE — 36415 COLL VENOUS BLD VENIPUNCTURE: CPT | Performed by: STUDENT IN AN ORGANIZED HEALTH CARE EDUCATION/TRAINING PROGRAM

## 2024-04-19 PROCEDURE — 87147 CULTURE TYPE IMMUNOLOGIC: CPT | Performed by: STUDENT IN AN ORGANIZED HEALTH CARE EDUCATION/TRAINING PROGRAM

## 2024-04-19 PROCEDURE — 94761 N-INVAS EAR/PLS OXIMETRY MLT: CPT

## 2024-04-19 PROCEDURE — 94640 AIRWAY INHALATION TREATMENT: CPT

## 2024-04-19 PROCEDURE — 5A1955Z RESPIRATORY VENTILATION, GREATER THAN 96 CONSECUTIVE HOURS: ICD-10-PCS | Performed by: STUDENT IN AN ORGANIZED HEALTH CARE EDUCATION/TRAINING PROGRAM

## 2024-04-19 PROCEDURE — 99291 CRITICAL CARE FIRST HOUR: CPT

## 2024-04-19 PROCEDURE — 80053 COMPREHEN METABOLIC PANEL: CPT | Performed by: STUDENT IN AN ORGANIZED HEALTH CARE EDUCATION/TRAINING PROGRAM

## 2024-04-19 PROCEDURE — 82805 BLOOD GASES W/O2 SATURATION: CPT

## 2024-04-19 PROCEDURE — 99223 1ST HOSP IP/OBS HIGH 75: CPT | Performed by: INTERNAL MEDICINE

## 2024-04-19 PROCEDURE — 70450 CT HEAD/BRAIN W/O DYE: CPT | Performed by: RADIOLOGY

## 2024-04-19 PROCEDURE — 0BH17EZ INSERTION OF ENDOTRACHEAL AIRWAY INTO TRACHEA, VIA NATURAL OR ARTIFICIAL OPENING: ICD-10-PCS | Performed by: STUDENT IN AN ORGANIZED HEALTH CARE EDUCATION/TRAINING PROGRAM

## 2024-04-19 PROCEDURE — 83735 ASSAY OF MAGNESIUM: CPT | Performed by: STUDENT IN AN ORGANIZED HEALTH CARE EDUCATION/TRAINING PROGRAM

## 2024-04-19 PROCEDURE — 82009 KETONE BODYS QUAL: CPT | Performed by: STUDENT IN AN ORGANIZED HEALTH CARE EDUCATION/TRAINING PROGRAM

## 2024-04-19 PROCEDURE — 83036 HEMOGLOBIN GLYCOSYLATED A1C: CPT | Performed by: STUDENT IN AN ORGANIZED HEALTH CARE EDUCATION/TRAINING PROGRAM

## 2024-04-19 PROCEDURE — 31500 INSERT EMERGENCY AIRWAY: CPT

## 2024-04-19 PROCEDURE — 25010000002 ENOXAPARIN PER 10 MG: Performed by: INTERNAL MEDICINE

## 2024-04-19 PROCEDURE — 0 MIDAZOLAM 1 MG/ML 100ML NS 100 MG/100ML SOLUTION: Performed by: STUDENT IN AN ORGANIZED HEALTH CARE EDUCATION/TRAINING PROGRAM

## 2024-04-19 PROCEDURE — 84100 ASSAY OF PHOSPHORUS: CPT | Performed by: STUDENT IN AN ORGANIZED HEALTH CARE EDUCATION/TRAINING PROGRAM

## 2024-04-19 PROCEDURE — 85730 THROMBOPLASTIN TIME PARTIAL: CPT | Performed by: STUDENT IN AN ORGANIZED HEALTH CARE EDUCATION/TRAINING PROGRAM

## 2024-04-19 PROCEDURE — 25010000002 PROPOFOL 10 MG/ML EMULSION: Performed by: STUDENT IN AN ORGANIZED HEALTH CARE EDUCATION/TRAINING PROGRAM

## 2024-04-19 PROCEDURE — 36415 COLL VENOUS BLD VENIPUNCTURE: CPT

## 2024-04-19 PROCEDURE — 82375 ASSAY CARBOXYHB QUANT: CPT

## 2024-04-19 PROCEDURE — 85610 PROTHROMBIN TIME: CPT | Performed by: STUDENT IN AN ORGANIZED HEALTH CARE EDUCATION/TRAINING PROGRAM

## 2024-04-19 PROCEDURE — 83605 ASSAY OF LACTIC ACID: CPT | Performed by: STUDENT IN AN ORGANIZED HEALTH CARE EDUCATION/TRAINING PROGRAM

## 2024-04-19 PROCEDURE — 25010000002 SUCCINYLCHOLINE PER 20 MG: Performed by: STUDENT IN AN ORGANIZED HEALTH CARE EDUCATION/TRAINING PROGRAM

## 2024-04-19 PROCEDURE — 25010000002 POTASSIUM CHLORIDE PER 2 MEQ: Performed by: STUDENT IN AN ORGANIZED HEALTH CARE EDUCATION/TRAINING PROGRAM

## 2024-04-19 PROCEDURE — 84145 PROCALCITONIN (PCT): CPT | Performed by: STUDENT IN AN ORGANIZED HEALTH CARE EDUCATION/TRAINING PROGRAM

## 2024-04-19 RX ORDER — DEXTROSE MONOHYDRATE 25 G/50ML
10-50 INJECTION, SOLUTION INTRAVENOUS
Status: DISCONTINUED | OUTPATIENT
Start: 2024-04-19 | End: 2024-04-24 | Stop reason: SDUPTHER

## 2024-04-19 RX ORDER — NICOTINE POLACRILEX 4 MG
15 LOZENGE BUCCAL
Status: DISCONTINUED | OUTPATIENT
Start: 2024-04-19 | End: 2024-04-24 | Stop reason: SDUPTHER

## 2024-04-19 RX ORDER — ROPINIROLE 2 MG/1
2 TABLET, FILM COATED ORAL NIGHTLY
COMMUNITY

## 2024-04-19 RX ORDER — POTASSIUM CHLORIDE 1.5 G/1.58G
40 POWDER, FOR SOLUTION ORAL EVERY 4 HOURS
Qty: 4 PACKET | Refills: 0 | Status: COMPLETED | OUTPATIENT
Start: 2024-04-19 | End: 2024-04-19

## 2024-04-19 RX ORDER — CHLORHEXIDINE GLUCONATE 0.12 MG/ML
15 RINSE ORAL EVERY 12 HOURS SCHEDULED
Status: DISCONTINUED | OUTPATIENT
Start: 2024-04-19 | End: 2024-04-28

## 2024-04-19 RX ORDER — SODIUM CHLORIDE 0.9 % (FLUSH) 0.9 %
10 SYRINGE (ML) INJECTION AS NEEDED
Status: DISCONTINUED | OUTPATIENT
Start: 2024-04-19 | End: 2024-04-30 | Stop reason: HOSPADM

## 2024-04-19 RX ORDER — HYDROXYZINE HYDROCHLORIDE 25 MG/1
25 TABLET, FILM COATED ORAL EVERY 6 HOURS PRN
Status: DISCONTINUED | OUTPATIENT
Start: 2024-04-19 | End: 2024-04-30 | Stop reason: HOSPADM

## 2024-04-19 RX ORDER — INSULIN LISPRO 100 [IU]/ML
16 INJECTION, SOLUTION INTRAVENOUS; SUBCUTANEOUS 3 TIMES DAILY
Status: CANCELLED | OUTPATIENT
Start: 2024-04-19

## 2024-04-19 RX ORDER — MONTELUKAST SODIUM 10 MG/1
10 TABLET ORAL NIGHTLY
Status: DISCONTINUED | OUTPATIENT
Start: 2024-04-20 | End: 2024-04-30 | Stop reason: HOSPADM

## 2024-04-19 RX ORDER — LIDOCAINE HYDROCHLORIDE 20 MG/ML
10 SOLUTION OROPHARYNGEAL
COMMUNITY

## 2024-04-19 RX ORDER — FENTANYL CITRATE-0.9 % NACL/PF 10 MCG/ML
50-300 PLASTIC BAG, INJECTION (ML) INTRAVENOUS
Status: DISCONTINUED | OUTPATIENT
Start: 2024-04-19 | End: 2024-04-23

## 2024-04-19 RX ORDER — GLUCAGON 1 MG/ML
1 KIT INJECTION
Status: DISCONTINUED | OUTPATIENT
Start: 2024-04-19 | End: 2024-04-24

## 2024-04-19 RX ORDER — POLYETHYLENE GLYCOL 3350 17 G/17G
17 POWDER, FOR SOLUTION ORAL DAILY PRN
Status: DISCONTINUED | OUTPATIENT
Start: 2024-04-19 | End: 2024-04-30 | Stop reason: HOSPADM

## 2024-04-19 RX ORDER — DEXTROSE MONOHYDRATE, SODIUM CHLORIDE, AND POTASSIUM CHLORIDE 50; 1.49; 4.5 G/1000ML; G/1000ML; G/1000ML
150 INJECTION, SOLUTION INTRAVENOUS CONTINUOUS PRN
Status: DISCONTINUED | OUTPATIENT
Start: 2024-04-19 | End: 2024-04-24

## 2024-04-19 RX ORDER — LEVOTHYROXINE SODIUM 0.1 MG/1
200 TABLET ORAL DAILY
Status: DISCONTINUED | OUTPATIENT
Start: 2024-04-20 | End: 2024-04-30 | Stop reason: HOSPADM

## 2024-04-19 RX ORDER — ENOXAPARIN SODIUM 100 MG/ML
40 INJECTION SUBCUTANEOUS EVERY 24 HOURS
Status: DISCONTINUED | OUTPATIENT
Start: 2024-04-19 | End: 2024-04-30 | Stop reason: HOSPADM

## 2024-04-19 RX ORDER — SODIUM CHLORIDE AND POTASSIUM CHLORIDE 150; 900 MG/100ML; MG/100ML
250 INJECTION, SOLUTION INTRAVENOUS CONTINUOUS PRN
Status: DISCONTINUED | OUTPATIENT
Start: 2024-04-19 | End: 2024-04-24

## 2024-04-19 RX ORDER — SUCCINYLCHOLINE CHLORIDE 20 MG/ML
1.5 INJECTION INTRAMUSCULAR; INTRAVENOUS ONCE
Status: COMPLETED | OUTPATIENT
Start: 2024-04-19 | End: 2024-04-19

## 2024-04-19 RX ORDER — DEXTROSE AND SODIUM CHLORIDE 5; .45 G/100ML; G/100ML
150 INJECTION, SOLUTION INTRAVENOUS CONTINUOUS PRN
Status: DISCONTINUED | OUTPATIENT
Start: 2024-04-19 | End: 2024-04-30 | Stop reason: HOSPADM

## 2024-04-19 RX ORDER — SODIUM CHLORIDE 0.9 % (FLUSH) 0.9 %
10 SYRINGE (ML) INJECTION EVERY 12 HOURS SCHEDULED
Status: DISCONTINUED | OUTPATIENT
Start: 2024-04-19 | End: 2024-04-23

## 2024-04-19 RX ORDER — INSULIN LISPRO 100 [IU]/ML
INJECTION, SOLUTION INTRAVENOUS; SUBCUTANEOUS
Status: ON HOLD | COMMUNITY
End: 2024-04-19

## 2024-04-19 RX ORDER — BUPROPION HYDROCHLORIDE 450 MG/1
450 TABLET, FILM COATED, EXTENDED RELEASE ORAL EVERY MORNING
COMMUNITY

## 2024-04-19 RX ORDER — ONDANSETRON 4 MG/1
4 TABLET, ORALLY DISINTEGRATING ORAL EVERY 8 HOURS PRN
COMMUNITY

## 2024-04-19 RX ORDER — SODIUM CHLORIDE 0.9 % (FLUSH) 0.9 %
10 SYRINGE (ML) INJECTION EVERY 12 HOURS SCHEDULED
Status: DISCONTINUED | OUTPATIENT
Start: 2024-04-19 | End: 2024-04-30 | Stop reason: HOSPADM

## 2024-04-19 RX ORDER — PROMETHAZINE HYDROCHLORIDE 25 MG/1
25 TABLET ORAL EVERY 12 HOURS PRN
Status: DISCONTINUED | OUTPATIENT
Start: 2024-04-19 | End: 2024-04-30 | Stop reason: HOSPADM

## 2024-04-19 RX ORDER — DEXTROSE MONOHYDRATE, SODIUM CHLORIDE, AND POTASSIUM CHLORIDE 50; 1.49; 9 G/1000ML; G/1000ML; G/1000ML
150 INJECTION, SOLUTION INTRAVENOUS CONTINUOUS PRN
Status: DISCONTINUED | OUTPATIENT
Start: 2024-04-19 | End: 2024-04-24

## 2024-04-19 RX ORDER — LIDOCAINE HYDROCHLORIDE 20 MG/ML
10 SOLUTION OROPHARYNGEAL
Status: CANCELLED | OUTPATIENT
Start: 2024-04-19

## 2024-04-19 RX ORDER — FLUOXETINE HYDROCHLORIDE 20 MG/1
40 CAPSULE ORAL DAILY
Status: DISCONTINUED | OUTPATIENT
Start: 2024-04-20 | End: 2024-04-30 | Stop reason: HOSPADM

## 2024-04-19 RX ORDER — GABAPENTIN 800 MG/1
800 TABLET ORAL 3 TIMES DAILY
COMMUNITY

## 2024-04-19 RX ORDER — POTASSIUM CHLORIDE 20 MEQ/1
10 TABLET, EXTENDED RELEASE ORAL DAILY
Status: DISCONTINUED | OUTPATIENT
Start: 2024-04-20 | End: 2024-04-20

## 2024-04-19 RX ORDER — FUROSEMIDE 20 MG/1
20 TABLET ORAL DAILY
Status: DISCONTINUED | OUTPATIENT
Start: 2024-04-20 | End: 2024-04-30 | Stop reason: HOSPADM

## 2024-04-19 RX ORDER — SODIUM CHLORIDE 0.9 % (FLUSH) 0.9 %
20 SYRINGE (ML) INJECTION AS NEEDED
Status: DISCONTINUED | OUTPATIENT
Start: 2024-04-19 | End: 2024-04-30 | Stop reason: HOSPADM

## 2024-04-19 RX ORDER — FLUTICASONE PROPIONATE 50 MCG
2 SPRAY, SUSPENSION (ML) NASAL DAILY PRN
Status: CANCELLED | OUTPATIENT
Start: 2024-04-19

## 2024-04-19 RX ORDER — ONDANSETRON 4 MG/1
4 TABLET, ORALLY DISINTEGRATING ORAL EVERY 8 HOURS PRN
Status: DISCONTINUED | OUTPATIENT
Start: 2024-04-20 | End: 2024-04-30 | Stop reason: HOSPADM

## 2024-04-19 RX ORDER — ROPINIROLE 1 MG/1
2 TABLET, FILM COATED ORAL NIGHTLY
Status: DISCONTINUED | OUTPATIENT
Start: 2024-04-20 | End: 2024-04-30 | Stop reason: HOSPADM

## 2024-04-19 RX ORDER — ALBUTEROL SULFATE 90 UG/1
1 AEROSOL, METERED RESPIRATORY (INHALATION) EVERY 4 HOURS PRN
COMMUNITY

## 2024-04-19 RX ORDER — HYDROXYZINE HYDROCHLORIDE 25 MG/1
25 TABLET, FILM COATED ORAL EVERY 6 HOURS PRN
COMMUNITY

## 2024-04-19 RX ORDER — GABAPENTIN 400 MG/1
800 CAPSULE ORAL EVERY 8 HOURS SCHEDULED
Status: DISCONTINUED | OUTPATIENT
Start: 2024-04-19 | End: 2024-04-30 | Stop reason: HOSPADM

## 2024-04-19 RX ORDER — SODIUM CHLORIDE 9 MG/ML
40 INJECTION, SOLUTION INTRAVENOUS AS NEEDED
Status: DISCONTINUED | OUTPATIENT
Start: 2024-04-19 | End: 2024-04-30 | Stop reason: HOSPADM

## 2024-04-19 RX ORDER — ALBUTEROL SULFATE 90 UG/1
1 AEROSOL, METERED RESPIRATORY (INHALATION) EVERY 4 HOURS PRN
Status: CANCELLED | OUTPATIENT
Start: 2024-04-19

## 2024-04-19 RX ORDER — INSULIN GLARGINE 100 [IU]/ML
35 INJECTION, SOLUTION SUBCUTANEOUS NIGHTLY
COMMUNITY

## 2024-04-19 RX ORDER — SODIUM CHLORIDE 9 MG/ML
250 INJECTION, SOLUTION INTRAVENOUS CONTINUOUS PRN
Status: DISCONTINUED | OUTPATIENT
Start: 2024-04-19 | End: 2024-04-30 | Stop reason: HOSPADM

## 2024-04-19 RX ORDER — CETIRIZINE HYDROCHLORIDE 10 MG/1
10 TABLET ORAL DAILY
Status: DISCONTINUED | OUTPATIENT
Start: 2024-04-19 | End: 2024-04-30 | Stop reason: HOSPADM

## 2024-04-19 RX ORDER — IPRATROPIUM BROMIDE AND ALBUTEROL SULFATE 2.5; .5 MG/3ML; MG/3ML
3 SOLUTION RESPIRATORY (INHALATION) EVERY 6 HOURS PRN
Status: DISCONTINUED | OUTPATIENT
Start: 2024-04-19 | End: 2024-04-30 | Stop reason: HOSPADM

## 2024-04-19 RX ORDER — DEXTROSE MONOHYDRATE, SODIUM CHLORIDE, AND POTASSIUM CHLORIDE 50; 2.98; 4.5 G/1000ML; G/1000ML; G/1000ML
150 INJECTION, SOLUTION INTRAVENOUS CONTINUOUS PRN
Status: DISCONTINUED | OUTPATIENT
Start: 2024-04-19 | End: 2024-04-24

## 2024-04-19 RX ORDER — NITROGLYCERIN 0.4 MG/1
0.4 TABLET SUBLINGUAL
Status: DISCONTINUED | OUTPATIENT
Start: 2024-04-19 | End: 2024-04-30 | Stop reason: HOSPADM

## 2024-04-19 RX ORDER — BUPRENORPHINE HYDROCHLORIDE AND NALOXONE HYDROCHLORIDE DIHYDRATE 8; 2 MG/1; MG/1
3 TABLET SUBLINGUAL DAILY
Status: CANCELLED | OUTPATIENT
Start: 2024-04-20

## 2024-04-19 RX ORDER — BUPRENORPHINE HYDROCHLORIDE AND NALOXONE HYDROCHLORIDE DIHYDRATE 8; 2 MG/1; MG/1
3 TABLET SUBLINGUAL DAILY
Status: DISCONTINUED | OUTPATIENT
Start: 2024-04-19 | End: 2024-04-23

## 2024-04-19 RX ORDER — DEXTROSE AND SODIUM CHLORIDE 5; .9 G/100ML; G/100ML
150 INJECTION, SOLUTION INTRAVENOUS CONTINUOUS PRN
Status: DISCONTINUED | OUTPATIENT
Start: 2024-04-19 | End: 2024-04-30 | Stop reason: HOSPADM

## 2024-04-19 RX ORDER — SODIUM CHLORIDE AND POTASSIUM CHLORIDE 150; 450 MG/100ML; MG/100ML
250 INJECTION, SOLUTION INTRAVENOUS CONTINUOUS PRN
Status: DISCONTINUED | OUTPATIENT
Start: 2024-04-19 | End: 2024-04-24

## 2024-04-19 RX ORDER — PROPOFOL 10 MG/ML
50 VIAL (ML) INTRAVENOUS ONCE
Status: COMPLETED | OUTPATIENT
Start: 2024-04-19 | End: 2024-04-19

## 2024-04-19 RX ORDER — TOPIRAMATE 25 MG/1
25 TABLET ORAL DAILY
Status: DISCONTINUED | OUTPATIENT
Start: 2024-04-19 | End: 2024-04-30 | Stop reason: HOSPADM

## 2024-04-19 RX ORDER — MIDAZOLAM IN NACL,ISO-OSMOT/PF 50 MG/50ML
1-10 INFUSION BOTTLE (ML) INTRAVENOUS
Status: DISCONTINUED | OUTPATIENT
Start: 2024-04-19 | End: 2024-04-19

## 2024-04-19 RX ORDER — SODIUM CHLORIDE 450 MG/100ML
250 INJECTION, SOLUTION INTRAVENOUS CONTINUOUS PRN
Status: DISCONTINUED | OUTPATIENT
Start: 2024-04-19 | End: 2024-04-30 | Stop reason: HOSPADM

## 2024-04-19 RX ORDER — DOCUSATE SODIUM 100 MG/1
200 CAPSULE, LIQUID FILLED ORAL DAILY PRN
Status: CANCELLED | OUTPATIENT
Start: 2024-04-19

## 2024-04-19 RX ORDER — SODIUM CHLORIDE AND POTASSIUM CHLORIDE 300; 900 MG/100ML; MG/100ML
250 INJECTION, SOLUTION INTRAVENOUS CONTINUOUS PRN
Status: DISCONTINUED | OUTPATIENT
Start: 2024-04-19 | End: 2024-04-24

## 2024-04-19 RX ORDER — PROPOFOL 10 MG/ML
100 VIAL (ML) INTRAVENOUS ONCE
Status: COMPLETED | OUTPATIENT
Start: 2024-04-19 | End: 2024-04-19

## 2024-04-19 RX ORDER — LEVOTHYROXINE SODIUM 0.2 MG/1
200 TABLET ORAL DAILY
COMMUNITY

## 2024-04-19 RX ORDER — PANTOPRAZOLE SODIUM 40 MG/1
40 TABLET, DELAYED RELEASE ORAL DAILY
Status: DISCONTINUED | OUTPATIENT
Start: 2024-04-20 | End: 2024-04-20

## 2024-04-19 RX ORDER — BISACODYL 5 MG/1
5 TABLET, DELAYED RELEASE ORAL DAILY PRN
Status: DISCONTINUED | OUTPATIENT
Start: 2024-04-19 | End: 2024-04-30 | Stop reason: HOSPADM

## 2024-04-19 RX ORDER — BISACODYL 10 MG
10 SUPPOSITORY, RECTAL RECTAL DAILY PRN
Status: DISCONTINUED | OUTPATIENT
Start: 2024-04-19 | End: 2024-04-30 | Stop reason: HOSPADM

## 2024-04-19 RX ORDER — ATORVASTATIN CALCIUM 20 MG/1
20 TABLET, FILM COATED ORAL NIGHTLY
Status: DISCONTINUED | OUTPATIENT
Start: 2024-04-19 | End: 2024-04-30 | Stop reason: HOSPADM

## 2024-04-19 RX ORDER — AMOXICILLIN 250 MG
2 CAPSULE ORAL 2 TIMES DAILY
Status: DISCONTINUED | OUTPATIENT
Start: 2024-04-19 | End: 2024-04-30 | Stop reason: HOSPADM

## 2024-04-19 RX ORDER — INSULIN LISPRO 100 [IU]/ML
16 INJECTION, SOLUTION INTRAVENOUS; SUBCUTANEOUS 3 TIMES DAILY
COMMUNITY

## 2024-04-19 RX ORDER — FLUOXETINE HYDROCHLORIDE 20 MG/1
20 CAPSULE ORAL DAILY
Status: DISCONTINUED | OUTPATIENT
Start: 2024-04-20 | End: 2024-04-30 | Stop reason: HOSPADM

## 2024-04-19 RX ORDER — POLYETHYLENE GLYCOL 3350 17 G/17G
17 POWDER, FOR SOLUTION ORAL DAILY
Status: CANCELLED | OUTPATIENT
Start: 2024-04-19

## 2024-04-19 RX ORDER — ROCURONIUM BROMIDE 10 MG/ML
50 INJECTION, SOLUTION INTRAVENOUS ONCE
Status: COMPLETED | OUTPATIENT
Start: 2024-04-19 | End: 2024-04-19

## 2024-04-19 RX ORDER — BUPROPION HYDROCHLORIDE 150 MG/1
450 TABLET ORAL DAILY
Status: DISCONTINUED | OUTPATIENT
Start: 2024-04-20 | End: 2024-04-30 | Stop reason: HOSPADM

## 2024-04-19 RX ADMIN — PROPOFOL 50 MG: 10 INJECTION, EMULSION INTRAVENOUS at 09:08

## 2024-04-19 RX ADMIN — Medication 10 ML: at 12:00

## 2024-04-19 RX ADMIN — MIDAZOLAM HYDROCHLORIDE 8 MG/HR: 1 INJECTION, SOLUTION INTRAVENOUS at 09:19

## 2024-04-19 RX ADMIN — Medication 10 ML: at 22:04

## 2024-04-19 RX ADMIN — MUPIROCIN 1 APPLICATION: 20 OINTMENT TOPICAL at 22:30

## 2024-04-19 RX ADMIN — POTASSIUM CHLORIDE, DEXTROSE MONOHYDRATE AND SODIUM CHLORIDE 150 ML/HR: 150; 5; 450 INJECTION, SOLUTION INTRAVENOUS at 15:31

## 2024-04-19 RX ADMIN — POTASSIUM CHLORIDE 40 MEQ: 1.5 POWDER, FOR SOLUTION ORAL at 22:20

## 2024-04-19 RX ADMIN — INSULIN HUMAN 4 UNITS/HR: 1 INJECTION, SOLUTION INTRAVENOUS at 11:58

## 2024-04-19 RX ADMIN — PROPOFOL 50 MCG/KG/MIN: 10 INJECTION, EMULSION INTRAVENOUS at 09:10

## 2024-04-19 RX ADMIN — PROPOFOL 100 MG: 10 INJECTION, EMULSION INTRAVENOUS at 09:07

## 2024-04-19 RX ADMIN — Medication 10 ML: at 20:30

## 2024-04-19 RX ADMIN — Medication 50 MCG/HR: at 17:02

## 2024-04-19 RX ADMIN — CETIRIZINE HYDROCHLORIDE 10 MG: 10 TABLET, FILM COATED ORAL at 18:32

## 2024-04-19 RX ADMIN — TOPIRAMATE 25 MG: 25 TABLET, FILM COATED ORAL at 18:32

## 2024-04-19 RX ADMIN — PROPOFOL 50 MCG/KG/MIN: 10 INJECTION, EMULSION INTRAVENOUS at 16:51

## 2024-04-19 RX ADMIN — PROPOFOL 50 MCG/KG/MIN: 10 INJECTION, EMULSION INTRAVENOUS at 12:36

## 2024-04-19 RX ADMIN — PROPOFOL 50 MCG/KG/MIN: 10 INJECTION, EMULSION INTRAVENOUS at 22:20

## 2024-04-19 RX ADMIN — POTASSIUM CHLORIDE AND SODIUM CHLORIDE 250 ML/HR: 450; 150 INJECTION, SOLUTION INTRAVENOUS at 13:07

## 2024-04-19 RX ADMIN — ENOXAPARIN SODIUM 40 MG: 40 INJECTION SUBCUTANEOUS at 18:03

## 2024-04-19 RX ADMIN — SUCCINYLCHOLINE CHLORIDE 120 MG: 20 INJECTION, SOLUTION INTRAMUSCULAR; INTRAVENOUS at 09:08

## 2024-04-19 RX ADMIN — CHLORHEXIDINE GLUCONATE 15 ML: 1.2 RINSE ORAL at 22:20

## 2024-04-19 RX ADMIN — SODIUM CHLORIDE 2400 ML: 9 INJECTION, SOLUTION INTRAVENOUS at 09:42

## 2024-04-19 RX ADMIN — ATORVASTATIN CALCIUM 20 MG: 20 TABLET, FILM COATED ORAL at 20:30

## 2024-04-19 RX ADMIN — GABAPENTIN 800 MG: 400 CAPSULE ORAL at 22:21

## 2024-04-19 RX ADMIN — POTASSIUM CHLORIDE 40 MEQ: 1.5 POWDER, FOR SOLUTION ORAL at 20:30

## 2024-04-19 RX ADMIN — ROCURONIUM BROMIDE 50 MG: 10 SOLUTION INTRAVENOUS at 09:22

## 2024-04-19 RX ADMIN — POTASSIUM CHLORIDE, DEXTROSE MONOHYDRATE AND SODIUM CHLORIDE 150 ML/HR: 150; 5; 450 INJECTION, SOLUTION INTRAVENOUS at 20:29

## 2024-04-19 RX ADMIN — DOCUSATE SODIUM 50 MG AND SENNOSIDES 8.6 MG 2 TABLET: 8.6; 5 TABLET, FILM COATED ORAL at 20:30

## 2024-04-19 RX ADMIN — IPRATROPIUM BROMIDE AND ALBUTEROL SULFATE 3 ML: .5; 2.5 SOLUTION RESPIRATORY (INHALATION) at 18:29

## 2024-04-19 NOTE — CASE MANAGEMENT/SOCIAL WORK
Discharge Planning Assessment   Jose     Patient Name: Jes Moreno  MRN: [id number]  Today's Date: 4/19/2024    Admit Date: 4/19/2024        Discharge Needs Assessment    No documentation.                  Discharge Plan       Row Name 04/19/24 1505       Plan    Patient/Family in Agreement with Plan unable to assess                  Continued Care and Services - Admitted Since 4/19/2024    No active coordination exists for this encounter.       Expected Discharge Date and Time       Expected Discharge Date Expected Discharge Time    Apr 22, 2024            Demographic Summary       Row Name 04/19/24 1505       General Information    Admission Type inpatient    Arrived From home    Referral Source emergency department    Reason for Consult discharge planning                    Marcie Jaramillo RN

## 2024-04-19 NOTE — ED NOTES
Pt arrives to the ED belligerent, combative, uncooperative, spitting at staff, unable to answer orientation questions. Pt arrives restrained by EMS in handcuffs to BUE and BLE due to Pt being combative and uncooperative. Pt arrives extremely diaphoretic, skin reddened, scattered blue bruising to BUE and BLE. Per EMS concerns for possible DKA and drug overdose of unknown substance. Attempted to redirect Pt, unsuccessful, Pt unable to follow commands and concerns for protection of airway due to condition, per Dr. Petr FREITAS to prepare Pt for intubation. Unable to obtain vital signs due to Pt condition and uncooperative. Unable to obtain peripheral IV access due to Pt positioning and condition, VORB by Dr. Humphreys to obtain IV access in foot if possible.

## 2024-04-19 NOTE — PLAN OF CARE
Problem: Adult Inpatient Plan of Care  Goal: Plan of Care Review  Outcome: Ongoing, Progressing  Goal: Patient-Specific Goal (Individualized)  Outcome: Ongoing, Progressing  Goal: Absence of Hospital-Acquired Illness or Injury  Outcome: Ongoing, Progressing  Intervention: Identify and Manage Fall Risk  Recent Flowsheet Documentation  Taken 4/19/2024 1700 by Doug Sidhu RN  Safety Promotion/Fall Prevention: safety round/check completed  Taken 4/19/2024 1615 by Doug Sidhu RN  Safety Promotion/Fall Prevention: safety round/check completed  Intervention: Prevent Skin Injury  Recent Flowsheet Documentation  Taken 4/19/2024 1615 by Doug Sidhu RN  Body Position:   turned   right  Intervention: Prevent and Manage VTE (Venous Thromboembolism) Risk  Recent Flowsheet Documentation  Taken 4/19/2024 1615 by Doug Sidhu RN  Activity Management: bedrest  VTE Prevention/Management: (see mar) other (see comments)  Intervention: Prevent Infection  Recent Flowsheet Documentation  Taken 4/19/2024 1615 by Doug Sidhu RN  Infection Prevention: environmental surveillance performed  Goal: Optimal Comfort and Wellbeing  Outcome: Ongoing, Progressing  Intervention: Monitor Pain and Promote Comfort  Recent Flowsheet Documentation  Taken 4/19/2024 1615 by Doug Sidhu RN  Pain Management Interventions: position adjusted  Intervention: Provide Person-Centered Care  Recent Flowsheet Documentation  Taken 4/19/2024 1615 by Doug Sidhu RN  Trust Relationship/Rapport:   care explained   reassurance provided  Goal: Readiness for Transition of Care  Outcome: Ongoing, Progressing   Goal Outcome Evaluation:
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
## 2024-04-19 NOTE — H&P
[facility] HOSPITALIST HISTORY AND PHYSICAL    Patient Identification:  Name:  Jes Moreno  Age:  38 y.o.  Sex:  female  :  1985  MRN:  [id number]   Admit Date: 2024   Visit Number:  [id number]  Primary Care Physician:  Juany Sheehan APRN       Chief complaint: Altered mental status    History of presenting illness: Ms. Moreno is a 38 y.o. female who presented to [facility] emergency department via EMS secondary to altered mental status.  It should be noted patient was intubated and sedated at time of initial interview.  According to emergency room provider, patient was found on the side of the road acting irrational and a passing by motorist did call local authorities.  When local authorities approached the patient, she reportedly became agitated and combative, requiring several police officers to subdue her and bring her to the emergency department for further treatment and evaluation.  Per emergency room provider, upon initial evaluation the patient did appear to have excessive lacrimation and salivation as well as flushing of the skin.  There is concern for acute drug intoxication.  Patient did require large amounts of sedating medications, raising concern for ability to protect her airway.  As such, patient was emergently intubated and placed on sedating medications.  Patient will now be admitted for further treatment and evaluation for suspected acute drug intoxication.  -------------------------------------------------------------------------------------------------------------------  Past Medical History:   Diagnosis Date    Asthma     Celiac disease     Diabetes mellitus type 1     Diabetic ketoacidosis     Disease of thyroid gland     Hepatitis C     Infectious viral hepatitis     hepititis C    Neuropathy     Reflux gastritis      Past Surgical History:   Procedure Laterality Date     SECTION      X 2     Family History   Problem Relation Age of Onset    No  Known Problems Mother     Lung cancer Father     No Known Problems Sister     No Known Problems Brother     Diabetes type I Maternal Grandmother     Breast cancer Maternal Grandmother     Hypertension Maternal Grandmother     No Known Problems Maternal Grandfather     No Known Problems Paternal Grandmother     Diabetes type II Paternal Grandfather     No Known Problems Daughter     No Known Problems Son     No Known Problems Cousin     Rheum arthritis Neg Hx     Osteoarthritis Neg Hx     Asthma Neg Hx     Diabetes Neg Hx     Heart failure Neg Hx     Hyperlipidemia Neg Hx     Migraines Neg Hx     Rashes / Skin problems Neg Hx     Seizures Neg Hx     Stroke Neg Hx     Thyroid disease Neg Hx      Social History     Socioeconomic History    Marital status: Single   Tobacco Use    Smoking status: Every Day     Current packs/day: 1.00     Average packs/day: 1 pack/day for 3.0 years (3.0 ttl pk-yrs)     Types: Cigarettes    Smokeless tobacco: Never    Tobacco comments:     unable to assess    Vaping Use    Vaping status: Some Days    Substances: Nicotine   Substance and Sexual Activity    Alcohol use: No     Comment: unable to assess     Drug use: No     Comment: suboxone    Sexual activity: Yes     Partners: Male     ---------------------------------------------------------------------------------------------------------------------   Allergies:  Sulfa antibiotics  ---------------------------------------------------------------------------------------------------------------------   Prior to Admission Medications       Prescriptions Last Dose Informant Patient Reported? Taking?    albuterol sulfate  (90 Base) MCG/ACT inhaler Unknown Pharmacy Yes No    Inhale 1 puff Every 4 (Four) Hours As Needed for Wheezing or Shortness of Air.    atorvastatin (LIPITOR) 20 MG tablet Unknown Pharmacy Yes No    Take 1 tablet by mouth Every Night.    Baqsimi Two Pack 3 MG/DOSE powder Unknown Pharmacy Yes No    1 spray into the  nostril(s) as directed by provider As Needed (Low Blood Glucose).    buprenorphine-naloxone (SUBOXONE) 8-2 MG per SL tablet Unknown Pharmacy Yes No    Place 3 tablets under the tongue Daily.    buPROPion XL (FORFIVO XL) 450 MG 24 hr tablet Unknown Pharmacy Yes No    Take 1 tablet by mouth Every Morning.    cetirizine (zyrTEC) 10 MG tablet Unknown Pharmacy Yes No    Take 1 tablet by mouth Daily.    docusate sodium (COLACE) 100 MG capsule Unknown Pharmacy Yes No    Take 2 capsules by mouth Daily As Needed for Constipation.    FLUoxetine (PROzac) 20 MG capsule Unknown Pharmacy Yes No    Take 1 capsule by mouth Daily.    FLUoxetine (PROzac) 40 MG capsule Unknown Pharmacy Yes No    Take 1 capsule by mouth Daily.    fluticasone (FLONASE) 50 MCG/ACT nasal spray Unknown Pharmacy Yes No    2 sprays by Each Nare route Daily As Needed for Allergies.    furosemide (LASIX) 20 MG tablet Unknown Pharmacy Yes No    Take 1 tablet by mouth Daily. Take with potassium    gabapentin (NEURONTIN) 800 MG tablet Unknown Pharmacy Yes No    Take 1 tablet by mouth 3 (Three) Times a Day.    glucose (DEX4) 4 GM chewable tablet Unknown Pharmacy Yes No    Chew 1 tablet Daily As Needed for Low Blood Sugar (BG below 70).    hydrOXYzine (ATARAX) 25 MG tablet Unknown Pharmacy Yes No    Take 1 tablet by mouth Every 6 (Six) Hours As Needed for Anxiety.    Insulin Glargine (BASAGLAR KWIKPEN) 100 UNIT/ML injection pen Unknown Pharmacy Yes No    Inject 35 Units under the skin into the appropriate area as directed Every Night.    Insulin Lispro, 1 Unit Dial, (HUMALOG) 100 UNIT/ML solution pen-injector Unknown Pharmacy Yes No    Inject 16 Units under the skin into the appropriate area as directed 3 times a day.    ipratropium-albuterol (COMBIVENT RESPIMAT)  MCG/ACT inhaler Unknown Pharmacy Yes No    Inhale 1 puff Every 6 (Six) Hours As Needed for Wheezing.    levothyroxine (SYNTHROID, LEVOTHROID) 200 MCG tablet Unknown Pharmacy Yes No    Take 1 tablet  by mouth Daily.    Lidocaine Viscous HCl (XYLOCAINE) 2 % solution Unknown Pharmacy Yes No    Take 10 mL by mouth Every 2 (Two) Hours As Needed for Mild Pain.    montelukast (SINGULAIR) 10 MG tablet Unknown Pharmacy Yes No    Take 1 tablet by mouth Every Night.    ondansetron ODT (ZOFRAN-ODT) 4 MG disintegrating tablet Unknown Pharmacy Yes No    Place 1 tablet on the tongue Every 8 (Eight) Hours As Needed for Nausea or Vomiting.    pantoprazole (PROTONIX) 40 MG EC tablet Unknown Pharmacy Yes No    Take 1 tablet by mouth Daily.    PEG 3350 17 GM/SCOOP powder Unknown Pharmacy Yes No    Take 17 g by mouth Daily.    potassium chloride 10 MEQ CR tablet Unknown Pharmacy Yes No    Take 1 tablet by mouth Daily. Take with lasix    promethazine (PHENERGAN) 25 MG tablet Unknown Pharmacy Yes No    Take 1 tablet by mouth Every 12 (Twelve) Hours As Needed for Nausea or Vomiting.    rOPINIRole (REQUIP) 2 MG tablet Unknown Pharmacy Yes No    Take 1 tablet by mouth Every Night.    Semaglutide 3 MG tablet Unknown Pharmacy Yes No    Take 1 tablet by mouth Daily.    topiramate (TOPAMAX) 25 MG tablet Unknown Pharmacy Yes No    Take 1 tablet by mouth Daily.          Hospital Scheduled Meds:  atorvastatin, 20 mg, Oral, Nightly  [Held by provider] buprenorphine-naloxone, 3 tablet, Sublingual, Daily  [START ON 4/20/2024] buPROPion XL, 450 mg, Oral, QAM  cetirizine, 10 mg, Oral, Daily  enoxaparin, 40 mg, Subcutaneous, Q24H  [START ON 4/20/2024] FLUoxetine, 20 mg, Oral, Daily  [START ON 4/20/2024] FLUoxetine, 40 mg, Oral, Daily  [START ON 4/20/2024] furosemide, 20 mg, Oral, Daily  gabapentin, 800 mg, Oral, Q8H  [START ON 4/20/2024] levothyroxine, 200 mcg, Oral, Daily  [START ON 4/20/2024] montelukast, 10 mg, Oral, Nightly  [START ON 4/20/2024] pantoprazole, 40 mg, Oral, Daily  [START ON 4/20/2024] potassium chloride, 10 mEq, Oral, Daily  [START ON 4/20/2024] rOPINIRole, 2 mg, Oral, Nightly  senna-docusate sodium, 2 tablet, Oral, BID  sodium  chloride, 10 mL, Intravenous, Q12H  sodium chloride, 10 mL, Intravenous, Q12H  topiramate, 25 mg, Oral, Daily      dextrose 5 % and sodium chloride 0.45 %, 150 mL/hr  dextrose 5 % and sodium chloride 0.45 % with KCl 20 mEq/L, 150 mL/hr, Last Rate: 150 mL/hr (04/19/24 1531)  dextrose 5 % and sodium chloride 0.45 % with KCl 40 mEq/L, 150 mL/hr  dextrose 5 % and sodium chloride 0.9 %, 150 mL/hr  dextrose 5 % and sodium chloride 0.9 % with KCl 20 mEq, 150 mL/hr  dextrose 5% and sodium chloride 0.9% with KCl 40 mEq/L, 150 mL/hr  fentanyl 10 mcg/mL,  mcg/hr, Last Rate: 50 mcg/hr (04/19/24 1702)  insulin, 0-100 Units/hr, Last Rate: 1.9 Units/hr (04/19/24 1657)  propofol, 50 mcg/kg/min, Last Rate: 50 mcg/kg/min (04/19/24 1651)  sodium chloride 0.45 % 1,000 mL with potassium chloride 40 mEq infusion, 250 mL/hr  sodium chloride, 250 mL/hr  sodium chloride 0.45 % with KCl 20 mEq, 250 mL/hr, Last Rate: Stopped (04/19/24 1459)  sodium chloride, 250 mL/hr  sodium chloride 0.9 % with KCl 20 mEq, 250 mL/hr  sodium chloride 0.9 % with KCl 40 mEq/L, 250 mL/hr      ---------------------------------------------------------------------------------------------------------------------   Review of Systems   On review of systems the patient denies the following unless noted above:     Constitutional:  Fevers, chills, weight change, fatigue     Eyes: Vision changes, headache, double vision, loss of vision     ENT: Runny nose, nose bleeds, ringing in ears, pain with swallowing, sore throat     Cardiovascular: Chest pains, palpitations, PND, orthopnea     Respiratory: Cough, wheezing, SOA, hemoptysis     GI:  Abdominal pain, diarrhea, constipation, change in stool caliber,    Rectal bleeding, vomiting or nausea     : Difficulty voiding, dysuria, hematuria     Musculoskeletal: Changes of any chronic joint pain, swelling     Skin: Rash, itching, easy bruisability     Neurological: Unilateral weakness, new onset numbness, speech  difficulties     Psychiatric: Sadness, tearfulness, feelings of hopelessness, racing thoughts     Endocrine:  Heat or cold intolerance, mood swings, polydipsia, polyphagia,    recent hypoglycemia  --------------------------------------------------------------------------------------------------------------------  Vital Signs:  Temp:  [97.6 °F (36.4 °C)-98.4 °F (36.9 °C)] 98.4 °F (36.9 °C)  Heart Rate:  [] 86  Resp:  [18-24] 18  BP: (100-170)/(60-95) 104/63  FiO2 (%):  [35 %] 35 %      04/19/24  0907 04/19/24  1616   Weight: 80 kg (176 lb 5.9 oz) 65.1 kg (143 lb 8 oz)     Body mass index is 22.48 kg/m².  ---------------------------------------------------------------------------------------------------------------------   Physical exam:  Constitutional: Well-nourished  female, currently intubated and sedated.     HENT:  Head:  Normocephalic and atraumatic.  Mouth:  Moist mucous membranes.    Eyes:  Conjunctivae are normal.  No scleral icterus.    Neck:  Neck supple. No thyromegaly.  No JVD present.    Cardiovascular:  Regular rate and rhythm with no murmurs, rubs, clicks or gallops appreciated.  Pulmonary/Chest:  Clear to auscultation bilaterally with no crackles, wheezes or rhonchi appreciated.  Abdominal:  Soft. Nontender. Nondistended  Bowel sounds are normal in all four quadrants. No organomegally appreciated.   Musculoskeletal:  No edema, no tenderness, and no deformity.  No red or swollen joints anywhere.    Neurological: Intubated and sedated.   Skin:  Warm and dry to palpation, multiple bruises noted on anterior aspect of bilateral lower extremities and several bruises on bilateral upper extremities  Peripheral vascular:  2+ radial and pedal pulses in bilateral upper and lower extremities.  --------------------------------------------------------------------------  ---------------------------------------------------------------------------------------------------------------------   Results  "from last 7 days   Lab Units 04/19/24  1401 04/19/24  0945   LACTATE mmol/L 1.4 3.1*   WBC 10*3/mm3  --  15.71*   HEMOGLOBIN g/dL  --  11.7*   HEMATOCRIT %  --  36.1   MCV fL  --  91.4   MCHC g/dL  --  32.4   PLATELETS 10*3/mm3  --  409   INR   --  1.04     Results from last 7 days   Lab Units 04/19/24  1002   PH, ARTERIAL pH units 7.321*   PO2 ART mm Hg 142.0*   PCO2, ARTERIAL mm Hg 37.4   HCO3 ART mmol/L 19.3*     Results from last 7 days   Lab Units 04/19/24  1143 04/19/24  0945   SODIUM mmol/L 137 137   POTASSIUM mmol/L 3.7 3.7   MAGNESIUM mg/dL 2.0  --    CHLORIDE mmol/L 107 102   CO2 mmol/L 14.8* 16.6*   BUN mg/dL 21* 22*   CREATININE mg/dL 0.86 1.01*   CALCIUM mg/dL 8.1* 9.0   GLUCOSE mg/dL 464* 542*   ALBUMIN g/dL  --  3.5   BILIRUBIN mg/dL  --  0.2   ALK PHOS U/L  --  131*   AST (SGOT) U/L  --  25   ALT (SGPT) U/L  --  23   Estimated Creatinine Clearance: 92.9 mL/min (by C-G formula based on SCr of 0.86 mg/dL).  No results found for: \"AMMONIA\"  Results from last 7 days   Lab Units 04/19/24  1143 04/19/24  0945   HSTROP T ng/L 10 11         Lab Results   Component Value Date    HGBA1C 9.00 (H) 04/19/2024     Lab Results   Component Value Date    TSH 0.732 08/12/2023    FREET4 1.80 (H) 06/20/2021     Lab Results   Component Value Date    PREGTESTUR Negative 08/30/2020     Pain Management Panel  More data exists         Latest Ref Rng & Units 4/19/2024 2/21/2024   Pain Management Panel   Amphetamine, Urine Qual Negative Positive  Negative    Barbiturates Screen, Urine Negative Negative  Negative    Benzodiazepine Screen, Urine Negative Positive  Negative    Buprenorphine, Screen, Urine Negative Positive  Positive    Cocaine Screen, Urine Negative Negative  Negative    Fentanyl, Urine Negative Negative  Negative    Methadone Screen , Urine Negative Negative  Negative    Methamphetamine, Ur Negative Positive  Negative      No results found for: \"BLOODCX\"  No results found for: \"URINECX\"  No results found for: " "\"WOUNDCX\"  No results found for: \"STOOLCX\"      ---------------------------------------------------------------------------------------------------------------------  Imaging Results (Last 7 Days)       Procedure Component Value Units Date/Time    CT Head Without Contrast [[id number]] Collected: 04/19/24 1208     Updated: 04/19/24 1211    Narrative:      PROCEDURE: CT HEAD WO CONTRAST-     HISTORY: Mental status change, unknown cause     COMPARISON: 8/12/2023.     TECHNIQUE: Multiple axial CT images were performed from the foramen  magnum to the vertex without enhancement. This study was performed with  techniques to keep radiation doses as low as reasonably achievable  (ALARA). Individualized dose reduction techniques using automated  exposure control or adjustment of mA and/or kV according to the patient  size were employed.     FINDINGS: There is no CT evidence of hemorrhage. There is no mass, mass  effect or midline shift.  There is no hydrocephalus. The paranasal  sinuses are clear. Bone windows reveal no acute osseous abnormalities.       Impression:      No acute intracranial process.              This report was finalized on 4/19/2024 12:09 PM by Kareen Bolden M.D..       XR Chest 1 View [[id number]] Collected: 04/19/24 1005     Updated: 04/19/24 1007    Narrative:      PROCEDURE: XR CHEST 1 VW-       HISTORY: Severe Sepsis Protocol     COMPARISON: 8/12/2023.     FINDINGS: An endotracheal tube is in place and is well-positioned with  the tip above the leola. A nasogastric tube is in place with the tip in  the fundus of the stomach. The heart is normal in size. The mediastinum  is unremarkable. The lungs are clear. There is no pneumothorax. There  are no acute osseous abnormalities.       Impression:      Support tubes appropriately positioned.        This report was finalized on 4/19/2024 10:05 AM by Kareen Bolden M.D..               I have personally reviewed the radiology images and read the " final radiology report.  ---------------------------------------------------------------------------------------------------------------------  Assessment and Plan:    Acute encephalopathy -etiology likely secondary to acute methamphetamine intoxication.  Will continue sedating medications and mechanical ventilation for the near future.  CT head demonstrates no acute intracranial process.  Chest x-ray demonstrates no acute cardiopulmonary process.  Urine analysis demonstrates no evidence of acute cystitis.  Urine tox screen demonstrates positivity for amphetamines, benzodiazepines, Suboxone and methamphetamines.    2.  DKA -patient with pH of 7.32, positive urine ketones, small serum acetone blood sugar of 542 with high anion gap metabolic acidosis.  Patient has been started on Glucomander protocol and will continue this overnight with anticipation of transitioning to subcutaneous insulin in the near future.    Jono Valencia DO  04/19/24  17:29 EDT

## 2024-04-20 ENCOUNTER — APPOINTMENT (OUTPATIENT)
Dept: GENERAL RADIOLOGY | Facility: HOSPITAL | Age: 39
DRG: 894 | End: 2024-04-20
Payer: MEDICAID

## 2024-04-20 LAB
ALBUMIN SERPL-MCNC: 3 G/DL (ref 3.5–5.2)
ALBUMIN/GLOB SERPL: 1.4 G/DL
ALP SERPL-CCNC: 112 U/L (ref 39–117)
ALT SERPL W P-5'-P-CCNC: 22 U/L (ref 1–33)
ANION GAP SERPL CALCULATED.3IONS-SCNC: 7.8 MMOL/L (ref 5–15)
ANION GAP SERPL CALCULATED.3IONS-SCNC: 7.9 MMOL/L (ref 5–15)
ANION GAP SERPL CALCULATED.3IONS-SCNC: 7.9 MMOL/L (ref 5–15)
ANION GAP SERPL CALCULATED.3IONS-SCNC: 8.5 MMOL/L (ref 5–15)
ANION GAP SERPL CALCULATED.3IONS-SCNC: 8.8 MMOL/L (ref 5–15)
ANION GAP SERPL CALCULATED.3IONS-SCNC: 8.8 MMOL/L (ref 5–15)
ANION GAP SERPL CALCULATED.3IONS-SCNC: 9.4 MMOL/L (ref 5–15)
AST SERPL-CCNC: 26 U/L (ref 1–32)
BACTERIA BLD CULT: ABNORMAL
BILIRUB SERPL-MCNC: <0.2 MG/DL (ref 0–1.2)
BOTTLE TYPE: ABNORMAL
BUN SERPL-MCNC: 12 MG/DL (ref 6–20)
BUN SERPL-MCNC: 15 MG/DL (ref 6–20)
BUN SERPL-MCNC: 17 MG/DL (ref 6–20)
BUN SERPL-MCNC: 8 MG/DL (ref 6–20)
BUN SERPL-MCNC: 9 MG/DL (ref 6–20)
BUN/CREAT SERPL: 10.7 (ref 7–25)
BUN/CREAT SERPL: 13 (ref 7–25)
BUN/CREAT SERPL: 17.1 (ref 7–25)
BUN/CREAT SERPL: 18.3 (ref 7–25)
BUN/CREAT SERPL: 20.8 (ref 7–25)
BUN/CREAT SERPL: 20.8 (ref 7–25)
BUN/CREAT SERPL: 23.6 (ref 7–25)
CALCIUM SPEC-SCNC: 7.4 MG/DL (ref 8.6–10.5)
CALCIUM SPEC-SCNC: 7.4 MG/DL (ref 8.6–10.5)
CALCIUM SPEC-SCNC: 7.9 MG/DL (ref 8.6–10.5)
CALCIUM SPEC-SCNC: 8.1 MG/DL (ref 8.6–10.5)
CALCIUM SPEC-SCNC: 8.1 MG/DL (ref 8.6–10.5)
CALCIUM SPEC-SCNC: 8.3 MG/DL (ref 8.6–10.5)
CALCIUM SPEC-SCNC: 8.3 MG/DL (ref 8.6–10.5)
CHLORIDE SERPL-SCNC: 111 MMOL/L (ref 98–107)
CHLORIDE SERPL-SCNC: 112 MMOL/L (ref 98–107)
CHLORIDE SERPL-SCNC: 112 MMOL/L (ref 98–107)
CHLORIDE SERPL-SCNC: 113 MMOL/L (ref 98–107)
CHLORIDE SERPL-SCNC: 114 MMOL/L (ref 98–107)
CO2 SERPL-SCNC: 18.2 MMOL/L (ref 22–29)
CO2 SERPL-SCNC: 19.1 MMOL/L (ref 22–29)
CO2 SERPL-SCNC: 19.1 MMOL/L (ref 22–29)
CO2 SERPL-SCNC: 19.2 MMOL/L (ref 22–29)
CO2 SERPL-SCNC: 20.2 MMOL/L (ref 22–29)
CO2 SERPL-SCNC: 20.5 MMOL/L (ref 22–29)
CO2 SERPL-SCNC: 20.6 MMOL/L (ref 22–29)
CREAT SERPL-MCNC: 0.69 MG/DL (ref 0.57–1)
CREAT SERPL-MCNC: 0.7 MG/DL (ref 0.57–1)
CREAT SERPL-MCNC: 0.72 MG/DL (ref 0.57–1)
CREAT SERPL-MCNC: 0.75 MG/DL (ref 0.57–1)
CREAT SERPL-MCNC: 0.82 MG/DL (ref 0.57–1)
DEPRECATED RDW RBC AUTO: 49.9 FL (ref 37–54)
EGFRCR SERPLBLD CKD-EPI 2021: 106 ML/MIN/1.73
EGFRCR SERPLBLD CKD-EPI 2021: 111.3 ML/MIN/1.73
EGFRCR SERPLBLD CKD-EPI 2021: 115.1 ML/MIN/1.73
EGFRCR SERPLBLD CKD-EPI 2021: 115.5 ML/MIN/1.73
EGFRCR SERPLBLD CKD-EPI 2021: 95.2 ML/MIN/1.73
ERYTHROCYTE [DISTWIDTH] IN BLOOD BY AUTOMATED COUNT: 13.7 % (ref 12.3–15.4)
GLOBULIN UR ELPH-MCNC: 2.2 GM/DL
GLUCOSE BLDC GLUCOMTR-MCNC: 114 MG/DL (ref 70–130)
GLUCOSE BLDC GLUCOMTR-MCNC: 116 MG/DL (ref 70–130)
GLUCOSE BLDC GLUCOMTR-MCNC: 119 MG/DL (ref 70–130)
GLUCOSE BLDC GLUCOMTR-MCNC: 123 MG/DL (ref 70–130)
GLUCOSE BLDC GLUCOMTR-MCNC: 124 MG/DL (ref 70–130)
GLUCOSE BLDC GLUCOMTR-MCNC: 125 MG/DL (ref 70–130)
GLUCOSE BLDC GLUCOMTR-MCNC: 125 MG/DL (ref 70–130)
GLUCOSE BLDC GLUCOMTR-MCNC: 126 MG/DL (ref 70–130)
GLUCOSE BLDC GLUCOMTR-MCNC: 130 MG/DL (ref 70–130)
GLUCOSE BLDC GLUCOMTR-MCNC: 133 MG/DL (ref 70–130)
GLUCOSE BLDC GLUCOMTR-MCNC: 135 MG/DL (ref 70–130)
GLUCOSE BLDC GLUCOMTR-MCNC: 136 MG/DL (ref 70–130)
GLUCOSE BLDC GLUCOMTR-MCNC: 137 MG/DL (ref 70–130)
GLUCOSE BLDC GLUCOMTR-MCNC: 142 MG/DL (ref 70–130)
GLUCOSE BLDC GLUCOMTR-MCNC: 145 MG/DL (ref 70–130)
GLUCOSE BLDC GLUCOMTR-MCNC: 146 MG/DL (ref 70–130)
GLUCOSE BLDC GLUCOMTR-MCNC: 148 MG/DL (ref 70–130)
GLUCOSE BLDC GLUCOMTR-MCNC: 155 MG/DL (ref 70–130)
GLUCOSE BLDC GLUCOMTR-MCNC: 161 MG/DL (ref 70–130)
GLUCOSE SERPL-MCNC: 128 MG/DL (ref 65–99)
GLUCOSE SERPL-MCNC: 133 MG/DL (ref 65–99)
GLUCOSE SERPL-MCNC: 134 MG/DL (ref 65–99)
GLUCOSE SERPL-MCNC: 134 MG/DL (ref 65–99)
GLUCOSE SERPL-MCNC: 137 MG/DL (ref 65–99)
GLUCOSE SERPL-MCNC: 137 MG/DL (ref 65–99)
GLUCOSE SERPL-MCNC: 204 MG/DL (ref 65–99)
HCT VFR BLD AUTO: 35.8 % (ref 34–46.6)
HGB BLD-MCNC: 10.5 G/DL (ref 12–15.9)
MAGNESIUM SERPL-MCNC: 1.5 MG/DL (ref 1.6–2.6)
MAGNESIUM SERPL-MCNC: 1.6 MG/DL (ref 1.6–2.6)
MAGNESIUM SERPL-MCNC: 1.7 MG/DL (ref 1.6–2.6)
MAGNESIUM SERPL-MCNC: 1.8 MG/DL (ref 1.6–2.6)
MAGNESIUM SERPL-MCNC: 2 MG/DL (ref 1.6–2.6)
MAGNESIUM SERPL-MCNC: 2.1 MG/DL (ref 1.6–2.6)
MCH RBC QN AUTO: 29.2 PG (ref 26.6–33)
MCHC RBC AUTO-ENTMCNC: 29.3 G/DL (ref 31.5–35.7)
MCV RBC AUTO: 99.4 FL (ref 79–97)
OSMOLALITY SERPL: 312 MOSM/KG (ref 275–300)
PHOSPHATE SERPL-MCNC: 2.7 MG/DL (ref 2.5–4.5)
PHOSPHATE SERPL-MCNC: 2.7 MG/DL (ref 2.5–4.5)
PHOSPHATE SERPL-MCNC: 2.9 MG/DL (ref 2.5–4.5)
PHOSPHATE SERPL-MCNC: 3.1 MG/DL (ref 2.5–4.5)
PHOSPHATE SERPL-MCNC: 3.1 MG/DL (ref 2.5–4.5)
PHOSPHATE SERPL-MCNC: 3.2 MG/DL (ref 2.5–4.5)
PLATELET # BLD AUTO: 339 10*3/MM3 (ref 140–450)
PMV BLD AUTO: 8.8 FL (ref 6–12)
POTASSIUM SERPL-SCNC: 3.6 MMOL/L (ref 3.5–5.2)
POTASSIUM SERPL-SCNC: 4 MMOL/L (ref 3.5–5.2)
POTASSIUM SERPL-SCNC: 4.1 MMOL/L (ref 3.5–5.2)
POTASSIUM SERPL-SCNC: 4.4 MMOL/L (ref 3.5–5.2)
POTASSIUM SERPL-SCNC: 4.5 MMOL/L (ref 3.5–5.2)
POTASSIUM SERPL-SCNC: 4.6 MMOL/L (ref 3.5–5.2)
POTASSIUM SERPL-SCNC: 5 MMOL/L (ref 3.5–5.2)
POTASSIUM SERPL-SCNC: 5 MMOL/L (ref 3.5–5.2)
PROT SERPL-MCNC: 5.2 G/DL (ref 6–8.5)
RBC # BLD AUTO: 3.6 10*6/MM3 (ref 3.77–5.28)
SODIUM SERPL-SCNC: 139 MMOL/L (ref 136–145)
SODIUM SERPL-SCNC: 140 MMOL/L (ref 136–145)
SODIUM SERPL-SCNC: 141 MMOL/L (ref 136–145)
SODIUM SERPL-SCNC: 142 MMOL/L (ref 136–145)
SODIUM SERPL-SCNC: 142 MMOL/L (ref 136–145)
WBC NRBC COR # BLD AUTO: 10.24 10*3/MM3 (ref 3.4–10.8)

## 2024-04-20 PROCEDURE — 25810000003 SODIUM CHLORIDE 0.9 % SOLUTION: Performed by: INTERNAL MEDICINE

## 2024-04-20 PROCEDURE — 36415 COLL VENOUS BLD VENIPUNCTURE: CPT | Performed by: STUDENT IN AN ORGANIZED HEALTH CARE EDUCATION/TRAINING PROGRAM

## 2024-04-20 PROCEDURE — 80053 COMPREHEN METABOLIC PANEL: CPT | Performed by: STUDENT IN AN ORGANIZED HEALTH CARE EDUCATION/TRAINING PROGRAM

## 2024-04-20 PROCEDURE — 71045 X-RAY EXAM CHEST 1 VIEW: CPT | Performed by: RADIOLOGY

## 2024-04-20 PROCEDURE — 83735 ASSAY OF MAGNESIUM: CPT | Performed by: STUDENT IN AN ORGANIZED HEALTH CARE EDUCATION/TRAINING PROGRAM

## 2024-04-20 PROCEDURE — 99233 SBSQ HOSP IP/OBS HIGH 50: CPT | Performed by: INTERNAL MEDICINE

## 2024-04-20 PROCEDURE — 25010000002 MAGNESIUM SULFATE 2 GM/50ML SOLUTION: Performed by: INTERNAL MEDICINE

## 2024-04-20 PROCEDURE — 25010000002 PROPOFOL 10 MG/ML EMULSION: Performed by: STUDENT IN AN ORGANIZED HEALTH CARE EDUCATION/TRAINING PROGRAM

## 2024-04-20 PROCEDURE — 94003 VENT MGMT INPAT SUBQ DAY: CPT

## 2024-04-20 PROCEDURE — 25010000002 ENOXAPARIN PER 10 MG: Performed by: INTERNAL MEDICINE

## 2024-04-20 PROCEDURE — 82948 REAGENT STRIP/BLOOD GLUCOSE: CPT

## 2024-04-20 PROCEDURE — 84100 ASSAY OF PHOSPHORUS: CPT | Performed by: STUDENT IN AN ORGANIZED HEALTH CARE EDUCATION/TRAINING PROGRAM

## 2024-04-20 PROCEDURE — 94799 UNLISTED PULMONARY SVC/PX: CPT

## 2024-04-20 PROCEDURE — 94761 N-INVAS EAR/PLS OXIMETRY MLT: CPT

## 2024-04-20 PROCEDURE — 85027 COMPLETE CBC AUTOMATED: CPT | Performed by: INTERNAL MEDICINE

## 2024-04-20 PROCEDURE — 71045 X-RAY EXAM CHEST 1 VIEW: CPT

## 2024-04-20 PROCEDURE — 94664 DEMO&/EVAL PT USE INHALER: CPT

## 2024-04-20 PROCEDURE — 84132 ASSAY OF SERUM POTASSIUM: CPT | Performed by: INTERNAL MEDICINE

## 2024-04-20 RX ORDER — POTASSIUM CHLORIDE 1.5 G/1.58G
40 POWDER, FOR SOLUTION ORAL EVERY 4 HOURS
Status: DISCONTINUED | OUTPATIENT
Start: 2024-04-20 | End: 2024-04-20

## 2024-04-20 RX ORDER — MAGNESIUM SULFATE HEPTAHYDRATE 40 MG/ML
2 INJECTION, SOLUTION INTRAVENOUS
Qty: 150 ML | Refills: 0 | Status: COMPLETED | OUTPATIENT
Start: 2024-04-20 | End: 2024-04-21

## 2024-04-20 RX ORDER — PANTOPRAZOLE SODIUM 40 MG/10ML
40 INJECTION, POWDER, LYOPHILIZED, FOR SOLUTION INTRAVENOUS
Status: DISCONTINUED | OUTPATIENT
Start: 2024-04-21 | End: 2024-04-25

## 2024-04-20 RX ADMIN — POTASSIUM CHLORIDE, DEXTROSE MONOHYDRATE AND SODIUM CHLORIDE 150 ML/HR: 150; 5; 450 INJECTION, SOLUTION INTRAVENOUS at 16:00

## 2024-04-20 RX ADMIN — Medication 10 ML: at 21:48

## 2024-04-20 RX ADMIN — PROPOFOL 45 MCG/KG/MIN: 10 INJECTION, EMULSION INTRAVENOUS at 14:07

## 2024-04-20 RX ADMIN — FLUOXETINE HYDROCHLORIDE 40 MG: 20 CAPSULE ORAL at 08:53

## 2024-04-20 RX ADMIN — CHLORHEXIDINE GLUCONATE 15 ML: 1.2 RINSE ORAL at 21:47

## 2024-04-20 RX ADMIN — MUPIROCIN 1 APPLICATION: 20 OINTMENT TOPICAL at 21:47

## 2024-04-20 RX ADMIN — PROPOFOL 50 MCG/KG/MIN: 10 INJECTION, EMULSION INTRAVENOUS at 01:58

## 2024-04-20 RX ADMIN — ROPINIROLE HYDROCHLORIDE 2 MG: 1 TABLET, FILM COATED ORAL at 21:46

## 2024-04-20 RX ADMIN — POTASSIUM CHLORIDE, DEXTROSE MONOHYDRATE AND SODIUM CHLORIDE 150 ML/HR: 150; 5; 450 INJECTION, SOLUTION INTRAVENOUS at 09:18

## 2024-04-20 RX ADMIN — Medication 200 MCG/HR: at 05:32

## 2024-04-20 RX ADMIN — Medication 250 MCG/HR: at 15:47

## 2024-04-20 RX ADMIN — INSULIN HUMAN 1.2 UNITS/HR: 1 INJECTION, SOLUTION INTRAVENOUS at 05:33

## 2024-04-20 RX ADMIN — Medication 10 ML: at 08:54

## 2024-04-20 RX ADMIN — MONTELUKAST SODIUM 10 MG: 10 TABLET, COATED ORAL at 21:46

## 2024-04-20 RX ADMIN — CHLORHEXIDINE GLUCONATE 15 ML: 1.2 RINSE ORAL at 08:54

## 2024-04-20 RX ADMIN — TOPIRAMATE 25 MG: 25 TABLET, FILM COATED ORAL at 08:57

## 2024-04-20 RX ADMIN — LEVOTHYROXINE SODIUM 200 MCG: 100 TABLET ORAL at 08:52

## 2024-04-20 RX ADMIN — POTASSIUM CHLORIDE, DEXTROSE MONOHYDRATE AND SODIUM CHLORIDE 150 ML/HR: 150; 5; 450 INJECTION, SOLUTION INTRAVENOUS at 23:58

## 2024-04-20 RX ADMIN — PROPOFOL 40 MCG/KG/MIN: 10 INJECTION, EMULSION INTRAVENOUS at 05:32

## 2024-04-20 RX ADMIN — MUPIROCIN 1 APPLICATION: 20 OINTMENT TOPICAL at 08:52

## 2024-04-20 RX ADMIN — GABAPENTIN 800 MG: 400 CAPSULE ORAL at 05:32

## 2024-04-20 RX ADMIN — ENOXAPARIN SODIUM 40 MG: 40 INJECTION SUBCUTANEOUS at 17:48

## 2024-04-20 RX ADMIN — DOCUSATE SODIUM 50 MG AND SENNOSIDES 8.6 MG 2 TABLET: 8.6; 5 TABLET, FILM COATED ORAL at 21:47

## 2024-04-20 RX ADMIN — SODIUM CHLORIDE 1000 ML: 9 INJECTION, SOLUTION INTRAVENOUS at 15:19

## 2024-04-20 RX ADMIN — CETIRIZINE HYDROCHLORIDE 10 MG: 10 TABLET, FILM COATED ORAL at 08:52

## 2024-04-20 RX ADMIN — POTASSIUM CHLORIDE, DEXTROSE MONOHYDRATE AND SODIUM CHLORIDE 150 ML/HR: 150; 5; 450 INJECTION, SOLUTION INTRAVENOUS at 03:29

## 2024-04-20 RX ADMIN — GABAPENTIN 800 MG: 400 CAPSULE ORAL at 21:46

## 2024-04-20 RX ADMIN — MAGNESIUM SULFATE HEPTAHYDRATE 2 G: 40 INJECTION, SOLUTION INTRAVENOUS at 23:58

## 2024-04-20 RX ADMIN — DOCUSATE SODIUM 50 MG AND SENNOSIDES 8.6 MG 2 TABLET: 8.6; 5 TABLET, FILM COATED ORAL at 08:53

## 2024-04-20 RX ADMIN — FLUOXETINE HYDROCHLORIDE 20 MG: 20 CAPSULE ORAL at 08:52

## 2024-04-20 RX ADMIN — GABAPENTIN 800 MG: 400 CAPSULE ORAL at 13:24

## 2024-04-20 RX ADMIN — BUPROPION HYDROCHLORIDE 450 MG: 150 TABLET, EXTENDED RELEASE ORAL at 08:53

## 2024-04-20 RX ADMIN — MAGNESIUM SULFATE HEPTAHYDRATE 2 G: 40 INJECTION, SOLUTION INTRAVENOUS at 21:50

## 2024-04-20 RX ADMIN — FUROSEMIDE 20 MG: 20 TABLET ORAL at 08:52

## 2024-04-20 RX ADMIN — PROPOFOL 40 MCG/KG/MIN: 10 INJECTION, EMULSION INTRAVENOUS at 19:32

## 2024-04-20 RX ADMIN — IPRATROPIUM BROMIDE AND ALBUTEROL SULFATE 3 ML: .5; 2.5 SOLUTION RESPIRATORY (INHALATION) at 18:46

## 2024-04-20 RX ADMIN — IPRATROPIUM BROMIDE AND ALBUTEROL SULFATE 3 ML: .5; 2.5 SOLUTION RESPIRATORY (INHALATION) at 06:25

## 2024-04-20 RX ADMIN — ATORVASTATIN CALCIUM 20 MG: 20 TABLET, FILM COATED ORAL at 21:46

## 2024-04-20 RX ADMIN — PROPOFOL 50 MCG/KG/MIN: 10 INJECTION, EMULSION INTRAVENOUS at 09:21

## 2024-04-20 NOTE — PROGRESS NOTES
Nutrition Services    Patient Name:  Jes Moreno  YOB: 1985  MRN: [id number]  Admit Date:  4/19/2024    TF recommendation : Peptamen 1.2 - goal rate 35ml/hr with 10 ml/hr auto water flush - regimen will provide 930kcal - total kcal with propofol 1642 kcal 63 g protein 920ml fluids     Electronically signed by:  Dennise Chapman RD  04/20/24 10:25 EDT

## 2024-04-20 NOTE — PLAN OF CARE
Goal Outcome Evaluation:  Plan of Care Reviewed With: patient        Progress: no change  Outcome Evaluation: Patient remains intubated/sedated. Propofol and Fentanyl infusing for sedation. Insulin gtt and fluids infusing per glucomander protocol. K+ replaced. UOP adequate. No BM. Afebrile. Remains in NSR.

## 2024-04-20 NOTE — PLAN OF CARE
Problem: Adult Inpatient Plan of Care  Goal: Plan of Care Review  Outcome: Ongoing, Progressing  Flowsheets (Taken 4/20/2024 1619)  Outcome Evaluation: remains intubated and sedated. still on insulin gtt. afebrile.  Goal: Patient-Specific Goal (Individualized)  Outcome: Ongoing, Progressing  Goal: Absence of Hospital-Acquired Illness or Injury  Outcome: Ongoing, Progressing  Intervention: Identify and Manage Fall Risk  Recent Flowsheet Documentation  Taken 4/20/2024 1600 by Doug Sidhu RN  Safety Promotion/Fall Prevention: safety round/check completed  Taken 4/20/2024 1500 by Doug Sidhu RN  Safety Promotion/Fall Prevention: safety round/check completed  Taken 4/20/2024 1400 by Doug Sidhu RN  Safety Promotion/Fall Prevention: safety round/check completed  Taken 4/20/2024 1300 by Doug Sidhu RN  Safety Promotion/Fall Prevention: safety round/check completed  Taken 4/20/2024 1200 by Doug Sidhu RN  Safety Promotion/Fall Prevention: safety round/check completed  Taken 4/20/2024 1100 by Doug Sidhu RN  Safety Promotion/Fall Prevention: safety round/check completed  Taken 4/20/2024 1000 by Doug Sidhu RN  Safety Promotion/Fall Prevention: safety round/check completed  Taken 4/20/2024 0900 by Doug Sidhu RN  Safety Promotion/Fall Prevention: safety round/check completed  Taken 4/20/2024 0800 by Doug Sidhu RN  Safety Promotion/Fall Prevention: safety round/check completed  Taken 4/20/2024 0700 by Doug Sidhu RN  Safety Promotion/Fall Prevention: safety round/check completed  Intervention: Prevent Skin Injury  Recent Flowsheet Documentation  Taken 4/20/2024 1600 by Doug Sidhu RN  Body Position:   turned   right  Taken 4/20/2024 1400 by Doug Sidhu RN  Body Position:   turned   left  Skin Protection: adhesive use limited  Taken 4/20/2024 1200 by Doug Sidhu RN  Body Position:   turned    right  Taken 4/20/2024 1000 by Doug Sidhu RN  Body Position:   turned   left  Taken 4/20/2024 0800 by Doug Sidhu RN  Body Position:   turned   right  Skin Protection: adhesive use limited  Intervention: Prevent and Manage VTE (Venous Thromboembolism) Risk  Recent Flowsheet Documentation  Taken 4/20/2024 1400 by Doug Sidhu RN  Activity Management: bedrest  Taken 4/20/2024 0800 by Doug Sidhu RN  Activity Management: bedrest  VTE Prevention/Management: (see mar) other (see comments)  Intervention: Prevent Infection  Recent Flowsheet Documentation  Taken 4/20/2024 1400 by Doug Sidhu RN  Infection Prevention: environmental surveillance performed  Taken 4/20/2024 0800 by Doug Sidhu RN  Infection Prevention: environmental surveillance performed  Goal: Optimal Comfort and Wellbeing  Outcome: Ongoing, Progressing  Intervention: Provide Person-Centered Care  Recent Flowsheet Documentation  Taken 4/20/2024 1400 by Doug Sidhu RN  Trust Relationship/Rapport:   care explained   reassurance provided  Taken 4/20/2024 0800 by Doug Sidhu RN  Trust Relationship/Rapport:   care explained   reassurance provided  Goal: Readiness for Transition of Care  Outcome: Ongoing, Progressing   Goal Outcome Evaluation:              Outcome Evaluation: remains intubated and sedated. still on insulin gtt. afebrile.

## 2024-04-20 NOTE — PROGRESS NOTES
[facility]     Patient Identification:  Name:  Jes Moreno  Age:  38 y.o.  Sex:  female  :  1985  MRN:  [id number]  Visit Number:  [id number]  Primary Care Provider:  Juany Sheehan APRN    Length of stay:  1    Chief complaint: Intubated and sedated    Subjective:    Patient seen and examined at bedside without nursing staff present.  Patient is comfortably sedated.  Per nursing staff, no new events overnight.  ----------------------------------------------------------------------------------------------------------------------  Current Hospital Meds:  atorvastatin, 20 mg, Oral, Nightly  [Held by provider] buprenorphine-naloxone, 3 tablet, Sublingual, Daily  buPROPion XL, 450 mg, Oral, Daily  cetirizine, 10 mg, Oral, Daily  chlorhexidine, 15 mL, Mouth/Throat, Q12H  enoxaparin, 40 mg, Subcutaneous, Q24H  FLUoxetine, 20 mg, Oral, Daily  FLUoxetine, 40 mg, Oral, Daily  furosemide, 20 mg, Oral, Daily  gabapentin, 800 mg, Oral, Q8H  levothyroxine, 200 mcg, Oral, Daily  montelukast, 10 mg, Oral, Nightly  mupirocin, 1 Application, Topical, Q12H  [START ON 2024] pantoprazole, 40 mg, Intravenous, Q AM  rOPINIRole, 2 mg, Oral, Nightly  senna-docusate sodium, 2 tablet, Oral, BID  sodium chloride, 1,000 mL, Intravenous, Once  sodium chloride, 10 mL, Intravenous, Q12H  sodium chloride, 10 mL, Intravenous, Q12H  sodium chloride, 10 mL, Intravenous, Q12H  sodium chloride, 10 mL, Intravenous, Q12H  sodium chloride, 10 mL, Intravenous, Q12H  topiramate, 25 mg, Oral, Daily      dextrose 5 % and sodium chloride 0.45 %, 150 mL/hr  dextrose 5 % and sodium chloride 0.45 % with KCl 20 mEq/L, 150 mL/hr, Last Rate: 150 mL/hr (24)  dextrose 5 % and sodium chloride 0.45 % with KCl 40 mEq/L, 150 mL/hr  dextrose 5 % and sodium chloride 0.9 %, 150 mL/hr  dextrose 5 % and sodium chloride 0.9 % with KCl 20 mEq, 150 mL/hr  dextrose 5% and sodium chloride 0.9% with KCl 40 mEq/L, 150  mL/hr  fentanyl 10 mcg/mL,  mcg/hr, Last Rate: 250 mcg/hr (04/20/24 1547)  insulin, 0-100 Units/hr, Last Rate: 0.8 Units/hr (04/20/24 1522)  propofol, 50 mcg/kg/min, Last Rate: 40 mcg/kg/min (04/20/24 1422)  sodium chloride 0.45 % 1,000 mL with potassium chloride 40 mEq infusion, 250 mL/hr  sodium chloride, 250 mL/hr  sodium chloride 0.45 % with KCl 20 mEq, 250 mL/hr, Last Rate: Stopped (04/19/24 1459)  sodium chloride, 250 mL/hr  sodium chloride 0.9 % with KCl 20 mEq, 250 mL/hr  sodium chloride 0.9 % with KCl 40 mEq/L, 250 mL/hr      ----------------------------------------------------------------------------------------------------------------------  Vital Signs:  Temp:  [98 °F (36.7 °C)-99.3 °F (37.4 °C)] 98.8 °F (37.1 °C)  Heart Rate:  [73-91] 75  Resp:  [18] 18  BP: ()/(47-81) 94/56  FiO2 (%):  [35 %] 35 %      04/19/24  1616 04/20/24  0521 04/20/24  0942   Weight: 65.1 kg (143 lb 8 oz) 66.6 kg (146 lb 12.8 oz) 66.6 kg (146 lb 13.2 oz)     Body mass index is 22.99 kg/m².    Intake/Output Summary (Last 24 hours) at 4/20/2024 1550  Last data filed at 4/20/2024 1519  Gross per 24 hour   Intake 3848.69 ml   Output 2290 ml   Net 1558.69 ml     NPO Diet NPO Type: Strict NPO  ----------------------------------------------------------------------------------------------------------------------  Physical exam:  Constitutional: Well-nourished  female, currently intubated and sedated.     HENT:  Head:  Normocephalic and atraumatic.  Mouth:  Moist mucous membranes.    Eyes:  Conjunctivae are normal.  No scleral icterus.    Neck:  Neck supple. No thyromegaly.  No JVD present.    Cardiovascular:  Regular rate and rhythm with no murmurs, rubs, clicks or gallops appreciated.  Pulmonary/Chest:  Clear to auscultation bilaterally with no crackles, wheezes or rhonchi appreciated.  Abdominal:  Soft. Nontender. Nondistended  Bowel sounds are normal in all four quadrants. No organomegally appreciated.    Musculoskeletal:  No edema, no tenderness, and no deformity.  No red or swollen joints anywhere.    Neurological: Intubated and sedated.   Skin:  Warm and dry to palpation, multiple bruises noted on anterior aspect of bilateral lower extremities and several bruises on bilateral upper extremities  Peripheral vascular:  2+ radial and pedal pulses in bilateral upper and lower extremities.  -----------------------------------------------------------------------------------   ----------------------------------------------------------------------------------------------------------------------  Results from last 7 days   Lab Units 04/19/24  1143 04/19/24  0945   HSTROP T ng/L 10 11     Results from last 7 days   Lab Units 04/20/24  0025 04/19/24  1401 04/19/24  0945   LACTATE mmol/L  --  1.4 3.1*   WBC 10*3/mm3 10.24  --  15.71*   HEMOGLOBIN g/dL 10.5*  --  11.7*   HEMATOCRIT % 35.8  --  36.1   MCV fL 99.4*  --  91.4   MCHC g/dL 29.3*  --  32.4   PLATELETS 10*3/mm3 339  --  409   INR   --   --  1.04     Results from last 7 days   Lab Units 04/19/24  1002   PH, ARTERIAL pH units 7.321*   PO2 ART mm Hg 142.0*   PCO2, ARTERIAL mm Hg 37.4   HCO3 ART mmol/L 19.3*     Results from last 7 days   Lab Units 04/20/24  1127 04/20/24  0717 04/20/24  0329 04/20/24  0026 04/19/24  1143 04/19/24  0945   SODIUM mmol/L 141 142 140 141  141   < > 137   POTASSIUM mmol/L 4.1 4.4 4.5  4.6 5.0  5.0   < > 3.7   MAGNESIUM mg/dL 1.7 1.8 2.0 2.1   < >  --    CHLORIDE mmol/L 112* 114* 113* 114*  114*   < > 102   CO2 mmol/L 20.5* 20.2* 18.2* 19.1*  19.1*   < > 16.6*   BUN mg/dL 12 15 17 15  15   < > 22*   CREATININE mg/dL 0.70 0.82 0.72 0.72  0.72   < > 1.01*   CALCIUM mg/dL 7.9* 8.1* 8.1* 8.3*  8.3*   < > 9.0   GLUCOSE mg/dL 204* 133* 128* 134*  134*   < > 542*   ALBUMIN g/dL  --   --   --  3.0*  --  3.5   BILIRUBIN mg/dL  --   --   --  <0.2  --  0.2   ALK PHOS U/L  --   --   --  112  --  131*   AST (SGOT) U/L  --   --   --  26  --  25  "  ALT (SGPT) U/L  --   --   --  22  --  23    < > = values in this interval not displayed.   Estimated Creatinine Clearance: 116.8 mL/min (by C-G formula based on SCr of 0.7 mg/dL).    No results found for: \"AMMONIA\"      Blood Culture   Date Value Ref Range Status   04/19/2024 Abnormal Stain (C)  Preliminary   04/19/2024 Abnormal Stain (C)  Preliminary     No results found for: \"URINECX\"  No results found for: \"WOUNDCX\"  No results found for: \"STOOLCX\"    I have personally looked at the labs and they are summarized above.  ----------------------------------------------------------------------------------------------------------------------  Imaging Results (Last 24 Hours)       Procedure Component Value Units Date/Time    XR Chest 1 View [[id number]] Collected: 04/20/24 0724     Updated: 04/20/24 0728    Narrative:      EXAMINATION: AP portable chest     CLINICAL HISTORY: Intubated     COMPARISON: 4/19/2024     FINDINGS: An endotracheal tube is noted with tip 4.3 cm above the  leola. A nasogastric tube courses into the stomach. Mild left basilar  opacity may be related to atelectasis although pneumonia cannot be  excluded. No pneumothorax.       Impression:      1. Lines and tubes as above.  2. Mild left basilar opacity. This may be related to atelectasis.  However, pneumonia would be difficult to exclude entirely.     This report was finalized on 4/20/2024 7:26 AM by Christian Leo MD.             ----------------------------------------------------------------------------------------------------------------------  Assessment and Plan:    Acute encephalopathy -etiology likely secondary to acute methamphetamine intoxication.  Will continue sedating medications and mechanical ventilation for the next 24 hours.  Will perform spontaneous awakening trial tomorrow.     2.  DKA -now resolved, will continue IV insulin drip with IV fluids as patient is currently n.p.o.  If able to extubate tomorrow, will likely transition " to subcutaneous insulin at that time.    Disposition will attempt spontaneous awakening trial tomorrow    Jono Valencia,    04/20/24   15:50 EDT

## 2024-04-21 LAB
ANION GAP SERPL CALCULATED.3IONS-SCNC: 6.6 MMOL/L (ref 5–15)
ANION GAP SERPL CALCULATED.3IONS-SCNC: 7.2 MMOL/L (ref 5–15)
ANION GAP SERPL CALCULATED.3IONS-SCNC: 7.8 MMOL/L (ref 5–15)
ANION GAP SERPL CALCULATED.3IONS-SCNC: 8 MMOL/L (ref 5–15)
ANION GAP SERPL CALCULATED.3IONS-SCNC: 9.4 MMOL/L (ref 5–15)
ANION GAP SERPL CALCULATED.3IONS-SCNC: 9.6 MMOL/L (ref 5–15)
BUN SERPL-MCNC: 3 MG/DL (ref 6–20)
BUN SERPL-MCNC: 3 MG/DL (ref 6–20)
BUN SERPL-MCNC: 4 MG/DL (ref 6–20)
BUN SERPL-MCNC: 5 MG/DL (ref 6–20)
BUN SERPL-MCNC: 6 MG/DL (ref 6–20)
BUN SERPL-MCNC: 7 MG/DL (ref 6–20)
BUN/CREAT SERPL: 10.3 (ref 7–25)
BUN/CREAT SERPL: 10.4 (ref 7–25)
BUN/CREAT SERPL: 4.5 (ref 7–25)
BUN/CREAT SERPL: 5 (ref 7–25)
BUN/CREAT SERPL: 6 (ref 7–25)
BUN/CREAT SERPL: 7.8 (ref 7–25)
CALCIUM SPEC-SCNC: 7.2 MG/DL (ref 8.6–10.5)
CALCIUM SPEC-SCNC: 7.3 MG/DL (ref 8.6–10.5)
CALCIUM SPEC-SCNC: 7.4 MG/DL (ref 8.6–10.5)
CALCIUM SPEC-SCNC: 7.4 MG/DL (ref 8.6–10.5)
CALCIUM SPEC-SCNC: 7.5 MG/DL (ref 8.6–10.5)
CALCIUM SPEC-SCNC: 7.5 MG/DL (ref 8.6–10.5)
CHLORIDE SERPL-SCNC: 108 MMOL/L (ref 98–107)
CHLORIDE SERPL-SCNC: 108 MMOL/L (ref 98–107)
CHLORIDE SERPL-SCNC: 110 MMOL/L (ref 98–107)
CHLORIDE SERPL-SCNC: 110 MMOL/L (ref 98–107)
CHLORIDE SERPL-SCNC: 111 MMOL/L (ref 98–107)
CHLORIDE SERPL-SCNC: 111 MMOL/L (ref 98–107)
CO2 SERPL-SCNC: 18.6 MMOL/L (ref 22–29)
CO2 SERPL-SCNC: 19 MMOL/L (ref 22–29)
CO2 SERPL-SCNC: 20.4 MMOL/L (ref 22–29)
CO2 SERPL-SCNC: 20.8 MMOL/L (ref 22–29)
CO2 SERPL-SCNC: 21.2 MMOL/L (ref 22–29)
CO2 SERPL-SCNC: 22.4 MMOL/L (ref 22–29)
CREAT SERPL-MCNC: 0.58 MG/DL (ref 0.57–1)
CREAT SERPL-MCNC: 0.6 MG/DL (ref 0.57–1)
CREAT SERPL-MCNC: 0.64 MG/DL (ref 0.57–1)
CREAT SERPL-MCNC: 0.67 MG/DL (ref 0.57–1)
DEPRECATED RDW RBC AUTO: 48.6 FL (ref 37–54)
EGFRCR SERPLBLD CKD-EPI 2021: 116.3 ML/MIN/1.73
EGFRCR SERPLBLD CKD-EPI 2021: 117.6 ML/MIN/1.73
EGFRCR SERPLBLD CKD-EPI 2021: 119.5 ML/MIN/1.73
EGFRCR SERPLBLD CKD-EPI 2021: 120.5 ML/MIN/1.73
ERYTHROCYTE [DISTWIDTH] IN BLOOD BY AUTOMATED COUNT: 13.9 % (ref 12.3–15.4)
GLUCOSE BLDC GLUCOMTR-MCNC: 109 MG/DL (ref 70–130)
GLUCOSE BLDC GLUCOMTR-MCNC: 115 MG/DL (ref 70–130)
GLUCOSE BLDC GLUCOMTR-MCNC: 122 MG/DL (ref 70–130)
GLUCOSE BLDC GLUCOMTR-MCNC: 125 MG/DL (ref 70–130)
GLUCOSE BLDC GLUCOMTR-MCNC: 126 MG/DL (ref 70–130)
GLUCOSE BLDC GLUCOMTR-MCNC: 126 MG/DL (ref 70–130)
GLUCOSE BLDC GLUCOMTR-MCNC: 128 MG/DL (ref 70–130)
GLUCOSE BLDC GLUCOMTR-MCNC: 133 MG/DL (ref 70–130)
GLUCOSE BLDC GLUCOMTR-MCNC: 133 MG/DL (ref 70–130)
GLUCOSE BLDC GLUCOMTR-MCNC: 135 MG/DL (ref 70–130)
GLUCOSE BLDC GLUCOMTR-MCNC: 138 MG/DL (ref 70–130)
GLUCOSE BLDC GLUCOMTR-MCNC: 138 MG/DL (ref 70–130)
GLUCOSE BLDC GLUCOMTR-MCNC: 140 MG/DL (ref 70–130)
GLUCOSE BLDC GLUCOMTR-MCNC: 142 MG/DL (ref 70–130)
GLUCOSE BLDC GLUCOMTR-MCNC: 142 MG/DL (ref 70–130)
GLUCOSE BLDC GLUCOMTR-MCNC: 145 MG/DL (ref 70–130)
GLUCOSE BLDC GLUCOMTR-MCNC: 147 MG/DL (ref 70–130)
GLUCOSE BLDC GLUCOMTR-MCNC: 166 MG/DL (ref 70–130)
GLUCOSE BLDC GLUCOMTR-MCNC: 172 MG/DL (ref 70–130)
GLUCOSE BLDC GLUCOMTR-MCNC: 189 MG/DL (ref 70–130)
GLUCOSE SERPL-MCNC: 129 MG/DL (ref 65–99)
GLUCOSE SERPL-MCNC: 135 MG/DL (ref 65–99)
GLUCOSE SERPL-MCNC: 143 MG/DL (ref 65–99)
GLUCOSE SERPL-MCNC: 145 MG/DL (ref 65–99)
GLUCOSE SERPL-MCNC: 161 MG/DL (ref 65–99)
GLUCOSE SERPL-MCNC: 251 MG/DL (ref 65–99)
HCT VFR BLD AUTO: 33.7 % (ref 34–46.6)
HGB BLD-MCNC: 10.5 G/DL (ref 12–15.9)
MAGNESIUM SERPL-MCNC: 2 MG/DL (ref 1.6–2.6)
MAGNESIUM SERPL-MCNC: 2 MG/DL (ref 1.6–2.6)
MAGNESIUM SERPL-MCNC: 2.3 MG/DL (ref 1.6–2.6)
MAGNESIUM SERPL-MCNC: 2.3 MG/DL (ref 1.6–2.6)
MAGNESIUM SERPL-MCNC: 2.7 MG/DL (ref 1.6–2.6)
MAGNESIUM SERPL-MCNC: 2.7 MG/DL (ref 1.6–2.6)
MCH RBC QN AUTO: 29.6 PG (ref 26.6–33)
MCHC RBC AUTO-ENTMCNC: 31.2 G/DL (ref 31.5–35.7)
MCV RBC AUTO: 94.9 FL (ref 79–97)
PHOSPHATE SERPL-MCNC: 3.5 MG/DL (ref 2.5–4.5)
PHOSPHATE SERPL-MCNC: 3.7 MG/DL (ref 2.5–4.5)
PHOSPHATE SERPL-MCNC: 3.8 MG/DL (ref 2.5–4.5)
PHOSPHATE SERPL-MCNC: 3.9 MG/DL (ref 2.5–4.5)
PLATELET # BLD AUTO: 316 10*3/MM3 (ref 140–450)
PMV BLD AUTO: 8.7 FL (ref 6–12)
POTASSIUM SERPL-SCNC: 3.8 MMOL/L (ref 3.5–5.2)
POTASSIUM SERPL-SCNC: 3.9 MMOL/L (ref 3.5–5.2)
POTASSIUM SERPL-SCNC: 4 MMOL/L (ref 3.5–5.2)
POTASSIUM SERPL-SCNC: 4.1 MMOL/L (ref 3.5–5.2)
POTASSIUM SERPL-SCNC: 4.5 MMOL/L (ref 3.5–5.2)
POTASSIUM SERPL-SCNC: 4.8 MMOL/L (ref 3.5–5.2)
RBC # BLD AUTO: 3.55 10*6/MM3 (ref 3.77–5.28)
SODIUM SERPL-SCNC: 137 MMOL/L (ref 136–145)
SODIUM SERPL-SCNC: 137 MMOL/L (ref 136–145)
SODIUM SERPL-SCNC: 138 MMOL/L (ref 136–145)
SODIUM SERPL-SCNC: 138 MMOL/L (ref 136–145)
SODIUM SERPL-SCNC: 139 MMOL/L (ref 136–145)
SODIUM SERPL-SCNC: 140 MMOL/L (ref 136–145)
WBC NRBC COR # BLD AUTO: 11.08 10*3/MM3 (ref 3.4–10.8)

## 2024-04-21 PROCEDURE — 94799 UNLISTED PULMONARY SVC/PX: CPT

## 2024-04-21 PROCEDURE — 80048 BASIC METABOLIC PNL TOTAL CA: CPT | Performed by: STUDENT IN AN ORGANIZED HEALTH CARE EDUCATION/TRAINING PROGRAM

## 2024-04-21 PROCEDURE — 25010000002 MAGNESIUM SULFATE 2 GM/50ML SOLUTION: Performed by: INTERNAL MEDICINE

## 2024-04-21 PROCEDURE — 84100 ASSAY OF PHOSPHORUS: CPT | Performed by: STUDENT IN AN ORGANIZED HEALTH CARE EDUCATION/TRAINING PROGRAM

## 2024-04-21 PROCEDURE — 36415 COLL VENOUS BLD VENIPUNCTURE: CPT | Performed by: STUDENT IN AN ORGANIZED HEALTH CARE EDUCATION/TRAINING PROGRAM

## 2024-04-21 PROCEDURE — 83735 ASSAY OF MAGNESIUM: CPT | Performed by: STUDENT IN AN ORGANIZED HEALTH CARE EDUCATION/TRAINING PROGRAM

## 2024-04-21 PROCEDURE — 82948 REAGENT STRIP/BLOOD GLUCOSE: CPT

## 2024-04-21 PROCEDURE — 25010000002 PROPOFOL 10 MG/ML EMULSION: Performed by: STUDENT IN AN ORGANIZED HEALTH CARE EDUCATION/TRAINING PROGRAM

## 2024-04-21 PROCEDURE — 94761 N-INVAS EAR/PLS OXIMETRY MLT: CPT

## 2024-04-21 PROCEDURE — 94664 DEMO&/EVAL PT USE INHALER: CPT

## 2024-04-21 PROCEDURE — 85027 COMPLETE CBC AUTOMATED: CPT | Performed by: INTERNAL MEDICINE

## 2024-04-21 PROCEDURE — 99233 SBSQ HOSP IP/OBS HIGH 50: CPT | Performed by: INTERNAL MEDICINE

## 2024-04-21 PROCEDURE — 25010000002 ENOXAPARIN PER 10 MG: Performed by: INTERNAL MEDICINE

## 2024-04-21 PROCEDURE — 94003 VENT MGMT INPAT SUBQ DAY: CPT

## 2024-04-21 PROCEDURE — 25810000003 SODIUM CHLORIDE 0.9 % SOLUTION: Performed by: INTERNAL MEDICINE

## 2024-04-21 RX ADMIN — Medication 10 ML: at 21:11

## 2024-04-21 RX ADMIN — IPRATROPIUM BROMIDE AND ALBUTEROL SULFATE 3 ML: .5; 2.5 SOLUTION RESPIRATORY (INHALATION) at 06:29

## 2024-04-21 RX ADMIN — PANTOPRAZOLE SODIUM 40 MG: 40 INJECTION, POWDER, FOR SOLUTION INTRAVENOUS at 06:22

## 2024-04-21 RX ADMIN — FLUOXETINE HYDROCHLORIDE 40 MG: 20 CAPSULE ORAL at 08:02

## 2024-04-21 RX ADMIN — IPRATROPIUM BROMIDE AND ALBUTEROL SULFATE 3 ML: .5; 2.5 SOLUTION RESPIRATORY (INHALATION) at 18:25

## 2024-04-21 RX ADMIN — FLUOXETINE HYDROCHLORIDE 20 MG: 20 CAPSULE ORAL at 08:01

## 2024-04-21 RX ADMIN — FUROSEMIDE 20 MG: 20 TABLET ORAL at 08:01

## 2024-04-21 RX ADMIN — Medication 10 ML: at 08:02

## 2024-04-21 RX ADMIN — PROPOFOL 40 MCG/KG/MIN: 10 INJECTION, EMULSION INTRAVENOUS at 17:13

## 2024-04-21 RX ADMIN — MONTELUKAST SODIUM 10 MG: 10 TABLET, COATED ORAL at 21:10

## 2024-04-21 RX ADMIN — ENOXAPARIN SODIUM 40 MG: 40 INJECTION SUBCUTANEOUS at 17:13

## 2024-04-21 RX ADMIN — CETIRIZINE HYDROCHLORIDE 10 MG: 10 TABLET, FILM COATED ORAL at 08:01

## 2024-04-21 RX ADMIN — POTASSIUM CHLORIDE, DEXTROSE MONOHYDRATE AND SODIUM CHLORIDE 150 ML/HR: 150; 5; 450 INJECTION, SOLUTION INTRAVENOUS at 13:40

## 2024-04-21 RX ADMIN — LEVOTHYROXINE SODIUM 200 MCG: 100 TABLET ORAL at 08:01

## 2024-04-21 RX ADMIN — Medication 300 MCG/HR: at 00:38

## 2024-04-21 RX ADMIN — ROPINIROLE HYDROCHLORIDE 2 MG: 1 TABLET, FILM COATED ORAL at 21:10

## 2024-04-21 RX ADMIN — MUPIROCIN 1 APPLICATION: 20 OINTMENT TOPICAL at 21:10

## 2024-04-21 RX ADMIN — DEXMEDETOMIDINE 0.2 MCG/KG/HR: 100 INJECTION, SOLUTION INTRAVENOUS at 09:07

## 2024-04-21 RX ADMIN — DOCUSATE SODIUM 50 MG AND SENNOSIDES 8.6 MG 2 TABLET: 8.6; 5 TABLET, FILM COATED ORAL at 21:10

## 2024-04-21 RX ADMIN — PROPOFOL 45 MCG/KG/MIN: 10 INJECTION, EMULSION INTRAVENOUS at 00:33

## 2024-04-21 RX ADMIN — MUPIROCIN 1 APPLICATION: 20 OINTMENT TOPICAL at 08:02

## 2024-04-21 RX ADMIN — CHLORHEXIDINE GLUCONATE 15 ML: 1.2 RINSE ORAL at 08:03

## 2024-04-21 RX ADMIN — DOCUSATE SODIUM 50 MG AND SENNOSIDES 8.6 MG 2 TABLET: 8.6; 5 TABLET, FILM COATED ORAL at 08:01

## 2024-04-21 RX ADMIN — GABAPENTIN 800 MG: 400 CAPSULE ORAL at 13:40

## 2024-04-21 RX ADMIN — CHLORHEXIDINE GLUCONATE 15 ML: 1.2 RINSE ORAL at 21:10

## 2024-04-21 RX ADMIN — TOPIRAMATE 25 MG: 25 TABLET, FILM COATED ORAL at 08:04

## 2024-04-21 RX ADMIN — GABAPENTIN 800 MG: 400 CAPSULE ORAL at 21:10

## 2024-04-21 RX ADMIN — POTASSIUM CHLORIDE, DEXTROSE MONOHYDRATE AND SODIUM CHLORIDE 150 ML/HR: 150; 5; 450 INJECTION, SOLUTION INTRAVENOUS at 07:01

## 2024-04-21 RX ADMIN — ATORVASTATIN CALCIUM 20 MG: 20 TABLET, FILM COATED ORAL at 21:10

## 2024-04-21 RX ADMIN — PROPOFOL 40 MCG/KG/MIN: 10 INJECTION, EMULSION INTRAVENOUS at 23:22

## 2024-04-21 RX ADMIN — GABAPENTIN 800 MG: 400 CAPSULE ORAL at 06:22

## 2024-04-21 RX ADMIN — Medication 175 MCG/HR: at 10:00

## 2024-04-21 RX ADMIN — DEXMEDETOMIDINE 1.5 MCG/KG/HR: 100 INJECTION, SOLUTION INTRAVENOUS at 17:23

## 2024-04-21 RX ADMIN — BUPROPION HYDROCHLORIDE 450 MG: 150 TABLET, EXTENDED RELEASE ORAL at 08:01

## 2024-04-21 RX ADMIN — POTASSIUM CHLORIDE, DEXTROSE MONOHYDRATE AND SODIUM CHLORIDE 150 ML/HR: 150; 5; 450 INJECTION, SOLUTION INTRAVENOUS at 21:11

## 2024-04-21 RX ADMIN — MAGNESIUM SULFATE HEPTAHYDRATE 2 G: 40 INJECTION, SOLUTION INTRAVENOUS at 02:27

## 2024-04-21 RX ADMIN — PROPOFOL 45 MCG/KG/MIN: 10 INJECTION, EMULSION INTRAVENOUS at 04:24

## 2024-04-21 NOTE — PROGRESS NOTES
[facility]     Patient Identification:  Name:  Jes Moreno  Age:  38 y.o.  Sex:  female  :  1985  MRN:  [id number]  Visit Number:  [id number]  Primary Care Provider:  Juany Sheehan APRN    Length of stay:  2    Chief complaint: Intubated and sedated    Subjective:    Patient remains intubated and sedated.  Did attempt spontaneous awakening trial.  Unfortunately, patient did poorly and she became quite agitated requiring reinitiation of sedation.  No new events overnight.  ----------------------------------------------------------------------------------------------------------------------  Current Hospital Meds:  atorvastatin, 20 mg, Oral, Nightly  [Held by provider] buprenorphine-naloxone, 3 tablet, Sublingual, Daily  buPROPion XL, 450 mg, Oral, Daily  cetirizine, 10 mg, Oral, Daily  chlorhexidine, 15 mL, Mouth/Throat, Q12H  enoxaparin, 40 mg, Subcutaneous, Q24H  FLUoxetine, 20 mg, Oral, Daily  FLUoxetine, 40 mg, Oral, Daily  furosemide, 20 mg, Oral, Daily  gabapentin, 800 mg, Oral, Q8H  levothyroxine, 200 mcg, Oral, Daily  montelukast, 10 mg, Oral, Nightly  mupirocin, 1 Application, Topical, Q12H  pantoprazole, 40 mg, Intravenous, Q AM  rOPINIRole, 2 mg, Oral, Nightly  senna-docusate sodium, 2 tablet, Oral, BID  sodium chloride, 10 mL, Intravenous, Q12H  sodium chloride, 10 mL, Intravenous, Q12H  sodium chloride, 10 mL, Intravenous, Q12H  sodium chloride, 10 mL, Intravenous, Q12H  sodium chloride, 10 mL, Intravenous, Q12H  topiramate, 25 mg, Oral, Daily      dexmedetomidine, 0.2-1.5 mcg/kg/hr, Last Rate: 1.5 mcg/kg/hr (24 1145)  dextrose 5 % and sodium chloride 0.45 %, 150 mL/hr  dextrose 5 % and sodium chloride 0.45 % with KCl 20 mEq/L, 150 mL/hr, Last Rate: 150 mL/hr (24 1340)  dextrose 5 % and sodium chloride 0.45 % with KCl 40 mEq/L, 150 mL/hr  dextrose 5 % and sodium chloride 0.9 %, 150 mL/hr  dextrose 5 % and sodium chloride 0.9 % with KCl 20 mEq,  150 mL/hr  dextrose 5% and sodium chloride 0.9% with KCl 40 mEq/L, 150 mL/hr  fentanyl 10 mcg/mL,  mcg/hr, Last Rate: 300 mcg/hr (04/21/24 1215)  insulin, 0-100 Units/hr, Last Rate: 1 Units/hr (04/21/24 1225)  propofol, 50 mcg/kg/min, Last Rate: 30 mcg/kg/min (04/21/24 1341)  sodium chloride 0.45 % 1,000 mL with potassium chloride 40 mEq infusion, 250 mL/hr  sodium chloride, 250 mL/hr  sodium chloride 0.45 % with KCl 20 mEq, 250 mL/hr, Last Rate: Stopped (04/19/24 1459)  sodium chloride, 250 mL/hr  sodium chloride 0.9 % with KCl 20 mEq, 250 mL/hr  sodium chloride 0.9 % with KCl 40 mEq/L, 250 mL/hr      ----------------------------------------------------------------------------------------------------------------------  Vital Signs:  Temp:  [98.4 °F (36.9 °C)-99.8 °F (37.7 °C)] 99.5 °F (37.5 °C)  Heart Rate:  [67-82] 69  Resp:  [18-20] 18  BP: ()/(46-66) 103/56  FiO2 (%):  [35 %] 35 %      04/20/24  0521 04/20/24  0942 04/21/24  0311   Weight: 66.6 kg (146 lb 12.8 oz) 66.6 kg (146 lb 13.2 oz) 69.4 kg (153 lb)     Body mass index is 23.96 kg/m².    Intake/Output Summary (Last 24 hours) at 4/21/2024 1356  Last data filed at 4/21/2024 0429  Gross per 24 hour   Intake 5761.27 ml   Output 2710 ml   Net 3051.27 ml     NPO Diet NPO Type: Strict NPO  ----------------------------------------------------------------------------------------------------------------------  Physical exam:  Constitutional: Well-nourished  female, currently intubated and sedated.     HENT:  Head:  Normocephalic and atraumatic.  Mouth:  Moist mucous membranes.    Eyes:  Conjunctivae are normal.  No scleral icterus.    Neck:  Neck supple. No thyromegaly.  No JVD present.    Cardiovascular:  Regular rate and rhythm with no murmurs, rubs, clicks or gallops appreciated.  Pulmonary/Chest:  Clear to auscultation bilaterally with no crackles, wheezes or rhonchi appreciated.  Abdominal:  Soft. Nontender. Nondistended  Bowel sounds are  normal in all four quadrants. No organomegally appreciated.   Musculoskeletal:  No edema, no tenderness, and no deformity.  No red or swollen joints anywhere.    Neurological: Intubated and sedated.   Skin:  Warm and dry to palpation, multiple bruises noted on anterior aspect of bilateral lower extremities and several bruises on bilateral upper extremities  Peripheral vascular:  2+ radial and pedal pulses in bilateral upper and lower extremities.  -----------------------------------------------------------------------------------   ----------------------------------------------------------------------------------------------------------------------  Results from last 7 days   Lab Units 04/19/24  1143 04/19/24  0945   HSTROP T ng/L 10 11     Results from last 7 days   Lab Units 04/21/24  0027 04/20/24  0025 04/19/24  1401 04/19/24  0945   LACTATE mmol/L  --   --  1.4 3.1*   WBC 10*3/mm3 11.08* 10.24  --  15.71*   HEMOGLOBIN g/dL 10.5* 10.5*  --  11.7*   HEMATOCRIT % 33.7* 35.8  --  36.1   MCV fL 94.9 99.4*  --  91.4   MCHC g/dL 31.2* 29.3*  --  32.4   PLATELETS 10*3/mm3 316 339  --  409   INR   --   --   --  1.04     Results from last 7 days   Lab Units 04/19/24  1002   PH, ARTERIAL pH units 7.321*   PO2 ART mm Hg 142.0*   PCO2, ARTERIAL mm Hg 37.4   HCO3 ART mmol/L 19.3*     Results from last 7 days   Lab Units 04/21/24  1209 04/21/24  0728 04/21/24  0339 04/20/24  0329 04/20/24  0026 04/19/24  1143 04/19/24  0945   SODIUM mmol/L 140 138 138   < > 141  141   < > 137   POTASSIUM mmol/L 4.1 3.8 4.0   < > 5.0  5.0   < > 3.7   MAGNESIUM mg/dL 2.3 2.7* 2.7*   < > 2.1   < >  --    CHLORIDE mmol/L 111* 108* 110*   < > 114*  114*   < > 102   CO2 mmol/L 22.4 20.4* 18.6*   < > 19.1*  19.1*   < > 16.6*   BUN mg/dL 4* 5* 6   < > 15  15   < > 22*   CREATININE mg/dL 0.67 0.64 0.58   < > 0.72  0.72   < > 1.01*   CALCIUM mg/dL 7.2* 7.4* 7.4*   < > 8.3*  8.3*   < > 9.0   GLUCOSE mg/dL 129* 161* 145*   < > 134*  134*   < >  "542*   ALBUMIN g/dL  --   --   --   --  3.0*  --  3.5   BILIRUBIN mg/dL  --   --   --   --  <0.2  --  0.2   ALK PHOS U/L  --   --   --   --  112  --  131*   AST (SGOT) U/L  --   --   --   --  26  --  25   ALT (SGPT) U/L  --   --   --   --  22  --  23    < > = values in this interval not displayed.   Estimated Creatinine Clearance: 127.2 mL/min (by C-G formula based on SCr of 0.67 mg/dL).    No results found for: \"AMMONIA\"      Blood Culture   Date Value Ref Range Status   04/19/2024 Abnormal Stain (C)  Preliminary   04/19/2024 Abnormal Stain (C)  Preliminary     No results found for: \"URINECX\"  No results found for: \"WOUNDCX\"  No results found for: \"STOOLCX\"    I have personally looked at the labs and they are summarized above.  ----------------------------------------------------------------------------------------------------------------------  Imaging Results (Last 24 Hours)       ** No results found for the last 24 hours. **          ----------------------------------------------------------------------------------------------------------------------  Assessment and Plan:    Acute encephalopathy -etiology likely secondary to acute methamphetamine intoxication.  Will continue sedating medications and mechanical ventilation for the next 24 hours.  Will attempt spontaneous awakening trial again tomorrow.     2.  DKA -now resolved, will continue IV insulin drip with IV fluids as patient is currently n.p.o.  If able to extubate tomorrow, will likely transition to subcutaneous insulin at that time.    Disposition will attempt spontaneous awakening trial tomorrow    Jono Valencia DO   04/21/24   13:57 EDT     "

## 2024-04-21 NOTE — PLAN OF CARE
Problem: Skin Injury Risk Increased  Goal: Skin Health and Integrity  Outcome: Ongoing, Not Progressing  Intervention: Optimize Skin Protection  Recent Flowsheet Documentation  Taken 4/21/2024 0225 by Betty Vicente RN  Pressure Reduction Techniques:   frequent weight shift encouraged   heels elevated off bed   pressure points protected   weight shift assistance provided  Head of Bed (HOB) Positioning: HOB at 30-45 degrees  Pressure Reduction Devices:   pressure-redistributing mattress utilized   positioning supports utilized   heel offloading device utilized  Skin Protection:   adhesive use limited   incontinence pads utilized   tubing/devices free from skin contact  Taken 4/21/2024 0038 by Betty Vicente RN  Head of Bed (HOB) Positioning: HOB at 30-45 degrees  Taken 4/20/2024 2224 by Betty Vicente RN  Head of Bed (HOB) Positioning: HOB at 30-45 degrees  Taken 4/20/2024 2000 by Betty Vicente RN  Pressure Reduction Techniques:   frequent weight shift encouraged   heels elevated off bed   positioned off wounds   pressure points protected   weight shift assistance provided  Head of Bed (HOB) Positioning: HOB at 30-45 degrees  Pressure Reduction Devices:   pressure-redistributing mattress utilized   positioning supports utilized   heel offloading device utilized  Skin Protection:   adhesive use limited   incontinence pads utilized   tubing/devices free from skin contact     Problem: Adult Inpatient Plan of Care  Goal: Plan of Care Review  Outcome: Ongoing, Not Progressing  Flowsheets  Taken 4/21/2024 0412 by Betty Vicente RN  Progress: no change  Taken 4/20/2024 0521 by Cherelle Reid, RN  Plan of Care Reviewed With: patient  Goal: Patient-Specific Goal (Individualized)  Outcome: Ongoing, Not Progressing  Goal: Absence of Hospital-Acquired Illness or Injury  Outcome: Ongoing, Not Progressing  Intervention: Identify and Manage Fall Risk  Recent Flowsheet Documentation  Taken 4/21/2024 0323 by  Jules, Betty, RN  Safety Promotion/Fall Prevention: safety round/check completed  Taken 4/21/2024 0225 by Betty Vicente RN  Safety Promotion/Fall Prevention: safety round/check completed  Taken 4/21/2024 0122 by Betty Vicente RN  Safety Promotion/Fall Prevention: safety round/check completed  Taken 4/21/2024 0038 by Betty Vicente RN  Safety Promotion/Fall Prevention: safety round/check completed  Taken 4/20/2024 2300 by Betty Vicente RN  Safety Promotion/Fall Prevention: safety round/check completed  Taken 4/20/2024 2224 by Betty Vicente RN  Safety Promotion/Fall Prevention: safety round/check completed  Taken 4/20/2024 2143 by Betty Vicente RN  Safety Promotion/Fall Prevention: safety round/check completed  Taken 4/20/2024 2000 by Betty Vicente RN  Safety Promotion/Fall Prevention: safety round/check completed  Taken 4/20/2024 1922 by Betty Vicente RN  Safety Promotion/Fall Prevention: safety round/check completed  Intervention: Prevent Skin Injury  Recent Flowsheet Documentation  Taken 4/21/2024 0225 by Betty Vicente RN  Body Position:   turned   side-lying   left  Skin Protection:   adhesive use limited   incontinence pads utilized   tubing/devices free from skin contact  Taken 4/21/2024 0038 by Betty Vicente RN  Body Position:   turned   side-lying   right  Taken 4/20/2024 2224 by Betty Vicente RN  Body Position:   turned   left   side-lying  Taken 4/20/2024 2000 by Betty Vicente RN  Body Position:   turned   side-lying   right  Skin Protection:   adhesive use limited   incontinence pads utilized   tubing/devices free from skin contact  Intervention: Prevent and Manage VTE (Venous Thromboembolism) Risk  Recent Flowsheet Documentation  Taken 4/21/2024 0225 by Betty Vicente RN  Activity Management: bedrest  Taken 4/20/2024 2000 by Betty Vicente RN  Activity Management: bedrest  VTE Prevention/Management: (see mar) other (see comments)  Range of Motion:  ROM (range of motion) performed  Goal: Optimal Comfort and Wellbeing  Outcome: Ongoing, Not Progressing  Intervention: Provide Person-Centered Care  Recent Flowsheet Documentation  Taken 4/21/2024 0225 by Betty Vicente RN  Trust Relationship/Rapport:   care explained   reassurance provided   thoughts/feelings acknowledged  Taken 4/20/2024 2000 by Betty Vicente RN  Trust Relationship/Rapport:   care explained   reassurance provided   thoughts/feelings acknowledged  Goal: Readiness for Transition of Care  Outcome: Ongoing, Not Progressing     Problem: Fall Injury Risk  Goal: Absence of Fall and Fall-Related Injury  Outcome: Ongoing, Not Progressing  Intervention: Promote Injury-Free Environment  Recent Flowsheet Documentation  Taken 4/21/2024 0323 by Betty Vicente RN  Safety Promotion/Fall Prevention: safety round/check completed  Taken 4/21/2024 0225 by Betty Vicente RN  Safety Promotion/Fall Prevention: safety round/check completed  Taken 4/21/2024 0122 by Betty Vicente RN  Safety Promotion/Fall Prevention: safety round/check completed  Taken 4/21/2024 0038 by Betty Vicente RN  Safety Promotion/Fall Prevention: safety round/check completed  Taken 4/20/2024 2300 by Betty Vicente RN  Safety Promotion/Fall Prevention: safety round/check completed  Taken 4/20/2024 2224 by Betty Vicente RN  Safety Promotion/Fall Prevention: safety round/check completed  Taken 4/20/2024 2143 by Betty Vicente RN  Safety Promotion/Fall Prevention: safety round/check completed  Taken 4/20/2024 2000 by Betty Vicente RN  Safety Promotion/Fall Prevention: safety round/check completed  Taken 4/20/2024 1922 by Betty Vicente RN  Safety Promotion/Fall Prevention: safety round/check completed     Problem: Adjustment to Illness (Sepsis/Septic Shock)  Goal: Optimal Coping  Outcome: Ongoing, Not Progressing     Problem: Bleeding (Sepsis/Septic Shock)  Goal: Absence of Bleeding  Outcome: Ongoing, Not  Progressing     Problem: Glycemic Control Impaired (Sepsis/Septic Shock)  Goal: Blood Glucose Level Within Desired Range  Outcome: Ongoing, Not Progressing  Intervention: Optimize Glycemic Control  Recent Flowsheet Documentation  Taken 4/20/2024 2000 by Betty Vicente RN  Glycemic Management: blood glucose monitored     Problem: Infection Progression (Sepsis/Septic Shock)  Goal: Absence of Infection Signs and Symptoms  Outcome: Ongoing, Not Progressing  Intervention: Promote Recovery  Recent Flowsheet Documentation  Taken 4/21/2024 0225 by Betty Vicente RN  Activity Management: bedrest  Taken 4/20/2024 2000 by Betty Vicente RN  Activity Management: bedrest  Airway/Ventilation Support: pulmonary hygiene promoted  Intervention: Promote Stabilization  Recent Flowsheet Documentation  Taken 4/20/2024 2000 by Betty Vicente RN  Lung Protection Measures: ventilator waveforms monitored     Problem: Nutrition Impaired (Sepsis/Septic Shock)  Goal: Optimal Nutrition Intake  Outcome: Ongoing, Not Progressing     Problem: Communication Impairment (Mechanical Ventilation, Invasive)  Goal: Effective Communication  Outcome: Ongoing, Not Progressing     Problem: Device-Related Complication Risk (Mechanical Ventilation, Invasive)  Goal: Optimal Device Function  Outcome: Ongoing, Not Progressing     Problem: Inability to Wean (Mechanical Ventilation, Invasive)  Goal: Mechanical Ventilation Liberation  Outcome: Ongoing, Not Progressing  Intervention: Promote Extubation and Mechanical Ventilation Liberation  Recent Flowsheet Documentation  Taken 4/20/2024 2000 by Betty Vicente RN  Environmental Support: calm environment promoted     Problem: Nutrition Impairment (Mechanical Ventilation, Invasive)  Goal: Optimal Nutrition Delivery  Outcome: Ongoing, Not Progressing     Problem: Skin and Tissue Injury (Mechanical Ventilation, Invasive)  Goal: Absence of Device-Related Skin and Tissue Injury  Outcome: Ongoing, Not  Progressing  Intervention: Maintain Skin and Tissue Health  Recent Flowsheet Documentation  Taken 4/21/2024 0225 by Betty Vicente RN  Device Skin Pressure Protection:   absorbent pad utilized/changed   adhesive use limited   positioning supports utilized   pressure points protected  Taken 4/20/2024 2000 by Betty Vicente RN  Device Skin Pressure Protection:   absorbent pad utilized/changed   adhesive use limited   positioning supports utilized   pressure points protected     Problem: Ventilator-Induced Lung Injury (Mechanical Ventilation, Invasive)  Goal: Absence of Ventilator-Induced Lung Injury  Outcome: Ongoing, Not Progressing  Intervention: Prevent Ventilator-Associated Pneumonia  Recent Flowsheet Documentation  Taken 4/21/2024 0225 by Betty Vicente RN  Head of Bed (HOB) Positioning: HOB at 30-45 degrees  VAP Prevention Bundle:   HOB elevation maintained   readiness to extubate assessed   oral care regularly provided   stress ulcer prophylaxis provided   vent circuit breaks minimized   VTE prophylaxis provided  VAP Prevention Contraindications: per provider order  VAP Prevention Measures: completed  Taken 4/21/2024 0038 by Betty Vicente RN  Head of Bed (HOB) Positioning: HOB at 30-45 degrees  Oral Care:   swabbed with antiseptic solution   lip/mouth moisturizer applied  Taken 4/20/2024 2224 by Betty Vicente RN  Head of Bed (HOB) Positioning: HOB at 30-45 degrees  Taken 4/20/2024 2000 by Betty Vicente RN  Head of Bed ([facility]) Positioning: HOB at 30-45 degrees  VAP Prevention Bundle:   HOB elevation maintained   oral care regularly provided   readiness to extubate assessed   stress ulcer prophylaxis provided   vent circuit breaks minimized   VTE prophylaxis provided  VAP Prevention Contraindications: per provider order  VAP Prevention Measures: completed  Oral Care:   teeth brushed - suction toothbrush   lip/mouth moisturizer applied  Intervention: Facilitate Lung-Protection Measures  Recent  Flowsheet Documentation  Taken 4/20/2024 2000 by Betty Vicente, RN  Lung Protection Measures: ventilator waveforms monitored   Goal Outcome Evaluation:           Progress: no change

## 2024-04-21 NOTE — PLAN OF CARE
Problem: Adult Inpatient Plan of Care  Goal: Plan of Care Review  4/21/2024 1458 by Doug Sidhu RN  Outcome: Ongoing, Progressing  Flowsheets (Taken 4/21/2024 1458)  Outcome Evaluation: pt failed awakening trial today, she became very agitated and was unable to be redirected. vitals stable. insulin gtt continued  4/21/2024 1455 by Doug Sidhu, SARIKA  Outcome: Ongoing, Progressing  Goal: Patient-Specific Goal (Individualized)  4/21/2024 1458 by Doug Sidhu, SARIKA  Outcome: Ongoing, Progressing  4/21/2024 1455 by Doug Sidhu RN  Outcome: Ongoing, Progressing  Goal: Absence of Hospital-Acquired Illness or Injury  4/21/2024 1458 by Doug Sidhu RN  Outcome: Ongoing, Progressing  4/21/2024 1455 by Doug Sidhu RN  Outcome: Ongoing, Not Progressing  Intervention: Identify and Manage Fall Risk  Recent Flowsheet Documentation  Taken 4/21/2024 1400 by Doug Sidhu RN  Safety Promotion/Fall Prevention: safety round/check completed  Taken 4/21/2024 1300 by Doug Sidhu RN  Safety Promotion/Fall Prevention: safety round/check completed  Taken 4/21/2024 1200 by Doug Sidhu RN  Safety Promotion/Fall Prevention: safety round/check completed  Taken 4/21/2024 1100 by Doug Sidhu RN  Safety Promotion/Fall Prevention: safety round/check completed  Taken 4/21/2024 1000 by Doug Sidhu RN  Safety Promotion/Fall Prevention: safety round/check completed  Taken 4/21/2024 0900 by Doug Sidhu RN  Safety Promotion/Fall Prevention: safety round/check completed  Taken 4/21/2024 0800 by Doug Sidhu RN  Safety Promotion/Fall Prevention: safety round/check completed  Taken 4/21/2024 0700 by Doug Sidhu RN  Safety Promotion/Fall Prevention: safety round/check completed  Intervention: Prevent Skin Injury  Recent Flowsheet Documentation  Taken 4/21/2024 1400 by Doug Sidhu RN  Body Position:   turned   left  Skin  Protection: adhesive use limited  Taken 4/21/2024 1200 by Doug Sidhu RN  Body Position:   turned   right  Taken 4/21/2024 1000 by Doug Sidhu RN  Body Position:   turned   left  Taken 4/21/2024 0800 by Doug Sidhu RN  Body Position:   turned   right  Skin Protection: adhesive use limited  Intervention: Prevent and Manage VTE (Venous Thromboembolism) Risk  Recent Flowsheet Documentation  Taken 4/21/2024 1400 by Doug Sidhu RN  Activity Management: bedrest  Taken 4/21/2024 0800 by Doug Sidhu RN  Activity Management: bedrest  Intervention: Prevent Infection  Recent Flowsheet Documentation  Taken 4/21/2024 1400 by Doug Sidhu RN  Infection Prevention: environmental surveillance performed  Taken 4/21/2024 0800 by Doug Sidhu RN  Infection Prevention: environmental surveillance performed  Goal: Optimal Comfort and Wellbeing  4/21/2024 1458 by Doug Sidhu RN  Outcome: Ongoing, Progressing  4/21/2024 1455 by Doug Sidhu RN  Outcome: Ongoing, Not Progressing  Intervention: Provide Person-Centered Care  Recent Flowsheet Documentation  Taken 4/21/2024 1400 by Doug Sidhu RN  Trust Relationship/Rapport: reassurance provided  Taken 4/21/2024 0800 by Doug Sidhu RN  Trust Relationship/Rapport:   care explained   reassurance provided  Goal: Readiness for Transition of Care  4/21/2024 1458 by Doug Sidhu RN  Outcome: Ongoing, Progressing  4/21/2024 1455 by Doug Sidhu RN  Outcome: Ongoing, Not Progressing   Goal Outcome Evaluation:              Outcome Evaluation: pt failed awakening trial today, she became very agitated and was unable to be redirected. vitals stable. insulin gtt continued

## 2024-04-22 ENCOUNTER — APPOINTMENT (OUTPATIENT)
Dept: GENERAL RADIOLOGY | Facility: HOSPITAL | Age: 39
DRG: 894 | End: 2024-04-22
Payer: MEDICAID

## 2024-04-22 LAB
ANION GAP SERPL CALCULATED.3IONS-SCNC: 10.5 MMOL/L (ref 5–15)
ANION GAP SERPL CALCULATED.3IONS-SCNC: 7.2 MMOL/L (ref 5–15)
ANION GAP SERPL CALCULATED.3IONS-SCNC: 8 MMOL/L (ref 5–15)
BACTERIA SPEC AEROBE CULT: ABNORMAL
BACTERIA SPEC AEROBE CULT: ABNORMAL
BUN SERPL-MCNC: 2 MG/DL (ref 6–20)
BUN/CREAT SERPL: 3.2 (ref 7–25)
BUN/CREAT SERPL: 3.3 (ref 7–25)
BUN/CREAT SERPL: 3.4 (ref 7–25)
CALCIUM SPEC-SCNC: 7.6 MG/DL (ref 8.6–10.5)
CALCIUM SPEC-SCNC: 7.7 MG/DL (ref 8.6–10.5)
CALCIUM SPEC-SCNC: 7.8 MG/DL (ref 8.6–10.5)
CHLORIDE SERPL-SCNC: 109 MMOL/L (ref 98–107)
CHLORIDE SERPL-SCNC: 109 MMOL/L (ref 98–107)
CHLORIDE SERPL-SCNC: 111 MMOL/L (ref 98–107)
CO2 SERPL-SCNC: 20.5 MMOL/L (ref 22–29)
CO2 SERPL-SCNC: 21.8 MMOL/L (ref 22–29)
CO2 SERPL-SCNC: 22 MMOL/L (ref 22–29)
CREAT SERPL-MCNC: 0.59 MG/DL (ref 0.57–1)
CREAT SERPL-MCNC: 0.6 MG/DL (ref 0.57–1)
CREAT SERPL-MCNC: 0.62 MG/DL (ref 0.57–1)
DEPRECATED RDW RBC AUTO: 48.1 FL (ref 37–54)
EGFRCR SERPLBLD CKD-EPI 2021: 118.5 ML/MIN/1.73
EGFRCR SERPLBLD CKD-EPI 2021: 119.5 ML/MIN/1.73
EGFRCR SERPLBLD CKD-EPI 2021: 120 ML/MIN/1.73
ERYTHROCYTE [DISTWIDTH] IN BLOOD BY AUTOMATED COUNT: 13.8 % (ref 12.3–15.4)
GLUCOSE BLDC GLUCOMTR-MCNC: 136 MG/DL (ref 70–130)
GLUCOSE BLDC GLUCOMTR-MCNC: 136 MG/DL (ref 70–130)
GLUCOSE BLDC GLUCOMTR-MCNC: 137 MG/DL (ref 70–130)
GLUCOSE BLDC GLUCOMTR-MCNC: 138 MG/DL (ref 70–130)
GLUCOSE BLDC GLUCOMTR-MCNC: 139 MG/DL (ref 70–130)
GLUCOSE BLDC GLUCOMTR-MCNC: 141 MG/DL (ref 70–130)
GLUCOSE BLDC GLUCOMTR-MCNC: 142 MG/DL (ref 70–130)
GLUCOSE BLDC GLUCOMTR-MCNC: 143 MG/DL (ref 70–130)
GLUCOSE BLDC GLUCOMTR-MCNC: 145 MG/DL (ref 70–130)
GLUCOSE BLDC GLUCOMTR-MCNC: 148 MG/DL (ref 70–130)
GLUCOSE BLDC GLUCOMTR-MCNC: 151 MG/DL (ref 70–130)
GLUCOSE BLDC GLUCOMTR-MCNC: 163 MG/DL (ref 70–130)
GLUCOSE BLDC GLUCOMTR-MCNC: 163 MG/DL (ref 70–130)
GLUCOSE BLDC GLUCOMTR-MCNC: 182 MG/DL (ref 70–130)
GLUCOSE BLDC GLUCOMTR-MCNC: 192 MG/DL (ref 70–130)
GLUCOSE SERPL-MCNC: 139 MG/DL (ref 65–99)
GLUCOSE SERPL-MCNC: 157 MG/DL (ref 65–99)
GLUCOSE SERPL-MCNC: 159 MG/DL (ref 65–99)
GRAM STN SPEC: ABNORMAL
GRAM STN SPEC: ABNORMAL
HCT VFR BLD AUTO: 34.1 % (ref 34–46.6)
HGB BLD-MCNC: 10.8 G/DL (ref 12–15.9)
ISOLATED FROM: ABNORMAL
ISOLATED FROM: ABNORMAL
MAGNESIUM SERPL-MCNC: 1.8 MG/DL (ref 1.6–2.6)
MAGNESIUM SERPL-MCNC: 1.8 MG/DL (ref 1.6–2.6)
MAGNESIUM SERPL-MCNC: 1.9 MG/DL (ref 1.6–2.6)
MCH RBC QN AUTO: 30 PG (ref 26.6–33)
MCHC RBC AUTO-ENTMCNC: 31.7 G/DL (ref 31.5–35.7)
MCV RBC AUTO: 94.7 FL (ref 79–97)
PHOSPHATE SERPL-MCNC: 4 MG/DL (ref 2.5–4.5)
PHOSPHATE SERPL-MCNC: 4.1 MG/DL (ref 2.5–4.5)
PHOSPHATE SERPL-MCNC: 4.1 MG/DL (ref 2.5–4.5)
PLATELET # BLD AUTO: 329 10*3/MM3 (ref 140–450)
PMV BLD AUTO: 9 FL (ref 6–12)
POTASSIUM SERPL-SCNC: 4.2 MMOL/L (ref 3.5–5.2)
POTASSIUM SERPL-SCNC: 4.2 MMOL/L (ref 3.5–5.2)
POTASSIUM SERPL-SCNC: 4.3 MMOL/L (ref 3.5–5.2)
RBC # BLD AUTO: 3.6 10*6/MM3 (ref 3.77–5.28)
SODIUM SERPL-SCNC: 139 MMOL/L (ref 136–145)
SODIUM SERPL-SCNC: 140 MMOL/L (ref 136–145)
SODIUM SERPL-SCNC: 140 MMOL/L (ref 136–145)
WBC NRBC COR # BLD AUTO: 11.97 10*3/MM3 (ref 3.4–10.8)

## 2024-04-22 PROCEDURE — 94799 UNLISTED PULMONARY SVC/PX: CPT

## 2024-04-22 PROCEDURE — 94664 DEMO&/EVAL PT USE INHALER: CPT

## 2024-04-22 PROCEDURE — 87186 SC STD MICRODIL/AGAR DIL: CPT | Performed by: INTERNAL MEDICINE

## 2024-04-22 PROCEDURE — 87040 BLOOD CULTURE FOR BACTERIA: CPT | Performed by: INTERNAL MEDICINE

## 2024-04-22 PROCEDURE — 83735 ASSAY OF MAGNESIUM: CPT | Performed by: STUDENT IN AN ORGANIZED HEALTH CARE EDUCATION/TRAINING PROGRAM

## 2024-04-22 PROCEDURE — 82948 REAGENT STRIP/BLOOD GLUCOSE: CPT

## 2024-04-22 PROCEDURE — 71045 X-RAY EXAM CHEST 1 VIEW: CPT

## 2024-04-22 PROCEDURE — 25810000003 SODIUM CHLORIDE 0.9 % SOLUTION: Performed by: INTERNAL MEDICINE

## 2024-04-22 PROCEDURE — 71045 X-RAY EXAM CHEST 1 VIEW: CPT | Performed by: RADIOLOGY

## 2024-04-22 PROCEDURE — 84100 ASSAY OF PHOSPHORUS: CPT | Performed by: STUDENT IN AN ORGANIZED HEALTH CARE EDUCATION/TRAINING PROGRAM

## 2024-04-22 PROCEDURE — 80048 BASIC METABOLIC PNL TOTAL CA: CPT | Performed by: STUDENT IN AN ORGANIZED HEALTH CARE EDUCATION/TRAINING PROGRAM

## 2024-04-22 PROCEDURE — 87147 CULTURE TYPE IMMUNOLOGIC: CPT | Performed by: INTERNAL MEDICINE

## 2024-04-22 PROCEDURE — 99232 SBSQ HOSP IP/OBS MODERATE 35: CPT | Performed by: STUDENT IN AN ORGANIZED HEALTH CARE EDUCATION/TRAINING PROGRAM

## 2024-04-22 PROCEDURE — 87154 CUL TYP ID BLD PTHGN 6+ TRGT: CPT | Performed by: INTERNAL MEDICINE

## 2024-04-22 PROCEDURE — 25010000002 PROPOFOL 10 MG/ML EMULSION: Performed by: STUDENT IN AN ORGANIZED HEALTH CARE EDUCATION/TRAINING PROGRAM

## 2024-04-22 PROCEDURE — 25010000002 ENOXAPARIN PER 10 MG: Performed by: INTERNAL MEDICINE

## 2024-04-22 PROCEDURE — 85027 COMPLETE CBC AUTOMATED: CPT | Performed by: INTERNAL MEDICINE

## 2024-04-22 PROCEDURE — 94003 VENT MGMT INPAT SUBQ DAY: CPT

## 2024-04-22 PROCEDURE — 99291 CRITICAL CARE FIRST HOUR: CPT | Performed by: INTERNAL MEDICINE

## 2024-04-22 PROCEDURE — 36556 INSERT NON-TUNNEL CV CATH: CPT | Performed by: INTERNAL MEDICINE

## 2024-04-22 PROCEDURE — 94761 N-INVAS EAR/PLS OXIMETRY MLT: CPT

## 2024-04-22 RX ADMIN — BUPROPION HYDROCHLORIDE 450 MG: 150 TABLET, EXTENDED RELEASE ORAL at 08:50

## 2024-04-22 RX ADMIN — POTASSIUM CHLORIDE, DEXTROSE MONOHYDRATE AND SODIUM CHLORIDE 150 ML/HR: 150; 5; 450 INJECTION, SOLUTION INTRAVENOUS at 10:58

## 2024-04-22 RX ADMIN — DOCUSATE SODIUM 50 MG AND SENNOSIDES 8.6 MG 2 TABLET: 8.6; 5 TABLET, FILM COATED ORAL at 22:00

## 2024-04-22 RX ADMIN — Medication 10 ML: at 22:01

## 2024-04-22 RX ADMIN — Medication 10 ML: at 22:00

## 2024-04-22 RX ADMIN — Medication 10 ML: at 08:51

## 2024-04-22 RX ADMIN — POTASSIUM CHLORIDE, DEXTROSE MONOHYDRATE AND SODIUM CHLORIDE 150 ML/HR: 150; 5; 450 INJECTION, SOLUTION INTRAVENOUS at 04:10

## 2024-04-22 RX ADMIN — POTASSIUM CHLORIDE, DEXTROSE MONOHYDRATE AND SODIUM CHLORIDE 150 ML/HR: 150; 5; 450 INJECTION, SOLUTION INTRAVENOUS at 18:13

## 2024-04-22 RX ADMIN — Medication 10 ML: at 08:50

## 2024-04-22 RX ADMIN — MONTELUKAST SODIUM 10 MG: 10 TABLET, COATED ORAL at 22:00

## 2024-04-22 RX ADMIN — GABAPENTIN 800 MG: 400 CAPSULE ORAL at 15:30

## 2024-04-22 RX ADMIN — MUPIROCIN 1 APPLICATION: 20 OINTMENT TOPICAL at 08:50

## 2024-04-22 RX ADMIN — PROPOFOL 40 MCG/KG/MIN: 10 INJECTION, EMULSION INTRAVENOUS at 05:46

## 2024-04-22 RX ADMIN — CETIRIZINE HYDROCHLORIDE 10 MG: 10 TABLET, FILM COATED ORAL at 08:49

## 2024-04-22 RX ADMIN — ENOXAPARIN SODIUM 40 MG: 40 INJECTION SUBCUTANEOUS at 18:36

## 2024-04-22 RX ADMIN — PROPOFOL 50 MCG/KG/MIN: 10 INJECTION, EMULSION INTRAVENOUS at 14:22

## 2024-04-22 RX ADMIN — ATORVASTATIN CALCIUM 20 MG: 20 TABLET, FILM COATED ORAL at 22:00

## 2024-04-22 RX ADMIN — CHLORHEXIDINE GLUCONATE 15 ML: 1.2 RINSE ORAL at 22:02

## 2024-04-22 RX ADMIN — LEVOTHYROXINE SODIUM 200 MCG: 100 TABLET ORAL at 08:49

## 2024-04-22 RX ADMIN — TOPIRAMATE 25 MG: 25 TABLET, FILM COATED ORAL at 08:49

## 2024-04-22 RX ADMIN — DEXMEDETOMIDINE 1.5 MCG/KG/HR: 100 INJECTION, SOLUTION INTRAVENOUS at 23:01

## 2024-04-22 RX ADMIN — FLUOXETINE HYDROCHLORIDE 20 MG: 20 CAPSULE ORAL at 08:49

## 2024-04-22 RX ADMIN — IPRATROPIUM BROMIDE AND ALBUTEROL SULFATE 3 ML: .5; 2.5 SOLUTION RESPIRATORY (INHALATION) at 19:49

## 2024-04-22 RX ADMIN — PANTOPRAZOLE SODIUM 40 MG: 40 INJECTION, POWDER, FOR SOLUTION INTRAVENOUS at 05:43

## 2024-04-22 RX ADMIN — MUPIROCIN 1 APPLICATION: 20 OINTMENT TOPICAL at 22:00

## 2024-04-22 RX ADMIN — DOCUSATE SODIUM 50 MG AND SENNOSIDES 8.6 MG 2 TABLET: 8.6; 5 TABLET, FILM COATED ORAL at 08:50

## 2024-04-22 RX ADMIN — DEXMEDETOMIDINE 1.5 MCG/KG/HR: 100 INJECTION, SOLUTION INTRAVENOUS at 13:09

## 2024-04-22 RX ADMIN — DEXMEDETOMIDINE 1.5 MCG/KG/HR: 100 INJECTION, SOLUTION INTRAVENOUS at 02:33

## 2024-04-22 RX ADMIN — FLUOXETINE HYDROCHLORIDE 40 MG: 20 CAPSULE ORAL at 08:49

## 2024-04-22 RX ADMIN — PROPOFOL 50 MCG/KG/MIN: 10 INJECTION, EMULSION INTRAVENOUS at 18:47

## 2024-04-22 RX ADMIN — FUROSEMIDE 20 MG: 20 TABLET ORAL at 08:49

## 2024-04-22 RX ADMIN — PROPOFOL 50 MCG/KG/MIN: 10 INJECTION, EMULSION INTRAVENOUS at 22:26

## 2024-04-22 RX ADMIN — CHLORHEXIDINE GLUCONATE 15 ML: 1.2 RINSE ORAL at 08:51

## 2024-04-22 RX ADMIN — PROPOFOL 50 MCG/KG/MIN: 10 INJECTION, EMULSION INTRAVENOUS at 09:07

## 2024-04-22 RX ADMIN — GABAPENTIN 800 MG: 400 CAPSULE ORAL at 05:43

## 2024-04-22 RX ADMIN — IPRATROPIUM BROMIDE AND ALBUTEROL SULFATE 3 ML: .5; 2.5 SOLUTION RESPIRATORY (INHALATION) at 07:02

## 2024-04-22 RX ADMIN — GABAPENTIN 800 MG: 400 CAPSULE ORAL at 22:00

## 2024-04-22 RX ADMIN — ROPINIROLE HYDROCHLORIDE 2 MG: 1 TABLET, FILM COATED ORAL at 22:00

## 2024-04-22 NOTE — CASE MANAGEMENT/SOCIAL WORK
Discharge Planning Assessment   Jose     Patient Name: Jes Moreno  MRN: [id number]  Today's Date: 4/22/2024    Admit Date: 4/19/2024       Discharge Plan       Row Name 04/22/24 1438       Plan    Plan SS received consult for intubated. SS visited pt's room and family wasn't present. SS attempted contact with mother, Jumana Carbone without success and was unable to leave a message. SS to follow and assist as needed with discharge planning.                 Expected Discharge Date and Time       Expected Discharge Date Expected Discharge Time    Apr 23, 2024         KRIS Alberts

## 2024-04-22 NOTE — CONSULTS
Diabetes Education    Patient Name:  Jes Moreno  YOB: 1985  MRN: [id number]  Admit Date:  4/19/2024        Patient intubated will continue to follow.       Electronically signed by:  Dalia Cristina RN  04/22/24 13:36 EDT

## 2024-04-22 NOTE — PROGRESS NOTES
"    [facility] HOSPITALIST PROGRESS NOTE     Patient Identification:  Name:  Jes Moreno  Age:  38 y.o.  Sex:  female  :  1985  MRN:  [id number]  Visit Number:  [id number]  ROOM: [address]     Primary Care Provider:  Juany Sheehan APRN    Length of stay in inpatient status:  3    Subjective     Chief Compliant:    Chief Complaint   Patient presents with    Altered Mental Status       History of Presenting Illness:    Patient seen and examined this morning and discussed in multi-disciplinary rounds. Discussed with Dr. Felton. Patient only able to provide limited history due to being intubated, but when sedation weaned she mouthed \"I can't breathe\" and shook her head \"no\" when asked if she was in pain. She was not able to pull adequate volumes during breathing trial this morning and became very tachypneic so sedation and mechanical ventilation was restarted.     Objective     Current Hospital Meds:atorvastatin, 20 mg, Oral, Nightly  [Held by provider] buprenorphine-naloxone, 3 tablet, Sublingual, Daily  buPROPion XL, 450 mg, Oral, Daily  cetirizine, 10 mg, Oral, Daily  chlorhexidine, 15 mL, Mouth/Throat, Q12H  enoxaparin, 40 mg, Subcutaneous, Q24H  FLUoxetine, 20 mg, Oral, Daily  FLUoxetine, 40 mg, Oral, Daily  furosemide, 20 mg, Oral, Daily  gabapentin, 800 mg, Oral, Q8H  levothyroxine, 200 mcg, Oral, Daily  montelukast, 10 mg, Oral, Nightly  mupirocin, 1 Application, Topical, Q12H  pantoprazole, 40 mg, Intravenous, Q AM  rOPINIRole, 2 mg, Oral, Nightly  senna-docusate sodium, 2 tablet, Oral, BID  sodium chloride, 10 mL, Intravenous, Q12H  sodium chloride, 10 mL, Intravenous, Q12H  sodium chloride, 10 mL, Intravenous, Q12H  sodium chloride, 10 mL, Intravenous, Q12H  sodium chloride, 10 mL, Intravenous, Q12H  topiramate, 25 mg, Oral, Daily    dexmedetomidine, 0.2-1.5 mcg/kg/hr, Last Rate: 50 mcg/kg/hr (24 0913)  dextrose 5 % and sodium chloride 0.45 %, 150 mL/hr  dextrose 5 % and sodium " chloride 0.45 % with KCl 20 mEq/L, 150 mL/hr, Last Rate: 150 mL/hr (04/22/24 0448)  dextrose 5 % and sodium chloride 0.45 % with KCl 40 mEq/L, 150 mL/hr  dextrose 5 % and sodium chloride 0.9 %, 150 mL/hr  dextrose 5 % and sodium chloride 0.9 % with KCl 20 mEq, 150 mL/hr  dextrose 5% and sodium chloride 0.9% with KCl 40 mEq/L, 150 mL/hr  fentanyl 10 mcg/mL,  mcg/hr, Last Rate: Stopped (04/21/24 1818)  insulin, 0-100 Units/hr, Last Rate: 1.3 Units/hr (04/22/24 0949)  propofol, 50 mcg/kg/min, Last Rate: 50 mcg/kg/min (04/22/24 0953)  sodium chloride 0.45 % 1,000 mL with potassium chloride 40 mEq infusion, 250 mL/hr  sodium chloride, 250 mL/hr  sodium chloride 0.45 % with KCl 20 mEq, 250 mL/hr, Last Rate: Stopped (04/19/24 1459)  sodium chloride, 250 mL/hr  sodium chloride 0.9 % with KCl 20 mEq, 250 mL/hr  sodium chloride 0.9 % with KCl 40 mEq/L, 250 mL/hr        Current Antimicrobial Therapy:  Anti-Infectives (From admission, onward)      None          Current Diuretic Therapy:  Diuretics (From admission, onward)      Ordered     Dose/Rate Route Frequency Start Stop    04/19/24 1726  furosemide (LASIX) tablet 20 mg        Ordering Provider: Jono Valencia DO    20 mg Oral Daily 04/20/24 0900            ----------------------------------------------------------------------------------------------------------------------  Vital Signs:  Temp:  [98.8 °F (37.1 °C)-100.1 °F (37.8 °C)] 99 °F (37.2 °C)  Heart Rate:  [61-75] 65  Resp:  [18-34] 34  BP: ()/(48-73) 104/62  FiO2 (%):  [35 %] 35 %  SpO2:  [94 %-100 %] 97 %  on   ;   Device (Oxygen Therapy): ventilator  Body mass index is 24.07 kg/m².    Wt Readings from Last 3 Encounters:   04/22/24 69.7 kg (153 lb 11.2 oz)   02/21/24 66.1 kg (145 lb 12.8 oz)   08/13/23 73.5 kg (162 lb)     Intake & Output (last 3 days)         04/19 0701  04/20 0700 04/20 0701  04/21 0700 04/21 0701  04/22 0700 04/22 0701  04/23 0700    I.V. (mL/kg) 3285.4 (41.1) 4761.3 (59.5)  4866 (60.8) 735.1 (9.2)    NG/GT 30       IV Piggyback  1000      Total Intake(mL/kg) 3315.4 (41.4) 5761.3 (72) 4866 (60.8) 735.1 (9.2)    Urine (mL/kg/hr) 1150 2650 (1.4) 3725 (1.9) 750 (2.6)    Emesis/NG output 200 60 120     Stool 0 0 0     Total Output 1350 2710 3845 750    Net +1965.4 +3051.3 +1021 -14.9            Urine Unmeasured Occurrence  0 x 0 x     Stool Unmeasured Occurrence 0 x 0 x 0 x     Emesis Unmeasured Occurrence   0 x           NPO Diet NPO Type: Strict NPO  ----------------------------------------------------------------------------------------------------------------------  Physical exam:   Constitutional:  Well-developed and well-nourished. Mild respiratory distress    HENT:  Head:  Normocephalic and atraumatic. ET and OG tubes in place  Cardiovascular:  Normal rate, regular rhythm   Pulmonary/Chest: Tachypneic, breath sounds equal bilaterally, no overt wheezing, crackles, or rhonchi  Musculoskeletal:  No deformity.    Neurological: Drowsy, but awake, follows some commands, no focal deficit noted on gross examination  Skin:  Skin is warm and dry.  Peripheral vascular:  No cyanosis, no edema.  Psychiatric: Anxious     ----------------------------------------------------------------------------------------------------------------------  Results from last 7 days   Lab Units 04/22/24  0022 04/21/24  0027 04/20/24  0025 04/19/24  1401 04/19/24  0945   LACTATE mmol/L  --   --   --  1.4 3.1*   WBC 10*3/mm3 11.97* 11.08* 10.24  --  15.71*   HEMOGLOBIN g/dL 10.8* 10.5* 10.5*  --  11.7*   HEMATOCRIT % 34.1 33.7* 35.8  --  36.1   MCV fL 94.7 94.9 99.4*  --  91.4   MCHC g/dL 31.7 31.2* 29.3*  --  32.4   PLATELETS 10*3/mm3 329 316 339  --  409   INR   --   --   --   --  1.04     Results from last 7 days   Lab Units 04/19/24  1002   PH, ARTERIAL pH units 7.321*   PO2 ART mm Hg 142.0*   PCO2, ARTERIAL mm Hg 37.4   HCO3 ART mmol/L 19.3*     Results from last 7 days   Lab Units 04/22/24  0722 04/22/24  0407  "04/22/24  0022 04/20/24  0329 04/20/24  0026 04/19/24  1143 04/19/24  0945   SODIUM mmol/L 139 140 140   < > 141  141   < > 137   POTASSIUM mmol/L 4.2 4.3 4.2   < > 5.0  5.0   < > 3.7   MAGNESIUM mg/dL 1.8 1.8 1.9   < > 2.1   < >  --    CHLORIDE mmol/L 109* 111* 109*   < > 114*  114*   < > 102   CO2 mmol/L 22.0 21.8* 20.5*   < > 19.1*  19.1*   < > 16.6*   BUN mg/dL 2* 2* 2*   < > 15  15   < > 22*   CREATININE mg/dL 0.59 0.60 0.62   < > 0.72  0.72   < > 1.01*   CALCIUM mg/dL 7.8* 7.7* 7.6*   < > 8.3*  8.3*   < > 9.0   PHOSPHORUS mg/dL 4.0 4.1 4.1   < > 2.7   < >  --    GLUCOSE mg/dL 159* 157* 139*   < > 134*  134*   < > 542*   ALBUMIN g/dL  --   --   --   --  3.0*  --  3.5   BILIRUBIN mg/dL  --   --   --   --  <0.2  --  0.2   ALK PHOS U/L  --   --   --   --  112  --  131*   AST (SGOT) U/L  --   --   --   --  26  --  25   ALT (SGPT) U/L  --   --   --   --  22  --  23    < > = values in this interval not displayed.   Estimated Creatinine Clearance: 145 mL/min (by C-G formula based on SCr of 0.59 mg/dL).  No results found for: \"AMMONIA\"  Results from last 7 days   Lab Units 04/19/24  1143 04/19/24  0945   HSTROP T ng/L 10 11             Hemoglobin A1C   Date/Time Value Ref Range Status   04/19/2024 1143 9.00 (H) 4.80 - 5.60 % Final     Glucose   Date/Time Value Ref Range Status   04/22/2024 0948 141 (H) 70 - 130 mg/dL Final   04/22/2024 0749 151 (H) 70 - 130 mg/dL Final   04/22/2024 0540 138 (H) 70 - 130 mg/dL Final   04/22/2024 0337 145 (H) 70 - 130 mg/dL Final   04/22/2024 0134 139 (H) 70 - 130 mg/dL Final   04/21/2024 2337 138 (H) 70 - 130 mg/dL Final   04/21/2024 2234 147 (H) 70 - 130 mg/dL Final   04/21/2024 2134 133 (H) 70 - 130 mg/dL Final     Lab Results   Component Value Date    TSH 0.732 08/12/2023    FREET4 1.80 (H) 06/20/2021     Lab Results   Component Value Date    PREGTESTUR Negative 08/30/2020     Pain Management Panel  More data exists         Latest Ref Rng & Units 4/19/2024 2/21/2024   Pain " Management Panel   Amphetamine, Urine Qual Negative Positive  Negative    Barbiturates Screen, Urine Negative Negative  Negative    Benzodiazepine Screen, Urine Negative Positive  Negative    Buprenorphine, Screen, Urine Negative Positive  Positive    Cocaine Screen, Urine Negative Negative  Negative    Fentanyl, Urine Negative Negative  Negative    Methadone Screen , Urine Negative Negative  Negative    Methamphetamine, Ur Negative Positive  Negative      Brief Urine Lab Results  (Last result in the past 365 days)        Color   Clarity   Blood   Leuk Est   Nitrite   Protein   CREAT   Urine HCG        04/19/24 0945 Dark Yellow   Clear   Negative   Negative   Negative   Trace                 Blood Culture   Date Value Ref Range Status   04/19/2024 Staphylococcus epidermidis (C)  Final   04/19/2024 Staphylococcus epidermidis (C)  Final       Results from last 7 days   Lab Units 04/19/24  1401 04/19/24  0945   PROCALCITONIN ng/mL  --  0.08   LACTATE mmol/L 1.4 3.1*       I have personally looked at the labs and they are summarized above.  ----------------------------------------------------------------------------------------------------------------------  Detailed radiology reports for the last 24 hours:  Imaging Results (Last 24 Hours)       Procedure Component Value Units Date/Time    XR Chest 1 View [[id number]] Collected: 04/22/24 0727     Updated: 04/22/24 0731    Narrative:       VERIFICATION OBSERVER NAME: Noy Knowles MD.     TECHNIQUE, ADDITIONAL HISTORY, FINDINGS: Single frontal view of the  chest was obtained.   Comparison study date : Prior day. Time : 6:21 AM EST.     HISTORY: Intubated patient     ET tube and nasogastric tube are appropriately positioned.  Development of bilateral pleural-parenchymal changes.  Minimal cardiac enlargement.       Impression:      Development of bilateral pleural-parenchymal changes consisting of small  effusion with underlying infiltrates.  Findings could  be  inflammatory/infectious in origin or related to CHF              [id number]     ZIP Code [id number].              This report was finalized on 4/22/2024 7:29 AM by Dr. Noy Knowles MD.             Assessment & Plan      #Acute encephalopathy  #Respiratory failure  #Polysubstance abuse  - Initially intubated for airway protection. Patient failed spontaneous breathing trial today as she was not able to pull adequate volumes, was agitated, and had lots of respiratory secretions.   - Pulmonology assisting with ventilator management, appreciate assistance.   - In the setting of chronic tobacco use there is possibility of undiagnosed COPD. If she continues to have difficulty with weaning from ventilator would consider steroids. Continue nebulizer treatments.   - Discussed with patient's mother at bedside this evening and she wishes to pursue Nik's Law as patient has a long history of substance use and she is very worried for her daughter's safety. Will request social work/case management consult to assist with this process.   - CXR today showed improvement in volume overload. Echo from 2019 reviewed which showed LV EF 61-65%, normal LV systolic function. Will obtain repeat echo to evaluate for change in cardiac function as imaging has shown findings of volume overload.    #DKA  - now resolved. Continue IV insulin drip for now with goal blood sugar 140-180. Monitor accuchecks. Starting tube feeds so insulin requirements will likely increase.      VTE Prophylaxis:   Mechanical Order History:       None          Pharmalogical Order History:        Ordered     Dose Route Frequency Stop    04/19/24 1633  Enoxaparin Sodium (LOVENOX) syringe 40 mg         40 mg SC Every 24 Hours --                    Dispo: pending clinical improvement     Madalyn Villalba DO  [facility] Hospitalist  04/22/24  10:38 EDT

## 2024-04-22 NOTE — PROGRESS NOTES
Central Venous Catheter Insertion Procedure Note    Central venous cathter insertion using ultrasound     Indications:  Poor iv access, critically ill patient    Procedure Details   Informed consent was obtained for the procedure from mother, including sedation.  Risks of lung perforation, hemorrhage, arrhythmia, and adverse drug reaction were discussed.     Maximum sterile technique was used including antiseptics, cap, gloves, gown, hand hygiene, mask and sheet.    Under sterile conditions the skin above the left IJ location, ultrasound was used through out  the procedure , the left IJ area was prepped with chlor hexidine and covered with a sterile drape. Local anesthesia was applied to the skin and subcutaneous tissues. With the help of USG the vein was identified and needle was guided in that - blood was aspirated. A guide wire was then passed easily through the catheter. There were no arrhythmias. Then central venous cathter was introduced over guide wire and the guide wire was taken out through the brown port. The line was sutured in place.  Findings:  There were no changes to vital signs. Catheter was flushed withNS. Patient did tolerate procedure well.    Recommendations:  CXR ordered to verify placement.

## 2024-04-22 NOTE — CONSULTS
Chief Complaint:  Pulmonary and critical care is following for ventilator management and critical illness management          HPI-most of the history is obtained from patient's medical chart.  Patient is a 38-year-old female presented to [facility] due to change in mental status.  Patient was intubated and sedated on my assessment.  Patient was intubated for airway protection in the ER.    All the labs medications ins and outs and vitals since the time of admission reviewed.  Treatment given in the ER then on the floor reviewed.  MDR done at bedside with patient's nurse and respiratory therapist.    Review of Systems:        ROS cannot be obtained as patient is intubated and sedated    Vital Signs  Temp:  [98.8 °F (37.1 °C)-100.1 °F (37.8 °C)] 99 °F (37.2 °C)  Heart Rate:  [67-75] 71  Resp:  [18-20] 18  BP: ()/(48-73) 97/52  FiO2 (%):  [35 %] 35 %  Body mass index is 24.07 kg/m².    Intake/Output Summary (Last 24 hours) at 4/22/2024 0953  Last data filed at 4/22/2024 0831  Gross per 24 hour   Intake 5601.03 ml   Output 4395 ml   Net 1206.03 ml     I/O this shift:  In: 735.1 [I.V.:735.1]  Out: 550 [Urine:550]    Physical Exam:   GENERAL APPEARANCE: Intubated and sedated.  Appears to be resting comfortably in bed.     HEAD: normocephalic.    EYES: PERRLA.    THROAT: ET tube in place. Oral cavity and pharynx normal. No inflammation, swelling, exudate, or lesions.     NECK: Supple.     CARDIAC: Normal S1 and S2. No S3, S4 or murmurs. Rhythm is regular.     LUNGS: Clear to auscultation and percussion without rales, rhonchi, wheezing or diminished breath sounds.    ABDOMEN: Positive bowel sounds. Soft, nondistended, nontender.     EXTREMITIES: No significant deformity or joint abnormality. No edema. Peripheral pulses intact.    NEUROLOGICAL: Unable to assess due to sedation status.     PSYCHIATRIC: Unable to assess due to sedation status     Results Review:     I reviewed the patient's new clinical  results.  Results from last 7 days   Lab Units 04/22/24  0022 04/21/24  0027 04/20/24  0025   WBC 10*3/mm3 11.97* 11.08* 10.24   HEMOGLOBIN g/dL 10.8* 10.5* 10.5*   PLATELETS 10*3/mm3 329 316 339     Results from last 7 days   Lab Units 04/22/24  0722 04/22/24  0407 04/22/24  0022   SODIUM mmol/L 139 140 140   POTASSIUM mmol/L 4.2 4.3 4.2   CHLORIDE mmol/L 109* 111* 109*   CO2 mmol/L 22.0 21.8* 20.5*   BUN mg/dL 2* 2* 2*   CREATININE mg/dL 0.59 0.60 0.62   CALCIUM mg/dL 7.8* 7.7* 7.6*   GLUCOSE mg/dL 159* 157* 139*   MAGNESIUM mg/dL 1.8 1.8 1.9     Lab Results   Component Value Date    INR 1.04 04/19/2024    INR 0.93 02/21/2024    INR 1.00 08/12/2023    PROTIME 14.1 04/19/2024    PROTIME 13.0 02/21/2024    PROTIME 13.7 08/12/2023     Results from last 7 days   Lab Units 04/20/24  0026 04/19/24  0945   ALK PHOS U/L 112 131*   BILIRUBIN mg/dL <0.2 0.2   ALT (SGPT) U/L 22 23   AST (SGOT) U/L 26 25     Results from last 7 days   Lab Units 04/19/24  1002   PH, ARTERIAL pH units 7.321*   PO2 ART mm Hg 142.0*   PCO2, ARTERIAL mm Hg 37.4   HCO3 ART mmol/L 19.3*     Imaging Results (Last 24 Hours)       Procedure Component Value Units Date/Time    XR Chest 1 View [[id number]] Collected: 04/22/24 0727     Updated: 04/22/24 0731    Narrative:       VERIFICATION OBSERVER NAME: Noy Knowles MD.     TECHNIQUE, ADDITIONAL HISTORY, FINDINGS: Single frontal view of the  chest was obtained.   Comparison study date : Prior day. Time : 6:21 AM EST.     HISTORY: Intubated patient     ET tube and nasogastric tube are appropriately positioned.  Development of bilateral pleural-parenchymal changes.  Minimal cardiac enlargement.       Impression:      Development of bilateral pleural-parenchymal changes consisting of small  effusion with underlying infiltrates.  Findings could be  inflammatory/infectious in origin or related to CHF              [id number]     ZIP Code [id number].              This report was finalized on 4/22/2024 7:29 AM by   Noy Knowles MD.                    atorvastatin, 20 mg, Oral, Nightly  [Held by provider] buprenorphine-naloxone, 3 tablet, Sublingual, Daily  buPROPion XL, 450 mg, Oral, Daily  cetirizine, 10 mg, Oral, Daily  chlorhexidine, 15 mL, Mouth/Throat, Q12H  enoxaparin, 40 mg, Subcutaneous, Q24H  FLUoxetine, 20 mg, Oral, Daily  FLUoxetine, 40 mg, Oral, Daily  furosemide, 20 mg, Oral, Daily  gabapentin, 800 mg, Oral, Q8H  levothyroxine, 200 mcg, Oral, Daily  montelukast, 10 mg, Oral, Nightly  mupirocin, 1 Application, Topical, Q12H  pantoprazole, 40 mg, Intravenous, Q AM  rOPINIRole, 2 mg, Oral, Nightly  senna-docusate sodium, 2 tablet, Oral, BID  sodium chloride, 10 mL, Intravenous, Q12H  sodium chloride, 10 mL, Intravenous, Q12H  sodium chloride, 10 mL, Intravenous, Q12H  sodium chloride, 10 mL, Intravenous, Q12H  sodium chloride, 10 mL, Intravenous, Q12H  topiramate, 25 mg, Oral, Daily      dexmedetomidine, 0.2-1.5 mcg/kg/hr, Last Rate: Stopped (04/22/24 0913)  dextrose 5 % and sodium chloride 0.45 %, 150 mL/hr  dextrose 5 % and sodium chloride 0.45 % with KCl 20 mEq/L, 150 mL/hr, Last Rate: 150 mL/hr (04/22/24 0448)  dextrose 5 % and sodium chloride 0.45 % with KCl 40 mEq/L, 150 mL/hr  dextrose 5 % and sodium chloride 0.9 %, 150 mL/hr  dextrose 5 % and sodium chloride 0.9 % with KCl 20 mEq, 150 mL/hr  dextrose 5% and sodium chloride 0.9% with KCl 40 mEq/L, 150 mL/hr  fentanyl 10 mcg/mL,  mcg/hr, Last Rate: Stopped (04/21/24 1818)  insulin, 0-100 Units/hr, Last Rate: 1.3 Units/hr (04/22/24 0949)  propofol, 50 mcg/kg/min, Last Rate: 50 mcg/kg/min (04/22/24 0907)  sodium chloride 0.45 % 1,000 mL with potassium chloride 40 mEq infusion, 250 mL/hr  sodium chloride, 250 mL/hr  sodium chloride 0.45 % with KCl 20 mEq, 250 mL/hr, Last Rate: Stopped (04/19/24 1655)  sodium chloride, 250 mL/hr  sodium chloride 0.9 % with KCl 20 mEq, 250 mL/hr  sodium chloride 0.9 % with KCl 40 mEq/L, 250 mL/hr        Medication Review:     Latest Reference Range & Units 04/19/24 10:02   pH, Arterial 7.350 - 7.450 pH units 7.321 (L)   pCO2, Arterial 35.0 - 45.0 mm Hg 37.4   pO2, Arterial 83.0 - 108.0 mm Hg 142.0 (H)   HCO3, Arterial 20.0 - 26.0 mmol/L 19.3 (L)   Base Excess 0.0 - 2.0 mmol/L -6.2 (L)   O2 Saturation, Arterial 94.0 - 99.0 % >99.2 (H)   CO2 Content 22 - 33 mmol/L 20.4 (L)   A-a DO2 0.0 - 300.0 mmHg 54.3   Carboxyhemoglobin 0 - 5 % 0.9   Methemoglobin 0.00 - 3.00 % 0.20   Oxyhemoglobin 94 - 99 % 98.2   Hematocrit, Blood Gas 38.0 - 51.0 % 35.6 (L)   Hemoglobin, Blood Gas 13.5 - 17.5 g/dL 11.6 (L)   Site  Left Brachial   Saul's Test  N/A   Modality  Ventilator   FIO2 % 35   Ventilator Mode  VC/AC   Set Tidal Volume  450.00   Set Mech Resp Rate  18.0   PEEP  5.0   Barometric Pressure for Blood Gas mmHg 727   (L): Data is abnormally low  (H): Data is abnormally high  Microbiology Results (last 10 days)       Procedure Component Value - Date/Time    Blood Culture - Blood, Blood, Central Line [[id number]]  (Abnormal) Collected: 04/19/24 0947    Lab Status: Final result Specimen: Blood, Central Line Updated: 04/22/24 0610     Blood Culture Staphylococcus epidermidis     Isolated from Aerobic Bottle     Gram Stain Aerobic Bottle Gram positive cocci    Narrative:      Refer to previous blood culture collected on 04/19/2024 0947 for MICs      Blood Culture - Blood, Arm, Right [[id number]]  (Abnormal)  (Susceptibility) Collected: 04/19/24 0947    Lab Status: Final result Specimen: Blood from Arm, Right Updated: 04/22/24 0610     Blood Culture Staphylococcus epidermidis     Isolated from Aerobic Bottle     Gram Stain Aerobic Bottle Gram positive cocci    Susceptibility        Staphylococcus epidermidis      ILIANA      Oxacillin Resistant      Vancomycin Susceptible                           Blood Culture ID, PCR - Blood, Arm, Right [[id number]]  (Abnormal) Collected: 04/19/24 0947    Lab Status: Final result Specimen: Blood from Arm, Right Updated:  04/20/24 0521     BCID, PCR Staph spp, not aureus or lugdunensis. Identification by BCID2 PCR.     BOTTLE TYPE Aerobic Bottle            Latest Reference Range & Units 04/19/24 11:43 04/19/24 13:01   Acetone Negative  Small !!    Amphetamine, Urine Qual Negative   Positive !   Barbiturates Screen, Urine Negative   Negative   Benzodiazepine Screen, Urine Negative   Positive !   Buprenorphine, Screen, Urine Negative   Positive !   Cocaine Screen, Urine Negative   Negative   Fentanyl, Urine Negative   Negative   Methamphetamine, Ur Negative   Positive !   Methadone Screen , Urine Negative   Negative   Opiate Screen Negative   Negative   Oxycodone Screen, Urine Negative   Negative   Phencyclidine (PCP), Urine Negative   Negative   THC Screen, Urine Negative   Negative   Tricyclic Antidepressants Screen Negative   Negative   !!: Data is critical  !: Data is abnormal  Assessment & Plan     Neurology- Sedated -drips reviewed.  Adjusted for a RASS goal of -1 to -2.  SAT was done and patient failed.  Had lots of respiratory secretions and was agitated.  EKG tomorrow morning for QTc interval.  If stable will consider starting on Zyprexa.    Respiratory-intubated for airway protection.  Latest ABG and chest x-ray reviewed.  Ventilator settings were adjusted.  Adjusted FiO2.  Will get venous blood gas tomorrow morning.  CT head reviewed-no acute intracranial process identified.  Vent settings   Vent Settings          Resp Rate (Set): 18  Pressure Support (cm H2O): 8 cm H20  FiO2 (%): 35 %  PEEP/CPAP (cm H2O): 5 cm H20    Minute Ventilation (L/min) (Obs): 8.43 L/min  Resp Rate (Observed) Vent: 18     I:E Ratio (Obs): 1:3.1    PIP Observed (cm H2O): 20 cm H2O      Failed weaning.  SAT and SBT again tomorrow morning.    VAP- precautions  Head end - 30 %  Mouth care   DVT prophylaxis    Chest x-ray-latest reviewed  Shows volume status improvement.  ABG-latest reviewed    Cardiology- hemodynamically -stable.  Continue to monitor HR-  rate and rhythm, BP   Results for orders placed during the hospital encounter of 03/12/19    Adult Transthoracic Echo Complete W/ Cont if Necessary Per Protocol    Interpretation Summary  · Normal left ventricular cavity size and wall thickness noted. All left ventricular wall segments contract normally.  · Estimated EF appears to be in the range of 61 - 65%.  · The aortic valve is structurally normal. No aortic valve regurgitation is present. No aortic valve stenosis is present.  · The mitral valve is normal in structure. No mitral valve regurgitation is present. No significant mitral valve stenosis is present.  · The tricuspid valve is normal. No evidence of tricuspid valve stenosis is present. Trace tricuspid valve regurgitation is present. Estimated right ventricular systolic pressure from tricuspid regurgitation is normal (<35 mmHg).  · There is no evidence of pericardial effusion.    Changed central line    Nephrology- Cr and BUN stable  I/O-reviewed.  Electrolytes reviewed and adjusted    GI- PPI prophylaxis  TUBE feeds-will start.    Hematology- CBC  Hb  platelet  WBC    ID  Culture  And Antibiotics  Will repeat blood cultures.      Endocrinology- Maintain Blood sugar 140 -180  Continue insulin drip.  Latest blood sugars reviewed.      Electrolytes-   Mag and phos       DVT prophylaxis-continue    Bed side rounds were done with RT and patient's nurse. All the lab and clinical findings were discussed with them and plan was also discussed in great detail.    Family member present-none.  Spoke to patient's mother on the phone and updated her about the case.  Answered her questions to her satisfaction.  Patient is currently critically ill due to change in mental status respiratory failure and hyperglycemia.  On multiple drips.  I adjusted patient's sedation and drips.  Change central line.  Case discussed with patient's nurse, RT and other multidisciplinary team members.  Case discussed with primary team and  plan was discussed. Cc 32 mins      Acute encephalopathy               Tenzin Felton MD  04/22/24  09:53 EDT

## 2024-04-22 NOTE — PLAN OF CARE
Problem: Skin Injury Risk Increased  Goal: Skin Health and Integrity  Outcome: Ongoing, Not Progressing  Intervention: Optimize Skin Protection  Recent Flowsheet Documentation  Taken 4/22/2024 0619 by Betty Vicente RN  Head of Bed ([facility]) Positioning: HOB at 30-45 degrees  Taken 4/22/2024 0445 by Betty Vicente RN  Head of Bed ([facility]) Positioning: HOB at 30-45 degrees  Taken 4/22/2024 0203 by Betty Vicente RN  Pressure Reduction Techniques:   heels elevated off bed   frequent weight shift encouraged   positioned off wounds   pressure points protected   weight shift assistance provided  Head of Bed ([facility]OB) Positioning: HOB at 30-45 degrees  Pressure Reduction Devices:   pressure-redistributing mattress utilized   positioning supports utilized   heel offloading device utilized  Skin Protection:   adhesive use limited   incontinence pads utilized   tubing/devices free from skin contact  Taken 4/22/2024 0000 by Betty Vicente RN  Head of Bed ([facility]) Positioning: HOB at 30-45 degrees  Taken 4/21/2024 2235 by Betty Vicente RN  Head of Bed ([facility]) Positioning: HOB at 30-45 degrees  Taken 4/21/2024 2035 by Betty Vicente RN  Pressure Reduction Techniques:   frequent weight shift encouraged   heels elevated off bed   pressure points protected   weight shift assistance provided  Head of Bed ([facility]) Positioning: HOB at 30-45 degrees  Pressure Reduction Devices:   positioning supports utilized   pressure-redistributing mattress utilized   heel offloading device utilized  Skin Protection:   adhesive use limited   incontinence pads utilized   tubing/devices free from skin contact     Problem: Adult Inpatient Plan of Care  Goal: Plan of Care Review  Outcome: Ongoing, Not Progressing  Flowsheets (Taken 4/22/2024 0646)  Progress: no change  Plan of Care Reviewed With: patient  Goal: Patient-Specific Goal (Individualized)  Outcome: Ongoing, Not Progressing  Goal: Absence of Hospital-Acquired Illness or Injury  Outcome:  Ongoing, Not Progressing  Intervention: Identify and Manage Fall Risk  Recent Flowsheet Documentation  Taken 4/22/2024 0619 by Betty Vicente RN  Safety Promotion/Fall Prevention: safety round/check completed  Taken 4/22/2024 0519 by Betty Vicente RN  Safety Promotion/Fall Prevention: safety round/check completed  Taken 4/22/2024 0445 by Betty Vicente RN  Safety Promotion/Fall Prevention: safety round/check completed  Taken 4/22/2024 0336 by Betty Vicente RN  Safety Promotion/Fall Prevention: safety round/check completed  Taken 4/22/2024 0203 by Betty Vicente RN  Safety Promotion/Fall Prevention: safety round/check completed  Taken 4/22/2024 0117 by Betty Vicente RN  Safety Promotion/Fall Prevention: safety round/check completed  Taken 4/22/2024 0000 by Betty Vicente RN  Safety Promotion/Fall Prevention: safety round/check completed  Taken 4/21/2024 2322 by Betty Vicente RN  Safety Promotion/Fall Prevention: safety round/check completed  Taken 4/21/2024 2235 by Betty Vicente RN  Safety Promotion/Fall Prevention: safety round/check completed  Taken 4/21/2024 2110 by Betty Vicente RN  Safety Promotion/Fall Prevention: safety round/check completed  Taken 4/21/2024 2035 by Betty Vicente RN  Safety Promotion/Fall Prevention: safety round/check completed  Taken 4/21/2024 1900 by Betty Vicente RN  Safety Promotion/Fall Prevention: safety round/check completed  Intervention: Prevent Skin Injury  Recent Flowsheet Documentation  Taken 4/22/2024 0619 by Betty Vicente RN  Body Position:   turned   side-lying   left  Taken 4/22/2024 0445 by Betty Vicente RN  Body Position:   turned   side-lying   right  Taken 4/22/2024 0203 by Betty Vicente RN  Body Position:   turned   side-lying   left  Skin Protection:   adhesive use limited   incontinence pads utilized   tubing/devices free from skin contact  Taken 4/22/2024 0000 by Betty Vicente RN  Body Position:   turned    side-lying   right  Taken 4/21/2024 2235 by Betty Vicente RN  Body Position:   turned   side-lying   left  Taken 4/21/2024 2035 by Betty Vicente RN  Body Position:   turned   side-lying   right  Skin Protection:   adhesive use limited   incontinence pads utilized   tubing/devices free from skin contact  Intervention: Prevent and Manage VTE (Venous Thromboembolism) Risk  Recent Flowsheet Documentation  Taken 4/22/2024 0203 by Betty Vicente RN  Activity Management: bedrest  VTE Prevention/Management: (lovenox) other (see comments)  Range of Motion: ROM (range of motion) performed  Taken 4/21/2024 2035 by Betty Vicente RN  Activity Management: bedrest  Range of Motion: ROM (range of motion) performed  Goal: Optimal Comfort and Wellbeing  Outcome: Ongoing, Not Progressing  Intervention: Provide Person-Centered Care  Recent Flowsheet Documentation  Taken 4/22/2024 0203 by Betty Vicente RN  Trust Relationship/Rapport:   care explained   reassurance provided   thoughts/feelings acknowledged  Taken 4/21/2024 2035 by Betty Vicente RN  Trust Relationship/Rapport:   care explained   reassurance provided   thoughts/feelings acknowledged  Goal: Readiness for Transition of Care  Outcome: Ongoing, Not Progressing     Problem: Fall Injury Risk  Goal: Absence of Fall and Fall-Related Injury  Outcome: Ongoing, Not Progressing  Intervention: Promote Injury-Free Environment  Recent Flowsheet Documentation  Taken 4/22/2024 0619 by Betty Vicente RN  Safety Promotion/Fall Prevention: safety round/check completed  Taken 4/22/2024 0519 by Betty Vicente RN  Safety Promotion/Fall Prevention: safety round/check completed  Taken 4/22/2024 0445 by Betty Vicente RN  Safety Promotion/Fall Prevention: safety round/check completed  Taken 4/22/2024 0336 by Betty Vicente RN  Safety Promotion/Fall Prevention: safety round/check completed  Taken 4/22/2024 0203 by Betty Vicente RN  Safety Promotion/Fall  Prevention: safety round/check completed  Taken 4/22/2024 0117 by Betty Vicente RN  Safety Promotion/Fall Prevention: safety round/check completed  Taken 4/22/2024 0000 by Betty Vicente RN  Safety Promotion/Fall Prevention: safety round/check completed  Taken 4/21/2024 2322 by Betty Vicente RN  Safety Promotion/Fall Prevention: safety round/check completed  Taken 4/21/2024 2235 by Betty Vicente RN  Safety Promotion/Fall Prevention: safety round/check completed  Taken 4/21/2024 2110 by Betty Vicente RN  Safety Promotion/Fall Prevention: safety round/check completed  Taken 4/21/2024 2035 by Betty Vicente RN  Safety Promotion/Fall Prevention: safety round/check completed  Taken 4/21/2024 1900 by Betty Vicente RN  Safety Promotion/Fall Prevention: safety round/check completed     Problem: Adjustment to Illness (Sepsis/Septic Shock)  Goal: Optimal Coping  Outcome: Ongoing, Not Progressing     Problem: Bleeding (Sepsis/Septic Shock)  Goal: Absence of Bleeding  Outcome: Ongoing, Not Progressing     Problem: Glycemic Control Impaired (Sepsis/Septic Shock)  Goal: Blood Glucose Level Within Desired Range  Outcome: Ongoing, Not Progressing  Intervention: Optimize Glycemic Control  Recent Flowsheet Documentation  Taken 4/21/2024 2035 by Betty Vicente RN  Glycemic Management: blood glucose monitored     Problem: Infection Progression (Sepsis/Septic Shock)  Goal: Absence of Infection Signs and Symptoms  Outcome: Ongoing, Not Progressing  Intervention: Promote Recovery  Recent Flowsheet Documentation  Taken 4/22/2024 0203 by Betty Vicente RN  Activity Management: bedrest  Taken 4/21/2024 2035 by Betty Vicente RN  Activity Management: bedrest  Airway/Ventilation Support: pulmonary hygiene promoted  Intervention: Promote Stabilization  Recent Flowsheet Documentation  Taken 4/21/2024 2035 by Betty Vicente RN  Lung Protection Measures: ventilator synchrony promoted     Problem: Nutrition  Impaired (Sepsis/Septic Shock)  Goal: Optimal Nutrition Intake  Outcome: Ongoing, Not Progressing     Problem: Communication Impairment (Mechanical Ventilation, Invasive)  Goal: Effective Communication  Outcome: Ongoing, Not Progressing     Problem: Device-Related Complication Risk (Mechanical Ventilation, Invasive)  Goal: Optimal Device Function  Outcome: Ongoing, Not Progressing     Problem: Inability to Wean (Mechanical Ventilation, Invasive)  Goal: Mechanical Ventilation Liberation  Outcome: Ongoing, Not Progressing     Problem: Nutrition Impairment (Mechanical Ventilation, Invasive)  Goal: Optimal Nutrition Delivery  Outcome: Ongoing, Not Progressing     Problem: Skin and Tissue Injury (Mechanical Ventilation, Invasive)  Goal: Absence of Device-Related Skin and Tissue Injury  Outcome: Ongoing, Not Progressing  Intervention: Maintain Skin and Tissue Health  Recent Flowsheet Documentation  Taken 4/22/2024 0203 by Betty Vicente RN  Device Skin Pressure Protection:   absorbent pad utilized/changed   adhesive use limited   positioning supports utilized   pressure points protected  Taken 4/21/2024 2035 by Betty Vicente RN  Device Skin Pressure Protection:   absorbent pad utilized/changed   adhesive use limited   positioning supports utilized   pressure points protected     Problem: Ventilator-Induced Lung Injury (Mechanical Ventilation, Invasive)  Goal: Absence of Ventilator-Induced Lung Injury  Outcome: Ongoing, Not Progressing  Intervention: Prevent Ventilator-Associated Pneumonia  Recent Flowsheet Documentation  Taken 4/22/2024 0619 by Betty Vicente RN  Head of Bed ([facility]) Positioning: HOB at 30-45 degrees  Taken 4/22/2024 0445 by Betty Vicente RN  Head of Bed ([facility]) Positioning: HOB at 30-45 degrees  Oral Care:   swabbed with antiseptic solution   lip/mouth moisturizer applied  Taken 4/22/2024 0203 by Betty Vicente RN  Head of Bed ([facility]) Positioning: HOB at 30-45 degrees  Taken 4/22/2024 0000 by  Betty Vicente RN  Head of Bed ([facility]) Positioning: HOB at 30-45 degrees  Oral Care:   swabbed with antiseptic solution   lip/mouth moisturizer applied  Taken 4/21/2024 2235 by Betty Vicente RN  Head of Bed ([facility]) Positioning: HOB at 30-45 degrees  Taken 4/21/2024 2035 by Betty Vicente RN  Head of Bed ([facility]) Positioning: HOB at 30-45 degrees  VAP Prevention Bundle:   HOB elevation maintained   oral care regularly provided   readiness to extubate assessed   stress ulcer prophylaxis provided   vent circuit breaks minimized   VTE prophylaxis provided  VAP Prevention Contraindications: per provider order  VAP Prevention Measures: completed  Oral Care: teeth brushed - suction toothbrush  Intervention: Facilitate Lung-Protection Measures  Recent Flowsheet Documentation  Taken 4/21/2024 2035 by Betty Vicente RN  Lung Protection Measures: ventilator synchrony promoted   Goal Outcome Evaluation:  Plan of Care Reviewed With: patient        Progress: no change

## 2024-04-23 ENCOUNTER — APPOINTMENT (OUTPATIENT)
Dept: CARDIOLOGY | Facility: HOSPITAL | Age: 39
DRG: 894 | End: 2024-04-23
Payer: MEDICAID

## 2024-04-23 LAB
ANION GAP SERPL CALCULATED.3IONS-SCNC: 8.3 MMOL/L (ref 5–15)
ATMOSPHERIC PRESS: 729 MMHG
BACTERIA BLD CULT: ABNORMAL
BASE EXCESS BLDV CALC-SCNC: 2.2 MMOL/L (ref 0–2)
BASOPHILS # BLD AUTO: 0.02 10*3/MM3 (ref 0–0.2)
BASOPHILS NFR BLD AUTO: 0.2 % (ref 0–1.5)
BDY SITE: ABNORMAL
BH CV ECHO MEAS - ACS: 1.4 CM
BH CV ECHO MEAS - AO ROOT DIAM: 2.4 CM
BH CV ECHO MEAS - EDV(CUBED): 64 ML
BH CV ECHO MEAS - EDV(MOD-SP4): 73.8 ML
BH CV ECHO MEAS - EF(MOD-SP4): 63.4 %
BH CV ECHO MEAS - ESV(CUBED): 22 ML
BH CV ECHO MEAS - ESV(MOD-SP4): 27 ML
BH CV ECHO MEAS - FS: 30 %
BH CV ECHO MEAS - IVS/LVPW: 0.9 CM
BH CV ECHO MEAS - IVSD: 0.9 CM
BH CV ECHO MEAS - LA DIMENSION: 3 CM
BH CV ECHO MEAS - LAT PEAK E' VEL: 13.9 CM/SEC
BH CV ECHO MEAS - LV DIASTOLIC VOL/BSA (35-75): 40.8 CM2
BH CV ECHO MEAS - LV MASS(C)D: 118.2 GRAMS
BH CV ECHO MEAS - LV SYSTOLIC VOL/BSA (12-30): 14.9 CM2
BH CV ECHO MEAS - LVIDD: 4 CM
BH CV ECHO MEAS - LVIDS: 2.8 CM
BH CV ECHO MEAS - LVPWD: 1 CM
BH CV ECHO MEAS - MED PEAK E' VEL: 12.3 CM/SEC
BH CV ECHO MEAS - MV A MAX VEL: 104 CM/SEC
BH CV ECHO MEAS - MV E MAX VEL: 92.2 CM/SEC
BH CV ECHO MEAS - MV E/A: 0.89
BH CV ECHO MEAS - PA ACC TIME: 0.14 SEC
BH CV ECHO MEAS - RAP SYSTOLE: 10 MMHG
BH CV ECHO MEAS - RVSP: 27 MMHG
BH CV ECHO MEAS - SV(MOD-SP4): 46.8 ML
BH CV ECHO MEAS - SVI(MOD-SP4): 25.9 ML/M2
BH CV ECHO MEAS - TAPSE (>1.6): 3.6 CM
BH CV ECHO MEAS - TR MAX PG: 17 MMHG
BH CV ECHO MEAS - TR MAX VEL: 206 CM/SEC
BH CV ECHO MEASUREMENTS AVERAGE E/E' RATIO: 7.04
BOTTLE TYPE: ABNORMAL
BUN SERPL-MCNC: 3 MG/DL (ref 6–20)
BUN/CREAT SERPL: 4.6 (ref 7–25)
CALCIUM SPEC-SCNC: 8.2 MG/DL (ref 8.6–10.5)
CHLORIDE SERPL-SCNC: 106 MMOL/L (ref 98–107)
CO2 BLDA-SCNC: 29.3 MMOL/L (ref 22–33)
CO2 SERPL-SCNC: 25.7 MMOL/L (ref 22–29)
COHGB MFR BLD: 1.3 % (ref 0–5)
CREAT SERPL-MCNC: 0.65 MG/DL (ref 0.57–1)
DEPRECATED RDW RBC AUTO: 46.1 FL (ref 37–54)
EGFRCR SERPLBLD CKD-EPI 2021: 117.2 ML/MIN/1.73
EOSINOPHIL # BLD AUTO: 0.3 10*3/MM3 (ref 0–0.4)
EOSINOPHIL NFR BLD AUTO: 2.6 % (ref 0.3–6.2)
ERYTHROCYTE [DISTWIDTH] IN BLOOD BY AUTOMATED COUNT: 13.5 % (ref 12.3–15.4)
GLUCOSE BLDC GLUCOMTR-MCNC: 113 MG/DL (ref 70–130)
GLUCOSE BLDC GLUCOMTR-MCNC: 115 MG/DL (ref 70–130)
GLUCOSE BLDC GLUCOMTR-MCNC: 117 MG/DL (ref 70–130)
GLUCOSE BLDC GLUCOMTR-MCNC: 119 MG/DL (ref 70–130)
GLUCOSE BLDC GLUCOMTR-MCNC: 122 MG/DL (ref 70–130)
GLUCOSE BLDC GLUCOMTR-MCNC: 127 MG/DL (ref 70–130)
GLUCOSE BLDC GLUCOMTR-MCNC: 130 MG/DL (ref 70–130)
GLUCOSE BLDC GLUCOMTR-MCNC: 131 MG/DL (ref 70–130)
GLUCOSE BLDC GLUCOMTR-MCNC: 135 MG/DL (ref 70–130)
GLUCOSE BLDC GLUCOMTR-MCNC: 137 MG/DL (ref 70–130)
GLUCOSE BLDC GLUCOMTR-MCNC: 146 MG/DL (ref 70–130)
GLUCOSE BLDC GLUCOMTR-MCNC: 149 MG/DL (ref 70–130)
GLUCOSE BLDC GLUCOMTR-MCNC: 151 MG/DL (ref 70–130)
GLUCOSE BLDC GLUCOMTR-MCNC: 153 MG/DL (ref 70–130)
GLUCOSE BLDC GLUCOMTR-MCNC: 154 MG/DL (ref 70–130)
GLUCOSE BLDC GLUCOMTR-MCNC: 157 MG/DL (ref 70–130)
GLUCOSE BLDC GLUCOMTR-MCNC: 157 MG/DL (ref 70–130)
GLUCOSE BLDC GLUCOMTR-MCNC: 161 MG/DL (ref 70–130)
GLUCOSE BLDC GLUCOMTR-MCNC: 168 MG/DL (ref 70–130)
GLUCOSE BLDC GLUCOMTR-MCNC: 168 MG/DL (ref 70–130)
GLUCOSE BLDC GLUCOMTR-MCNC: 176 MG/DL (ref 70–130)
GLUCOSE BLDC GLUCOMTR-MCNC: 182 MG/DL (ref 70–130)
GLUCOSE SERPL-MCNC: 126 MG/DL (ref 65–99)
HCO3 BLDV-SCNC: 27.9 MMOL/L (ref 22–28)
HCT VFR BLD AUTO: 34.8 % (ref 34–46.6)
HGB BLD-MCNC: 10.9 G/DL (ref 12–15.9)
HGB BLDA-MCNC: 12.1 G/DL (ref 13.5–17.5)
IMM GRANULOCYTES # BLD AUTO: 0.05 10*3/MM3 (ref 0–0.05)
IMM GRANULOCYTES NFR BLD AUTO: 0.4 % (ref 0–0.5)
INHALED O2 CONCENTRATION: 35 %
LEFT ATRIUM VOLUME INDEX: 18.9 ML/M2
LYMPHOCYTES # BLD AUTO: 1.4 10*3/MM3 (ref 0.7–3.1)
LYMPHOCYTES NFR BLD AUTO: 12.2 % (ref 19.6–45.3)
Lab: ABNORMAL
MCH RBC QN AUTO: 28.8 PG (ref 26.6–33)
MCHC RBC AUTO-ENTMCNC: 31.3 G/DL (ref 31.5–35.7)
MCV RBC AUTO: 91.8 FL (ref 79–97)
METHGB BLD QL: 0.3 % (ref 0–3)
MODALITY: ABNORMAL
MONOCYTES # BLD AUTO: 0.65 10*3/MM3 (ref 0.1–0.9)
MONOCYTES NFR BLD AUTO: 5.7 % (ref 5–12)
NEUTROPHILS NFR BLD AUTO: 78.9 % (ref 42.7–76)
NEUTROPHILS NFR BLD AUTO: 9.07 10*3/MM3 (ref 1.7–7)
NRBC BLD AUTO-RTO: 0 /100 WBC (ref 0–0.2)
OXYHGB MFR BLDV: 48 % (ref 45–75)
PCO2 BLDV: 46.6 MM HG (ref 41–51)
PEEP RESPIRATORY: 5 CM[H2O]
PH BLDV: 7.38 PH UNITS (ref 7.32–7.42)
PLATELET # BLD AUTO: 372 10*3/MM3 (ref 140–450)
PMV BLD AUTO: 9 FL (ref 6–12)
PO2 BLDV: 28.7 MM HG (ref 27–53)
POTASSIUM SERPL-SCNC: 4 MMOL/L (ref 3.5–5.2)
QT INTERVAL: 388 MS
QTC INTERVAL: 442 MS
RBC # BLD AUTO: 3.79 10*6/MM3 (ref 3.77–5.28)
SAO2 % BLDCOV: 48.8 % (ref 45–75)
SET MECH RESP RATE: 18
SODIUM SERPL-SCNC: 140 MMOL/L (ref 136–145)
TRIGL SERPL-MCNC: 44 MG/DL (ref 0–150)
VENTILATOR MODE: ABNORMAL
VT ON VENT VENT: 450 ML
WBC NRBC COR # BLD AUTO: 11.49 10*3/MM3 (ref 3.4–10.8)

## 2024-04-23 PROCEDURE — 99232 SBSQ HOSP IP/OBS MODERATE 35: CPT | Performed by: STUDENT IN AN ORGANIZED HEALTH CARE EDUCATION/TRAINING PROGRAM

## 2024-04-23 PROCEDURE — 25010000002 ENOXAPARIN PER 10 MG: Performed by: INTERNAL MEDICINE

## 2024-04-23 PROCEDURE — 92610 EVALUATE SWALLOWING FUNCTION: CPT

## 2024-04-23 PROCEDURE — 99291 CRITICAL CARE FIRST HOUR: CPT | Performed by: INTERNAL MEDICINE

## 2024-04-23 PROCEDURE — 25010000002 PROPOFOL 10 MG/ML EMULSION: Performed by: STUDENT IN AN ORGANIZED HEALTH CARE EDUCATION/TRAINING PROGRAM

## 2024-04-23 PROCEDURE — 93005 ELECTROCARDIOGRAM TRACING: CPT | Performed by: INTERNAL MEDICINE

## 2024-04-23 PROCEDURE — 82820 HEMOGLOBIN-OXYGEN AFFINITY: CPT

## 2024-04-23 PROCEDURE — 94799 UNLISTED PULMONARY SVC/PX: CPT

## 2024-04-23 PROCEDURE — 25010000002 METHYLPREDNISOLONE PER 125 MG: Performed by: STUDENT IN AN ORGANIZED HEALTH CARE EDUCATION/TRAINING PROGRAM

## 2024-04-23 PROCEDURE — 82948 REAGENT STRIP/BLOOD GLUCOSE: CPT

## 2024-04-23 PROCEDURE — 80048 BASIC METABOLIC PNL TOTAL CA: CPT | Performed by: STUDENT IN AN ORGANIZED HEALTH CARE EDUCATION/TRAINING PROGRAM

## 2024-04-23 PROCEDURE — 84478 ASSAY OF TRIGLYCERIDES: CPT

## 2024-04-23 PROCEDURE — 93010 ELECTROCARDIOGRAM REPORT: CPT | Performed by: INTERNAL MEDICINE

## 2024-04-23 PROCEDURE — 94664 DEMO&/EVAL PT USE INHALER: CPT

## 2024-04-23 PROCEDURE — 93306 TTE W/DOPPLER COMPLETE: CPT

## 2024-04-23 PROCEDURE — 82805 BLOOD GASES W/O2 SATURATION: CPT

## 2024-04-23 PROCEDURE — 94003 VENT MGMT INPAT SUBQ DAY: CPT

## 2024-04-23 PROCEDURE — 93306 TTE W/DOPPLER COMPLETE: CPT | Performed by: SPECIALIST

## 2024-04-23 PROCEDURE — 94761 N-INVAS EAR/PLS OXIMETRY MLT: CPT

## 2024-04-23 PROCEDURE — 85025 COMPLETE CBC W/AUTO DIFF WBC: CPT | Performed by: STUDENT IN AN ORGANIZED HEALTH CARE EDUCATION/TRAINING PROGRAM

## 2024-04-23 PROCEDURE — 25810000003 SODIUM CHLORIDE 0.9 % SOLUTION: Performed by: INTERNAL MEDICINE

## 2024-04-23 RX ORDER — GUAIFENESIN 200 MG/10ML
400 LIQUID ORAL EVERY 6 HOURS PRN
Status: DISCONTINUED | OUTPATIENT
Start: 2024-04-23 | End: 2024-04-30 | Stop reason: HOSPADM

## 2024-04-23 RX ORDER — BUPRENORPHINE HYDROCHLORIDE AND NALOXONE HYDROCHLORIDE DIHYDRATE 8; 2 MG/1; MG/1
3 TABLET SUBLINGUAL DAILY
Status: DISCONTINUED | OUTPATIENT
Start: 2024-04-23 | End: 2024-04-30 | Stop reason: HOSPADM

## 2024-04-23 RX ORDER — METHYLPREDNISOLONE SODIUM SUCCINATE 125 MG/2ML
80 INJECTION, POWDER, LYOPHILIZED, FOR SOLUTION INTRAMUSCULAR; INTRAVENOUS ONCE
Status: COMPLETED | OUTPATIENT
Start: 2024-04-23 | End: 2024-04-23

## 2024-04-23 RX ORDER — OLANZAPINE 10 MG/1
10 TABLET, ORALLY DISINTEGRATING ORAL DAILY
Status: DISCONTINUED | OUTPATIENT
Start: 2024-04-23 | End: 2024-04-30 | Stop reason: HOSPADM

## 2024-04-23 RX ADMIN — LEVOTHYROXINE SODIUM 200 MCG: 100 TABLET ORAL at 08:32

## 2024-04-23 RX ADMIN — TOPIRAMATE 25 MG: 25 TABLET, FILM COATED ORAL at 08:32

## 2024-04-23 RX ADMIN — BUPRENORPHINE HYDROCHLORIDE AND NALOXONE HYDROCHLORIDE DIHYDRATE 3 TABLET: 8; 2 TABLET SUBLINGUAL at 19:36

## 2024-04-23 RX ADMIN — DOCUSATE SODIUM 50 MG AND SENNOSIDES 8.6 MG 2 TABLET: 8.6; 5 TABLET, FILM COATED ORAL at 08:32

## 2024-04-23 RX ADMIN — CHLORHEXIDINE GLUCONATE 15 ML: 1.2 RINSE ORAL at 08:25

## 2024-04-23 RX ADMIN — IPRATROPIUM BROMIDE AND ALBUTEROL SULFATE 3 ML: .5; 2.5 SOLUTION RESPIRATORY (INHALATION) at 18:26

## 2024-04-23 RX ADMIN — OLANZAPINE 10 MG: 10 TABLET, ORALLY DISINTEGRATING ORAL at 09:26

## 2024-04-23 RX ADMIN — POTASSIUM CHLORIDE, DEXTROSE MONOHYDRATE AND SODIUM CHLORIDE 150 ML/HR: 150; 5; 450 INJECTION, SOLUTION INTRAVENOUS at 21:44

## 2024-04-23 RX ADMIN — GABAPENTIN 800 MG: 400 CAPSULE ORAL at 06:10

## 2024-04-23 RX ADMIN — FLUOXETINE HYDROCHLORIDE 40 MG: 20 CAPSULE ORAL at 08:32

## 2024-04-23 RX ADMIN — Medication 10 ML: at 08:25

## 2024-04-23 RX ADMIN — CETIRIZINE HYDROCHLORIDE 10 MG: 10 TABLET, FILM COATED ORAL at 08:32

## 2024-04-23 RX ADMIN — DEXMEDETOMIDINE 1.5 MCG/KG/HR: 100 INJECTION, SOLUTION INTRAVENOUS at 06:43

## 2024-04-23 RX ADMIN — POTASSIUM CHLORIDE, DEXTROSE MONOHYDRATE AND SODIUM CHLORIDE 150 ML/HR: 150; 5; 450 INJECTION, SOLUTION INTRAVENOUS at 01:07

## 2024-04-23 RX ADMIN — POTASSIUM CHLORIDE, DEXTROSE MONOHYDRATE AND SODIUM CHLORIDE 150 ML/HR: 150; 5; 450 INJECTION, SOLUTION INTRAVENOUS at 07:30

## 2024-04-23 RX ADMIN — IPRATROPIUM BROMIDE AND ALBUTEROL SULFATE 3 ML: .5; 2.5 SOLUTION RESPIRATORY (INHALATION) at 07:20

## 2024-04-23 RX ADMIN — INSULIN HUMAN 2.7 UNITS/HR: 1 INJECTION, SOLUTION INTRAVENOUS at 11:03

## 2024-04-23 RX ADMIN — PANTOPRAZOLE SODIUM 40 MG: 40 INJECTION, POWDER, FOR SOLUTION INTRAVENOUS at 06:10

## 2024-04-23 RX ADMIN — Medication 10 ML: at 20:44

## 2024-04-23 RX ADMIN — FLUOXETINE HYDROCHLORIDE 20 MG: 20 CAPSULE ORAL at 08:32

## 2024-04-23 RX ADMIN — MUPIROCIN 1 APPLICATION: 20 OINTMENT TOPICAL at 20:44

## 2024-04-23 RX ADMIN — PROPOFOL 50 MCG/KG/MIN: 10 INJECTION, EMULSION INTRAVENOUS at 02:05

## 2024-04-23 RX ADMIN — METHYLPREDNISOLONE SODIUM SUCCINATE 80 MG: 125 INJECTION, POWDER, FOR SOLUTION INTRAMUSCULAR; INTRAVENOUS at 09:49

## 2024-04-23 RX ADMIN — ENOXAPARIN SODIUM 40 MG: 40 INJECTION SUBCUTANEOUS at 18:37

## 2024-04-23 RX ADMIN — MUPIROCIN 1 APPLICATION: 20 OINTMENT TOPICAL at 08:25

## 2024-04-23 RX ADMIN — PROPOFOL 50 MCG/KG/MIN: 10 INJECTION, EMULSION INTRAVENOUS at 06:44

## 2024-04-23 RX ADMIN — POTASSIUM CHLORIDE, DEXTROSE MONOHYDRATE AND SODIUM CHLORIDE 150 ML/HR: 150; 5; 450 INJECTION, SOLUTION INTRAVENOUS at 15:17

## 2024-04-23 RX ADMIN — FUROSEMIDE 20 MG: 20 TABLET ORAL at 08:35

## 2024-04-23 NOTE — CASE MANAGEMENT/SOCIAL WORK
Discharge Planning Assessment   [place]     Patient Name: Jes Moreno  MRN: [id number]  Today's Date: 4/23/2024    Admit Date: 4/19/2024    Plan: SS received consult for inubated. Pt self extubated on this date. SS spoke with pt mother casper on this date. Mother states the address that is listed is her. Mother is unsure of pt address but states she knows pt lives in [facility]. Pt lives alone, but her s/o Jose Alejandro has been staying off and on with pt. Pt does not utilize  services. Pt PCP Juany Sheehan. Pt has insulin pump, per mother pt does not utilize the insulin pump and dexcom via unknown provider. Pt states pt utilizes [facility] in [place]. Pt does not have POA or living will. Pt mother states pt draws SSI disability and has a monthly incomes of 940.00 a month. Mother also states she wants pts to get help for her drug use and states she plans to contact the court for [facility] to get pt into substance abuse rehab. SS to follow and assist with discharge planning.   Discharge Needs Assessment       Row Name 04/23/24 0951       Living Environment    People in Home alone    Current Living Arrangements apartment    Potentially Unsafe Housing Conditions none    Primary Care Provided by self    Provides Primary Care For no one    Family Caregiver if Needed parent(s)    Family Caregiver Names  - Casper    Quality of Family Relationships helpful;involved;supportive    Able to Return to Prior Arrangements --  Mother is requesting pt gets help for drug use       Resource/Environmental Concerns    Resource/Environmental Concerns none       Discharge Needs Assessment    Equipment Currently Used at Home other (see comments)  insulin pump, dexcom    Concerns to be Addressed --  intubated                   Discharge Plan       Row Name 04/23/24 0952       Plan    Plan SS received consult for inubated. Pt self extubated on this date. SS spoke with pt mother casper on this date. Mother states the address that is listed is  her. Mother is unsure of pt address but states she knows pt lives in [facility]. Pt lives alone, but her s/o Jose Alejandro has been staying off and on with pt. Pt does not utilize HH services. Pt PCP Juany Sheehan. Pt has insulin pump, per mother pt does not utilize the insulin pump and dexcom via unknown provider. Pt states pt utilizes [facility] in [place]. Pt does not have POA or living will. Pt mother states pt draws SSI disability and has a monthly incomes of 940.00 a month. Mother also states she wants pts to get help for her drug use and states she plans to contact the court for [facility] to get pt into substance abuse rehab. SS to follow and assist with discharge planning.               Expected Discharge Date and Time       Expected Discharge Date Expected Discharge Time    Apr 23, 2024        MAURICIO Laguna

## 2024-04-23 NOTE — PLAN OF CARE
Goal Outcome Evaluation:  Plan of Care Reviewed With: patient        Progress: improving  Outcome Evaluation: Pt extubated on shift. 2L NC. NPO per speech. Adequate UOP. Afebrile throughout shift. Insulin gtt infusing. VSS. Bed alarm set. Boyfriend at bedside. Call light in reach. Care ongoing.

## 2024-04-23 NOTE — PROGRESS NOTES
Chief Complaint:  Pulmonary and critical care is following for ventilator management and critical illness management        Subjective-overnight events reviewed all the labs medications ins and outs and vitals reviewed.  Patient resting in bed comfortably in any distress.  MDR done at bedside  SBT was done and patient was successfully extubated..    Review of Systems:        ROS cannot be obtained as patient is intubated and sedated    Vital Signs  Temp:  [98.3 °F (36.8 °C)-99.2 °F (37.3 °C)] 98.3 °F (36.8 °C)  Heart Rate:  [] 96  Resp:  [15-34] 16  BP: ()/(48-73) 128/69  FiO2 (%):  [35 %] 35 %  Body mass index is 24.24 kg/m².    Intake/Output Summary (Last 24 hours) at 4/23/2024 1929  Last data filed at 4/23/2024 1900  Gross per 24 hour   Intake 2283.64 ml   Output 6800 ml   Net -4516.36 ml     No intake/output data recorded.    Physical Exam:-Examined before extubation   GENERAL APPEARANCE: Intubated and sedated.  Appears to be resting comfortably in bed.     HEAD: normocephalic.    EYES: PERRLA.    THROAT: ET tube in place. Oral cavity and pharynx normal. No inflammation, swelling, exudate, or lesions.     NECK: Supple.     CARDIAC: Normal S1 and S2. No S3, S4 or murmurs. Rhythm is regular.     LUNGS: Clear to auscultation and percussion without rales, rhonchi, wheezing or diminished breath sounds.    ABDOMEN: Positive bowel sounds. Soft, nondistended, nontender.     EXTREMITIES: No significant deformity or joint abnormality. No edema. Peripheral pulses intact.    NEUROLOGICAL: Unable to assess due to sedation status.     PSYCHIATRIC: Unable to assess due to sedation status     Results Review:     I reviewed the patient's new clinical results.  Results from last 7 days   Lab Units 04/23/24  0314 04/22/24  0022 04/21/24  0027   WBC 10*3/mm3 11.49* 11.97* 11.08*   HEMOGLOBIN g/dL 10.9* 10.8* 10.5*   PLATELETS 10*3/mm3 372 329 316     Results from last 7 days   Lab Units 04/23/24  0029  04/22/24  0722 04/22/24  0407 04/22/24  0022   SODIUM mmol/L 140 139 140 140   POTASSIUM mmol/L 4.0 4.2 4.3 4.2   CHLORIDE mmol/L 106 109* 111* 109*   CO2 mmol/L 25.7 22.0 21.8* 20.5*   BUN mg/dL 3* 2* 2* 2*   CREATININE mg/dL 0.65 0.59 0.60 0.62   CALCIUM mg/dL 8.2* 7.8* 7.7* 7.6*   GLUCOSE mg/dL 126* 159* 157* 139*   MAGNESIUM mg/dL  --  1.8 1.8 1.9     Lab Results   Component Value Date    INR 1.04 04/19/2024    INR 0.93 02/21/2024    INR 1.00 08/12/2023    PROTIME 14.1 04/19/2024    PROTIME 13.0 02/21/2024    PROTIME 13.7 08/12/2023     Results from last 7 days   Lab Units 04/20/24  0026 04/19/24  0945   ALK PHOS U/L 112 131*   BILIRUBIN mg/dL <0.2 0.2   ALT (SGPT) U/L 22 23   AST (SGOT) U/L 26 25     Results from last 7 days   Lab Units 04/19/24  1002   PH, ARTERIAL pH units 7.321*   PO2 ART mm Hg 142.0*   PCO2, ARTERIAL mm Hg 37.4   HCO3 ART mmol/L 19.3*     Imaging Results (Last 24 Hours)       ** No results found for the last 24 hours. **                 atorvastatin, 20 mg, Oral, Nightly  buprenorphine-naloxone, 3 tablet, Sublingual, Daily  buPROPion XL, 450 mg, Oral, Daily  cetirizine, 10 mg, Oral, Daily  chlorhexidine, 15 mL, Mouth/Throat, Q12H  enoxaparin, 40 mg, Subcutaneous, Q24H  FLUoxetine, 20 mg, Oral, Daily  FLUoxetine, 40 mg, Oral, Daily  furosemide, 20 mg, Oral, Daily  gabapentin, 800 mg, Oral, Q8H  levothyroxine, 200 mcg, Oral, Daily  montelukast, 10 mg, Oral, Nightly  mupirocin, 1 Application, Topical, Q12H  OLANZapine zydis, 10 mg, Oral, Daily  pantoprazole, 40 mg, Intravenous, Q AM  rOPINIRole, 2 mg, Oral, Nightly  senna-docusate sodium, 2 tablet, Oral, BID  sodium chloride, 10 mL, Intravenous, Q12H  sodium chloride, 10 mL, Intravenous, Q12H  sodium chloride, 10 mL, Intravenous, Q12H  sodium chloride, 10 mL, Intravenous, Q12H  sodium chloride, 10 mL, Intravenous, Q12H  topiramate, 25 mg, Oral, Daily      dexmedetomidine, 0.2-1.5 mcg/kg/hr, Last Rate: Stopped (04/23/24 0941)  dextrose 5 % and  sodium chloride 0.45 %, 150 mL/hr  dextrose 5 % and sodium chloride 0.45 % with KCl 20 mEq/L, 150 mL/hr, Last Rate: 150 mL/hr (04/23/24 1517)  dextrose 5 % and sodium chloride 0.45 % with KCl 40 mEq/L, 150 mL/hr  dextrose 5 % and sodium chloride 0.9 %, 150 mL/hr  dextrose 5 % and sodium chloride 0.9 % with KCl 20 mEq, 150 mL/hr  dextrose 5% and sodium chloride 0.9% with KCl 40 mEq/L, 150 mL/hr  insulin, 0-100 Units/hr, Last Rate: 1.4 Units/hr (04/23/24 1841)  propofol, 50 mcg/kg/min, Last Rate: Stopped (04/23/24 0854)  sodium chloride 0.45 % 1,000 mL with potassium chloride 40 mEq infusion, 250 mL/hr  sodium chloride, 250 mL/hr  sodium chloride 0.45 % with KCl 20 mEq, 250 mL/hr, Last Rate: Stopped (04/19/24 1459)  sodium chloride, 250 mL/hr  sodium chloride 0.9 % with KCl 20 mEq, 250 mL/hr  sodium chloride 0.9 % with KCl 40 mEq/L, 250 mL/hr        Medication Review:    Latest Reference Range & Units 04/19/24 10:02   pH, Arterial 7.350 - 7.450 pH units 7.321 (L)   pCO2, Arterial 35.0 - 45.0 mm Hg 37.4   pO2, Arterial 83.0 - 108.0 mm Hg 142.0 (H)   HCO3, Arterial 20.0 - 26.0 mmol/L 19.3 (L)   Base Excess 0.0 - 2.0 mmol/L -6.2 (L)   O2 Saturation, Arterial 94.0 - 99.0 % >99.2 (H)   CO2 Content 22 - 33 mmol/L 20.4 (L)   A-a DO2 0.0 - 300.0 mmHg 54.3   Carboxyhemoglobin 0 - 5 % 0.9   Methemoglobin 0.00 - 3.00 % 0.20   Oxyhemoglobin 94 - 99 % 98.2   Hematocrit, Blood Gas 38.0 - 51.0 % 35.6 (L)   Hemoglobin, Blood Gas 13.5 - 17.5 g/dL 11.6 (L)   Site  Left Brachial   Saul's Test  N/A   Modality  Ventilator   FIO2 % 35   Ventilator Mode  VC/AC   Set Tidal Volume  450.00   Set Mech Resp Rate  18.0   PEEP  5.0   Barometric Pressure for Blood Gas mmHg 727   (L): Data is abnormally low  (H): Data is abnormally high  Microbiology Results (last 10 days)       Procedure Component Value - Date/Time    Blood Culture - Blood, Arm, Right [[id number]]  (Normal) Collected: 04/22/24 1532    Lab Status: Preliminary result Specimen: Blood  from Arm, Right Updated: 04/23/24 1631     Blood Culture No growth at 24 hours    Blood Culture - Blood, Arm, Right [[id number]]  (Normal) Collected: 04/22/24 1436    Lab Status: Preliminary result Specimen: Blood from Arm, Right Updated: 04/23/24 1515     Blood Culture No growth at 24 hours    Blood Culture - Blood, Blood, Central Line [[id number]]  (Abnormal) Collected: 04/19/24 0947    Lab Status: Final result Specimen: Blood, Central Line Updated: 04/22/24 0610     Blood Culture Staphylococcus epidermidis     Isolated from Aerobic Bottle     Gram Stain Aerobic Bottle Gram positive cocci    Narrative:      Refer to previous blood culture collected on 04/19/2024 0947 for MICs      Blood Culture - Blood, Arm, Right [[id number]]  (Abnormal)  (Susceptibility) Collected: 04/19/24 0947    Lab Status: Final result Specimen: Blood from Arm, Right Updated: 04/22/24 0610     Blood Culture Staphylococcus epidermidis     Isolated from Aerobic Bottle     Gram Stain Aerobic Bottle Gram positive cocci    Susceptibility        Staphylococcus epidermidis      ILIANA      Oxacillin Resistant      Vancomycin Susceptible                           Blood Culture ID, PCR - Blood, Arm, Right [[id number]]  (Abnormal) Collected: 04/19/24 0947    Lab Status: Final result Specimen: Blood from Arm, Right Updated: 04/20/24 0521     BCID, PCR Staph spp, not aureus or lugdunensis. Identification by BCID2 PCR.     BOTTLE TYPE Aerobic Bottle            Latest Reference Range & Units 04/19/24 11:43 04/19/24 13:01   Acetone Negative  Small !!    Amphetamine, Urine Qual Negative   Positive !   Barbiturates Screen, Urine Negative   Negative   Benzodiazepine Screen, Urine Negative   Positive !   Buprenorphine, Screen, Urine Negative   Positive !   Cocaine Screen, Urine Negative   Negative   Fentanyl, Urine Negative   Negative   Methamphetamine, Ur Negative   Positive !   Methadone Screen , Urine Negative   Negative   Opiate Screen Negative   Negative    Oxycodone Screen, Urine Negative   Negative   Phencyclidine (PCP), Urine Negative   Negative   THC Screen, Urine Negative   Negative   Tricyclic Antidepressants Screen Negative   Negative   !!: Data is critical  !: Data is abnormal  Assessment & Plan     Neurology- Sedated -drips reviewed.  Adjusted for a RASS goal of -1 to -2.    Started Zyprexa.  Start Precedex and wean other sedation.  Did SAT and SBT patient was extubated.  CT head reviewed-no acute intracranial process identified.  Respiratory-intubated for airway protection.  Latest ABG and chest x-ray reviewed.  Ventilator settings were adjusted.  Adjusted FiO2.  Latest ABG reviewed and ventilator settings adjusted.      SAT SBT were done and patient was extubated   Vent settings   Vent Settings          Resp Rate (Set): 18  Pressure Support (cm H2O): 8 cm H20  FiO2 (%): 35 %  PEEP/CPAP (cm H2O): 5 cm H20    Minute Ventilation (L/min) (Obs): 4.25 L/min  Resp Rate (Observed) Vent: 27     I:E Ratio (Obs): 1:1.7    PIP Observed (cm H2O): 50 cm H2O       Continue to monitor the patient in the ICU as patient is very high risk for delirium and respiratory failure and might require reintubation    VAP- precautions  Head end - 30 %  Mouth care   DVT prophylaxis    Chest x-ray-latest reviewed  Shows volume status improvement.  ABG-latest reviewed    Cardiology- hemodynamically -stable.  Continue to monitor HR- rate and rhythm, BP   Results for orders placed during the hospital encounter of 04/19/24    Adult Transthoracic Echo Complete W/ Cont if Necessary Per Protocol    Interpretation Summary    Left ventricular systolic function is normal. Left ventricular ejection fraction appears to be 66 - 70%.    Left ventricular diastolic function is consistent with (grade I) impaired relaxation.    Estimated right ventricular systolic pressure from tricuspid regurgitation is normal (<35 mmHg).    Femoral line was taken out yesterday and upper body line was  placed.    Nephrology- Cr and BUN stable  I/O-reviewed.  Electrolytes reviewed and adjusted    GI- PPI prophylaxis  Speech and swallow evaluation    Hematology- CBC  Hb  platelet  WBC    ID  Culture  And Antibiotics  Repeat blood cultures negative so far reviewed.      Endocrinology- Maintain Blood sugar 140 -180  Continue insulin drip.  Latest blood sugars reviewed.      Electrolytes-   Mag and phos       DVT prophylaxis-continue    Bed side rounds were done with RT and patient's nurse. All the lab and clinical findings were discussed with them and plan was also discussed in great detail.    Family member present-none.  Spoke to patient's mother on the phone and updated her about the case.  Answered her questions to her satisfaction.  Patient is currently critically ill due to change in mental status respiratory failure and hyperglycemia.  On multiple drips.  I adjusted patient's sedation and drips.  Change central line.  Case discussed with patient's nurse, RT and other multidisciplinary team members.  Case discussed with primary team and plan was discussed. Cc 32 mins      Acute encephalopathy               Tenzin Felton MD  04/23/24  19:29 EDT

## 2024-04-23 NOTE — PLAN OF CARE
Problem: Skin Injury Risk Increased  Goal: Skin Health and Integrity  Outcome: Ongoing, Progressing  Intervention: Optimize Skin Protection  Recent Flowsheet Documentation  Taken 4/23/2024 0411 by Betty Vicente RN  Head of Bed (HOB) Positioning: HOB at 30-45 degrees  Taken 4/23/2024 0218 by Betty Vicente RN  Pressure Reduction Techniques:   frequent weight shift encouraged   heels elevated off bed   positioned off wounds   pressure points protected   weight shift assistance provided  Head of Bed (HOB) Positioning: HOB at 30-45 degrees  Pressure Reduction Devices:   pressure-redistributing mattress utilized   positioning supports utilized   heel offloading device utilized  Skin Protection:   adhesive use limited   incontinence pads utilized   tubing/devices free from skin contact  Taken 4/23/2024 0010 by Betty Vicente RN  Head of Bed (HOB) Positioning: HOB at 30-45 degrees  Taken 4/22/2024 2200 by Betty Vicente RN  Head of Bed (HOB) Positioning: HOB at 30-45 degrees  Taken 4/22/2024 2018 by Betty Vicente RN  Pressure Reduction Techniques:   frequent weight shift encouraged   heels elevated off bed   pressure points protected   weight shift assistance provided   positioned off wounds  Head of Bed (HOB) Positioning: HOB at 30-45 degrees  Pressure Reduction Devices:   pressure-redistributing mattress utilized   positioning supports utilized   heel offloading device utilized  Skin Protection:   adhesive use limited   incontinence pads utilized   tubing/devices free from skin contact     Problem: Adult Inpatient Plan of Care  Goal: Plan of Care Review  Outcome: Ongoing, Progressing  Flowsheets  Taken 4/23/2024 0433  Progress: no change  Taken 4/22/2024 0646  Plan of Care Reviewed With: patient  Goal: Patient-Specific Goal (Individualized)  Outcome: Ongoing, Progressing  Goal: Absence of Hospital-Acquired Illness or Injury  Outcome: Ongoing, Progressing  Intervention: Identify and Manage Fall  Risk  Recent Flowsheet Documentation  Taken 4/23/2024 0411 by Betty Vicente RN  Safety Promotion/Fall Prevention: safety round/check completed  Taken 4/23/2024 0307 by Betty Vicente RN  Safety Promotion/Fall Prevention: safety round/check completed  Taken 4/23/2024 0218 by Betty Vicente RN  Safety Promotion/Fall Prevention: safety round/check completed  Taken 4/23/2024 0107 by Betty Vicente RN  Safety Promotion/Fall Prevention: safety round/check completed  Taken 4/23/2024 0010 by Betty Vicente RN  Safety Promotion/Fall Prevention: safety round/check completed  Taken 4/22/2024 2301 by Betty Vicente RN  Safety Promotion/Fall Prevention: safety round/check completed  Taken 4/22/2024 2200 by Betty Vicente RN  Safety Promotion/Fall Prevention: safety round/check completed  Taken 4/22/2024 2115 by Betty Vicente RN  Safety Promotion/Fall Prevention: safety round/check completed  Taken 4/22/2024 2018 by Betty Vicente RN  Safety Promotion/Fall Prevention: safety round/check completed  Taken 4/22/2024 1900 by Betty Vicente RN  Safety Promotion/Fall Prevention: safety round/check completed  Intervention: Prevent Skin Injury  Recent Flowsheet Documentation  Taken 4/23/2024 0411 by Betty Vicente RN  Body Position:   turned   side-lying   right  Taken 4/23/2024 0218 by Betty Vicente RN  Body Position:   turned   side-lying   left  Skin Protection:   adhesive use limited   incontinence pads utilized   tubing/devices free from skin contact  Taken 4/23/2024 0010 by Betty Vicente RN  Body Position:   turned   side-lying   right  Taken 4/22/2024 2200 by Betty Vicente RN  Body Position:   turned   side-lying   left  Taken 4/22/2024 2018 by Betty Vicente RN  Body Position:   turned   side-lying   right  Skin Protection:   adhesive use limited   incontinence pads utilized   tubing/devices free from skin contact  Intervention: Prevent and Manage VTE (Venous Thromboembolism)  Risk  Recent Flowsheet Documentation  Taken 4/23/2024 0218 by Betty Vicente RN  Activity Management: bedrest  Taken 4/22/2024 2018 by Betty Vicente RN  Activity Management: bedrest  VTE Prevention/Management: (lovenox) other (see comments)  Range of Motion: ROM (range of motion) performed  Goal: Optimal Comfort and Wellbeing  Outcome: Ongoing, Progressing  Intervention: Provide Person-Centered Care  Recent Flowsheet Documentation  Taken 4/23/2024 0218 by Betty Vicente RN  Trust Relationship/Rapport:   care explained   reassurance provided  Taken 4/22/2024 2018 by Betty Vicente RN  Trust Relationship/Rapport:   care explained   reassurance provided  Goal: Readiness for Transition of Care  Outcome: Ongoing, Progressing     Problem: Fall Injury Risk  Goal: Absence of Fall and Fall-Related Injury  Outcome: Ongoing, Progressing  Intervention: Promote Injury-Free Environment  Recent Flowsheet Documentation  Taken 4/23/2024 0411 by Betty Vicente RN  Safety Promotion/Fall Prevention: safety round/check completed  Taken 4/23/2024 0307 by Betty Vicente RN  Safety Promotion/Fall Prevention: safety round/check completed  Taken 4/23/2024 0218 by Betty Vicente RN  Safety Promotion/Fall Prevention: safety round/check completed  Taken 4/23/2024 0107 by Betty Vicente RN  Safety Promotion/Fall Prevention: safety round/check completed  Taken 4/23/2024 0010 by Betty Vicnete RN  Safety Promotion/Fall Prevention: safety round/check completed  Taken 4/22/2024 2301 by Betty Vicente RN  Safety Promotion/Fall Prevention: safety round/check completed  Taken 4/22/2024 2200 by Betty Vicente RN  Safety Promotion/Fall Prevention: safety round/check completed  Taken 4/22/2024 2115 by Betty Vicente RN  Safety Promotion/Fall Prevention: safety round/check completed  Taken 4/22/2024 2018 by Betty Vicente RN  Safety Promotion/Fall Prevention: safety round/check completed  Taken 4/22/2024 1900 by  Betty Vicente, RN  Safety Promotion/Fall Prevention: safety round/check completed     Problem: Adjustment to Illness (Sepsis/Septic Shock)  Goal: Optimal Coping  Outcome: Ongoing, Progressing     Problem: Bleeding (Sepsis/Septic Shock)  Goal: Absence of Bleeding  Outcome: Ongoing, Progressing     Problem: Glycemic Control Impaired (Sepsis/Septic Shock)  Goal: Blood Glucose Level Within Desired Range  Outcome: Ongoing, Progressing  Intervention: Optimize Glycemic Control  Recent Flowsheet Documentation  Taken 4/22/2024 2018 by Betty Vicente RN  Glycemic Management: blood glucose monitored     Problem: Infection Progression (Sepsis/Septic Shock)  Goal: Absence of Infection Signs and Symptoms  Outcome: Ongoing, Progressing  Intervention: Promote Recovery  Recent Flowsheet Documentation  Taken 4/23/2024 0218 by Betty Vicente, RN  Activity Management: bedrest  Airway/Ventilation Support: pulmonary hygiene promoted  Taken 4/22/2024 2018 by Betty Vicente RN  Activity Management: bedrest  Sleep/Rest Enhancement:   consistent schedule promoted   noise level reduced   regular sleep/rest pattern promoted   room darkened     Problem: Nutrition Impaired (Sepsis/Septic Shock)  Goal: Optimal Nutrition Intake  Outcome: Ongoing, Progressing     Problem: Communication Impairment (Mechanical Ventilation, Invasive)  Goal: Effective Communication  Outcome: Ongoing, Progressing  Intervention: Ensure Effective Communication  Recent Flowsheet Documentation  Taken 4/22/2024 2018 by Betty Vicente, RN  Communication Enhancement Strategies: nonverbal strategies used     Problem: Device-Related Complication Risk (Mechanical Ventilation, Invasive)  Goal: Optimal Device Function  Outcome: Ongoing, Progressing     Problem: Inability to Wean (Mechanical Ventilation, Invasive)  Goal: Mechanical Ventilation Liberation  Outcome: Ongoing, Progressing  Intervention: Promote Extubation and Mechanical Ventilation Liberation  Recent  Flowsheet Documentation  Taken 4/22/2024 2018 by Betty Vicente RN  Sleep/Rest Enhancement:   consistent schedule promoted   noise level reduced   regular sleep/rest pattern promoted   room darkened     Problem: Nutrition Impairment (Mechanical Ventilation, Invasive)  Goal: Optimal Nutrition Delivery  Outcome: Ongoing, Progressing     Problem: Skin and Tissue Injury (Mechanical Ventilation, Invasive)  Goal: Absence of Device-Related Skin and Tissue Injury  Outcome: Ongoing, Progressing  Intervention: Maintain Skin and Tissue Health  Recent Flowsheet Documentation  Taken 4/23/2024 0218 by Betty Vicente RN  Device Skin Pressure Protection:   absorbent pad utilized/changed   adhesive use limited   positioning supports utilized   pressure points protected  Taken 4/22/2024 2018 by Betty Vicente RN  Device Skin Pressure Protection:   absorbent pad utilized/changed   adhesive use limited   positioning supports utilized   pressure points protected     Problem: Ventilator-Induced Lung Injury (Mechanical Ventilation, Invasive)  Goal: Absence of Ventilator-Induced Lung Injury  Outcome: Ongoing, Progressing  Intervention: Prevent Ventilator-Associated Pneumonia  Recent Flowsheet Documentation  Taken 4/23/2024 0411 by Betty Vicente RN  Head of Bed (HOB) Positioning: HOB at 30-45 degrees  Oral Care: swabbed with antiseptic solution  Taken 4/23/2024 0218 by Betty Vicente RN  Head of Bed (HOB) Positioning: HOB at 30-45 degrees  Taken 4/23/2024 0010 by Betty Vicente RN  Head of Bed ([facility]) Positioning: HOB at 30-45 degrees  Oral Care:   swabbed with antiseptic solution   lip/mouth moisturizer applied  Taken 4/22/2024 2200 by Betty Vicente RN  Head of Bed (HOB) Positioning: HOB at 30-45 degrees  Taken 4/22/2024 2018 by Betty Vicente RN  Head of Bed ([facility]) Positioning: HOB at 30-45 degrees  VAP Prevention Bundle:   HOB elevation maintained   oral care regularly provided   readiness to extubate assessed    stress ulcer prophylaxis provided   vent circuit breaks minimized   VTE prophylaxis provided  VAP Prevention Contraindications: per provider order  VAP Prevention Measures: completed  Oral Care: teeth brushed - suction toothbrush   Goal Outcome Evaluation:  Plan of Care Reviewed With: patient        Progress: no change

## 2024-04-23 NOTE — THERAPY EVALUATION
Acute Care - Speech Language Pathology   Swallow Initial Evaluation [facility]  Clinical Dysphagia Assessment     Patient Name: Jes Moreno  : 1985  MRN: [id number]  Today's Date: 2024     Admit Date: 2024  Jes Moreno  was seen at bedside this pm on CCU-8 to assess safety/efficacy of swallowing fnx, determine safest/least restrictive diet tolerance.     She has a medical hx significant, obtained from EMR review, for asthma, DM type I, and hepatitis C.     She presented to [facility] ED via EMS and was emergently intubated per concerns for airway protection given concerns for acute drug intoxication and excessive lacrimation and salivation. She remained intubated from - at which time she self-extubated.     Social History     Socioeconomic History    Marital status: Single   Tobacco Use    Smoking status: Every Day     Current packs/day: 1.00     Average packs/day: 1 pack/day for 3.0 years (3.0 ttl pk-yrs)     Types: Cigarettes    Smokeless tobacco: Never    Tobacco comments:     unable to assess    Vaping Use    Vaping status: Some Days    Substances: Nicotine   Substance and Sexual Activity    Alcohol use: No     Comment: unable to assess     Drug use: No     Comment: suboxone    Sexual activity: Yes     Partners: Male   Imaging:  EXAM:    XR Chest, 1 View     EXAM DATE:    2024 12:53 PM     CLINICAL HISTORY:    central line placement; F19.929-Other psychoactive substance use,  unspecified with intoxication, unspecified; E10.10-Type 1 diabetes  mellitus with ketoacidosis without coma; J96.00-Acute respiratory  failure, unspecified whether with hypoxia or hypercapnia; A41.9-Sepsis,  unspecified organism     TECHNIQUE:    Frontal view of the chest.     COMPARISON:    [phone] 1:00 a.m.     FINDINGS:    Lungs and pleural spaces:  Improvement in right basilar airspace  disease.  Stable/improved left basilar airspace disease.  Trace right  pleural effusion has decreased.  No consolidation.   No pneumothorax.    Heart:  Unremarkable as visualized.  No cardiomegaly.    Mediastinum:  Unremarkable as visualized.  Normal mediastinal contour.    Bones/joints:  Unremarkable as visualized.  No acute fracture.    Tubes, lines and devices:  ET tube with tip at level of clavicles.  NG  tube extends into the stomach.  Left IJ deep line with tip at the  cavoatrial junction.     IMPRESSION:  1.  Likely overall improvement in volume overload.  2.  Improvement in basilar airspace disease.  3.  Support devices as above.     This report was finalized on 4/22/2024 1:14 PM by Dr. Madhu Crockett MD.  Labs:  WBC  11.49  04/23 0314     Diet Orders (active) (From admission, onward)       Start     Ordered    04/23/24 1553  NPO Diet NPO Type: Strict NPO, Ice Chips, Other (see comments)  Diet Effective Now        Comments: Ice chips and/or sips thin water via spoon per pt request.   Meds via non-oral route.    04/23/24 1553    04/19/24 1127  Patient is on Glucommander  Continuous         04/19/24 1126                  She is observed on room air w/o complications across this assessment.    She was positioned upright and centered in bed to accept multiple po presentations of ice chips and thin liquids via spoon.      Facial/oral structures were symmetrical upon observation. Lingual protrusion revealed no deviation. Oral mucosa were moist, pink, and clean. Secretions were clear, thin, and well controlled. OROM/MANDO was wfl to imitate oral postures. Gag is not assessed. Volitional cough was intact w/ weak intensity, breathy in quality, non-productive. Voice was severely weak in intensity, breathy in quality negatively impacting intelligible speech. This is concerning for post-extubation edema.    Upon po presentations, adequate bolus anticipation and acceptance w/ good labial seal for bolus clearance via spoon bowl. Bolus formation, manipulation and control were mildly prolonged w/ rotary mastication pattern. A-p transit was timely  w/o significant oral residue appreciated. No overt s/s aspiration before the swallow.      Pharyngeal swallow was timely w/ weak hyolaryngeal elevation per palpation. Throat clear and cough response is elicited following all ice chip and thin water via spoon presentations. Patient denied odynophagia.    Visit Dx:     ICD-10-CM ICD-9-CM   1. Psychoactive substance-induced intoxication  F19.929 292.89     969.9   2. Diabetic ketoacidosis without coma associated with type 1 diabetes mellitus  E10.10 250.13   3. Acute respiratory failure, unspecified whether with hypoxia or hypercapnia  J96.00 518.81   4. Sepsis, due to unspecified organism, unspecified whether acute organ dysfunction present  A41.9 038.9     995.91     Patient Active Problem List   Diagnosis    Aspirin toxicity    Hypothyroidism    Type I diabetes mellitus    Chronic hepatitis C virus infection    Diabetic peripheral neuropathy    Gastroesophageal reflux disease without esophagitis    Celiac disease    Asthma    DKA (diabetic ketoacidosis)    Acute encephalopathy     Past Medical History:   Diagnosis Date    Asthma     Celiac disease     Diabetes mellitus type 1     Diabetic ketoacidosis     Disease of thyroid gland     Hepatitis C     Infectious viral hepatitis     hepititis C    Neuropathy     Reflux gastritis      Past Surgical History:   Procedure Laterality Date     SECTION      X 2     Impression:     Ms Moreno presented w/ s/s concerning for aspiration of ice chips and thin water via spoon. Per this evaluation, current vocal quality concerning for post-extubation edema, and weak cough evidenced, she is felt to most benefit from continued NPO status at this time w/ ice chips and sips thin water via spoon only. SLP to f/u for improvement in swallow function for MBS.       SLP Recommendation and Plan     1. Continue NPO.   2. Ice chips or sips thin water via spoon.   3. Medicatoins via non-oral route.    4. Upright and centered for all po  intake  5. TOÑO precautions.  6. Oral care protocol.    SLP to f/u.     D/w patient results and recommendations w/ verbal agreement.    D/w RN results and recommendations w/ verbal agreement.    Thank you for allowing me to participate in the care of your patient-  Lyric Bradford M.S., CCC-SLP        EDUCATION  The patient has been educated in the following areas:   Dysphagia (Swallowing Impairment) Oral Care/Hydration NPO rationale.        Time Calculation:    Time Calculation- SLP       Row Name 04/23/24 1553             Time Calculation- SLP    SLP Received On 04/23/24  -      SLP - Next Appointment 04/24/24  -                User Key  (r) = Recorded By, (t) = Taken By, (c) = Cosigned By      Initials Name Provider Type    Lyric Schaefer MS CCC-SLP Speech and Language Pathologist                    Therapy Charges for Today       Code Description Service Date Service Provider Modifiers Qty    [id number] HC ST EVAL ORAL PHARYNG SWALLOW 4 4/23/2024 Lyric Bradford MS CCC-SLP GN 1            Lyric Bradford MS CCC-NGOZI  4/23/2024

## 2024-04-23 NOTE — PLAN OF CARE
Goal Outcome Evaluation:      Patient failed SAT today, will try again tomorrow.  Continues on precedex and diprivan for sedation.  Insulin gtt infusing - adjusted per glucommander.  Afebrile.  No BM.  2900 UOP.  Mother at bedside and updated on status.  New CVC placed - right femoral CVC removed.

## 2024-04-24 ENCOUNTER — APPOINTMENT (OUTPATIENT)
Dept: GENERAL RADIOLOGY | Facility: HOSPITAL | Age: 39
DRG: 894 | End: 2024-04-24
Payer: MEDICAID

## 2024-04-24 LAB
ANION GAP SERPL CALCULATED.3IONS-SCNC: 8.2 MMOL/L (ref 5–15)
BACTERIA UR QL AUTO: ABNORMAL /HPF
BILIRUB UR QL STRIP: NEGATIVE
BUN SERPL-MCNC: 8 MG/DL (ref 6–20)
BUN/CREAT SERPL: 14.8 (ref 7–25)
C TRACH RRNA CVX QL NAA+PROBE: NOT DETECTED
CALCIUM SPEC-SCNC: 8.3 MG/DL (ref 8.6–10.5)
CHLORIDE SERPL-SCNC: 110 MMOL/L (ref 98–107)
CLARITY UR: ABNORMAL
CO2 SERPL-SCNC: 24.8 MMOL/L (ref 22–29)
COLOR UR: YELLOW
CREAT SERPL-MCNC: 0.54 MG/DL (ref 0.57–1)
DEPRECATED RDW RBC AUTO: 45.6 FL (ref 37–54)
EGFRCR SERPLBLD CKD-EPI 2021: 122.5 ML/MIN/1.73
ERYTHROCYTE [DISTWIDTH] IN BLOOD BY AUTOMATED COUNT: 13.6 % (ref 12.3–15.4)
GLUCOSE BLDC GLUCOMTR-MCNC: 108 MG/DL (ref 70–130)
GLUCOSE BLDC GLUCOMTR-MCNC: 115 MG/DL (ref 70–130)
GLUCOSE BLDC GLUCOMTR-MCNC: 116 MG/DL (ref 70–130)
GLUCOSE BLDC GLUCOMTR-MCNC: 127 MG/DL (ref 70–130)
GLUCOSE BLDC GLUCOMTR-MCNC: 130 MG/DL (ref 70–130)
GLUCOSE BLDC GLUCOMTR-MCNC: 135 MG/DL (ref 70–130)
GLUCOSE BLDC GLUCOMTR-MCNC: 136 MG/DL (ref 70–130)
GLUCOSE BLDC GLUCOMTR-MCNC: 139 MG/DL (ref 70–130)
GLUCOSE BLDC GLUCOMTR-MCNC: 151 MG/DL (ref 70–130)
GLUCOSE BLDC GLUCOMTR-MCNC: 156 MG/DL (ref 70–130)
GLUCOSE BLDC GLUCOMTR-MCNC: 158 MG/DL (ref 70–130)
GLUCOSE BLDC GLUCOMTR-MCNC: 165 MG/DL (ref 70–130)
GLUCOSE BLDC GLUCOMTR-MCNC: 176 MG/DL (ref 70–130)
GLUCOSE BLDC GLUCOMTR-MCNC: 198 MG/DL (ref 70–130)
GLUCOSE BLDC GLUCOMTR-MCNC: 75 MG/DL (ref 70–130)
GLUCOSE BLDC GLUCOMTR-MCNC: 78 MG/DL (ref 70–130)
GLUCOSE SERPL-MCNC: 128 MG/DL (ref 65–99)
GLUCOSE UR STRIP-MCNC: NEGATIVE MG/DL
HCT VFR BLD AUTO: 30.1 % (ref 34–46.6)
HGB BLD-MCNC: 9.9 G/DL (ref 12–15.9)
HGB UR QL STRIP.AUTO: NEGATIVE
HYALINE CASTS UR QL AUTO: ABNORMAL /LPF
KETONES UR QL STRIP: NEGATIVE
LEUKOCYTE ESTERASE UR QL STRIP.AUTO: ABNORMAL
MCH RBC QN AUTO: 30.1 PG (ref 26.6–33)
MCHC RBC AUTO-ENTMCNC: 32.9 G/DL (ref 31.5–35.7)
MCV RBC AUTO: 91.5 FL (ref 79–97)
N GONORRHOEA RRNA SPEC QL NAA+PROBE: NOT DETECTED
NITRITE UR QL STRIP: POSITIVE
PH UR STRIP.AUTO: 8.5 [PH] (ref 5–8)
PLATELET # BLD AUTO: 403 10*3/MM3 (ref 140–450)
PMV BLD AUTO: 8.7 FL (ref 6–12)
POTASSIUM SERPL-SCNC: 3.8 MMOL/L (ref 3.5–5.2)
PROT UR QL STRIP: NEGATIVE
RBC # BLD AUTO: 3.29 10*6/MM3 (ref 3.77–5.28)
RBC # UR STRIP: ABNORMAL /HPF
REF LAB TEST METHOD: ABNORMAL
SODIUM SERPL-SCNC: 143 MMOL/L (ref 136–145)
SP GR UR STRIP: 1.01 (ref 1–1.03)
SQUAMOUS #/AREA URNS HPF: ABNORMAL /HPF
TRICHOMONAS VAGINALIS PCR: DETECTED
UROBILINOGEN UR QL STRIP: ABNORMAL
WBC # UR STRIP: ABNORMAL /HPF
WBC NRBC COR # BLD AUTO: 12.23 10*3/MM3 (ref 3.4–10.8)

## 2024-04-24 PROCEDURE — 63710000001 INSULIN GLARGINE PER 5 UNITS: Performed by: STUDENT IN AN ORGANIZED HEALTH CARE EDUCATION/TRAINING PROGRAM

## 2024-04-24 PROCEDURE — 99232 SBSQ HOSP IP/OBS MODERATE 35: CPT | Performed by: STUDENT IN AN ORGANIZED HEALTH CARE EDUCATION/TRAINING PROGRAM

## 2024-04-24 PROCEDURE — 94761 N-INVAS EAR/PLS OXIMETRY MLT: CPT

## 2024-04-24 PROCEDURE — 92610 EVALUATE SWALLOWING FUNCTION: CPT

## 2024-04-24 PROCEDURE — 87491 CHLMYD TRACH DNA AMP PROBE: CPT | Performed by: STUDENT IN AN ORGANIZED HEALTH CARE EDUCATION/TRAINING PROGRAM

## 2024-04-24 PROCEDURE — 99233 SBSQ HOSP IP/OBS HIGH 50: CPT | Performed by: INTERNAL MEDICINE

## 2024-04-24 PROCEDURE — 92611 MOTION FLUOROSCOPY/SWALLOW: CPT

## 2024-04-24 PROCEDURE — 94799 UNLISTED PULMONARY SVC/PX: CPT

## 2024-04-24 PROCEDURE — 87661 TRICHOMONAS VAGINALIS AMPLIF: CPT | Performed by: STUDENT IN AN ORGANIZED HEALTH CARE EDUCATION/TRAINING PROGRAM

## 2024-04-24 PROCEDURE — 94664 DEMO&/EVAL PT USE INHALER: CPT

## 2024-04-24 PROCEDURE — 80048 BASIC METABOLIC PNL TOTAL CA: CPT | Performed by: STUDENT IN AN ORGANIZED HEALTH CARE EDUCATION/TRAINING PROGRAM

## 2024-04-24 PROCEDURE — 87086 URINE CULTURE/COLONY COUNT: CPT | Performed by: STUDENT IN AN ORGANIZED HEALTH CARE EDUCATION/TRAINING PROGRAM

## 2024-04-24 PROCEDURE — 25010000002 VANCOMYCIN 5 G RECONSTITUTED SOLUTION: Performed by: HOSPITALIST

## 2024-04-24 PROCEDURE — 87040 BLOOD CULTURE FOR BACTERIA: CPT | Performed by: NURSE PRACTITIONER

## 2024-04-24 PROCEDURE — 74230 X-RAY XM SWLNG FUNCJ C+: CPT | Performed by: RADIOLOGY

## 2024-04-24 PROCEDURE — 25810000003 SODIUM CHLORIDE 0.9 % SOLUTION: Performed by: HOSPITALIST

## 2024-04-24 PROCEDURE — 99222 1ST HOSP IP/OBS MODERATE 55: CPT | Performed by: NURSE PRACTITIONER

## 2024-04-24 PROCEDURE — 82948 REAGENT STRIP/BLOOD GLUCOSE: CPT

## 2024-04-24 PROCEDURE — 25010000002 ENOXAPARIN PER 10 MG: Performed by: INTERNAL MEDICINE

## 2024-04-24 PROCEDURE — 87591 N.GONORRHOEAE DNA AMP PROB: CPT | Performed by: STUDENT IN AN ORGANIZED HEALTH CARE EDUCATION/TRAINING PROGRAM

## 2024-04-24 PROCEDURE — 74230 X-RAY XM SWLNG FUNCJ C+: CPT

## 2024-04-24 PROCEDURE — 63710000001 INSULIN LISPRO (HUMAN) PER 5 UNITS: Performed by: STUDENT IN AN ORGANIZED HEALTH CARE EDUCATION/TRAINING PROGRAM

## 2024-04-24 PROCEDURE — 85027 COMPLETE CBC AUTOMATED: CPT | Performed by: STUDENT IN AN ORGANIZED HEALTH CARE EDUCATION/TRAINING PROGRAM

## 2024-04-24 PROCEDURE — 81001 URINALYSIS AUTO W/SCOPE: CPT | Performed by: STUDENT IN AN ORGANIZED HEALTH CARE EDUCATION/TRAINING PROGRAM

## 2024-04-24 RX ORDER — GLUCAGON 1 MG/ML
1 KIT INJECTION
Status: DISCONTINUED | OUTPATIENT
Start: 2024-04-24 | End: 2024-04-25

## 2024-04-24 RX ORDER — METRONIDAZOLE 250 MG/1
500 TABLET ORAL EVERY 8 HOURS SCHEDULED
Status: DISCONTINUED | OUTPATIENT
Start: 2024-04-24 | End: 2024-04-30 | Stop reason: HOSPADM

## 2024-04-24 RX ORDER — INSULIN LISPRO 100 [IU]/ML
1-200 INJECTION, SOLUTION INTRAVENOUS; SUBCUTANEOUS
Status: DISCONTINUED | OUTPATIENT
Start: 2024-04-24 | End: 2024-04-25

## 2024-04-24 RX ORDER — NICOTINE POLACRILEX 4 MG
15 LOZENGE BUCCAL
Status: DISCONTINUED | OUTPATIENT
Start: 2024-04-24 | End: 2024-04-25

## 2024-04-24 RX ORDER — INSULIN LISPRO 100 [IU]/ML
1-200 INJECTION, SOLUTION INTRAVENOUS; SUBCUTANEOUS AS NEEDED
Status: DISCONTINUED | OUTPATIENT
Start: 2024-04-24 | End: 2024-04-25

## 2024-04-24 RX ORDER — DEXTROSE MONOHYDRATE 25 G/50ML
10-50 INJECTION, SOLUTION INTRAVENOUS
Status: DISCONTINUED | OUTPATIENT
Start: 2024-04-24 | End: 2024-04-25

## 2024-04-24 RX ADMIN — GABAPENTIN 800 MG: 400 CAPSULE ORAL at 13:34

## 2024-04-24 RX ADMIN — INSULIN GLARGINE 10 UNITS: 100 INJECTION, SOLUTION SUBCUTANEOUS at 13:28

## 2024-04-24 RX ADMIN — INSULIN GLARGINE 10 UNITS: 100 INJECTION, SOLUTION SUBCUTANEOUS at 21:41

## 2024-04-24 RX ADMIN — ATORVASTATIN CALCIUM 20 MG: 20 TABLET, FILM COATED ORAL at 21:20

## 2024-04-24 RX ADMIN — IPRATROPIUM BROMIDE AND ALBUTEROL SULFATE 3 ML: .5; 2.5 SOLUTION RESPIRATORY (INHALATION) at 13:03

## 2024-04-24 RX ADMIN — BUPRENORPHINE HYDROCHLORIDE AND NALOXONE HYDROCHLORIDE DIHYDRATE 3 TABLET: 8; 2 TABLET SUBLINGUAL at 08:01

## 2024-04-24 RX ADMIN — OLANZAPINE 10 MG: 10 TABLET, ORALLY DISINTEGRATING ORAL at 08:01

## 2024-04-24 RX ADMIN — IPRATROPIUM BROMIDE AND ALBUTEROL SULFATE 3 ML: .5; 2.5 SOLUTION RESPIRATORY (INHALATION) at 19:00

## 2024-04-24 RX ADMIN — MUPIROCIN 1 APPLICATION: 20 OINTMENT TOPICAL at 08:03

## 2024-04-24 RX ADMIN — INSULIN LISPRO 3 UNITS: 100 INJECTION, SOLUTION INTRAVENOUS; SUBCUTANEOUS at 17:23

## 2024-04-24 RX ADMIN — IPRATROPIUM BROMIDE AND ALBUTEROL SULFATE 3 ML: .5; 2.5 SOLUTION RESPIRATORY (INHALATION) at 03:44

## 2024-04-24 RX ADMIN — VANCOMYCIN HYDROCHLORIDE 1250 MG: 5 INJECTION, POWDER, LYOPHILIZED, FOR SOLUTION INTRAVENOUS at 13:34

## 2024-04-24 RX ADMIN — Medication 10 ML: at 21:20

## 2024-04-24 RX ADMIN — MUPIROCIN 1 APPLICATION: 20 OINTMENT TOPICAL at 21:21

## 2024-04-24 RX ADMIN — PANTOPRAZOLE SODIUM 40 MG: 40 INJECTION, POWDER, FOR SOLUTION INTRAVENOUS at 06:54

## 2024-04-24 RX ADMIN — ENOXAPARIN SODIUM 40 MG: 40 INJECTION SUBCUTANEOUS at 17:23

## 2024-04-24 RX ADMIN — Medication 10 ML: at 08:04

## 2024-04-24 RX ADMIN — GABAPENTIN 800 MG: 400 CAPSULE ORAL at 21:20

## 2024-04-24 RX ADMIN — DOCUSATE SODIUM 50 MG AND SENNOSIDES 8.6 MG 2 TABLET: 8.6; 5 TABLET, FILM COATED ORAL at 21:20

## 2024-04-24 RX ADMIN — POTASSIUM CHLORIDE, DEXTROSE MONOHYDRATE AND SODIUM CHLORIDE 150 ML/HR: 150; 5; 450 INJECTION, SOLUTION INTRAVENOUS at 04:09

## 2024-04-24 RX ADMIN — ROPINIROLE HYDROCHLORIDE 2 MG: 1 TABLET, FILM COATED ORAL at 21:20

## 2024-04-24 RX ADMIN — MONTELUKAST SODIUM 10 MG: 10 TABLET, COATED ORAL at 21:21

## 2024-04-24 RX ADMIN — VANCOMYCIN HYDROCHLORIDE 1250 MG: 5 INJECTION, POWDER, LYOPHILIZED, FOR SOLUTION INTRAVENOUS at 03:18

## 2024-04-24 RX ADMIN — METRONIDAZOLE 500 MG: 250 TABLET ORAL at 21:41

## 2024-04-24 NOTE — CONSULTS
INFECTIOUS DISEASE CONSULTATION REPORT        Patient Identification:  Name:  Jes Moreno  Age:  38 y.o.  Sex:  female  :  1985  MRN:  [id number]   Visit Number:  [id number]  Primary Care Physician:  Juany Sheehan APRN        Referring Provider: Dr. Villalba    Reason for consult: MRSA bacteremia       LOS: 5 days        Subjective       Subjective     History of present illness:      Thank you Dr. Villalba for allowing us to participate in the care of your patient.  As you well know, Ms. Jes Moreno is a 38 y.o. female with past medical history significant for asthma, celiac disease, diabetes mellitus type 1, history of DKA, thyroid disease, hepatitis C, neuropathy, reflux gastritis, who presented to [facility] on 2024 for altered mental status.  According to the ED provider the patient was found on the side of the road acting irrational and passing by motorist called local authorities.  When local 30s approached the patient, she reportedly became agitated and combative requiring several police officers to subdue her and bring her to the ED for further treatment evaluation.  Per the ED provider upon initial evaluation the patient did appear to have excessive lacrimation and salivation as well as flushing of the skin.  There was concern for acute drug intoxication.  The patient required large amounts of sedation medications raising concern for ability to practice her airway.  As such the patient was emergently intubated and placed on sedating medications.  The patient remained intubated from  through  at which time she self extubated.    On arrival to the ED the patient had elevated lactate at 3.1.  Normal procalcitonin 0.08.  Leukocytosis at 15.71.  Unremarkable urinalysis.  2 out of 2 blood cultures finalized with Staphylococcus epidermidis on BC ID.  Repeat blood cultures from  finalized with MRSA on BC ID.  Chest x-ray from 2024 reported likely  overall improvement in volume overload.  Improvement in basilar airspace disease.  Transthoracic echocardiogram from  did not report any evidence of vegetation.    The patient is awake and mildly confused, able to answer some questions appropriately.  Oriented to self and place.  On room air with no apparent distress.  Lung exam is diminished to auscultation bilaterally.  2/6 murmur noted.  Abdomen is soft and rounded with normoactive bowel sounds.  CVL to the left IJ with no surrounding infection or phlebitis.  Leukocytosis 12.23.  Repeat blood cultures pending x 2.      Infectious Disease consultation was requested for antimicrobial management.      ---------------------------------------------------------------------------------------------------------------------     Review Of Systems:    SOFIA due to confusion  ---------------------------------------------------------------------------------------------------------------------     Past Medical History    Past Medical History:   Diagnosis Date    Asthma     Celiac disease     Diabetes mellitus type 1     Diabetic ketoacidosis     Disease of thyroid gland     Hepatitis C     Infectious viral hepatitis     hepititis C    Neuropathy     Reflux gastritis        Past Surgical History    Past Surgical History:   Procedure Laterality Date     SECTION      X 2       Family History    Family History   Problem Relation Age of Onset    No Known Problems Mother     Lung cancer Father     No Known Problems Sister     No Known Problems Brother     Diabetes type I Maternal Grandmother     Breast cancer Maternal Grandmother     Hypertension Maternal Grandmother     No Known Problems Maternal Grandfather     No Known Problems Paternal Grandmother     Diabetes type II Paternal Grandfather     No Known Problems Daughter     No Known Problems Son     No Known Problems Cousin     Rheum arthritis Neg Hx     Osteoarthritis Neg Hx     Asthma Neg Hx     Diabetes Neg Hx      Heart failure Neg Hx     Hyperlipidemia Neg Hx     Migraines Neg Hx     Rashes / Skin problems Neg Hx     Seizures Neg Hx     Stroke Neg Hx     Thyroid disease Neg Hx        Social History    Social History     Tobacco Use    Smoking status: Every Day     Current packs/day: 1.00     Average packs/day: 1 pack/day for 3.0 years (3.0 ttl pk-yrs)     Types: Cigarettes    Smokeless tobacco: Never    Tobacco comments:     unable to assess    Vaping Use    Vaping status: Some Days    Substances: Nicotine   Substance Use Topics    Alcohol use: No     Comment: unable to assess     Drug use: No     Comment: suboxone       Allergies    Sulfa antibiotics  ---------------------------------------------------------------------------------------------------------------------     Home Medications:    Prior to Admission Medications       Prescriptions Last Dose Informant Patient Reported? Taking?    albuterol sulfate  (90 Base) MCG/ACT inhaler Unknown Pharmacy Yes No    Inhale 1 puff Every 4 (Four) Hours As Needed for Wheezing or Shortness of Air.    atorvastatin (LIPITOR) 20 MG tablet Unknown Pharmacy Yes No    Take 1 tablet by mouth Every Night.    Baqsimi Two Pack 3 MG/DOSE powder Unknown Pharmacy Yes No    1 spray into the nostril(s) as directed by provider As Needed (Low Blood Glucose).    buprenorphine-naloxone (SUBOXONE) 8-2 MG per SL tablet Unknown Pharmacy Yes No    Place 3 tablets under the tongue Daily.    buPROPion XL (FORFIVO XL) 450 MG 24 hr tablet Unknown Pharmacy Yes No    Take 1 tablet by mouth Every Morning.    cetirizine (zyrTEC) 10 MG tablet Unknown Pharmacy Yes No    Take 1 tablet by mouth Daily.    docusate sodium (COLACE) 100 MG capsule Unknown Pharmacy Yes No    Take 2 capsules by mouth Daily As Needed for Constipation.    FLUoxetine (PROzac) 20 MG capsule Unknown Pharmacy Yes No    Take 1 capsule by mouth Daily.    FLUoxetine (PROzac) 40 MG capsule Unknown Pharmacy Yes No    Take 1 capsule by mouth  Daily.    fluticasone (FLONASE) 50 MCG/ACT nasal spray Unknown Pharmacy Yes No    2 sprays by Each Nare route Daily As Needed for Allergies.    furosemide (LASIX) 20 MG tablet Unknown Pharmacy Yes No    Take 1 tablet by mouth Daily. Take with potassium    gabapentin (NEURONTIN) 800 MG tablet Unknown Pharmacy Yes No    Take 1 tablet by mouth 3 (Three) Times a Day.    glucose (DEX4) 4 GM chewable tablet Unknown Pharmacy Yes No    Chew 1 tablet Daily As Needed for Low Blood Sugar (BG below 70).    hydrOXYzine (ATARAX) 25 MG tablet Unknown Pharmacy Yes No    Take 1 tablet by mouth Every 6 (Six) Hours As Needed for Anxiety.    Insulin Glargine (BASAGLAR KWIKPEN) 100 UNIT/ML injection pen Unknown Pharmacy Yes No    Inject 35 Units under the skin into the appropriate area as directed Every Night.    Insulin Lispro, 1 Unit Dial, (HUMALOG) 100 UNIT/ML solution pen-injector Unknown Pharmacy Yes No    Inject 16 Units under the skin into the appropriate area as directed 3 times a day.    ipratropium-albuterol (COMBIVENT RESPIMAT)  MCG/ACT inhaler Unknown Pharmacy Yes No    Inhale 1 puff Every 6 (Six) Hours As Needed for Wheezing.    levothyroxine (SYNTHROID, LEVOTHROID) 200 MCG tablet Unknown Pharmacy Yes No    Take 1 tablet by mouth Daily.    Lidocaine Viscous HCl (XYLOCAINE) 2 % solution Unknown Pharmacy Yes No    Take 10 mL by mouth Every 2 (Two) Hours As Needed for Mild Pain.    montelukast (SINGULAIR) 10 MG tablet Unknown Pharmacy Yes No    Take 1 tablet by mouth Every Night.    ondansetron ODT (ZOFRAN-ODT) 4 MG disintegrating tablet Unknown Pharmacy Yes No    Place 1 tablet on the tongue Every 8 (Eight) Hours As Needed for Nausea or Vomiting.    pantoprazole (PROTONIX) 40 MG EC tablet Unknown Pharmacy Yes No    Take 1 tablet by mouth Daily.    PEG 3350 17 GM/SCOOP powder Unknown Pharmacy Yes No    Take 17 g by mouth Daily.    potassium chloride 10 MEQ CR tablet Unknown Pharmacy Yes No    Take 1 tablet by mouth  Daily. Take with lasix    promethazine (PHENERGAN) 25 MG tablet Unknown Pharmacy Yes No    Take 1 tablet by mouth Every 12 (Twelve) Hours As Needed for Nausea or Vomiting.    rOPINIRole (REQUIP) 2 MG tablet Unknown Pharmacy Yes No    Take 1 tablet by mouth Every Night.    Semaglutide 3 MG tablet Unknown Pharmacy Yes No    Take 1 tablet by mouth Daily.    topiramate (TOPAMAX) 25 MG tablet Unknown Pharmacy Yes No    Take 1 tablet by mouth Daily.          ---------------------------------------------------------------------------------------------------------------------    Objective       Objective     Hospital Scheduled Meds:  atorvastatin, 20 mg, Oral, Nightly  buprenorphine-naloxone, 3 tablet, Sublingual, Daily  buPROPion XL, 450 mg, Oral, Daily  cetirizine, 10 mg, Oral, Daily  chlorhexidine, 15 mL, Mouth/Throat, Q12H  enoxaparin, 40 mg, Subcutaneous, Q24H  FLUoxetine, 20 mg, Oral, Daily  FLUoxetine, 40 mg, Oral, Daily  furosemide, 20 mg, Oral, Daily  gabapentin, 800 mg, Oral, Q8H  levothyroxine, 200 mcg, Oral, Daily  montelukast, 10 mg, Oral, Nightly  mupirocin, 1 Application, Topical, Q12H  OLANZapine zydis, 10 mg, Oral, Daily  pantoprazole, 40 mg, Intravenous, Q AM  rOPINIRole, 2 mg, Oral, Nightly  senna-docusate sodium, 2 tablet, Oral, BID  sodium chloride, 10 mL, Intravenous, Q12H  sodium chloride, 10 mL, Intravenous, Q12H  sodium chloride, 10 mL, Intravenous, Q12H  topiramate, 25 mg, Oral, Daily  vancomycin, 1,250 mg, Intravenous, Q12H      dexmedetomidine, 0.2-1.5 mcg/kg/hr, Last Rate: Stopped (04/23/24 0941)  dextrose 5 % and sodium chloride 0.45 %, 150 mL/hr  dextrose 5 % and sodium chloride 0.45 % with KCl 20 mEq/L, 150 mL/hr, Last Rate: 150 mL/hr (04/24/24 0440)  dextrose 5 % and sodium chloride 0.45 % with KCl 40 mEq/L, 150 mL/hr  dextrose 5 % and sodium chloride 0.9 %, 150 mL/hr  dextrose 5 % and sodium chloride 0.9 % with KCl 20 mEq, 150 mL/hr  dextrose 5% and sodium chloride 0.9% with KCl 40 mEq/L,  150 mL/hr  insulin, 0-100 Units/hr, Last Rate: 2.8 Units/hr (04/24/24 1217)  Pharmacy to dose vancomycin,   propofol, 50 mcg/kg/min, Last Rate: Stopped (04/23/24 0854)  sodium chloride 0.45 % 1,000 mL with potassium chloride 40 mEq infusion, 250 mL/hr  sodium chloride, 250 mL/hr  sodium chloride 0.45 % with KCl 20 mEq, 250 mL/hr, Last Rate: Stopped (04/19/24 1459)  sodium chloride, 250 mL/hr  sodium chloride 0.9 % with KCl 20 mEq, 250 mL/hr  sodium chloride 0.9 % with KCl 40 mEq/L, 250 mL/hr      ---------------------------------------------------------------------------------------------------------------------   Vital Signs:  Temp:  [98.1 °F (36.7 °C)-99.6 °F (37.6 °C)] 98.1 °F (36.7 °C)  Heart Rate:  [] 96  Resp:  [10-22] 18  BP: (112-135)/(56-97) 134/82  Mean Arterial Pressure (Non-Invasive) for the past 24 hrs (Last 3 readings):   Noninvasive MAP (mmHg)   04/24/24 1100 101   04/24/24 1000 100   04/24/24 0900 102     SpO2 Percentage    04/24/24 0800 04/24/24 0900 04/24/24 1100   SpO2: 96% 99% 100%     SpO2:  [94 %-100 %] 100 %  on   ;   Device (Oxygen Therapy): room air    Body mass index is 23.89 kg/m².  Wt Readings from Last 3 Encounters:   04/24/24 69.2 kg (152 lb 9.6 oz)   02/21/24 66.1 kg (145 lb 12.8 oz)   08/13/23 73.5 kg (162 lb)     ---------------------------------------------------------------------------------------------------------------------     Physical Exam:    Constitutional: Generally ill-appearing  female.  Sitting up comfortably in bed on room air with no apparent distress.  Alert to self and place.  HENT:  Head: Normocephalic and atraumatic.  Mouth:  Moist mucous membranes.    Eyes:  Conjunctivae and EOM are normal.  No scleral icterus.  Neck:  Neck supple.  No JVD present.    Cardiovascular:  Normal rate, regular rhythm and normal heart sounds 2/6 murmur  Pulmonary/Chest: Diminished to auscultation bilaterally  Abdominal:  Soft.  Bowel sounds are normal.  No distension and  no tenderness.   Musculoskeletal:  No edema, no tenderness, and no deformity.  No swelling or redness of joints.  Neurological:  Alert and oriented to person, place,   Skin:  Skin is warm and dry.  No rash noted.  No pallor.       ---------------------------------------------------------------------------------------------------------------------    Results from last 7 days   Lab Units 04/19/24  1143 04/19/24  0945   HSTROP T ng/L 10 11       Results from last 7 days   Lab Units 04/23/24  0029   TRIGLYCERIDES mg/dL 44     Results from last 7 days   Lab Units 04/19/24  1002   PH, ARTERIAL pH units 7.321*   PO2 ART mm Hg 142.0*   PCO2, ARTERIAL mm Hg 37.4   HCO3 ART mmol/L 19.3*     Results from last 7 days   Lab Units 04/24/24  0132 04/23/24  0314 04/22/24  0022 04/20/24  0025 04/19/24  1401 04/19/24  0945   LACTATE mmol/L  --   --   --   --  1.4 3.1*   WBC 10*3/mm3 12.23* 11.49* 11.97*   < >  --  15.71*   HEMOGLOBIN g/dL 9.9* 10.9* 10.8*   < >  --  11.7*   HEMATOCRIT % 30.1* 34.8 34.1   < >  --  36.1   MCV fL 91.5 91.8 94.7   < >  --  91.4   MCHC g/dL 32.9 31.3* 31.7   < >  --  32.4   PLATELETS 10*3/mm3 403 372 329   < >  --  409   INR   --   --   --   --   --  1.04    < > = values in this interval not displayed.     Results from last 7 days   Lab Units 04/24/24  0132 04/23/24  0029 04/22/24  0722 04/22/24  0407 04/22/24  0022 04/20/24  0329 04/20/24  0026 04/19/24  1143 04/19/24  0945   SODIUM mmol/L 143 140 139 140 140   < > 141  141   < > 137   POTASSIUM mmol/L 3.8 4.0 4.2 4.3 4.2   < > 5.0  5.0   < > 3.7   MAGNESIUM mg/dL  --   --  1.8 1.8 1.9   < > 2.1   < >  --    CHLORIDE mmol/L 110* 106 109* 111* 109*   < > 114*  114*   < > 102   CO2 mmol/L 24.8 25.7 22.0 21.8* 20.5*   < > 19.1*  19.1*   < > 16.6*   BUN mg/dL 8 3* 2* 2* 2*   < > 15  15   < > 22*   CREATININE mg/dL 0.54* 0.65 0.59 0.60 0.62   < > 0.72  0.72   < > 1.01*   CALCIUM mg/dL 8.3* 8.2* 7.8* 7.7* 7.6*   < > 8.3*  8.3*   < > 9.0   GLUCOSE mg/dL  "128* 126* 159* 157* 139*   < > 134*  134*   < > 542*   ALBUMIN g/dL  --   --   --   --   --   --  3.0*  --  3.5   BILIRUBIN mg/dL  --   --   --   --   --   --  <0.2  --  0.2   ALK PHOS U/L  --   --   --   --   --   --  112  --  131*   AST (SGOT) U/L  --   --   --   --   --   --  26  --  25   ALT (SGPT) U/L  --   --   --   --   --   --  22  --  23    < > = values in this interval not displayed.   Estimated Creatinine Clearance: 157.3 mL/min (A) (by C-G formula based on SCr of 0.54 mg/dL (L)).  No results found for: \"AMMONIA\"    Glucose   Date/Time Value Ref Range Status   04/24/2024 1214 176 (H) 70 - 130 mg/dL Final   04/24/2024 1111 151 (H) 70 - 130 mg/dL Final   04/24/2024 1003 116 70 - 130 mg/dL Final   04/24/2024 0859 115 70 - 130 mg/dL Final   04/24/2024 0654 136 (H) 70 - 130 mg/dL Final   04/24/2024 0435 139 (H) 70 - 130 mg/dL Final   04/24/2024 0337 127 70 - 130 mg/dL Final   04/24/2024 0240 156 (H) 70 - 130 mg/dL Final     Lab Results   Component Value Date    HGBA1C 9.00 (H) 04/19/2024     Lab Results   Component Value Date    TSH 0.732 08/12/2023    FREET4 1.80 (H) 06/20/2021       Blood Culture   Date Value Ref Range Status   04/22/2024 Abnormal Stain (C)  Preliminary   04/22/2024 Abnormal Stain (C)  Preliminary   04/19/2024 Staphylococcus epidermidis (C)  Final   04/19/2024 Staphylococcus epidermidis (C)  Final     No results found for: \"URINECX\"  No results found for: \"WOUNDCX\"  No results found for: \"STOOLCX\"  No results found for: \"RESPCX\"  Pain Management Panel  More data exists         Latest Ref Rng & Units 4/19/2024 2/21/2024   Pain Management Panel   Amphetamine, Urine Qual Negative Positive  Negative    Barbiturates Screen, Urine Negative Negative  Negative    Benzodiazepine Screen, Urine Negative Positive  Negative    Buprenorphine, Screen, Urine Negative Positive  Positive    Cocaine Screen, Urine Negative Negative  Negative    Fentanyl, Urine Negative Negative  Negative    Methadone Screen , " Urine Negative Negative  Negative    Methamphetamine, Ur Negative Positive  Negative      I have personally reviewed the above laboratory results.   ---------------------------------------------------------------------------------------------------------------------  Imaging Results (Last 7 Days)       Procedure Component Value Units Date/Time    FL Video Swallow Single Contrast [[id number]] Collected: 04/24/24 1141     Updated: 04/24/24 1143    Narrative:      EXAMINATION: FL VIDEO SWALLOW SINGLE-CONTRAST-      CLINICAL INDICATION:     Aspiration r/o; F19.929-Other psychoactive  substance use, unspecified with intoxication, unspecified; E10.10-Type 1  diabetes mellitus with ketoacidosis without coma; J96.00-Acute  respiratory failure, unspecified whether with hypoxia or hypercapnia;  A41.9-Sepsis, unspecified organism     TECHNIQUE: Modified barium swallow was performed utilizing video  fluoroscopy in conjunction with the Speech Pathology team. The patient  ingested multiple barium media including thin barium, nectar, and honey  liquid as well as barium pudding and a barium pudding coated cracker.      FLUOROSCOPY TIME: 1.1 minutes     COMPARISON: NONE      FINDINGS:   Premature loss is noted with vallecular pooling.  Gross aspiration of nectar thick and thin liquids.          Impression:      1. Gross aspiration of nectar thick and thin liquids.  2. Please see dysphasia notes for further details.        This report was finalized on 4/24/2024 11:41 AM by Dr. Madhu Crockett MD.       XR Chest 1 View [[id number]] Collected: 04/22/24 1313     Updated: 04/22/24 1316    Narrative:      EXAM:    XR Chest, 1 View     EXAM DATE:    4/22/2024 12:53 PM     CLINICAL HISTORY:    central line placement; F19.929-Other psychoactive substance use,  unspecified with intoxication, unspecified; E10.10-Type 1 diabetes  mellitus with ketoacidosis without coma; J96.00-Acute respiratory  failure, unspecified whether with hypoxia or  hypercapnia; A41.9-Sepsis,  unspecified organism     TECHNIQUE:    Frontal view of the chest.     COMPARISON:    [phone] 1:00 a.m.     FINDINGS:    Lungs and pleural spaces:  Improvement in right basilar airspace  disease.  Stable/improved left basilar airspace disease.  Trace right  pleural effusion has decreased.  No consolidation.  No pneumothorax.    Heart:  Unremarkable as visualized.  No cardiomegaly.    Mediastinum:  Unremarkable as visualized.  Normal mediastinal contour.    Bones/joints:  Unremarkable as visualized.  No acute fracture.    Tubes, lines and devices:  ET tube with tip at level of clavicles.  NG  tube extends into the stomach.  Left IJ deep line with tip at the  cavoatrial junction.       Impression:      1.  Likely overall improvement in volume overload.  2.  Improvement in basilar airspace disease.  3.  Support devices as above.        This report was finalized on 4/22/2024 1:14 PM by Dr. Madhu Crockett MD.       XR Chest 1 View [[id number]] Collected: 04/22/24 0727     Updated: 04/22/24 0731    Narrative:       VERIFICATION OBSERVER NAME: Noy Knowles MD.     TECHNIQUE, ADDITIONAL HISTORY, FINDINGS: Single frontal view of the  chest was obtained.   Comparison study date : Prior day. Time : 6:21 AM EST.     HISTORY: Intubated patient     ET tube and nasogastric tube are appropriately positioned.  Development of bilateral pleural-parenchymal changes.  Minimal cardiac enlargement.       Impression:      Development of bilateral pleural-parenchymal changes consisting of small  effusion with underlying infiltrates.  Findings could be  inflammatory/infectious in origin or related to CHF              [id number]     ZIP Code [id number].              This report was finalized on 4/22/2024 7:29 AM by Dr. Noy Knowles MD.       XR Chest 1 View [[id number]] Collected: 04/20/24 0724     Updated: 04/20/24 0728    Narrative:      EXAMINATION: AP portable chest     CLINICAL HISTORY: Intubated     COMPARISON:  4/19/2024     FINDINGS: An endotracheal tube is noted with tip 4.3 cm above the  leola. A nasogastric tube courses into the stomach. Mild left basilar  opacity may be related to atelectasis although pneumonia cannot be  excluded. No pneumothorax.       Impression:      1. Lines and tubes as above.  2. Mild left basilar opacity. This may be related to atelectasis.  However, pneumonia would be difficult to exclude entirely.     This report was finalized on 4/20/2024 7:26 AM by Christian Leo MD.       CT Head Without Contrast [[id number]] Collected: 04/19/24 1208     Updated: 04/19/24 1211    Narrative:      PROCEDURE: CT HEAD WO CONTRAST-     HISTORY: Mental status change, unknown cause     COMPARISON: 8/12/2023.     TECHNIQUE: Multiple axial CT images were performed from the foramen  magnum to the vertex without enhancement. This study was performed with  techniques to keep radiation doses as low as reasonably achievable  (ALARA). Individualized dose reduction techniques using automated  exposure control or adjustment of mA and/or kV according to the patient  size were employed.     FINDINGS: There is no CT evidence of hemorrhage. There is no mass, mass  effect or midline shift.  There is no hydrocephalus. The paranasal  sinuses are clear. Bone windows reveal no acute osseous abnormalities.       Impression:      No acute intracranial process.              This report was finalized on 4/19/2024 12:09 PM by Kareen Bolden M.D..       XR Chest 1 View [[id number]] Collected: 04/19/24 1005     Updated: 04/19/24 1007    Narrative:      PROCEDURE: XR CHEST 1 VW-       HISTORY: Severe Sepsis Protocol     COMPARISON: 8/12/2023.     FINDINGS: An endotracheal tube is in place and is well-positioned with  the tip above the leola. A nasogastric tube is in place with the tip in  the fundus of the stomach. The heart is normal in size. The mediastinum  is unremarkable. The lungs are clear. There is no pneumothorax. There  are  no acute osseous abnormalities.       Impression:      Support tubes appropriately positioned.        This report was finalized on 4/19/2024 10:05 AM by Kareen Bolden M.D..             I have personally reviewed the above radiology results.   ---------------------------------------------------------------------------------------------------------------------      Pertinent Infectious Disease Results          Assessment & Plan      Assessment      MRSA bacteremia        Plan      Ms. Jes Moreno is a 38 y.o. female with past medical history significant for asthma, celiac disease, diabetes mellitus type 1, history of DKA, thyroid disease, hepatitis C, neuropathy, reflux gastritis, who presented to [facility] Emergency Department on 4/19/2024 for altered mental status.  According to the ED provider the patient was found on the side of the road acting irrational and passing by motorist called local authorities.  When local 30s approached the patient, she reportedly became agitated and combative requiring several police officers to subdue her and bring her to the ED for further treatment evaluation.  Per the ED provider upon initial evaluation the patient did appear to have excessive lacrimation and salivation as well as flushing of the skin.  There was concern for acute drug intoxication.  The patient required large amounts of sedation medications raising concern for ability to practice her airway.  As such the patient was emergently intubated and placed on sedating medications.  The patient remained intubated from 4/19 through 4/23 at which time she self extubated.    On arrival to the ED the patient had elevated lactate at 3.1.  Normal procalcitonin 0.08.  Leukocytosis at 15.71.  Unremarkable urinalysis.  2 out of 2 blood cultures finalized with Staphylococcus epidermidis on BC ID.  Repeat blood cultures from 4/22 finalized with MRSA on BC ID.  Chest x-ray from 4/22/2024 reported likely overall  improvement in volume overload.  Improvement in basilar airspace disease.  Transthoracic echocardiogram from 4/23 did not report any evidence of vegetation.    The patient is awake and mildly confused, able to answer some questions appropriately.  Oriented to self and place.  On room air with no apparent distress.  Lung exam is diminished to auscultation bilaterally.  2/6 murmur noted.  Abdomen is soft and rounded with normoactive bowel sounds.  CVL to the left IJ with no surrounding infection or phlebitis.  Leukocytosis 12.23.  Repeat blood cultures pending x 2.    The patient is currently receiving vancomycin course in the setting of MRSA bacteremia.  We have repeated blood cultures x 2 sets today, if remain positive would recommend a KIMMIE to rule out endocarditis.  We will continue to monitor closely and adjust antibiotic coverage as appropriate.      ANTIMICROBIAL THERAPY    Pharmacy to dose vancomycin  vancomycin       Again, thank you Dr. Villalba for allowing us to participate in the care of your patient and please feel free to call for any questions you may have.        Code Status:     Code Status and Medical Interventions:   Ordered at: 04/19/24 1230     Code Status (Patient has no pulse and is not breathing):    CPR (Attempt to Resuscitate)     Medical Interventions (Patient has pulse or is breathing):    Full Support         Emmy Villalba, GAURAV  04/24/24  12:24 EDT

## 2024-04-24 NOTE — PLAN OF CARE
Goal Outcome Evaluation:  Plan of Care Reviewed With: patient        Progress: improving  Outcome Evaluation: VSS on RA, A&Ox4, up to bedside commade multiple times. Safety measures in place, call light within reach.

## 2024-04-24 NOTE — PLAN OF CARE
Goal Outcome Evaluation:  Plan of Care Reviewed With: patient        Progress: improving  Outcome Evaluation: Pt remains AOx4 on room air. Transitioning from insulin gtt to subQ during shift. Up to bedside commode. SO at bedside. NSR. VSS. Call light in reach. Bed alarm set. Care ongoing.

## 2024-04-24 NOTE — PROGRESS NOTES
Chief Complaint:  Pulmonary and critical care is following for ventilator management and critical illness management        Subjective-overnight events reviewed.  All the labs medications ins and outs and vitals reviewed.  Patient resting in bed comfortably.  Remains extubated.  Currently on insulin drip.  Will have a speech and swallow evaluation today.    Review of Systems:    Positive for generalized fatigue otherwise negative    Vital Signs  Temp:  [98.1 °F (36.7 °C)-99.6 °F (37.6 °C)] 98.4 °F (36.9 °C)  Heart Rate:  [] 86  Resp:  [10-22] 15  BP: (112-142)/(56-97) 132/91  Body mass index is 23.89 kg/m².    Intake/Output Summary (Last 24 hours) at 4/24/2024 1349  Last data filed at 4/24/2024 1334  Gross per 24 hour   Intake 4238.29 ml   Output 2450 ml   Net 1788.29 ml     I/O this shift:  In: 250 [IV Piggyback:250]  Out: 900 [Urine:900]    Physical Exam:-    General- normal in appearance, not in any acute distress    HEENT- pupils equally reactive to light, normal in size, no scleral icterus    Neck-supple    Respiratory-respirations normal-on auscultation no wheezing no crackles,     Cardiovascular-  Normal S1 and S2. No S3, S4 or murmurs. No JVD, no carotid bruit and no edema, pulses normal bilaterally     GI-nontender nondistended bowel sounds positive    CNS-nonfocal    Musculoskeletal -no edema  Extremities- no obvious deformity noticed     Psychiatric-mood good, good eye contact, alert awake oriented  Skin- no visible rash       Results Review:     I reviewed the patient's new clinical results.  Results from last 7 days   Lab Units 04/24/24  0132 04/23/24  0314 04/22/24  0022   WBC 10*3/mm3 12.23* 11.49* 11.97*   HEMOGLOBIN g/dL 9.9* 10.9* 10.8*   PLATELETS 10*3/mm3 403 372 329     Results from last 7 days   Lab Units 04/24/24  0132 04/23/24  0029 04/22/24  0722 04/22/24  0407 04/22/24  0022   SODIUM mmol/L 143 140 139 140 140   POTASSIUM mmol/L 3.8 4.0 4.2 4.3 4.2   CHLORIDE mmol/L 110* 106  109* 111* 109*   CO2 mmol/L 24.8 25.7 22.0 21.8* 20.5*   BUN mg/dL 8 3* 2* 2* 2*   CREATININE mg/dL 0.54* 0.65 0.59 0.60 0.62   CALCIUM mg/dL 8.3* 8.2* 7.8* 7.7* 7.6*   GLUCOSE mg/dL 128* 126* 159* 157* 139*   MAGNESIUM mg/dL  --   --  1.8 1.8 1.9     Lab Results   Component Value Date    INR 1.04 04/19/2024    INR 0.93 02/21/2024    INR 1.00 08/12/2023    PROTIME 14.1 04/19/2024    PROTIME 13.0 02/21/2024    PROTIME 13.7 08/12/2023     Results from last 7 days   Lab Units 04/20/24  0026 04/19/24  0945   ALK PHOS U/L 112 131*   BILIRUBIN mg/dL <0.2 0.2   ALT (SGPT) U/L 22 23   AST (SGOT) U/L 26 25     Results from last 7 days   Lab Units 04/19/24  1002   PH, ARTERIAL pH units 7.321*   PO2 ART mm Hg 142.0*   PCO2, ARTERIAL mm Hg 37.4   HCO3 ART mmol/L 19.3*     Imaging Results (Last 24 Hours)       Procedure Component Value Units Date/Time    FL Video Swallow Single Contrast [[id number]] Collected: 04/24/24 1141     Updated: 04/24/24 1143    Narrative:      EXAMINATION: FL VIDEO SWALLOW SINGLE-CONTRAST-      CLINICAL INDICATION:     Aspiration r/o; F19.929-Other psychoactive  substance use, unspecified with intoxication, unspecified; E10.10-Type 1  diabetes mellitus with ketoacidosis without coma; J96.00-Acute  respiratory failure, unspecified whether with hypoxia or hypercapnia;  A41.9-Sepsis, unspecified organism     TECHNIQUE: Modified barium swallow was performed utilizing video  fluoroscopy in conjunction with the Speech Pathology team. The patient  ingested multiple barium media including thin barium, nectar, and honey  liquid as well as barium pudding and a barium pudding coated cracker.      FLUOROSCOPY TIME: 1.1 minutes     COMPARISON: NONE      FINDINGS:   Premature loss is noted with vallecular pooling.  Gross aspiration of nectar thick and thin liquids.          Impression:      1. Gross aspiration of nectar thick and thin liquids.  2. Please see dysphasia notes for further details.        This report was  finalized on 4/24/2024 11:41 AM by Dr. Madhu Crockett MD.                    atorvastatin, 20 mg, Oral, Nightly  buprenorphine-naloxone, 3 tablet, Sublingual, Daily  buPROPion XL, 450 mg, Oral, Daily  cetirizine, 10 mg, Oral, Daily  chlorhexidine, 15 mL, Mouth/Throat, Q12H  enoxaparin, 40 mg, Subcutaneous, Q24H  FLUoxetine, 20 mg, Oral, Daily  FLUoxetine, 40 mg, Oral, Daily  furosemide, 20 mg, Oral, Daily  gabapentin, 800 mg, Oral, Q8H  insulin glargine, 1-200 Units, Subcutaneous, Nightly - Glucommander  insulin lispro, 1-200 Units, Subcutaneous, 4x Daily With Meals & Nightly  levothyroxine, 200 mcg, Oral, Daily  montelukast, 10 mg, Oral, Nightly  mupirocin, 1 Application, Topical, Q12H  OLANZapine zydis, 10 mg, Oral, Daily  pantoprazole, 40 mg, Intravenous, Q AM  rOPINIRole, 2 mg, Oral, Nightly  senna-docusate sodium, 2 tablet, Oral, BID  sodium chloride, 10 mL, Intravenous, Q12H  sodium chloride, 10 mL, Intravenous, Q12H  sodium chloride, 10 mL, Intravenous, Q12H  topiramate, 25 mg, Oral, Daily  vancomycin, 1,250 mg, Intravenous, Q12H      dexmedetomidine, 0.2-1.5 mcg/kg/hr, Last Rate: Stopped (04/23/24 0941)  dextrose 5 % and sodium chloride 0.45 %, 150 mL/hr  dextrose 5 % and sodium chloride 0.9 %, 150 mL/hr  Pharmacy to dose vancomycin,   propofol, 50 mcg/kg/min, Last Rate: Stopped (04/23/24 0852)  sodium chloride, 250 mL/hr  sodium chloride, 250 mL/hr        Medication Review:    Latest Reference Range & Units 04/19/24 10:02   pH, Arterial 7.350 - 7.450 pH units 7.321 (L)   pCO2, Arterial 35.0 - 45.0 mm Hg 37.4   pO2, Arterial 83.0 - 108.0 mm Hg 142.0 (H)   HCO3, Arterial 20.0 - 26.0 mmol/L 19.3 (L)   Base Excess 0.0 - 2.0 mmol/L -6.2 (L)   O2 Saturation, Arterial 94.0 - 99.0 % >99.2 (H)   CO2 Content 22 - 33 mmol/L 20.4 (L)   A-a DO2 0.0 - 300.0 mmHg 54.3   Carboxyhemoglobin 0 - 5 % 0.9   Methemoglobin 0.00 - 3.00 % 0.20   Oxyhemoglobin 94 - 99 % 98.2   Hematocrit, Blood Gas 38.0 - 51.0 % 35.6 (L)    Hemoglobin, Blood Gas 13.5 - 17.5 g/dL 11.6 (L)   Site  Left Brachial   Saul's Test  N/A   Modality  Ventilator   FIO2 % 35   Ventilator Mode  VC/AC   Set Tidal Volume  450.00   Set Mech Resp Rate  18.0   PEEP  5.0   Barometric Pressure for Blood Gas mmHg 727   (L): Data is abnormally low  (H): Data is abnormally high  Microbiology Results (last 10 days)       Procedure Component Value - Date/Time    Blood Culture - Blood, Arm, Right [[id number]]  (Abnormal) Collected: 04/22/24 1532    Lab Status: Preliminary result Specimen: Blood from Arm, Right Updated: 04/24/24 0956     Blood Culture Abnormal Stain     Gram Stain Aerobic Bottle Gram positive cocci in clusters    Narrative:      No BCID performed, refer to previous blood culture specimen collected on  4/22/24    Blood Culture - Blood, Arm, Right [[id number]]  (Abnormal) Collected: 04/22/24 1436    Lab Status: Preliminary result Specimen: Blood from Arm, Right Updated: 04/23/24 2208     Blood Culture Abnormal Stain     Gram Stain Aerobic Bottle Gram positive cocci in clusters    Blood Culture ID, PCR - Blood, Arm, Right [[id number]]  (Abnormal) Collected: 04/22/24 1436    Lab Status: Final result Specimen: Blood from Arm, Right Updated: 04/23/24 2208     BCID, PCR Staph aureus. mecA/C and MREJ (methicillin resistance gene) detected. Identification by BCID2 PCR.     BOTTLE TYPE Aerobic Bottle    Narrative:      Infectious disease consultation is highly recommended to rule out distant foci of infection.    Blood Culture - Blood, Blood, Central Line [[id number]]  (Abnormal) Collected: 04/19/24 0947    Lab Status: Final result Specimen: Blood, Central Line Updated: 04/22/24 0610     Blood Culture Staphylococcus epidermidis     Isolated from Aerobic Bottle     Gram Stain Aerobic Bottle Gram positive cocci    Narrative:      Refer to previous blood culture collected on 04/19/2024 0947 for MICs      Blood Culture - Blood, Arm, Right [[id number]]  (Abnormal)   (Susceptibility) Collected: 04/19/24 0947    Lab Status: Final result Specimen: Blood from Arm, Right Updated: 04/22/24 0610     Blood Culture Staphylococcus epidermidis     Isolated from Aerobic Bottle     Gram Stain Aerobic Bottle Gram positive cocci    Susceptibility        Staphylococcus epidermidis      ILIANA      Oxacillin Resistant      Vancomycin Susceptible                           Blood Culture ID, PCR - Blood, Arm, Right [[id number]]  (Abnormal) Collected: 04/19/24 0947    Lab Status: Final result Specimen: Blood from Arm, Right Updated: 04/20/24 0521     BCID, PCR Staph spp, not aureus or lugdunensis. Identification by BCID2 PCR.     BOTTLE TYPE Aerobic Bottle            Latest Reference Range & Units 04/19/24 11:43 04/19/24 13:01   Acetone Negative  Small !!    Amphetamine, Urine Qual Negative   Positive !   Barbiturates Screen, Urine Negative   Negative   Benzodiazepine Screen, Urine Negative   Positive !   Buprenorphine, Screen, Urine Negative   Positive !   Cocaine Screen, Urine Negative   Negative   Fentanyl, Urine Negative   Negative   Methamphetamine, Ur Negative   Positive !   Methadone Screen , Urine Negative   Negative   Opiate Screen Negative   Negative   Oxycodone Screen, Urine Negative   Negative   Phencyclidine (PCP), Urine Negative   Negative   THC Screen, Urine Negative   Negative   Tricyclic Antidepressants Screen Negative   Negative   !!: Data is critical  !: Data is abnormal  Assessment & Plan     Neurology-     CT head reviewed-no acute intracranial process identified.      Respiratory-  Extubated and doing well.  FiO2 requirement reviewed and adjusted for a sat of 88 to 92%.    Cardiology- hemodynamically -stable.    Continue to monitor HR- rate and rhythm, BP   Results for orders placed during the hospital encounter of 04/19/24    Adult Transthoracic Echo Complete W/ Cont if Necessary Per Protocol    Interpretation Summary    Left ventricular systolic function is normal. Left  ventricular ejection fraction appears to be 66 - 70%.    Left ventricular diastolic function is consistent with (grade I) impaired relaxation.    Estimated right ventricular systolic pressure from tricuspid regurgitation is normal (<35 mmHg).    Will get a midline in and take the central line out.    Nephrology- Cr and BUN stable  I/O-reviewed.  Electrolytes reviewed and adjusted    GI- PPI prophylaxis  Speech and swallow evaluation and accordingly start the diet    Hematology- CBC  Hb  platelet  WBC    ID  Culture  And Antibiotics  Repeat blood cultures negative so far reviewed.  Repeat cultures drawn today and negative so far  UA positive.  Culture pending.  ID on case.  Will discuss with them.      Endocrinology- Maintain Blood sugar 140 -180  Continue insulin drip.  Latest blood sugars reviewed.  After patient's speech and swallow done and start diet insulin drip can be converted to oral insulin.    Electrolytes-   Mag and phos       DVT prophylaxis-continue    Bed side rounds were done with RT and patient's nurse. All the lab and clinical findings were discussed with them and plan was also discussed in great detail.          Acute encephalopathy               Tenzin Felton MD  04/24/24  13:49 EDT

## 2024-04-24 NOTE — MBS/VFSS/FEES
Acute Care - Speech Language Pathology   Swallow Initial Evaluation [facility]  Modified Barium Swallow Study     Patient Name: Jes Moreno  : 1985  MRN: [id number]  Today's Date: 2024     Admit Date: 2024    Jes Moreno  presented to the radiology suite this am from CCU-8 to participate in an instrumental MBS to objectively evaluate safety/efficacy of swallowing fnx, determine safest/least restrictive diet.    She has a medical hx significant, obtained from EMR review, for asthma, DM type I, and hepatitis C.      She presented to [facility] ED via EMS and was emergently intubated per concerns for airway protection given concerns for acute drug intoxication and excessive lacrimation and salivation. She remained intubated from - at which time she self-extubated.     She is s/p clinical dysphagia assessment this am w/ s/s concerning for aspiration of thin liquids and felt to most benefit from MBSS prior to po diet recommendations.       Social History     Socioeconomic History    Marital status: Single   Tobacco Use    Smoking status: Every Day     Current packs/day: 1.00     Average packs/day: 1 pack/day for 3.0 years (3.0 ttl pk-yrs)     Types: Cigarettes    Smokeless tobacco: Never    Tobacco comments:     unable to assess    Vaping Use    Vaping status: Some Days    Substances: Nicotine   Substance and Sexual Activity    Alcohol use: No     Comment: unable to assess     Drug use: No     Comment: suboxone    Sexual activity: Yes     Partners: Male   Imaging:  EXAM:    XR Chest, 1 View     EXAM DATE:    2024 12:53 PM     CLINICAL HISTORY:    central line placement; F19.929-Other psychoactive substance use,  unspecified with intoxication, unspecified; E10.10-Type 1 diabetes  mellitus with ketoacidosis without coma; J96.00-Acute respiratory  failure, unspecified whether with hypoxia or hypercapnia; A41.9-Sepsis,  unspecified organism     TECHNIQUE:    Frontal view of the chest.     COMPARISON:     [phone] 1:00 a.m.     FINDINGS:    Lungs and pleural spaces:  Improvement in right basilar airspace  disease.  Stable/improved left basilar airspace disease.  Trace right  pleural effusion has decreased.  No consolidation.  No pneumothorax.    Heart:  Unremarkable as visualized.  No cardiomegaly.    Mediastinum:  Unremarkable as visualized.  Normal mediastinal contour.    Bones/joints:  Unremarkable as visualized.  No acute fracture.    Tubes, lines and devices:  ET tube with tip at level of clavicles.  NG  tube extends into the stomach.  Left IJ deep line with tip at the  cavoatrial junction.     IMPRESSION:  1.  Likely overall improvement in volume overload.  2.  Improvement in basilar airspace disease.  3.  Support devices as above.     This report was finalized on 4/22/2024 1:14 PM by Dr. Madhu Crockett MD.  Labs:  WBC  11.49  04/23 0314  Diet Orders (active) (From admission, onward)       Start     Ordered    04/24/24 1139  Diet: Regular/House; No Straw, Feeding Assistance - Nursing; Texture: Pureed (NDD 1); Fluid Consistency: Honey Thick  Diet Effective Now         04/24/24 1139    04/24/24 1139  DIET MESSAGE Pt has been NPO. Please send a lunch tray. No straws on tray please.  Once        Comments: Pt has been NPO. Please send a lunch tray.  No straws on tray please.    04/24/24 1139    04/19/24 1127  Patient is on Glucommander  Continuous         04/19/24 1126                    Ms Moreno is observed on room air w/o complications across this evaluation.     Risks and benefits of the procedure were explained w/ patient verbalizing understanding/agreement to participate. Proceed per protocol. She is generally preoccupied w/ a visitor which she is expecting and repeatedly makes reference to this.     She was positioned upright and centered in a stationary chair to accept multiple po presentations of puree, honey thick, nectar thick, and thin liquids via spoon and cup. Solid cracker is deferred per pt  reports that she tolerates a primarily puree diet at baseline per edentulous status. Straw presentations deferred per evidenced aspiration. She was able to self provide po trials.     All views are from the lateral plane.     Facial/oral structures were symmetrical upon observation w/o lingual deviation upon protrusion. Oral mucosa were moist, pink and clean. She is edentulous. Secretions were clear, thin, and well controlled. OROM/MANDO was wfl to imitate oral postures. Gag was not assessed. Volitional cough was weak in intensity, clear in quality, nonproductive. Vocal quality was mild to moderately weak in intensity, breathy and harsh in quality w/ intelligible speech. She was a/a and cooperative to participate, oriented to person, follows simple directives, and participates in simple conversational exchanges however repeats the same phrases/questions multiple times. She is noted w/ generalized confusion related to situation.     Upon po presentations, adequate bolus anticipation w/ good labial seal for bolus clearance via spoon bowl and cup rim stability.  She self provides small bolus amounts across the evaluation. Bolus formation was delayed, manipulation was prolonged, and control was wfl. A-p transit was timely w/o significant oral residue appreciated. Tongue base retraction was mildly delayed w/ premature loss beyond the valleculae w/ nectar and thin liquids. No laryngeal penetration or aspiration evidenced before the swallow.     Pharyngeal swallow was slightly delayed w/ adequate hyolaryngeal elevation and complete but slightly delayed epiglottic inversion. Gross aspiration occurs during the swallow w/ nectar thick liquids via spoon and cup. Throat clear/cough is immediately elicited and is sufficient to clear. Gross aspiration again occurs w/ thin liquids and immediately elicited throat clear/cough is insufficient to clear aspirated material. Per severity and consistency of aspiration of nectar liquids via  spoon and cup, straw trial was deferred. Pharyngeal contraction was adequate w/o significant residue.                Penetration-Aspiration Scale Scoring  Consistency: Teaspoon Bolus Cup Bolus Straw Bolus   Puree 1, 1 -------- --------   Honey 1, 1 2 --------   Nectar 6 6 deferred   Thin 7 deferred deferred   Solid deferred -------- --------     *Reference*      Visit Dx:     ICD-10-CM ICD-9-CM   1. Psychoactive substance-induced intoxication  F19.929 292.89     969.9   2. Diabetic ketoacidosis without coma associated with type 1 diabetes mellitus  E10.10 250.13   3. Acute respiratory failure, unspecified whether with hypoxia or hypercapnia  J96.00 518.81   4. Sepsis, due to unspecified organism, unspecified whether acute organ dysfunction present  A41.9 038.9     995.91     Patient Active Problem List   Diagnosis    Aspirin toxicity    Hypothyroidism    Type I diabetes mellitus    Chronic hepatitis C virus infection    Diabetic peripheral neuropathy    Gastroesophageal reflux disease without esophagitis    Celiac disease    Asthma    DKA (diabetic ketoacidosis)    Acute encephalopathy     Past Medical History:   Diagnosis Date    Asthma     Celiac disease     Diabetes mellitus type 1     Diabetic ketoacidosis     Disease of thyroid gland     Hepatitis C     Infectious viral hepatitis     hepititis C    Neuropathy     Reflux gastritis      Past Surgical History:   Procedure Laterality Date     SECTION      X 2     Impression:     Ms Moreno presented w/ a primarily oral dysphagia w/ prolonged bolus manipulation, delayed formation, and premature loss beyond the valleculae w/ nectar and thin liquids. Aspiration was evidenced during the swallow w/ nectar and thin liquids. Throat clear/cough was immediately elicited however was only sufficient to clear nectar thick consistencies.     Per this evaluation, she is felt to most benefit from po diet initiation of puree consistencies and HONEY THICK liquids w/  medications crushed in puree at this time. No straw presentations are recommended as straw presentation style is not presented w/ honey thick liquids.     SLP Recommendation and Plan     1. Puree textures, HONEY THICK Liquids  2. Medications crushed in puree/HONEY   3. TOÑO precautions.   4. Oral care protocol.   5. Fully a/a for all po intake.   6. Upright and centered for all po intake.   7. No Straw presentations.     SLP to f/u for diet safety/tolerance.     D/w patient results and recommendations w/ verbal understanding and agreement.     D/w RN results and recommendations w/ verbal understanding and agreement.     Thank you for allowing me to participate in the care of your patient-  Lyric Bradford M.S., CCC-SLP          EDUCATION  The patient has been educated in the following areas:   Dysphagia (Swallowing Impairment) Oral Care/Hydration Modified Diet Instruction.        Time Calculation:       Therapy Charges for Today       Code Description Service Date Service Provider Modifiers Qty    07129822650 HC ST EVAL ORAL PHARYNG SWALLOW 4 4/23/2024 Lyric Bradford MS CCC-SLP GN 1    [id number] HC ST EVAL ORAL PHARYNG SWALLOW 4 4/24/2024 Lyric Bradford MS CCC-SLP GN 1            Lyric Bradford MS CCC-SLP  4/24/2024

## 2024-04-24 NOTE — THERAPY RE-EVALUATION
Acute Care - Speech Language Pathology   Swallow Re-Assessment [facility]  Clinical Dysphagia Assessment     Patient Name: Jes Moreno  : 1985  MRN: [id number]  Today's Date: 2024     Admit Date: 2024  Jes Moreno  was seen at bedside this am on CCU-8 to assess safety/efficacy of swallowing fnx, determine safest/least restrictive diet tolerance.     She has a medical hx significant, obtained from EMR review, for asthma, DM type I, and hepatitis C.      She presented to [facility] ED via EMS and was emergently intubated per concerns for airway protection given concerns for acute drug intoxication and excessive lacrimation and salivation. She remained intubated from - at which time she self-extubated.     She is s/p dysphagia assessment performed at bedside on  at which time she evidenced w/ s/s concerning for aspiration of ice chips and thin water presentations. She additionally demonstrated a vocal quality concerning for post-extubation edema. She has continued NPO w/ ice chips and sips thin water recommended.     Social History     Socioeconomic History    Marital status: Single   Tobacco Use    Smoking status: Every Day     Current packs/day: 1.00     Average packs/day: 1 pack/day for 3.0 years (3.0 ttl pk-yrs)     Types: Cigarettes    Smokeless tobacco: Never    Tobacco comments:     unable to assess    Vaping Use    Vaping status: Some Days    Substances: Nicotine   Substance and Sexual Activity    Alcohol use: No     Comment: unable to assess     Drug use: No     Comment: suboxone    Sexual activity: Yes     Partners: Male   Imaging:  Narrative & Impression   EXAM:    XR Chest, 1 View     EXAM DATE:    2024 12:53 PM     CLINICAL HISTORY:    central line placement; F19.929-Other psychoactive substance use,  unspecified with intoxication, unspecified; E10.10-Type 1 diabetes  mellitus with ketoacidosis without coma; J96.00-Acute respiratory  failure, unspecified whether with hypoxia or  hypercapnia; A41.9-Sepsis,  unspecified organism     TECHNIQUE:    Frontal view of the chest.     COMPARISON:    [phone] 1:00 a.m.     FINDINGS:    Lungs and pleural spaces:  Improvement in right basilar airspace  disease.  Stable/improved left basilar airspace disease.  Trace right  pleural effusion has decreased.  No consolidation.  No pneumothorax.    Heart:  Unremarkable as visualized.  No cardiomegaly.    Mediastinum:  Unremarkable as visualized.  Normal mediastinal contour.    Bones/joints:  Unremarkable as visualized.  No acute fracture.    Tubes, lines and devices:  ET tube with tip at level of clavicles.  NG  tube extends into the stomach.  Left IJ deep line with tip at the  cavoatrial junction.     IMPRESSION:  1.  Likely overall improvement in volume overload.  2.  Improvement in basilar airspace disease.  3.  Support devices as above.     This report was finalized on 4/22/2024 1:14 PM by Dr. Madhu Crockett MD.     Labs:  WBC  12.23  04/24 0132     Diet Orders (active) (From admission, onward)       Start     Ordered    04/23/24 1553  NPO Diet NPO Type: Strict NPO, Ice Chips, Other (see comments)  Diet Effective Now        Comments: Ice chips and/or sips thin water via spoon per pt request.   Meds via non-oral route.    04/23/24 1553    04/19/24 1127  Patient is on Glucommander  Continuous         04/19/24 1126                  Ms Moreno is observed on room air w/o complications across this assessment.    Upon SLP entry she is seated upright in bed and is self-providing ice chips via spoon. She demonstrates a throat clear intermittently across all presentations.     Ms Moreno was positioned upright and centered in bed to accept multiple po presentations of ice chips and thin liquids via spoon, cup, and straw.  She was able to self provide po trials.     Facial/oral structures were symmetrical upon observation. Lingual protrusion revealed no deviation. Oral mucosa were moist, pink, and clean. Secretions  were clear, thin, and well controlled. OROM/MANDO was wfl to imitate oral postures. She demonstrates a mild delay to follow most directives. Gag is not assessed. Volitional cough was intact w/ adequate  intensity, clear in quality, non-productive. Voice was weak in intensity, harsh and slightly breathy in quality w/ intelligible speech.    Upon po presentations, adequate bolus anticipation and acceptance w/ good labial seal for bolus clearance via spoon bowl, cup rim stability and suction via straw. Bolus formation, manipulation and control were wfl. A-p transit was timely w/o significant oral residue appreciated. No overt s/s aspiration before the swallow.      Pharyngeal swallow was timely w/ adequate hyolaryngeal elevation per palpation. Throat clear is elicited intermittently across all thin liquid presentation styles.     Visit Dx:     ICD-10-CM ICD-9-CM   1. Psychoactive substance-induced intoxication  F19.929 292.89     969.9   2. Diabetic ketoacidosis without coma associated with type 1 diabetes mellitus  E10.10 250.13   3. Acute respiratory failure, unspecified whether with hypoxia or hypercapnia  J96.00 518.81   4. Sepsis, due to unspecified organism, unspecified whether acute organ dysfunction present  A41.9 038.9     995.91     Patient Active Problem List   Diagnosis    Aspirin toxicity    Hypothyroidism    Type I diabetes mellitus    Chronic hepatitis C virus infection    Diabetic peripheral neuropathy    Gastroesophageal reflux disease without esophagitis    Celiac disease    Asthma    DKA (diabetic ketoacidosis)    Acute encephalopathy     Past Medical History:   Diagnosis Date    Asthma     Celiac disease     Diabetes mellitus type 1     Diabetic ketoacidosis     Disease of thyroid gland     Hepatitis C     Infectious viral hepatitis     hepititis C    Neuropathy     Reflux gastritis      Past Surgical History:   Procedure Laterality Date     SECTION      X 2     Impression:     Ms Moreno  presented w/ s/s concerning for potential aspiration of thin liquids and is felt to most benefit from MBSS to r/o aspiration prior to po diet initiation.     SLP Recommendation and Plan     1. MBSS     SLP to f/u for MBSS.     D/w patient results and recommendations w/ verbal agreement.    D/w RN results and recommendations w/ verbal agreement.    Thank you for allowing me to participate in the care of your patient-  Lyric Bradford M.S., CCC-SLP        EDUCATION  The patient has been educated in the following areas:   Dysphagia (Swallowing Impairment) Oral Care/Hydration.        Time Calculation:       Therapy Charges for Today       Code Description Service Date Service Provider Modifiers Qty    51070603338 HC ST EVAL ORAL PHARYNG SWALLOW 4 4/23/2024 Lyric Bradford MS CCC-SLP GN 1    [id number] HC ST EVAL ORAL PHARYNG SWALLOW 4 4/24/2024 Lyric Bradford MS CCC-SLP GN 1                 Lyric Bradford MS CCC-SLP  4/24/2024

## 2024-04-24 NOTE — PROGRESS NOTES
[facility] HOSPITALIST PROGRESS NOTE     Patient Identification:  Name:  Jes Moreno  Age:  38 y.o.  Sex:  female  :  1985  MRN:  [id number]  Visit Number:  [id number]  ROOM: [address]     Primary Care Provider:  Juany Sheehan APRN    Length of stay in inpatient status:  4    Subjective     Chief Compliant:    Chief Complaint   Patient presents with    Altered Mental Status       History of Presenting Illness:    Patient seen and examined this morning and then re-examined after she broke the cuff on her ET tube during SAT, which necessitated extubation. Patient denied any pain or shortness of breath, and her only complaint was she wanted ice to eat. Later discussed with patient's mother that as she is awake and oriented that she is able to make her own decisions regarding who is able to visit (mother was very upset that patient's significant other was there to visit, after patient had asked for him). Mother still planning to try to pursue Nik's Law and we counseled her that she would need to speak to the courthouse and likely an . CCU director Nilton Simeon RN present for the discussion as well.     Objective     Current Hospital Meds:atorvastatin, 20 mg, Oral, Nightly  buprenorphine-naloxone, 3 tablet, Sublingual, Daily  buPROPion XL, 450 mg, Oral, Daily  cetirizine, 10 mg, Oral, Daily  chlorhexidine, 15 mL, Mouth/Throat, Q12H  enoxaparin, 40 mg, Subcutaneous, Q24H  FLUoxetine, 20 mg, Oral, Daily  FLUoxetine, 40 mg, Oral, Daily  furosemide, 20 mg, Oral, Daily  gabapentin, 800 mg, Oral, Q8H  levothyroxine, 200 mcg, Oral, Daily  montelukast, 10 mg, Oral, Nightly  mupirocin, 1 Application, Topical, Q12H  OLANZapine zydis, 10 mg, Oral, Daily  pantoprazole, 40 mg, Intravenous, Q AM  rOPINIRole, 2 mg, Oral, Nightly  senna-docusate sodium, 2 tablet, Oral, BID  sodium chloride, 10 mL, Intravenous, Q12H  sodium chloride, 10 mL, Intravenous, Q12H  sodium chloride, 10 mL, Intravenous,  Q12H  topiramate, 25 mg, Oral, Daily    dexmedetomidine, 0.2-1.5 mcg/kg/hr, Last Rate: Stopped (04/23/24 0941)  dextrose 5 % and sodium chloride 0.45 %, 150 mL/hr  dextrose 5 % and sodium chloride 0.45 % with KCl 20 mEq/L, 150 mL/hr, Last Rate: 150 mL/hr (04/23/24 1517)  dextrose 5 % and sodium chloride 0.45 % with KCl 40 mEq/L, 150 mL/hr  dextrose 5 % and sodium chloride 0.9 %, 150 mL/hr  dextrose 5 % and sodium chloride 0.9 % with KCl 20 mEq, 150 mL/hr  dextrose 5% and sodium chloride 0.9% with KCl 40 mEq/L, 150 mL/hr  insulin, 0-100 Units/hr, Last Rate: 2.4 Units/hr (04/23/24 1940)  propofol, 50 mcg/kg/min, Last Rate: Stopped (04/23/24 0854)  sodium chloride 0.45 % 1,000 mL with potassium chloride 40 mEq infusion, 250 mL/hr  sodium chloride, 250 mL/hr  sodium chloride 0.45 % with KCl 20 mEq, 250 mL/hr, Last Rate: Stopped (04/19/24 1459)  sodium chloride, 250 mL/hr  sodium chloride 0.9 % with KCl 20 mEq, 250 mL/hr  sodium chloride 0.9 % with KCl 40 mEq/L, 250 mL/hr        Current Antimicrobial Therapy:  Anti-Infectives (From admission, onward)      None          Current Diuretic Therapy:  Diuretics (From admission, onward)      Ordered     Dose/Rate Route Frequency Start Stop    04/19/24 1116  furosemide (LASIX) tablet 20 mg        Ordering Provider: Jono Valencia DO    20 mg Oral Daily 04/20/24 0900            ----------------------------------------------------------------------------------------------------------------------  Vital Signs:  Temp:  [98.3 °F (36.8 °C)-99.6 °F (37.6 °C)] 99.6 °F (37.6 °C)  Heart Rate:  [] 99  Resp:  [15-34] 16  BP: ()/(48-73) 129/72  FiO2 (%):  [35 %] 35 %  SpO2:  [91 %-100 %] 96 %  on  Flow (L/min):  [2] 2;   Device (Oxygen Therapy): room air  Body mass index is 24.24 kg/m².    Wt Readings from Last 3 Encounters:   04/23/24 70.2 kg (154 lb 12.8 oz)   02/21/24 66.1 kg (145 lb 12.8 oz)   08/13/23 73.5 kg (162 lb)     Intake & Output (last 3 days)         04/21  0701 04/22 0700 04/22 0701 04/23 0700 04/23 0701 04/24 0700    I.V. (mL/kg) 4866 (60.8) 4685.8 (58.6)     Other  91     NG/GT  242     IV Piggyback       Total Intake(mL/kg) 4866 (60.8) 5018.8 (62.7)     Urine (mL/kg/hr) 3725 (1.9) 5600 (2.9) 4500 (4.2)    Emesis/NG output 120 0     Stool 0 0     Total Output 3845 5600 4500    Net +1021 -581.2 -4500           Urine Unmeasured Occurrence 0 x 0 x     Stool Unmeasured Occurrence 0 x 0 x     Emesis Unmeasured Occurrence 0 x 0 x           NPO Diet NPO Type: Strict NPO, Ice Chips, Other (see comments)  ----------------------------------------------------------------------------------------------------------------------  Physical exam:   Constitutional:  Well-developed and well-nourished.  No acute distress.      HENT:  Head:  Normocephalic and atraumatic.    Cardiovascular:  Normal rate, regular rhythm   Pulmonary/Chest:  No respiratory distress, breath sounds coarse and slightly diminished bilaterally but no overt wheezing, crackles, or rhonchi noted. Voice was very hoarse.  Musculoskeletal:  No deformity.    Neurological: Drowsy, but awake, no focal deficits noted on gross examination  Skin:  Skin is warm and dry. No rash noted. No pallor.   Peripheral vascular:  No cyanosis  Edited by: Madalyn Villalba DO at 4/23/2024 2030  ----------------------------------------------------------------------------------------------------------------------  Results from last 7 days   Lab Units 04/23/24  0314 04/22/24  0022 04/21/24  0027 04/20/24  0025 04/19/24  1401 04/19/24  0945   LACTATE mmol/L  --   --   --   --  1.4 3.1*   WBC 10*3/mm3 11.49* 11.97* 11.08*   < >  --  15.71*   HEMOGLOBIN g/dL 10.9* 10.8* 10.5*   < >  --  11.7*   HEMATOCRIT % 34.8 34.1 33.7*   < >  --  36.1   MCV fL 91.8 94.7 94.9   < >  --  91.4   MCHC g/dL 31.3* 31.7 31.2*   < >  --  32.4   PLATELETS 10*3/mm3 372 329 316   < >  --  409   INR   --   --   --   --   --  1.04    < > = values in this interval not  "displayed.     Results from last 7 days   Lab Units 04/19/24  1002   PH, ARTERIAL pH units 7.321*   PO2 ART mm Hg 142.0*   PCO2, ARTERIAL mm Hg 37.4   HCO3 ART mmol/L 19.3*     Results from last 7 days   Lab Units 04/23/24  0029 04/22/24  0722 04/22/24  0407 04/22/24  0022 04/20/24  0329 04/20/24  0026 04/19/24  1143 04/19/24  0945   SODIUM mmol/L 140 139 140 140   < > 141  141   < > 137   POTASSIUM mmol/L 4.0 4.2 4.3 4.2   < > 5.0  5.0   < > 3.7   MAGNESIUM mg/dL  --  1.8 1.8 1.9   < > 2.1   < >  --    CHLORIDE mmol/L 106 109* 111* 109*   < > 114*  114*   < > 102   CO2 mmol/L 25.7 22.0 21.8* 20.5*   < > 19.1*  19.1*   < > 16.6*   BUN mg/dL 3* 2* 2* 2*   < > 15  15   < > 22*   CREATININE mg/dL 0.65 0.59 0.60 0.62   < > 0.72  0.72   < > 1.01*   CALCIUM mg/dL 8.2* 7.8* 7.7* 7.6*   < > 8.3*  8.3*   < > 9.0   PHOSPHORUS mg/dL  --  4.0 4.1 4.1   < > 2.7   < >  --    GLUCOSE mg/dL 126* 159* 157* 139*   < > 134*  134*   < > 542*   ALBUMIN g/dL  --   --   --   --   --  3.0*  --  3.5   BILIRUBIN mg/dL  --   --   --   --   --  <0.2  --  0.2   ALK PHOS U/L  --   --   --   --   --  112  --  131*   AST (SGOT) U/L  --   --   --   --   --  26  --  25   ALT (SGPT) U/L  --   --   --   --   --  22  --  23    < > = values in this interval not displayed.   Estimated Creatinine Clearance: 132.6 mL/min (by C-G formula based on SCr of 0.65 mg/dL).  No results found for: \"AMMONIA\"  Results from last 7 days   Lab Units 04/19/24  1143 04/19/24  0945   HSTROP T ng/L 10 11         Results from last 7 days   Lab Units 04/23/24  0029   TRIGLYCERIDES mg/dL 44     Glucose   Date/Time Value Ref Range Status   04/23/2024 1939 157 (H) 70 - 130 mg/dL Final   04/23/2024 1840 119 70 - 130 mg/dL Final   04/23/2024 1748 146 (H) 70 - 130 mg/dL Final   04/23/2024 1645 168 (H) 70 - 130 mg/dL Final   04/23/2024 1541 122 70 - 130 mg/dL Final   04/23/2024 1438 137 (H) 70 - 130 mg/dL Final   04/23/2024 1335 113 70 - 130 mg/dL Final   04/23/2024 1233 115 " "70 - 130 mg/dL Final     Lab Results   Component Value Date    TSH 0.732 08/12/2023    FREET4 1.80 (H) 06/20/2021     Lab Results   Component Value Date    PREGTESTUR Negative 08/30/2020     Pain Management Panel  More data exists         Latest Ref Rng & Units 4/19/2024 2/21/2024   Pain Management Panel   Amphetamine, Urine Qual Negative Positive  Negative    Barbiturates Screen, Urine Negative Negative  Negative    Benzodiazepine Screen, Urine Negative Positive  Negative    Buprenorphine, Screen, Urine Negative Positive  Positive    Cocaine Screen, Urine Negative Negative  Negative    Fentanyl, Urine Negative Negative  Negative    Methadone Screen , Urine Negative Negative  Negative    Methamphetamine, Ur Negative Positive  Negative      Brief Urine Lab Results  (Last result in the past 365 days)        Color   Clarity   Blood   Leuk Est   Nitrite   Protein   CREAT   Urine HCG        04/19/24 0945 Dark Yellow   Clear   Negative   Negative   Negative   Trace                 Blood Culture   Date Value Ref Range Status   04/22/2024 No growth at 24 hours  Preliminary   04/22/2024 No growth at 24 hours  Preliminary   04/19/2024 Staphylococcus epidermidis (C)  Final   04/19/2024 Staphylococcus epidermidis (C)  Final     No results found for: \"URINECX\"  No results found for: \"WOUNDCX\"  No results found for: \"STOOLCX\"  No results found for: \"RESPCX\"  No results found for: \"AFBCX\"  Results from last 7 days   Lab Units 04/19/24  1401 04/19/24  0945   PROCALCITONIN ng/mL  --  0.08   LACTATE mmol/L 1.4 3.1*       I have personally looked at the labs and they are summarized above.  ----------------------------------------------------------------------------------------------------------------------  Detailed radiology reports for the last 24 hours:  Imaging Results (Last 24 Hours)       ** No results found for the last 24 hours. **          Assessment & Plan    #Acute encephalopathy  #Respiratory failure  #Polysubstance " abuse  - Initially intubated for airway protection. Patient broke the cuff on her ET tube today necessitating extubation, and she has been stable on 2L NC since then.  - Pulmonology following, appreciate assistance.   - In the setting of chronic tobacco use there is possibility of undiagnosed COPD. Since she had difficulty with weaning trial yesterday I ordered a dose of solu-medrol 80mg IV x1 today. Consider further steroids pending clinical course. Continue nebulizer treatments.   - Patient's mother wishes to pursue Nik's Law as patient has a long history of substance use and she is very worried for her daughter's safety. Social work/case management consulted to assist with this process.   - CXR yesterday showed improvement in volume overload. Echo from 2019 reviewed which showed LV EF 61-65%, normal LV systolic function. Repeat echo today showed normal LV systolic function, LV EF 66-70%, grade I diastolic impaired relaxation.  - Patient not cleared for oral intake at this time by SLP, so keep NPO with meds via non-oral route except for zyprexa and suboxone which are sublingual/disintegrating formulations.     #DKA  - now resolved. Continue IV insulin drip for now with goal blood sugar 140-180. Monitor accuchecks. Will transition to subcutaneous regimen when able but will defer for right now pending clearance for oral intake, as insulin requirements may increase significantly when she begins eating.  Edited by: Madalyn Villalba DO at 4/23/2024 2030    VTE Prophylaxis:   Mechanical Order History:       None          Pharmalogical Order History:        Ordered     Dose Route Frequency Stop    04/19/24 1633  Enoxaparin Sodium (LOVENOX) syringe 40 mg         40 mg SC Every 24 Hours --                    Dispo: pending clinical progress    Madalyn Villalba DO  [facility]  04/23/24  20:31 EDT

## 2024-04-24 NOTE — CONSULTS
"Diabetes Education  Assessment/Teaching    Patient Name:  Jes Moreno  YOB: 1985  MRN: [id number]  Admit Date:  4/19/2024      Assessment Date:  4/24/2024  Flowsheet Row Most Recent Value   General Information     Height 170.2 cm (67.01\")   Height Method Estimated   Weight 69.2 kg (152 lb 9.6 oz)   Weight Method Bed scale   Pregnancy Assessment    Diabetes History    Education Preferences    Nutrition Information    Assessment Topics    DM Goals             Flowsheet Row Most Recent Value   DM Education Needs    Meter Has own   Meter Type Other (comment)  [Dexcom]   Frequency of Testing Other (comment)  [CGM]   Medication Insulin   Problem Solving Hypoglycemia, Sick days, Hyperglycemia, Symptoms, Signs, Treatment   Reducing Risks Cardiovascular, Immunizations, Foot care, Dental exam, Eye exam, Blood pressure, Lipids, A1C testing, Neuropathy, Retinopathy   Healthy Eating Basic meal plan provided   Physical Activity Walking   Physical Activity Frequency Regularly   Healthy Coping Appropriate   Discharge Plan Follow-up with PCP, Follow-up with endocrinolgoist   Motivation Moderate   Teaching Method Explanation, Discussion, Handouts   Patient Response Verbalized understanding              Other Comments:  A1C 9.0 Patient was educated and received AADE7 and ADA handouts on diet, activity, checking blood glucose, taking medication as prescribed, checking feet daily and S/S of hypo/hyperglycemia. Patient was educated on sick rule days. Patient had no questions or concerns. Please re-consult or call for concerns or questions. Thank you.        Electronically signed by:  Dalia Cristina RN  04/24/24 14:51 EDT  "

## 2024-04-24 NOTE — CONSULTS
US PIV requested for CVC removal.    Ultrasound used to assess patient vasculature of BUE. Patient vasculature does not support ultrasound guided iv placement at this time. Merlyn BAUM spoke with Dr. Villalba who stated to hold off on US PIV placement. Stated does not want patient to have midline at this time and to leave CVC in place.     No line placed at this time.     NATHAN Godinez RN

## 2024-04-25 LAB
ALBUMIN SERPL-MCNC: 2.5 G/DL (ref 3.5–5.2)
ALBUMIN/GLOB SERPL: 0.9 G/DL
ALP SERPL-CCNC: 114 U/L (ref 39–117)
ALT SERPL W P-5'-P-CCNC: 16 U/L (ref 1–33)
ANION GAP SERPL CALCULATED.3IONS-SCNC: 5.9 MMOL/L (ref 5–15)
AST SERPL-CCNC: 19 U/L (ref 1–32)
BILIRUB SERPL-MCNC: 0.2 MG/DL (ref 0–1.2)
BUN SERPL-MCNC: 9 MG/DL (ref 6–20)
BUN/CREAT SERPL: 17.6 (ref 7–25)
CALCIUM SPEC-SCNC: 8.1 MG/DL (ref 8.6–10.5)
CHLORIDE SERPL-SCNC: 109 MMOL/L (ref 98–107)
CO2 SERPL-SCNC: 26.1 MMOL/L (ref 22–29)
CREAT SERPL-MCNC: 0.51 MG/DL (ref 0.57–1)
DEPRECATED RDW RBC AUTO: 46.2 FL (ref 37–54)
EGFRCR SERPLBLD CKD-EPI 2021: 124.2 ML/MIN/1.73
ERYTHROCYTE [DISTWIDTH] IN BLOOD BY AUTOMATED COUNT: 13.7 % (ref 12.3–15.4)
GLOBULIN UR ELPH-MCNC: 2.8 GM/DL
GLUCOSE BLDC GLUCOMTR-MCNC: 283 MG/DL (ref 70–130)
GLUCOSE BLDC GLUCOMTR-MCNC: 333 MG/DL (ref 70–130)
GLUCOSE BLDC GLUCOMTR-MCNC: 74 MG/DL (ref 70–130)
GLUCOSE BLDC GLUCOMTR-MCNC: 76 MG/DL (ref 70–130)
GLUCOSE BLDC GLUCOMTR-MCNC: 81 MG/DL (ref 70–130)
GLUCOSE SERPL-MCNC: 211 MG/DL (ref 65–99)
HCT VFR BLD AUTO: 31.2 % (ref 34–46.6)
HGB BLD-MCNC: 9.6 G/DL (ref 12–15.9)
MCH RBC QN AUTO: 28.5 PG (ref 26.6–33)
MCHC RBC AUTO-ENTMCNC: 30.8 G/DL (ref 31.5–35.7)
MCV RBC AUTO: 92.6 FL (ref 79–97)
PLATELET # BLD AUTO: 422 10*3/MM3 (ref 140–450)
PMV BLD AUTO: 8.5 FL (ref 6–12)
POTASSIUM SERPL-SCNC: 3.9 MMOL/L (ref 3.5–5.2)
PROT SERPL-MCNC: 5.3 G/DL (ref 6–8.5)
RBC # BLD AUTO: 3.37 10*6/MM3 (ref 3.77–5.28)
SODIUM SERPL-SCNC: 141 MMOL/L (ref 136–145)
VANCOMYCIN TROUGH SERPL-MCNC: 8.3 MCG/ML (ref 5–20)
WBC NRBC COR # BLD AUTO: 8.71 10*3/MM3 (ref 3.4–10.8)

## 2024-04-25 PROCEDURE — 85027 COMPLETE CBC AUTOMATED: CPT | Performed by: STUDENT IN AN ORGANIZED HEALTH CARE EDUCATION/TRAINING PROGRAM

## 2024-04-25 PROCEDURE — 82948 REAGENT STRIP/BLOOD GLUCOSE: CPT

## 2024-04-25 PROCEDURE — 63710000001 INSULIN LISPRO (HUMAN) PER 5 UNITS: Performed by: STUDENT IN AN ORGANIZED HEALTH CARE EDUCATION/TRAINING PROGRAM

## 2024-04-25 PROCEDURE — 99232 SBSQ HOSP IP/OBS MODERATE 35: CPT | Performed by: STUDENT IN AN ORGANIZED HEALTH CARE EDUCATION/TRAINING PROGRAM

## 2024-04-25 PROCEDURE — 94799 UNLISTED PULMONARY SVC/PX: CPT

## 2024-04-25 PROCEDURE — 80202 ASSAY OF VANCOMYCIN: CPT | Performed by: STUDENT IN AN ORGANIZED HEALTH CARE EDUCATION/TRAINING PROGRAM

## 2024-04-25 PROCEDURE — 99233 SBSQ HOSP IP/OBS HIGH 50: CPT | Performed by: NURSE PRACTITIONER

## 2024-04-25 PROCEDURE — 80053 COMPREHEN METABOLIC PANEL: CPT | Performed by: STUDENT IN AN ORGANIZED HEALTH CARE EDUCATION/TRAINING PROGRAM

## 2024-04-25 PROCEDURE — 25010000002 VANCOMYCIN 5 G RECONSTITUTED SOLUTION

## 2024-04-25 PROCEDURE — 92526 ORAL FUNCTION THERAPY: CPT

## 2024-04-25 PROCEDURE — 92610 EVALUATE SWALLOWING FUNCTION: CPT

## 2024-04-25 PROCEDURE — 94761 N-INVAS EAR/PLS OXIMETRY MLT: CPT

## 2024-04-25 PROCEDURE — 25810000003 SODIUM CHLORIDE 0.9 % SOLUTION

## 2024-04-25 PROCEDURE — 25010000002 ENOXAPARIN PER 10 MG: Performed by: INTERNAL MEDICINE

## 2024-04-25 PROCEDURE — 25010000002 VANCOMYCIN 5 G RECONSTITUTED SOLUTION: Performed by: HOSPITALIST

## 2024-04-25 PROCEDURE — 25810000003 SODIUM CHLORIDE 0.9 % SOLUTION: Performed by: HOSPITALIST

## 2024-04-25 PROCEDURE — 99232 SBSQ HOSP IP/OBS MODERATE 35: CPT | Performed by: INTERNAL MEDICINE

## 2024-04-25 PROCEDURE — 94664 DEMO&/EVAL PT USE INHALER: CPT

## 2024-04-25 PROCEDURE — 63710000001 INSULIN GLARGINE PER 5 UNITS: Performed by: STUDENT IN AN ORGANIZED HEALTH CARE EDUCATION/TRAINING PROGRAM

## 2024-04-25 RX ORDER — GLUCAGON 1 MG/ML
1 KIT INJECTION
Status: DISCONTINUED | OUTPATIENT
Start: 2024-04-25 | End: 2024-04-30 | Stop reason: HOSPADM

## 2024-04-25 RX ORDER — DEXTROSE MONOHYDRATE 25 G/50ML
25 INJECTION, SOLUTION INTRAVENOUS
Status: DISCONTINUED | OUTPATIENT
Start: 2024-04-25 | End: 2024-04-30 | Stop reason: HOSPADM

## 2024-04-25 RX ORDER — INSULIN LISPRO 100 [IU]/ML
2-9 INJECTION, SOLUTION INTRAVENOUS; SUBCUTANEOUS
Status: DISCONTINUED | OUTPATIENT
Start: 2024-04-25 | End: 2024-04-30 | Stop reason: HOSPADM

## 2024-04-25 RX ORDER — NICOTINE POLACRILEX 4 MG
15 LOZENGE BUCCAL
Status: DISCONTINUED | OUTPATIENT
Start: 2024-04-25 | End: 2024-04-30 | Stop reason: HOSPADM

## 2024-04-25 RX ORDER — PANTOPRAZOLE SODIUM 40 MG/1
40 TABLET, DELAYED RELEASE ORAL
Status: DISCONTINUED | OUTPATIENT
Start: 2024-04-26 | End: 2024-04-30 | Stop reason: HOSPADM

## 2024-04-25 RX ADMIN — IPRATROPIUM BROMIDE AND ALBUTEROL SULFATE 3 ML: .5; 2.5 SOLUTION RESPIRATORY (INHALATION) at 07:04

## 2024-04-25 RX ADMIN — Medication 10 ML: at 08:57

## 2024-04-25 RX ADMIN — ENOXAPARIN SODIUM 40 MG: 40 INJECTION SUBCUTANEOUS at 18:53

## 2024-04-25 RX ADMIN — ROPINIROLE HYDROCHLORIDE 2 MG: 1 TABLET, FILM COATED ORAL at 21:07

## 2024-04-25 RX ADMIN — ATORVASTATIN CALCIUM 20 MG: 20 TABLET, FILM COATED ORAL at 21:07

## 2024-04-25 RX ADMIN — VANCOMYCIN HYDROCHLORIDE 1250 MG: 5 INJECTION, POWDER, LYOPHILIZED, FOR SOLUTION INTRAVENOUS at 02:41

## 2024-04-25 RX ADMIN — FLUOXETINE HYDROCHLORIDE 40 MG: 20 CAPSULE ORAL at 08:56

## 2024-04-25 RX ADMIN — GABAPENTIN 800 MG: 400 CAPSULE ORAL at 21:08

## 2024-04-25 RX ADMIN — METRONIDAZOLE 500 MG: 250 TABLET ORAL at 21:07

## 2024-04-25 RX ADMIN — INSULIN LISPRO 6 UNITS: 100 INJECTION, SOLUTION INTRAVENOUS; SUBCUTANEOUS at 08:54

## 2024-04-25 RX ADMIN — Medication 10 ML: at 21:09

## 2024-04-25 RX ADMIN — VANCOMYCIN HYDROCHLORIDE 1750 MG: 5 INJECTION, POWDER, LYOPHILIZED, FOR SOLUTION INTRAVENOUS at 15:22

## 2024-04-25 RX ADMIN — OLANZAPINE 10 MG: 10 TABLET, ORALLY DISINTEGRATING ORAL at 08:54

## 2024-04-25 RX ADMIN — DOCUSATE SODIUM 50 MG AND SENNOSIDES 8.6 MG 2 TABLET: 8.6; 5 TABLET, FILM COATED ORAL at 08:55

## 2024-04-25 RX ADMIN — FLUOXETINE HYDROCHLORIDE 20 MG: 20 CAPSULE ORAL at 08:54

## 2024-04-25 RX ADMIN — TOPIRAMATE 25 MG: 25 TABLET, FILM COATED ORAL at 08:55

## 2024-04-25 RX ADMIN — CETIRIZINE HYDROCHLORIDE 10 MG: 10 TABLET, FILM COATED ORAL at 08:55

## 2024-04-25 RX ADMIN — INSULIN LISPRO 7 UNITS: 100 INJECTION, SOLUTION INTRAVENOUS; SUBCUTANEOUS at 12:26

## 2024-04-25 RX ADMIN — LEVOTHYROXINE SODIUM 200 MCG: 100 TABLET ORAL at 08:55

## 2024-04-25 RX ADMIN — CHLORHEXIDINE GLUCONATE 15 ML: 1.2 RINSE ORAL at 08:56

## 2024-04-25 RX ADMIN — Medication 10 ML: at 08:56

## 2024-04-25 RX ADMIN — GABAPENTIN 800 MG: 400 CAPSULE ORAL at 06:34

## 2024-04-25 RX ADMIN — METRONIDAZOLE 500 MG: 250 TABLET ORAL at 15:21

## 2024-04-25 RX ADMIN — PANTOPRAZOLE SODIUM 40 MG: 40 INJECTION, POWDER, FOR SOLUTION INTRAVENOUS at 06:35

## 2024-04-25 RX ADMIN — METRONIDAZOLE 500 MG: 250 TABLET ORAL at 06:35

## 2024-04-25 RX ADMIN — BUPROPION HYDROCHLORIDE 450 MG: 150 TABLET, EXTENDED RELEASE ORAL at 08:54

## 2024-04-25 RX ADMIN — BUPRENORPHINE HYDROCHLORIDE AND NALOXONE HYDROCHLORIDE DIHYDRATE 3 TABLET: 8; 2 TABLET SUBLINGUAL at 08:53

## 2024-04-25 RX ADMIN — GABAPENTIN 800 MG: 400 CAPSULE ORAL at 15:21

## 2024-04-25 RX ADMIN — MUPIROCIN 1 APPLICATION: 20 OINTMENT TOPICAL at 21:09

## 2024-04-25 RX ADMIN — FUROSEMIDE 20 MG: 20 TABLET ORAL at 08:55

## 2024-04-25 RX ADMIN — MUPIROCIN 1 APPLICATION: 20 OINTMENT TOPICAL at 08:55

## 2024-04-25 RX ADMIN — MONTELUKAST SODIUM 10 MG: 10 TABLET, COATED ORAL at 21:08

## 2024-04-25 RX ADMIN — INSULIN GLARGINE 25 UNITS: 100 INJECTION, SOLUTION SUBCUTANEOUS at 12:26

## 2024-04-25 NOTE — PLAN OF CARE
DYSPHAGIA THERAPY PLAN OF CARE:    Jes Moreno will benefit from formal dysphagia therapy x3-5 days per week at 15-30 minute sessions, as functionally tolerated, for 3-10 days, to address:    Long Term Goal:  Patient will accept least restrictive diet tolerance w/o overt s/s aspiration.     Short Term Goals:    1. Patient will perform OROM/MANDO exercises x3 sets x10 reps w/ min cues.    2. Patient will perform resistive breathing exercises x7sets x7 reps w/resistance of 1-5 to improve respiratory support and control.     3. Patient will perform laryngeal adduction/elevation exercises x3 sets x10 reps w/ min cues.     4. Patient will perform CTAR exercises sustained x3 sets x5 reps for 30+ seconds over 3 consecutive sessions.     5. Patient will perform CTAR exercises repetitive x3 sets x12 reps w/ mod cues.     6. Patient will perform compensatory techniques during meals w/ min cues.    7. Patient will accept ice chips/thin water w/o overt s/s aspiration w/ 80% of presentations.     8. Patient will accept ice/chip water per protocol between meals and medications for oral hydration/comfort with assistance.     9. Patient will participate in instrumental re-evaluation of swallowing fnx in 5-7 days, pending progress towards this poc.    Thank you for allowing me to participate in the care of your patient-  Lyric Bradford M.S., CCC-SLP

## 2024-04-25 NOTE — PLAN OF CARE
Goal Outcome Evaluation:           Progress: no change  Outcome Evaluation: Pt remains alert and oriented. NSR. VSS. Up to bedside commode. Will continue with plan of care.

## 2024-04-25 NOTE — PROGRESS NOTES
[facility] HOSPITALIST PROGRESS NOTE     Patient Identification:  Name:  Jes Moreno  Age:  38 y.o.  Sex:  female  :  1985  MRN:  [id number]  Visit Number:  [id number]  ROOM: [address]     Primary Care Provider:  Juany Sheehan APRN    Length of stay in inpatient status:  5    Subjective     Chief Compliant:    Chief Complaint   Patient presents with    Altered Mental Status       History of Presenting Illness: Patient seen and examined this morning with her a.m. nurse present at bedside.  Patient denied shortness of breath but reported frequent cough.  Nursing staff reported urinary retention requiring straight catheterization multiple times today.  Also noted green vaginal discharge.  Patient was able to urinate after Morton catheter was removed yesterday and denies having any dysuria at that time.    Objective     Current Hospital Meds:atorvastatin, 20 mg, Oral, Nightly  buprenorphine-naloxone, 3 tablet, Sublingual, Daily  buPROPion XL, 450 mg, Oral, Daily  cetirizine, 10 mg, Oral, Daily  chlorhexidine, 15 mL, Mouth/Throat, Q12H  enoxaparin, 40 mg, Subcutaneous, Q24H  FLUoxetine, 20 mg, Oral, Daily  FLUoxetine, 40 mg, Oral, Daily  furosemide, 20 mg, Oral, Daily  gabapentin, 800 mg, Oral, Q8H  insulin glargine, 1-200 Units, Subcutaneous, Nightly - Glucommander  insulin lispro, 1-200 Units, Subcutaneous, 4x Daily With Meals & Nightly  levothyroxine, 200 mcg, Oral, Daily  metroNIDAZOLE, 500 mg, Oral, Q8H  montelukast, 10 mg, Oral, Nightly  mupirocin, 1 Application, Topical, Q12H  OLANZapine zydis, 10 mg, Oral, Daily  pantoprazole, 40 mg, Intravenous, Q AM  rOPINIRole, 2 mg, Oral, Nightly  senna-docusate sodium, 2 tablet, Oral, BID  sodium chloride, 10 mL, Intravenous, Q12H  sodium chloride, 10 mL, Intravenous, Q12H  sodium chloride, 10 mL, Intravenous, Q12H  topiramate, 25 mg, Oral, Daily  vancomycin, 1,250 mg, Intravenous, Q12H    dexmedetomidine, 0.2-1.5 mcg/kg/hr, Last Rate: Stopped (24  0941)  dextrose 5 % and sodium chloride 0.45 %, 150 mL/hr  dextrose 5 % and sodium chloride 0.9 %, 150 mL/hr  Pharmacy to dose vancomycin,   sodium chloride, 250 mL/hr  sodium chloride, 250 mL/hr        Current Antimicrobial Therapy:  Anti-Infectives (From admission, onward)      Ordered     Dose/Rate Route Frequency Start Stop    04/24/24 1929  metroNIDAZOLE (FLAGYL) tablet 500 mg        Ordering Provider: Jose Johns MD    500 mg Oral Every 8 Hours Scheduled 04/24/24 2200 05/01/24 2159 04/24/24 0132  vancomycin 1250 mg/250 mL 0.9% NS IVPB ([facility])        Ordering Provider: Jose Johns MD    1,250 mg  over 75 Minutes Intravenous Every 12 Hours 04/24/24 0230 04/29/24 0229    04/24/24 0118  Pharmacy to dose vancomycin        Ordering Provider: Jose Johns MD     Does not apply Continuous PRN 04/24/24 0118 04/29/24 0117          Current Diuretic Therapy:  Diuretics (From admission, onward)      Ordered     Dose/Rate Route Frequency Start Stop    04/19/24 1726  furosemide (LASIX) tablet 20 mg        Ordering Provider: Jono Valencia DO    20 mg Oral Daily 04/20/24 0900            ----------------------------------------------------------------------------------------------------------------------  Vital Signs:  Temp:  [97.9 °F (36.6 °C)-98.7 °F (37.1 °C)] 97.9 °F (36.6 °C)  Heart Rate:  [] 85  Resp:  [10-18] 16  BP: (112-142)/(56-95) 131/81  SpO2:  [96 %-100 %] 99 %  on   ;   Device (Oxygen Therapy): room air  Body mass index is 23.89 kg/m².    Wt Readings from Last 3 Encounters:   04/24/24 69.2 kg (152 lb 9.6 oz)   02/21/24 66.1 kg (145 lb 12.8 oz)   08/13/23 73.5 kg (162 lb)     Intake & Output (last 3 days)         04/22 0701 04/23 0700 04/23 0701  04/24 0700 04/24 0701 04/25 0700    I.V. (mL/kg) 4685.8 (58.6) 3738.3 (46.7)     Other 91      NG/      IV Piggyback  250 250    Total Intake(mL/kg) 5018.8 (62.7) 3988.3 (49.9) 250 (3.1)    Urine (mL/kg/hr) 5600  (2.9) 5250 (2.7) 1900 (1.7)    Emesis/NG output 0      Stool 0      Total Output 5600 5250 1900    Net -581.2 -1261.7 -1650           Urine Unmeasured Occurrence 0 x 0 x     Stool Unmeasured Occurrence 0 x      Emesis Unmeasured Occurrence 0 x            Diet: Regular/House; No Straw, Feeding Assistance - Nursing; Texture: Pureed (NDD 1); Fluid Consistency: Honey Thick  ----------------------------------------------------------------------------------------------------------------------  Physical exam:   Constitutional:  Well-developed and well-nourished.  No acute distress.      HENT:  Head:  Normocephalic and atraumatic.    Cardiovascular:  Normal rate, regular rhythm   Pulmonary/Chest:  No respiratory distress, breath sounds coarse and slightly diminished bilaterally but no overt wheezing, crackles, or rhonchi noted.  Productive cough noted with clear sputum production.  Voice was very hoarse.  Musculoskeletal:  No deformity.    Neurological: Awake, alert, no focal deficit on gross examination. No slurred speech or facial droop.   Skin:  Skin is warm and dry. No rash noted. No pallor.   Peripheral vascular:  No cyanosis  Edited by: Madalyn Villalba DO at 4/24/2024 2115  ----------------------------------------------------------------------------------------------------------------------  Results from last 7 days   Lab Units 04/24/24  0132 04/23/24 0314 04/22/24  0022 04/20/24  0025 04/19/24  1401 04/19/24  0945   LACTATE mmol/L  --   --   --   --  1.4 3.1*   WBC 10*3/mm3 12.23* 11.49* 11.97*   < >  --  15.71*   HEMOGLOBIN g/dL 9.9* 10.9* 10.8*   < >  --  11.7*   HEMATOCRIT % 30.1* 34.8 34.1   < >  --  36.1   MCV fL 91.5 91.8 94.7   < >  --  91.4   MCHC g/dL 32.9 31.3* 31.7   < >  --  32.4   PLATELETS 10*3/mm3 403 372 329   < >  --  409   INR   --   --   --   --   --  1.04    < > = values in this interval not displayed.     Results from last 7 days   Lab Units 04/19/24  1002   PH, ARTERIAL pH units 7.321*   PO2  "ART mm Hg 142.0*   PCO2, ARTERIAL mm Hg 37.4   HCO3 ART mmol/L 19.3*     Results from last 7 days   Lab Units 04/24/24  0132 04/23/24  0029 04/22/24  0722 04/22/24  0407 04/22/24  0022 04/20/24  0329 04/20/24  0026 04/19/24  1143 04/19/24  0945   SODIUM mmol/L 143 140 139 140 140   < > 141  141   < > 137   POTASSIUM mmol/L 3.8 4.0 4.2 4.3 4.2   < > 5.0  5.0   < > 3.7   MAGNESIUM mg/dL  --   --  1.8 1.8 1.9   < > 2.1   < >  --    CHLORIDE mmol/L 110* 106 109* 111* 109*   < > 114*  114*   < > 102   CO2 mmol/L 24.8 25.7 22.0 21.8* 20.5*   < > 19.1*  19.1*   < > 16.6*   BUN mg/dL 8 3* 2* 2* 2*   < > 15  15   < > 22*   CREATININE mg/dL 0.54* 0.65 0.59 0.60 0.62   < > 0.72  0.72   < > 1.01*   CALCIUM mg/dL 8.3* 8.2* 7.8* 7.7* 7.6*   < > 8.3*  8.3*   < > 9.0   PHOSPHORUS mg/dL  --   --  4.0 4.1 4.1   < > 2.7   < >  --    GLUCOSE mg/dL 128* 126* 159* 157* 139*   < > 134*  134*   < > 542*   ALBUMIN g/dL  --   --   --   --   --   --  3.0*  --  3.5   BILIRUBIN mg/dL  --   --   --   --   --   --  <0.2  --  0.2   ALK PHOS U/L  --   --   --   --   --   --  112  --  131*   AST (SGOT) U/L  --   --   --   --   --   --  26  --  25   ALT (SGPT) U/L  --   --   --   --   --   --  22  --  23    < > = values in this interval not displayed.   Estimated Creatinine Clearance: 157.3 mL/min (A) (by C-G formula based on SCr of 0.54 mg/dL (L)).  No results found for: \"AMMONIA\"  Results from last 7 days   Lab Units 04/19/24  1143 04/19/24  0945   HSTROP T ng/L 10 11         Results from last 7 days   Lab Units 04/23/24  0029   TRIGLYCERIDES mg/dL 44     Glucose   Date/Time Value Ref Range Status   04/24/2024 1707 165 (H) 70 - 130 mg/dL Final   04/24/2024 1541 78 70 - 130 mg/dL Final   04/24/2024 1535 75 70 - 130 mg/dL Final   04/24/2024 1424 130 70 - 130 mg/dL Final   04/24/2024 1318 198 (H) 70 - 130 mg/dL Final   04/24/2024 1214 176 (H) 70 - 130 mg/dL Final   04/24/2024 1111 151 (H) 70 - 130 mg/dL Final   04/24/2024 1003 116 70 - 130 " "mg/dL Final     Lab Results   Component Value Date    TSH 0.732 08/12/2023    FREET4 1.80 (H) 06/20/2021     Lab Results   Component Value Date    PREGTESTUR Negative 08/30/2020     Pain Management Panel  More data exists         Latest Ref Rng & Units 4/19/2024 2/21/2024   Pain Management Panel   Amphetamine, Urine Qual Negative Positive  Negative    Barbiturates Screen, Urine Negative Negative  Negative    Benzodiazepine Screen, Urine Negative Positive  Negative    Buprenorphine, Screen, Urine Negative Positive  Positive    Cocaine Screen, Urine Negative Negative  Negative    Fentanyl, Urine Negative Negative  Negative    Methadone Screen , Urine Negative Negative  Negative    Methamphetamine, Ur Negative Positive  Negative      Brief Urine Lab Results  (Last result in the past 365 days)        Color   Clarity   Blood   Leuk Est   Nitrite   Protein   CREAT   Urine HCG        04/24/24 1220 Yellow   Cloudy   Negative   Moderate (2+)   Positive   Negative                 Blood Culture   Date Value Ref Range Status   04/22/2024 Abnormal Stain (C)  Preliminary   04/22/2024 Abnormal Stain (C)  Preliminary   04/19/2024 Staphylococcus epidermidis (C)  Final   04/19/2024 Staphylococcus epidermidis (C)  Final     No results found for: \"URINECX\"  No results found for: \"WOUNDCX\"  No results found for: \"STOOLCX\"  No results found for: \"RESPCX\"  No results found for: \"AFBCX\"  Results from last 7 days   Lab Units 04/19/24  1401 04/19/24  0945   PROCALCITONIN ng/mL  --  0.08   LACTATE mmol/L 1.4 3.1*       I have personally looked at the labs and they are summarized above.  ----------------------------------------------------------------------------------------------------------------------  Detailed radiology reports for the last 24 hours:  Imaging Results (Last 24 Hours)       Procedure Component Value Units Date/Time    FL Video Swallow Single Contrast [[id number]] Collected: 04/24/24 1141     Updated: 04/24/24 1143    " Narrative:      EXAMINATION: FL VIDEO SWALLOW SINGLE-CONTRAST-      CLINICAL INDICATION:     Aspiration r/o; F19.929-Other psychoactive  substance use, unspecified with intoxication, unspecified; E10.10-Type 1  diabetes mellitus with ketoacidosis without coma; J96.00-Acute  respiratory failure, unspecified whether with hypoxia or hypercapnia;  A41.9-Sepsis, unspecified organism     TECHNIQUE: Modified barium swallow was performed utilizing video  fluoroscopy in conjunction with the Speech Pathology team. The patient  ingested multiple barium media including thin barium, nectar, and honey  liquid as well as barium pudding and a barium pudding coated cracker.      FLUOROSCOPY TIME: 1.1 minutes     COMPARISON: NONE      FINDINGS:   Premature loss is noted with vallecular pooling.  Gross aspiration of nectar thick and thin liquids.          Impression:      1. Gross aspiration of nectar thick and thin liquids.  2. Please see dysphasia notes for further details.        This report was finalized on 4/24/2024 11:41 AM by Dr. Madhu Crockett MD.             Assessment & Plan    #Acute encephalopathy  #Respiratory failure  #Polysubstance abuse  - Initially intubated for airway protection.  Now extubated and stable on room air.  - Pulmonology following, appreciate assistance.   - In the setting of chronic tobacco use there is possibility of undiagnosed COPD.  Received a one-time dose of Solu-Medrol 80 mg IV yesterday.  Continue nebulizer treatments.  Continue Mucinex.  - Patient's mother wishes to pursue Nik's Law as patient has a long history of substance use and she is very worried for her daughter's safety. Social work/case management consulted to assist with this process.   - CXR yesterday showed improvement in volume overload. Echo from 2019 reviewed which showed LV EF 61-65%, normal LV systolic function. Repeat echo this admission showed normal LV systolic function, LV EF 66-70%, grade I diastolic impaired  relaxation.  - Patient evaluated by speech language pathologist today and recommended diet at this time is puréed texture with honey thick liquids.    # MRSA bacteremia  -Blood cultures from admission positive in 2 out of 2 samples for MRSA per [facility] ID.  Vancomycin started and infectious disease consulted, appreciate assistance.  Follow-up repeat blood culture results.  Transthoracic echo above did not show any evidence of vegetation, but she may need transesophageal echo when clinically improved to rule out valvular vegetation as she is high risk for infective endocarditis in the setting of polysubstance abuse.    # Trichomonas vaginalis infection  Green vaginal discharge noted by nursing staff today.  STI PCR panel ordered and was positive for trichomonas vaginalis.  Start Flagyl.    # Acute urinary retention  -Morton catheter anchored today due to persistent urinary retention.  Possibly medication versus infection related.  Will try to remove Morton catheter in the next couple days after treatment of acute infection as noted above.     #DKA  -DKA now resolved.  Goal blood sugar 140-180. Monitor accuchecks.  Transitioned from IV to subcutaneous basal/bolus regimen today.  Adjust doses for appropriate glucose control.  Edited by: Madalyn Villalba DO at 4/24/2024 2115    VTE Prophylaxis:   Mechanical Order History:       None          Pharmalogical Order History:        Ordered     Dose Route Frequency Stop    04/19/24 1633  Enoxaparin Sodium (LOVENOX) syringe 40 mg         40 mg SC Every 24 Hours --                    Dispo: Pending clinical course    Madalyn Villalba DO  [facility] Hospitalist  04/24/24  21:15 EDT

## 2024-04-25 NOTE — PROGRESS NOTES
[facility] HOSPITALIST PROGRESS NOTE     Patient Identification:  Name:  Jes Moreno  Age:  38 y.o.  Sex:  female  :  1985  MRN:  [id number]  Visit Number:  [id number]  ROOM: [address]     Primary Care Provider:  Juany Sheehan APRN    Length of stay in inpatient status:  6    Subjective     Chief Compliant:    Chief Complaint   Patient presents with    Altered Mental Status       History of Presenting Illness:    Patient seen and examined this morning, no family present at bedside. She denied shortness of breath, complained of continued cough. Said that using a flutter valve seems to be helping with coughing up secretions. Morton cathter noted to be in place.    Objective     Current Hospital Meds:atorvastatin, 20 mg, Oral, Nightly  buprenorphine-naloxone, 3 tablet, Sublingual, Daily  buPROPion XL, 450 mg, Oral, Daily  cetirizine, 10 mg, Oral, Daily  chlorhexidine, 15 mL, Mouth/Throat, Q12H  enoxaparin, 40 mg, Subcutaneous, Q24H  FLUoxetine, 20 mg, Oral, Daily  FLUoxetine, 40 mg, Oral, Daily  furosemide, 20 mg, Oral, Daily  gabapentin, 800 mg, Oral, Q8H  insulin glargine, 1-200 Units, Subcutaneous, Nightly - Glucommander  insulin lispro, 1-200 Units, Subcutaneous, 4x Daily With Meals & Nightly  levothyroxine, 200 mcg, Oral, Daily  metroNIDAZOLE, 500 mg, Oral, Q8H  montelukast, 10 mg, Oral, Nightly  mupirocin, 1 Application, Topical, Q12H  OLANZapine zydis, 10 mg, Oral, Daily  [START ON 2024] pantoprazole, 40 mg, Oral, Q AM  rOPINIRole, 2 mg, Oral, Nightly  senna-docusate sodium, 2 tablet, Oral, BID  sodium chloride, 10 mL, Intravenous, Q12H  sodium chloride, 10 mL, Intravenous, Q12H  sodium chloride, 10 mL, Intravenous, Q12H  topiramate, 25 mg, Oral, Daily  vancomycin, 1,250 mg, Intravenous, Q12H    dexmedetomidine, 0.2-1.5 mcg/kg/hr, Last Rate: Stopped (24 0941)  dextrose 5 % and sodium chloride 0.45 %, 150 mL/hr  dextrose 5 % and sodium chloride 0.9 %, 150 mL/hr  sodium chloride,  250 mL/hr  sodium chloride, 250 mL/hr        Current Antimicrobial Therapy:  Anti-Infectives (From admission, onward)      Ordered     Dose/Rate Route Frequency Start Stop    04/24/24 1929  metroNIDAZOLE (FLAGYL) tablet 500 mg        Ordering Provider: Jose Johns MD    500 mg Oral Every 8 Hours Scheduled 04/24/24 2200 05/01/24 2159    04/24/24 0132  vancomycin 1250 mg/250 mL 0.9% NS IVPB ([facility])        Ordering Provider: Jose Johns MD    1,250 mg  over 75 Minutes Intravenous Every 12 Hours 04/24/24 0230 04/29/24 0229          Current Diuretic Therapy:  Diuretics (From admission, onward)      Ordered     Dose/Rate Route Frequency Start Stop    04/19/24 1726  furosemide (LASIX) tablet 20 mg        Ordering Provider: Jono Valencia,     20 mg Oral Daily 04/20/24 0900            ----------------------------------------------------------------------------------------------------------------------  Vital Signs:  Temp:  [97.9 °F (36.6 °C)-98.4 °F (36.9 °C)] 98 °F (36.7 °C)  Heart Rate:  [] 101  Resp:  [14-26] 26  BP: (111-142)/(66-96) 130/79  SpO2:  [93 %-100 %] 99 %  on   ;   Device (Oxygen Therapy): room air  Body mass index is 23.58 kg/m².    Wt Readings from Last 3 Encounters:   04/25/24 68.3 kg (150 lb 9.6 oz)   02/21/24 66.1 kg (145 lb 12.8 oz)   08/13/23 73.5 kg (162 lb)     Intake & Output (last 3 days)         04/22 0701 04/23 0700 04/23 0701 04/24 0700 04/24 0701 04/25 0700 04/25 0701 04/26 0700    I.V. (mL/kg) 4685.8 (58.6) 3738.3 (46.7)      Other 91  10     NG/       IV Piggyback  250 500     Total Intake(mL/kg) 5018.8 (62.7) 3988.3 (49.9) 510 (6.4)     Urine (mL/kg/hr) 5600 (2.9) 5250 (2.7) 2500 (1.3)     Emesis/NG output 0  0     Stool 0  0     Total Output 5600 5250 2500     Net -581.2 -1261.7 -1990             Urine Unmeasured Occurrence 0 x 0 x      Stool Unmeasured Occurrence 0 x  0 x     Emesis Unmeasured Occurrence 0 x  0 x           Diet: Regular/House;  No Straw, Feeding Assistance - Nursing; Texture: Pureed (NDD 1); Fluid Consistency: Honey Thick  ----------------------------------------------------------------------------------------------------------------------  Physical exam:   Constitutional:  Well-developed and well-nourished.  No acute distress.      HENT:  Head:  Normocephalic and atraumatic.    Cardiovascular:  Normal rate, regular rhythm   Pulmonary/Chest:  No respiratory distress, breath sounds coarse and slightly diminished bilaterally but clear without wheezing, crackles, or rhonchi noted. No cough noted during exam today. Voice is still hoarse.  Musculoskeletal:  No deformity.    Neurological: Awake, alert, no focal deficit on gross examination. No slurred speech or facial droop.   Skin:  Skin is warm and dry. No rash noted. No pallor.   Peripheral vascular:  No cyanosis  Edited by: Madalyn Villalba DO at 4/25/2024 1044  ----------------------------------------------------------------------------------------------------------------------  Results from last 7 days   Lab Units 04/25/24  0100 04/24/24  0132 04/23/24  0314 04/20/24  0025 04/19/24  1401 04/19/24  0945   LACTATE mmol/L  --   --   --   --  1.4 3.1*   WBC 10*3/mm3 8.71 12.23* 11.49*   < >  --  15.71*   HEMOGLOBIN g/dL 9.6* 9.9* 10.9*   < >  --  11.7*   HEMATOCRIT % 31.2* 30.1* 34.8   < >  --  36.1   MCV fL 92.6 91.5 91.8   < >  --  91.4   MCHC g/dL 30.8* 32.9 31.3*   < >  --  32.4   PLATELETS 10*3/mm3 422 403 372   < >  --  409   INR   --   --   --   --   --  1.04    < > = values in this interval not displayed.     Results from last 7 days   Lab Units 04/19/24  1002   PH, ARTERIAL pH units 7.321*   PO2 ART mm Hg 142.0*   PCO2, ARTERIAL mm Hg 37.4   HCO3 ART mmol/L 19.3*     Results from last 7 days   Lab Units 04/25/24  0100 04/24/24  0132 04/23/24  0029 04/22/24  0722 04/22/24  0407 04/22/24  0022 04/20/24  0329 04/20/24  0026 04/19/24  1143 04/19/24  0945   SODIUM mmol/L 141 143 140 139 140  "140   < > 141  141   < > 137   POTASSIUM mmol/L 3.9 3.8 4.0 4.2 4.3 4.2   < > 5.0  5.0   < > 3.7   MAGNESIUM mg/dL  --   --   --  1.8 1.8 1.9   < > 2.1   < >  --    CHLORIDE mmol/L 109* 110* 106 109* 111* 109*   < > 114*  114*   < > 102   CO2 mmol/L 26.1 24.8 25.7 22.0 21.8* 20.5*   < > 19.1*  19.1*   < > 16.6*   BUN mg/dL 9 8 3* 2* 2* 2*   < > 15  15   < > 22*   CREATININE mg/dL 0.51* 0.54* 0.65 0.59 0.60 0.62   < > 0.72  0.72   < > 1.01*   CALCIUM mg/dL 8.1* 8.3* 8.2* 7.8* 7.7* 7.6*   < > 8.3*  8.3*   < > 9.0   PHOSPHORUS mg/dL  --   --   --  4.0 4.1 4.1   < > 2.7   < >  --    GLUCOSE mg/dL 211* 128* 126* 159* 157* 139*   < > 134*  134*   < > 542*   ALBUMIN g/dL 2.5*  --   --   --   --   --   --  3.0*  --  3.5   BILIRUBIN mg/dL 0.2  --   --   --   --   --   --  <0.2  --  0.2   ALK PHOS U/L 114  --   --   --   --   --   --  112  --  131*   AST (SGOT) U/L 19  --   --   --   --   --   --  26  --  25   ALT (SGPT) U/L 16  --   --   --   --   --   --  22  --  23    < > = values in this interval not displayed.   Estimated Creatinine Clearance: 164.4 mL/min (A) (by C-G formula based on SCr of 0.51 mg/dL (L)).  No results found for: \"AMMONIA\"  Results from last 7 days   Lab Units 04/19/24  1143 04/19/24  0945   HSTROP T ng/L 10 11         Results from last 7 days   Lab Units 04/23/24  0029   TRIGLYCERIDES mg/dL 44     Glucose   Date/Time Value Ref Range Status   04/25/2024 0839 283 (H) 70 - 130 mg/dL Final   04/24/2024 2132 158 (H) 70 - 130 mg/dL Final   04/24/2024 1707 165 (H) 70 - 130 mg/dL Final   04/24/2024 1541 78 70 - 130 mg/dL Final   04/24/2024 1535 75 70 - 130 mg/dL Final   04/24/2024 1424 130 70 - 130 mg/dL Final   04/24/2024 1318 198 (H) 70 - 130 mg/dL Final   04/24/2024 1214 176 (H) 70 - 130 mg/dL Final     Lab Results   Component Value Date    TSH 0.732 08/12/2023    FREET4 1.80 (H) 06/20/2021     Lab Results   Component Value Date    PREGTESTUR Negative 08/30/2020     Pain Management Panel  More data " exists         Latest Ref Rng & Units 4/19/2024 2/21/2024   Pain Management Panel   Amphetamine, Urine Qual Negative Positive  Negative    Barbiturates Screen, Urine Negative Negative  Negative    Benzodiazepine Screen, Urine Negative Positive  Negative    Buprenorphine, Screen, Urine Negative Positive  Positive    Cocaine Screen, Urine Negative Negative  Negative    Fentanyl, Urine Negative Negative  Negative    Methadone Screen , Urine Negative Negative  Negative    Methamphetamine, Ur Negative Positive  Negative      Brief Urine Lab Results  (Last result in the past 365 days)        Color   Clarity   Blood   Leuk Est   Nitrite   Protein   CREAT   Urine HCG        04/24/24 1220 Yellow   Cloudy   Negative   Moderate (2+)   Positive   Negative                 Blood Culture   Date Value Ref Range Status   04/22/2024 Staphylococcus aureus, MRSA (C)  Preliminary     Comment:       Infectious disease consultation is highly recommended to rule out distant foci of infection.  Methicillin resistant Staphylococcus aureus, Patient may be an isolation risk.   04/22/2024 Staphylococcus aureus, MRSA (C)  Preliminary     Comment:       Infectious disease consultation is highly recommended to rule out distant foci of infection.  Methicillin resistant Staphylococcus aureus, Patient may be an isolation risk.   04/19/2024 Staphylococcus epidermidis (C)  Final   04/19/2024 Staphylococcus epidermidis (C)  Final     Urine Culture   Date Value Ref Range Status   04/24/2024 No growth  Preliminary       Results from last 7 days   Lab Units 04/19/24  1401 04/19/24  0945   PROCALCITONIN ng/mL  --  0.08   LACTATE mmol/L 1.4 3.1*       I have personally looked at the labs and they are summarized above.  ----------------------------------------------------------------------------------------------------------------------  Detailed radiology reports for the last 24 hours:  Imaging Results (Last 24 Hours)       Procedure Component Value Units  Date/Time    FL Video Swallow Single Contrast [[id number]] Collected: 04/24/24 1141     Updated: 04/24/24 1143    Narrative:      EXAMINATION: FL VIDEO SWALLOW SINGLE-CONTRAST-      CLINICAL INDICATION:     Aspiration r/o; F19.929-Other psychoactive  substance use, unspecified with intoxication, unspecified; E10.10-Type 1  diabetes mellitus with ketoacidosis without coma; J96.00-Acute  respiratory failure, unspecified whether with hypoxia or hypercapnia;  A41.9-Sepsis, unspecified organism     TECHNIQUE: Modified barium swallow was performed utilizing video  fluoroscopy in conjunction with the Speech Pathology team. The patient  ingested multiple barium media including thin barium, nectar, and honey  liquid as well as barium pudding and a barium pudding coated cracker.      FLUOROSCOPY TIME: 1.1 minutes     COMPARISON: NONE      FINDINGS:   Premature loss is noted with vallecular pooling.  Gross aspiration of nectar thick and thin liquids.          Impression:      1. Gross aspiration of nectar thick and thin liquids.  2. Please see dysphasia notes for further details.        This report was finalized on 4/24/2024 11:41 AM by Dr. Madhu Crockett MD.             Assessment & Plan    #Acute encephalopathy  #Respiratory failure  #Polysubstance abuse  - Initially intubated for airway protection.  Now extubated and stable on room air.  - Pulmonology following, appreciate assistance.   - In the setting of chronic tobacco use there is possibility of undiagnosed COPD.  Received a one-time dose of Solu-Medrol 80 mg IV this admission.  Continue nebulizer treatments.  Continue Mucinex.  - Patient's mother wishes to pursue Nik's Law as patient has a long history of substance use and she is very worried for her daughter's safety. Social work/case management consulted to assist with this process.   - CXR 4/23 showed improvement in volume overload. Echo from 2019 reviewed which showed LV EF 61-65%, normal LV systolic function.  Repeat echo this admission showed normal LV systolic function, LV EF 66-70%, grade I diastolic impaired relaxation.  - Patient evaluated by speech language pathologist  and recommended diet at this time is puréed texture with honey thick liquids. Continue working with SLP for dysphagia and plan for instrumental re-evaluation in 5-7 days.    # MRSA bacteremia  -Blood cultures from admission positive in 2 out of 2 samples for MRSA per BC ID.  Vancomycin started and infectious disease consulted, appreciate assistance.  Follow-up repeat blood culture results, currently still in process, no updated results yet available.  Transthoracic echo above did not show any evidence of vegetation. I discussed with Infectious Disease and if repeat blood culture remain negative then she may not need KIMMIE as risk of infective endocarditis would be low with blood cultures clearing so quickly.    # Trichomonas vaginalis infection  Green vaginal discharge noted by nursing staff.  STI PCR panel ordered and was positive for trichomonas vaginalis.  Continue Flagyl (day #2).    # Acute urinary retention  - Continue spencer cathter for now. Possibly due to infection noted above vs medication effects. UA was abnormal yesterday but urine culture has shown no growth.     #DKA without coma  #Type I diabetes   -DKA now resolved.  Goal blood sugar 140-180. Monitor accuchecks.  Transitioned from IV to subcutaneous basal/bolus regimen.  Glucose was initially well controlled but has trended up and is staying >200 today. She is on glucomander subcutaneous protocol right now, but if glucose trend does not begin to improve today I will likely take off of protocol and adjust doses manually for appropriate glucose control.  - Hemoglobin A1c was 9.0 this admission indicating uncontrolled glucose in outpatient setting, but was improved from 10.2 in August 2023.  Edited by: Madalyn Villalba DO at 4/25/2024 1044    VTE Prophylaxis:   Mechanical Order History:        None          Pharmalogical Order History:        Ordered     Dose Route Frequency Stop    04/19/24 1967  Enoxaparin Sodium (LOVENOX) syringe 40 mg         40 mg SC Every 24 Hours --                    Dispo: pending clinical course    Madalyn Villalba DO  [facility] Hospitalist  04/25/24  10:44 EDT

## 2024-04-25 NOTE — THERAPY RE-EVALUATION
Acute Care - Speech Language Pathology   Swallow Re-Assessment [facility]  Clinical Dysphagia Re-Assessment     Patient Name: Jes Moreno  : 1985  MRN: [id number]  Today's Date: 2024     Admit Date: 2024  Jes Moreno  was seen at bedside this am on CCU-8 to assess safety/efficacy of swallowing fnx, determine safest/least restrictive diet tolerance.     She has a medical hx significant, obtained from EMR review, for asthma, DM type I, and hepatitis C.      She presented to [facility] ED via EMS and was emergently intubated per concerns for airway protection given concerns for acute drug intoxication and excessive lacrimation and salivation. She remained intubated from - at which time she self-extubated.     She is s/p MBSS on  during which she was evidenced w/ a primarily oral dysphagia and aspiration of nectar and thin liquids during the swallow. She did demonstrate a spontaneous cough/throat clear w/ all aspiration events, however this was only sufficient to clear nectar thick liquids. She was recommended for a modified po diet of puree textures and HONEY THICK liquids. No straws were recommended as straw trials were not evaluated w/ honey thick liquids.    Social History     Socioeconomic History    Marital status: Single   Tobacco Use    Smoking status: Every Day     Current packs/day: 1.00     Average packs/day: 1 pack/day for 3.0 years (3.0 ttl pk-yrs)     Types: Cigarettes    Smokeless tobacco: Never    Tobacco comments:     unable to assess    Vaping Use    Vaping status: Some Days    Substances: Nicotine   Substance and Sexual Activity    Alcohol use: No     Comment: unable to assess     Drug use: No     Comment: suboxone    Sexual activity: Yes     Partners: Male   Imaging:  No recent chest imaging available for review.  Labs:  WBC  8.71   0100     Diet Orders (active) (From admission, onward)       Start     Ordered    24 1305  Patient is on Glucommander  Continuous          04/24/24 1305    04/24/24 1139  Diet: Regular/House; No Straw, Feeding Assistance - Nursing; Texture: Pureed (NDD 1); Fluid Consistency: Honey Thick  Diet Effective Now         04/24/24 1139    04/24/24 1139  DIET MESSAGE Pt has been NPO. Please send a lunch tray. No straws on tray please.  Once        Comments: Pt has been NPO. Please send a lunch tray.  No straws on tray please.    04/24/24 1139    04/19/24 1127  Patient is on Glucommander  Continuous         04/19/24 1126                  She is observed on room air w/o complications this date.     Ms Moreno is seated upright in bed and is eager to participate in all activities. She reports that yesterday straws were present on her tray, however she recalled recommendations of no straws and repeatedly states that she did not use them. She is eager to please.     She accepts po trials of recommended consistencies w/ mild improvement noted in oral manipulation/formation and no s/s concerning for aspiration evidenced. Vocal quality is mild to moderately improved this date w/ increased vocal intensity and continued hoarse/harsh vocal quality.     She will benefit from formal dysphagia tx to address noted deficits.     Visit Dx:     ICD-10-CM ICD-9-CM   1. Psychoactive substance-induced intoxication  F19.929 292.89     969.9   2. Diabetic ketoacidosis without coma associated with type 1 diabetes mellitus  E10.10 250.13   3. Acute respiratory failure, unspecified whether with hypoxia or hypercapnia  J96.00 518.81   4. Sepsis, due to unspecified organism, unspecified whether acute organ dysfunction present  A41.9 038.9     995.91     Patient Active Problem List   Diagnosis    Aspirin toxicity    Hypothyroidism    Type I diabetes mellitus    Chronic hepatitis C virus infection    Diabetic peripheral neuropathy    Gastroesophageal reflux disease without esophagitis    Celiac disease    Asthma    DKA (diabetic ketoacidosis)    Acute encephalopathy     Past Medical History:    Diagnosis Date    Asthma     Celiac disease     Diabetes mellitus type 1     Diabetic ketoacidosis     Disease of thyroid gland     Hepatitis C     Infectious viral hepatitis     hepititis C    Neuropathy     Reflux gastritis      Past Surgical History:   Procedure Laterality Date     SECTION      X 2     Impression:     Ms Moreno presented w/ improvement in vocal intensity and quality and no s/s concerning for aspiration of recommended consistencies. She is felt to most benefit from continuation of current po diet and to participate in formal dysphagia therapy. She will additionally benefit from ice chips provided at pt request to provide timely stimulation of the swallow reflex and aid w/ decrease of suspected pharyngeal edema.     SLP Recommendation and Plan     1. Puree textures, HONEY THICK Liquids  2. Medications crushed in puree/HONEY   3. TOÑO precautions.   4. Oral care protocol.   5. Fully a/a for all po intake.   6. Upright and centered for all po intake.   7. No Straw presentations.   8. Formal dysphagia tx.   9. Ice chips per pt request.     SLP to f/u for formal dysphagia tx and repeat instrumental evaluation per pt progress and/or improvement.     D/w patient results and recommendations w/ verbal agreement.    D/w RN results and recommendations w/ verbal agreement.    Thank you for allowing me to participate in the care of your patient-  Lyric Bradford M.S., CCC-SLP     EDUCATION  The patient has been educated in the following areas:   Dysphagia (Swallowing Impairment) Oral Care/Hydration Modified Diet Instruction.        Time Calculation:    Time Calculation- SLP       Row Name 24 1014             Time Calculation- SLP    SLP - Next Appointment 24  -                User Key  (r) = Recorded By, (t) = Taken By, (c) = Cosigned By      Initials Name Provider Type    Lyric Schaefer MS CCC-SLP Speech and Language Pathologist                    Therapy Charges for Today       Code  Description Service Date Service Provider Modifiers Qty    94386151695 HC ST EVAL ORAL PHARYNG SWALLOW 4 4/24/2024 Lyric Bradford, MS CCC-SLP GN 1    [id number] HC ST MOTION FLUORO EVAL SWALLOW 8 4/24/2024 Lyric Bradford, MS CCC-SLP GN 1    [id number] HC ST TREATMENT SWALLOW 2 4/25/2024 Lyric Bradford, MS CCC-SLP GN 1    [id number] HC ST EVAL ORAL PHARYNG SWALLOW 4 4/25/2024 Lyric Bradford MS CCC-SLP GN 1                 Lyric Bradford MS LIZET-SLP  4/25/2024

## 2024-04-25 NOTE — PROGRESS NOTES
Chief Complaint:  Pulmonary and critical care is following for ventilator management and critical illness management        Subjective-overnight events reviewed.  All the labs medications ins and outs and vitals reviewed.  MDR done at bed side with RN, pharmacist, nutrition, , hospitalist manager  and RT  All the drips,other meds,  labs,ins and outs , abg, fio2 requirement reviewed and dicussed in rounds.   Repeat blood cultures from yesterday are pending.    Review of Systems:    Positive for generalized fatigue otherwise negative    Vital Signs  Temp:  [97.9 °F (36.6 °C)-98.4 °F (36.9 °C)] 98 °F (36.7 °C)  Heart Rate:  [] 105  Resp:  [14-25] 25  BP: (111-142)/(66-96) 131/75  Body mass index is 23.58 kg/m².    Intake/Output Summary (Last 24 hours) at 4/25/2024 0944  Last data filed at 4/25/2024 0600  Gross per 24 hour   Intake 510 ml   Output 2500 ml   Net -1990 ml     No intake/output data recorded.    Physical Exam:-    General- normal in appearance, not in any acute distress    HEENT- pupils equally reactive to light, normal in size, no scleral icterus    Neck-supple    Respiratory-respirations normal-on auscultation no wheezing no crackles,     Cardiovascular-  NSR     GI-nontender nondistended bowel sounds positive    CNS-nonfocal    Musculoskeletal -no edema  Extremities- no obvious deformity noticed     Psychiatric-mood good, good eye contact, alert awake oriented  Skin- no visible rash       Results Review:     I reviewed the patient's new clinical results.  Results from last 7 days   Lab Units 04/25/24  0100 04/24/24  0132 04/23/24  0314   WBC 10*3/mm3 8.71 12.23* 11.49*   HEMOGLOBIN g/dL 9.6* 9.9* 10.9*   PLATELETS 10*3/mm3 422 403 372     Results from last 7 days   Lab Units 04/25/24  0100 04/24/24  0132 04/23/24  0029 04/22/24  0722 04/22/24  0407 04/22/24  0022   SODIUM mmol/L 141 143 140 139 140 140   POTASSIUM mmol/L 3.9 3.8 4.0 4.2 4.3 4.2   CHLORIDE mmol/L 109* 110* 106 109*  111* 109*   CO2 mmol/L 26.1 24.8 25.7 22.0 21.8* 20.5*   BUN mg/dL 9 8 3* 2* 2* 2*   CREATININE mg/dL 0.51* 0.54* 0.65 0.59 0.60 0.62   CALCIUM mg/dL 8.1* 8.3* 8.2* 7.8* 7.7* 7.6*   GLUCOSE mg/dL 211* 128* 126* 159* 157* 139*   MAGNESIUM mg/dL  --   --   --  1.8 1.8 1.9     Lab Results   Component Value Date    INR 1.04 04/19/2024    INR 0.93 02/21/2024    INR 1.00 08/12/2023    PROTIME 14.1 04/19/2024    PROTIME 13.0 02/21/2024    PROTIME 13.7 08/12/2023     Results from last 7 days   Lab Units 04/25/24  0100 04/20/24  0026 04/19/24  0945   ALK PHOS U/L 114 112 131*   BILIRUBIN mg/dL 0.2 <0.2 0.2   ALT (SGPT) U/L 16 22 23   AST (SGOT) U/L 19 26 25     Results from last 7 days   Lab Units 04/19/24  1002   PH, ARTERIAL pH units 7.321*   PO2 ART mm Hg 142.0*   PCO2, ARTERIAL mm Hg 37.4   HCO3 ART mmol/L 19.3*     Imaging Results (Last 24 Hours)       Procedure Component Value Units Date/Time    FL Video Swallow Single Contrast [[id number]] Collected: 04/24/24 1141     Updated: 04/24/24 1143    Narrative:      EXAMINATION: FL VIDEO SWALLOW SINGLE-CONTRAST-      CLINICAL INDICATION:     Aspiration r/o; F19.929-Other psychoactive  substance use, unspecified with intoxication, unspecified; E10.10-Type 1  diabetes mellitus with ketoacidosis without coma; J96.00-Acute  respiratory failure, unspecified whether with hypoxia or hypercapnia;  A41.9-Sepsis, unspecified organism     TECHNIQUE: Modified barium swallow was performed utilizing video  fluoroscopy in conjunction with the Speech Pathology team. The patient  ingested multiple barium media including thin barium, nectar, and honey  liquid as well as barium pudding and a barium pudding coated cracker.      FLUOROSCOPY TIME: 1.1 minutes     COMPARISON: NONE      FINDINGS:   Premature loss is noted with vallecular pooling.  Gross aspiration of nectar thick and thin liquids.          Impression:      1. Gross aspiration of nectar thick and thin liquids.  2. Please see  dysphasia notes for further details.        This report was finalized on 4/24/2024 11:41 AM by Dr. Madhu Crockett MD.                    atorvastatin, 20 mg, Oral, Nightly  buprenorphine-naloxone, 3 tablet, Sublingual, Daily  buPROPion XL, 450 mg, Oral, Daily  cetirizine, 10 mg, Oral, Daily  chlorhexidine, 15 mL, Mouth/Throat, Q12H  enoxaparin, 40 mg, Subcutaneous, Q24H  FLUoxetine, 20 mg, Oral, Daily  FLUoxetine, 40 mg, Oral, Daily  furosemide, 20 mg, Oral, Daily  gabapentin, 800 mg, Oral, Q8H  insulin glargine, 1-200 Units, Subcutaneous, Nightly - Glucommander  insulin lispro, 1-200 Units, Subcutaneous, 4x Daily With Meals & Nightly  levothyroxine, 200 mcg, Oral, Daily  metroNIDAZOLE, 500 mg, Oral, Q8H  montelukast, 10 mg, Oral, Nightly  mupirocin, 1 Application, Topical, Q12H  OLANZapine zydis, 10 mg, Oral, Daily  [START ON 4/26/2024] pantoprazole, 40 mg, Oral, Q AM  rOPINIRole, 2 mg, Oral, Nightly  senna-docusate sodium, 2 tablet, Oral, BID  sodium chloride, 10 mL, Intravenous, Q12H  sodium chloride, 10 mL, Intravenous, Q12H  sodium chloride, 10 mL, Intravenous, Q12H  topiramate, 25 mg, Oral, Daily  vancomycin, 1,250 mg, Intravenous, Q12H      dexmedetomidine, 0.2-1.5 mcg/kg/hr, Last Rate: Stopped (04/23/24 0941)  dextrose 5 % and sodium chloride 0.45 %, 150 mL/hr  dextrose 5 % and sodium chloride 0.9 %, 150 mL/hr  sodium chloride, 250 mL/hr  sodium chloride, 250 mL/hr        Medication Review:    Latest Reference Range & Units 04/19/24 10:02   pH, Arterial 7.350 - 7.450 pH units 7.321 (L)   pCO2, Arterial 35.0 - 45.0 mm Hg 37.4   pO2, Arterial 83.0 - 108.0 mm Hg 142.0 (H)   HCO3, Arterial 20.0 - 26.0 mmol/L 19.3 (L)   Base Excess 0.0 - 2.0 mmol/L -6.2 (L)   O2 Saturation, Arterial 94.0 - 99.0 % >99.2 (H)   CO2 Content 22 - 33 mmol/L 20.4 (L)   A-a DO2 0.0 - 300.0 mmHg 54.3   Carboxyhemoglobin 0 - 5 % 0.9   Methemoglobin 0.00 - 3.00 % 0.20   Oxyhemoglobin 94 - 99 % 98.2   Hematocrit, Blood Gas 38.0 - 51.0 %  35.6 (L)   Hemoglobin, Blood Gas 13.5 - 17.5 g/dL 11.6 (L)   Site  Left Brachial   Saul's Test  N/A   Modality  Ventilator   FIO2 % 35   Ventilator Mode  VC/AC   Set Tidal Volume  450.00   Set Mech Resp Rate  18.0   PEEP  5.0   Barometric Pressure for Blood Gas mmHg 727   (L): Data is abnormally low  (H): Data is abnormally high  Microbiology Results (last 10 days)       Procedure Component Value - Date/Time    Chlamydia trachomatis, Neisseria gonorrhoeae, Trichomonas vaginalis, PCR - Urine, Urine, Clean Catch [[id number]]  (Abnormal) Collected: 04/24/24 1721    Lab Status: Final result Specimen: Urine, Clean Catch Updated: 04/24/24 1915     Chlamydia DNA by PCR Not Detected     Neisseria gonorrhoeae by PCR Not Detected     Trichomonas vaginalis PCR Detected    Blood Culture - Blood, Arm, Right [[id number]]  (Abnormal) Collected: 04/22/24 1532    Lab Status: Preliminary result Specimen: Blood from Arm, Right Updated: 04/25/24 0720     Blood Culture Staphylococcus aureus, MRSA     Comment:   Infectious disease consultation is highly recommended to rule out distant foci of infection.  Methicillin resistant Staphylococcus aureus, Patient may be an isolation risk.        Isolated from Aerobic Bottle     Gram Stain Aerobic Bottle Gram positive cocci in clusters    Narrative:      No BCID performed, refer to previous blood culture specimen collected on  4/22/24    Blood Culture - Blood, Arm, Right [[id number]]  (Abnormal) Collected: 04/22/24 1436    Lab Status: Preliminary result Specimen: Blood from Arm, Right Updated: 04/25/24 0719     Blood Culture Staphylococcus aureus, MRSA     Comment:   Infectious disease consultation is highly recommended to rule out distant foci of infection.  Methicillin resistant Staphylococcus aureus, Patient may be an isolation risk.        Isolated from Aerobic Bottle     Gram Stain Aerobic Bottle Gram positive cocci in clusters    Blood Culture ID, PCR - Blood, Arm, Right [[id number]]   (Abnormal) Collected: 04/22/24 1436    Lab Status: Final result Specimen: Blood from Arm, Right Updated: 04/23/24 2208     BCID, PCR Staph aureus. mecA/C and MREJ (methicillin resistance gene) detected. Identification by BCID2 PCR.     BOTTLE TYPE Aerobic Bottle    Narrative:      Infectious disease consultation is highly recommended to rule out distant foci of infection.    Blood Culture - Blood, Blood, Central Line [[id number]]  (Abnormal) Collected: 04/19/24 0947    Lab Status: Final result Specimen: Blood, Central Line Updated: 04/22/24 0610     Blood Culture Staphylococcus epidermidis     Isolated from Aerobic Bottle     Gram Stain Aerobic Bottle Gram positive cocci    Narrative:      Refer to previous blood culture collected on 04/19/2024 0947 for MICs      Blood Culture - Blood, Arm, Right [[id number]]  (Abnormal)  (Susceptibility) Collected: 04/19/24 0947    Lab Status: Final result Specimen: Blood from Arm, Right Updated: 04/22/24 0610     Blood Culture Staphylococcus epidermidis     Isolated from Aerobic Bottle     Gram Stain Aerobic Bottle Gram positive cocci    Susceptibility        Staphylococcus epidermidis      ILIANA      Oxacillin Resistant      Vancomycin Susceptible                           Blood Culture ID, PCR - Blood, Arm, Right [[id number]]  (Abnormal) Collected: 04/19/24 0947    Lab Status: Final result Specimen: Blood from Arm, Right Updated: 04/20/24 0521     BCID, PCR Staph spp, not aureus or lugdunensis. Identification by BCID2 PCR.     BOTTLE TYPE Aerobic Bottle            Latest Reference Range & Units 04/19/24 11:43 04/19/24 13:01   Acetone Negative  Small !!    Amphetamine, Urine Qual Negative   Positive !   Barbiturates Screen, Urine Negative   Negative   Benzodiazepine Screen, Urine Negative   Positive !   Buprenorphine, Screen, Urine Negative   Positive !   Cocaine Screen, Urine Negative   Negative   Fentanyl, Urine Negative   Negative   Methamphetamine, Ur Negative   Positive !    Methadone Screen , Urine Negative   Negative   Opiate Screen Negative   Negative   Oxycodone Screen, Urine Negative   Negative   Phencyclidine (PCP), Urine Negative   Negative   THC Screen, Urine Negative   Negative   Tricyclic Antidepressants Screen Negative   Negative   !!: Data is critical  !: Data is abnormal  Assessment & Plan     Neurology-     CT head reviewed-no acute intracranial process identified.      Respiratory-  Extubated and doing well.  FiO2 requirement reviewed and adjusted for a sat of 88 to 92%.    Cardiology- hemodynamically -stable.    Continue to monitor HR- rate and rhythm, BP   Results for orders placed during the hospital encounter of 04/19/24    Adult Transthoracic Echo Complete W/ Cont if Necessary Per Protocol    Interpretation Summary    Left ventricular systolic function is normal. Left ventricular ejection fraction appears to be 66 - 70%.    Left ventricular diastolic function is consistent with (grade I) impaired relaxation.    Estimated right ventricular systolic pressure from tricuspid regurgitation is normal (<35 mmHg).    Midline consult.  If midline could not be obtained keep the central line for 1 more day.  If cultures negative tomorrow the central line can be taken out and patient can be moved out of the ICU    Nephrology- Cr and BUN stable  I/O-reviewed.  Electrolytes reviewed and adjusted    GI- PPI prophylaxis  Continue current diet    Hematology- CBC  Hb  platelet  WBC    ID  Culture  And Antibiotics  Repeat blood cultures negative so far reviewed.  Repeat cultures drawn today and negative so far  UA positive.  Culture pending.  ID on case.  Will discuss with them.      Endocrinology- Maintain Blood sugar 140 -180  Continue insulin drip.  Latest blood sugars reviewed.  After patient's speech and swallow done and start diet insulin drip can be converted to oral insulin.    Electrolytes-   Mag and phos       DVT prophylaxis-continue    Bed side rounds were done with RT  and patient's nurse. All the lab and clinical findings were discussed with them and plan was also discussed in great detail.          Acute encephalopathy               Tenzin Felton MD  04/25/24  09:44 EDT      Patient

## 2024-04-25 NOTE — PROGRESS NOTES
PROGRESS NOTE         Patient Identification:  Name:  Jes Moreno  Age:  38 y.oAreli  Sex:  female  :  1985  MRN:  [id number]  Visit Number:  [id number]  Primary Care Provider:  Juany Sheehan APRN         LOS: 6 days       ----------------------------------------------------------------------------------------------------------------------  Subjective       Chief Complaints:    Altered Mental Status        Interval History:      Patient sitting up in bed this morning.  Overall feels much better today.  Currently on room air with no apparent distress.  Participating with flutter valve every hour per patient report.  Morton catheter in place.  Left chest wall CVC with no signs of infection.  Lungs clear to auscultation bilaterally.  3/6 systolic ejection murmur noted.  Abdomen soft nontender.  WBC normal at 8.71.  Trichomonas vaginalis diagnosed via PCR on 2024.  Urine culture from 2024 shows no growth thus far.  Blood cultures from 2024 show no growth thus far.  SLP evaluation from 2024 reports gross aspiration of nectar thick and thin liquids.    Review of Systems:    Constitutional: no fever, chills and night sweats.  Generalized fatigue.  Eyes: no eye drainage, itching or redness.  HEENT: no mouth sores, dysphagia or nose bleed.  Respiratory: no for shortness of breath, cough or production of sputum.  Cardiovascular: no chest pain, no palpitations, no orthopnea.  Gastrointestinal: no nausea, vomiting or diarrhea. No abdominal pain, hematemesis or rectal bleeding.  Genitourinary: no dysuria or polyuria.  Hematologic/lymphatic: no lymph node abnormalities, no easy bruising or easy bleeding.  Musculoskeletal: no muscle or joint pain.  Skin: No rash and no itching.  Neurological: no loss of consciousness, no seizure, no headache.  Behavioral/Psych: no depression or suicidal ideation.  Endocrine: no hot flashes.  Immunologic:  negative.    ----------------------------------------------------------------------------------------------------------------------      Objective       [facility] Meds:  atorvastatin, 20 mg, Oral, Nightly  buprenorphine-naloxone, 3 tablet, Sublingual, Daily  buPROPion XL, 450 mg, Oral, Daily  cetirizine, 10 mg, Oral, Daily  chlorhexidine, 15 mL, Mouth/Throat, Q12H  enoxaparin, 40 mg, Subcutaneous, Q24H  FLUoxetine, 20 mg, Oral, Daily  FLUoxetine, 40 mg, Oral, Daily  furosemide, 20 mg, Oral, Daily  gabapentin, 800 mg, Oral, Q8H  insulin glargine, 25 Units, Subcutaneous, Daily  insulin lispro, 2-9 Units, Subcutaneous, 4x Daily AC & at Bedtime  levothyroxine, 200 mcg, Oral, Daily  metroNIDAZOLE, 500 mg, Oral, Q8H  montelukast, 10 mg, Oral, Nightly  mupirocin, 1 Application, Topical, Q12H  OLANZapine zydis, 10 mg, Oral, Daily  [START ON 4/26/2024] pantoprazole, 40 mg, Oral, Q AM  rOPINIRole, 2 mg, Oral, Nightly  senna-docusate sodium, 2 tablet, Oral, BID  sodium chloride, 10 mL, Intravenous, Q12H  sodium chloride, 10 mL, Intravenous, Q12H  sodium chloride, 10 mL, Intravenous, Q12H  topiramate, 25 mg, Oral, Daily  vancomycin, 1,250 mg, Intravenous, Q12H      dexmedetomidine, 0.2-1.5 mcg/kg/hr, Last Rate: Stopped (04/23/24 0941)  dextrose 5 % and sodium chloride 0.45 %, 150 mL/hr  dextrose 5 % and sodium chloride 0.9 %, 150 mL/hr  sodium chloride, 250 mL/hr  sodium chloride, 250 mL/hr      ----------------------------------------------------------------------------------------------------------------------    Vital Signs:  Temp:  [97.9 °F (36.6 °C)-98 °F (36.7 °C)] 97.9 °F (36.6 °C)  Heart Rate:  [] 84  Resp:  [11-26] 20  BP: (111-140)/(66-96) 122/70  Mean Arterial Pressure (Non-Invasive) for the past 24 hrs (Last 3 readings):   Noninvasive MAP (mmHg)   04/25/24 1300 83   04/25/24 1200 89   04/25/24 1100 103     SpO2 Percentage    04/25/24 1100 04/25/24 1200 04/25/24 1300   SpO2: 98% 97% 98%     SpO2:  [93  %-100 %] 98 %  on   ;   Device (Oxygen Therapy): room air    Body mass index is 23.58 kg/m².  Wt Readings from Last 3 Encounters:   04/25/24 68.3 kg (150 lb 9.6 oz)   02/21/24 66.1 kg (145 lb 12.8 oz)   08/13/23 73.5 kg (162 lb)        Intake/Output Summary (Last 24 hours) at 4/25/2024 1344  Last data filed at 4/25/2024 0600  Gross per 24 hour   Intake 260 ml   Output 1600 ml   Net -1340 ml     Diet: Regular/House; No Straw, Feeding Assistance - Nursing; Texture: Pureed (NDD 1); Fluid Consistency: Honey Thick  ----------------------------------------------------------------------------------------------------------------------      Physical Exam:    Constitutional:  Well-developed and well-nourished.  No respiratory distress.  On room air.     HENT:  Head: Normocephalic and atraumatic.  Mouth:  Moist mucous membranes.    Eyes:  Conjunctivae and EOM are normal.  No scleral icterus.  Neck:  Neck supple.  No JVD present.    Cardiovascular:  Normal rate, regular rhythm and normal heart sounds with 3/6 systolic ejection murmur. No edema.  No edema.  Pulmonary/Chest:  No respiratory distress, no wheezes, no crackles, with normal breath sounds and good air movement.  Abdominal:  Soft.  Bowel sounds are normal.  No distension and no tenderness.  Left chest wall CBC without signs of infection.  Morton catheter in place.  Musculoskeletal:  No edema, no tenderness, and no deformity.  No swelling or redness of joints.  Neurological:  Alert and oriented to person, place, and time.  No facial droop.  No slurred speech.   Skin:  Skin is warm and dry.  No rash noted.  No pallor.   Psychiatric:  Normal mood and affect.  Behavior is normal.        ----------------------------------------------------------------------------------------------------------------------  Results from last 7 days   Lab Units 04/19/24  1143 04/19/24  0945   HSTROP T ng/L 10 11       Results from last 7 days   Lab Units 04/23/24  0029   TRIGLYCERIDES mg/dL 44      Results from last 7 days   Lab Units 04/19/24  1002   PH, ARTERIAL pH units 7.321*   PO2 ART mm Hg 142.0*   PCO2, ARTERIAL mm Hg 37.4   HCO3 ART mmol/L 19.3*     Results from last 7 days   Lab Units 04/25/24  0100 04/24/24  0132 04/23/24  0314 04/20/24  0025 04/19/24  1401 04/19/24  0945   LACTATE mmol/L  --   --   --   --  1.4 3.1*   WBC 10*3/mm3 8.71 12.23* 11.49*   < >  --  15.71*   HEMOGLOBIN g/dL 9.6* 9.9* 10.9*   < >  --  11.7*   HEMATOCRIT % 31.2* 30.1* 34.8   < >  --  36.1   MCV fL 92.6 91.5 91.8   < >  --  91.4   MCHC g/dL 30.8* 32.9 31.3*   < >  --  32.4   PLATELETS 10*3/mm3 422 403 372   < >  --  409   INR   --   --   --   --   --  1.04    < > = values in this interval not displayed.     Results from last 7 days   Lab Units 04/25/24  0100 04/24/24  0132 04/23/24  0029 04/22/24  0722 04/22/24  0407 04/22/24  0022 04/20/24  0329 04/20/24  0026 04/19/24  1143 04/19/24  0945   SODIUM mmol/L 141 143 140 139 140 140   < > 141  141   < > 137   POTASSIUM mmol/L 3.9 3.8 4.0 4.2 4.3 4.2   < > 5.0  5.0   < > 3.7   MAGNESIUM mg/dL  --   --   --  1.8 1.8 1.9   < > 2.1   < >  --    CHLORIDE mmol/L 109* 110* 106 109* 111* 109*   < > 114*  114*   < > 102   CO2 mmol/L 26.1 24.8 25.7 22.0 21.8* 20.5*   < > 19.1*  19.1*   < > 16.6*   BUN mg/dL 9 8 3* 2* 2* 2*   < > 15  15   < > 22*   CREATININE mg/dL 0.51* 0.54* 0.65 0.59 0.60 0.62   < > 0.72  0.72   < > 1.01*   CALCIUM mg/dL 8.1* 8.3* 8.2* 7.8* 7.7* 7.6*   < > 8.3*  8.3*   < > 9.0   GLUCOSE mg/dL 211* 128* 126* 159* 157* 139*   < > 134*  134*   < > 542*   ALBUMIN g/dL 2.5*  --   --   --   --   --   --  3.0*  --  3.5   BILIRUBIN mg/dL 0.2  --   --   --   --   --   --  <0.2  --  0.2   ALK PHOS U/L 114  --   --   --   --   --   --  112  --  131*   AST (SGOT) U/L 19  --   --   --   --   --   --  26  --  25   ALT (SGPT) U/L 16  --   --   --   --   --   --  22  --  23    < > = values in this interval not displayed.   Estimated Creatinine Clearance: 164.4 mL/min (A)  "(by C-G formula based on SCr of 0.51 mg/dL (L)).  No results found for: \"AMMONIA\"    Glucose   Date/Time Value Ref Range Status   04/25/2024 1153 333 (H) 70 - 130 mg/dL Final   04/25/2024 0839 283 (H) 70 - 130 mg/dL Final   04/24/2024 2132 158 (H) 70 - 130 mg/dL Final   04/24/2024 1707 165 (H) 70 - 130 mg/dL Final   04/24/2024 1541 78 70 - 130 mg/dL Final   04/24/2024 1535 75 70 - 130 mg/dL Final   04/24/2024 1424 130 70 - 130 mg/dL Final   04/24/2024 1318 198 (H) 70 - 130 mg/dL Final     Lab Results   Component Value Date    HGBA1C 9.00 (H) 04/19/2024     Lab Results   Component Value Date    TSH 0.732 08/12/2023    FREET4 1.80 (H) 06/20/2021       Blood Culture   Date Value Ref Range Status   04/24/2024 No growth at 24 hours  Preliminary   04/24/2024 No growth at 24 hours  Preliminary   04/22/2024 Staphylococcus aureus, MRSA (C)  Preliminary     Comment:       Infectious disease consultation is highly recommended to rule out distant foci of infection.  Methicillin resistant Staphylococcus aureus, Patient may be an isolation risk.   04/22/2024 Staphylococcus aureus, MRSA (C)  Preliminary     Comment:       Infectious disease consultation is highly recommended to rule out distant foci of infection.  Methicillin resistant Staphylococcus aureus, Patient may be an isolation risk.   04/19/2024 Staphylococcus epidermidis (C)  Final   04/19/2024 Staphylococcus epidermidis (C)  Final     Urine Culture   Date Value Ref Range Status   04/24/2024 No growth  Preliminary     No results found for: \"WOUNDCX\"  No results found for: \"STOOLCX\"  No results found for: \"RESPCX\"  Pain Management Panel  More data exists         Latest Ref Rng & Units 4/19/2024 2/21/2024   Pain Management Panel   Amphetamine, Urine Qual Negative Positive  Negative    Barbiturates Screen, Urine Negative Negative  Negative    Benzodiazepine Screen, Urine Negative Positive  Negative    Buprenorphine, Screen, Urine Negative Positive  Positive    Cocaine " Screen, Urine Negative Negative  Negative    Fentanyl, Urine Negative Negative  Negative    Methadone Screen , Urine Negative Negative  Negative    Methamphetamine, Ur Negative Positive  Negative          ----------------------------------------------------------------------------------------------------------------------  Imaging Results (Last 24 Hours)       ** No results found for the last 24 hours. **            ----------------------------------------------------------------------------------------------------------------------    Pertinent Infectious Disease Results                Assessment/Plan       Assessment       MRSA bacteremia       Plan      Patient sitting up in bed this morning.  Overall feels much better today.  Currently on room air with no apparent distress.  Participating with flutter valve every hour per patient report.  Morton catheter in place.  Left chest wall CVC with no signs of infection.  Lungs clear to auscultation bilaterally.  3/6 systolic ejection murmur noted.  Abdomen soft nontender.  WBC normal at 8.71.  Trichomonas vaginalis diagnosed via PCR on 4/24/2024.  Urine culture from 4/24/2024 shows no growth thus far.  Blood cultures from 4/24/2024 show no growth thus far.  SLP evaluation from 4/24/2024 reports gross aspiration of nectar thick and thin liquids.    Metronidazole 500 mg p.o. 3 times daily was initiated in the setting of trichomonas to continue with vancomycin pharmacy to dose for MRSA and Staphylococcus epidermis bacteremia.  Although bacteremia cleared quickly in the setting of known IV drug use and murmur recommend KIMMIE when feasible.  Will continue to follow closely and adjust antibiotic therapy as needed.      ANTIMICROBIAL THERAPY    metroNIDAZOLE - 250 MG  vancomycin     Code Status:   Code Status and Medical Interventions:   Ordered at: 04/19/24 1230     Code Status (Patient has no pulse and is not breathing):    CPR (Attempt to Resuscitate)     Medical Interventions  (Patient has pulse or is breathing):    Full Support       GAURAV Baumann  04/25/24  13:44 EDT

## 2024-04-25 NOTE — THERAPY TREATMENT NOTE
Acute Care - Speech Language Pathology   Swallow Treatment Note  Jose     Patient Name: Jes Moreno  : 1985  MRN: [id number]  Today's Date: 2024               Admit Date: 2024  DYSPHAGIA THERAPY PLAN OF CARE:     Jes Moreno was seen in pt room on CCU this am for formal therapy. She is eager for improvement and is pleasant and agreeable across all therapy tasks.      Long Term Goal:  Patient will accept least restrictive diet tolerance w/o overt s/s aspiration.      Short Term Goals:     1. Patient will perform OROM/MANDO exercises x3 sets x10 reps w/ min cues.  Deferred this date.      2. Patient will perform resistive breathing exercises x7sets x7 reps w/resistance of 1-5 to improve respiratory support and control.   Resistive breather at resistance 3/3 (exhale/inhale) x1 set x10 reps.   Resistive breather at resistance 5/5 (exhale/inhale) x3 sets x10 reps.     3. Patient will perform laryngeal adduction/elevation exercises x3 sets x10 reps w/ min cues.   Deferred per concerns for pharyngeal edema.     4. Patient will perform CTAR exercises sustained x3 sets x5 reps for 30+ seconds over 3 consecutive sessions.   Deferred per concerns for pharyngeal edema.     5. Patient will perform CTAR exercises repetitive x3 sets x12 reps w/ mod cues.   Deferred per concerns for pharyngeal edema.     6. Patient will perform compensatory techniques during meals w/ min cues.     7. Patient will accept ice chips/thin water w/o overt s/s aspiration w/ 80% of presentations.   Ice chips self-provided via spoon intermittently across this session. Overt s/s aspiration demonstrated in 50% of presentations.      8. Patient will accept ice/chip water per protocol between meals and medications for oral hydration/comfort with assistance.      9. Patient will participate in instrumental re-evaluation of swallowing fnx in 5-7 days, pending progress towards this poc.  Most recent MBS performed 24. Tentative  re-evaluation planned pending pt progress.      Visit Dx:     ICD-10-CM ICD-9-CM   1. Psychoactive substance-induced intoxication  F19.929 292.89     969.9   2. Diabetic ketoacidosis without coma associated with type 1 diabetes mellitus  E10.10 250.13   3. Acute respiratory failure, unspecified whether with hypoxia or hypercapnia  J96.00 518.81   4. Sepsis, due to unspecified organism, unspecified whether acute organ dysfunction present  A41.9 038.9     995.91     Patient Active Problem List   Diagnosis    Aspirin toxicity    Hypothyroidism    Type I diabetes mellitus    Chronic hepatitis C virus infection    Diabetic peripheral neuropathy    Gastroesophageal reflux disease without esophagitis    Celiac disease    Asthma    DKA (diabetic ketoacidosis)    Acute encephalopathy     Past Medical History:   Diagnosis Date    Asthma     Celiac disease     Diabetes mellitus type 1     Diabetic ketoacidosis     Disease of thyroid gland     Hepatitis C     Infectious viral hepatitis     hepititis C    Neuropathy     Reflux gastritis      Past Surgical History:   Procedure Laterality Date     SECTION      X 2       SLP Recommendation and Plan      1. Continue per POC.  2. Continue puree textures, honey thick liquids.   3. No straw presentations at this time.   4. Breather left at pt bedside for independent usage.       Thank you for allowing me to participate in the care of your patient-  Lyric Bradford M.S., CCC-SLP        EDUCATION  The patient has been educated in the following areas:   Dysphagia (Swallowing Impairment) Oral Care/Hydration Modified Diet Instruction.        Time Calculation:    Time Calculation- SLP       Row Name 24 1014             Time Calculation- SLP    SLP - Next Appointment 24  -                User Key  (r) = Recorded By, (t) = Taken By, (c) = Cosigned By      Initials Name Provider Type    Lyric Schaefer, MS CCC-SLP Speech and Language Pathologist                    Therapy  Charges for Today       Code Description Service Date Service Provider Modifiers Qty    73756320294 HC ST EVAL ORAL PHARYNG SWALLOW 4 4/24/2024 Lyric Bradford, MS CCC-SLP GN 1    79864502237 HC ST MOTION FLUORO EVAL SWALLOW 8 4/24/2024 Lyric Bradford, MS CCC-SLP GN 1    33171079946 HC ST TREATMENT SWALLOW 2 4/25/2024 Lyric Bradford, MS CCC-SLP GN 1    [id number] HC ST EVAL ORAL PHARYNG SWALLOW 4 4/25/2024 Lyric Bradford, MS CCC-SLP GN 1                 Lyric Bradford MS LIZET-SLP  4/25/2024

## 2024-04-25 NOTE — PLAN OF CARE
Goal Outcome Evaluation:  Plan of Care Reviewed With: patient        Progress: no change     Pt remains alert and oriented for the shift. She has not complained of any pain or nausea for the shift. Pt has had adequate UOP. NSR on the monitor. Pt is currently resting with call light near by, VSS, care ongoing.

## 2024-04-25 NOTE — PROGRESS NOTES
Pharmacokinetics Service Note:    Jes Moreno is a 38 y.o. female being managed by Pharmacy for vancomycin dosing.    Indication for Therapy: MRSA and MRSE bacteremia  Current Regimen: 1.25 gm q12hrs  Current Day of Therapy and Ordered Duration: day 2 of 5  Level Reported and Timing with Respect to Previous Dose: 8.3 mcg/mL collected 9.75 hrs post infusion  Calculated Steady State AUC: 388 mg/L*hr    Assessment/Plan: The calculated AUC is lower than desired for this indication.  Will increase the dosage to 1.5 gm q12hrs to target an AUC range of 500-600 mg/L*hr.    Monitoring Planned: Will repeat a level within the next 48 hours to determine if additional adjustments are needed.    Thank you.  Radha Vela, Pharm.D.  4/25/2024  14:26 EDT

## 2024-04-25 NOTE — PROGRESS NOTES
Notified by nursing staff that patient's blood glucose is persistently trending up and glucomander subcutaneous protocol is instructing to give very low doses of short acting insulin (3 units for blood glucose of 333, despite glucose continuing to rise after earlier dose). I am concerned that if her glucose continues to trend up she will go back into DKA. Discontinue glucomander subcutaneous protocol. Start lantus 25 units daily, as the last day on the insulin drip her total insulin requirement over 24 hours was approximately 52 units. Will attempt to split basal/bolus dosing approximately 50/50. Start moderate dose sliding scale correctional insulin. Continue to monitor closely and adjust insulin doses as needed to keep blood glucose in goal range of 140-180. I have not yet found a way outside of glucomander to order short acting prandial insulin based on carbohydrate count, but will try to look into this further. Continue accu-checks, hypoglycemia protocol prn.    Madalyn Villalba DO  04/25/24 12:08 EDT

## 2024-04-26 ENCOUNTER — APPOINTMENT (OUTPATIENT)
Dept: GENERAL RADIOLOGY | Facility: HOSPITAL | Age: 39
DRG: 894 | End: 2024-04-26
Payer: MEDICAID

## 2024-04-26 LAB
ANION GAP SERPL CALCULATED.3IONS-SCNC: 8.3 MMOL/L (ref 5–15)
BACTERIA SPEC AEROBE CULT: ABNORMAL
BACTERIA SPEC AEROBE CULT: ABNORMAL
BACTERIA SPEC AEROBE CULT: NO GROWTH
BUN SERPL-MCNC: 7 MG/DL (ref 6–20)
BUN/CREAT SERPL: 12.3 (ref 7–25)
CALCIUM SPEC-SCNC: 8.7 MG/DL (ref 8.6–10.5)
CHLORIDE SERPL-SCNC: 107 MMOL/L (ref 98–107)
CO2 SERPL-SCNC: 28.7 MMOL/L (ref 22–29)
CREAT SERPL-MCNC: 0.57 MG/DL (ref 0.57–1)
DEPRECATED RDW RBC AUTO: 45.1 FL (ref 37–54)
EGFRCR SERPLBLD CKD-EPI 2021: 121 ML/MIN/1.73
ERYTHROCYTE [DISTWIDTH] IN BLOOD BY AUTOMATED COUNT: 13.2 % (ref 12.3–15.4)
GLUCOSE BLDC GLUCOMTR-MCNC: 122 MG/DL (ref 70–130)
GLUCOSE BLDC GLUCOMTR-MCNC: 145 MG/DL (ref 70–130)
GLUCOSE BLDC GLUCOMTR-MCNC: 165 MG/DL (ref 70–130)
GLUCOSE BLDC GLUCOMTR-MCNC: 362 MG/DL (ref 70–130)
GLUCOSE BLDC GLUCOMTR-MCNC: 45 MG/DL (ref 70–130)
GLUCOSE BLDC GLUCOMTR-MCNC: 65 MG/DL (ref 70–130)
GLUCOSE BLDC GLUCOMTR-MCNC: 68 MG/DL (ref 70–130)
GLUCOSE SERPL-MCNC: 55 MG/DL (ref 65–99)
GRAM STN SPEC: ABNORMAL
GRAM STN SPEC: ABNORMAL
HCT VFR BLD AUTO: 32.7 % (ref 34–46.6)
HGB BLD-MCNC: 10.3 G/DL (ref 12–15.9)
ISOLATED FROM: ABNORMAL
ISOLATED FROM: ABNORMAL
MAGNESIUM SERPL-MCNC: 1.7 MG/DL (ref 1.6–2.6)
MCH RBC QN AUTO: 28.9 PG (ref 26.6–33)
MCHC RBC AUTO-ENTMCNC: 31.5 G/DL (ref 31.5–35.7)
MCV RBC AUTO: 91.9 FL (ref 79–97)
PLATELET # BLD AUTO: 457 10*3/MM3 (ref 140–450)
PMV BLD AUTO: 8.4 FL (ref 6–12)
POTASSIUM SERPL-SCNC: 3.3 MMOL/L (ref 3.5–5.2)
RBC # BLD AUTO: 3.56 10*6/MM3 (ref 3.77–5.28)
SODIUM SERPL-SCNC: 144 MMOL/L (ref 136–145)
VANCOMYCIN TROUGH SERPL-MCNC: 12.5 MCG/ML (ref 5–20)
WBC NRBC COR # BLD AUTO: 8.61 10*3/MM3 (ref 3.4–10.8)

## 2024-04-26 PROCEDURE — 63710000001 INSULIN LISPRO (HUMAN) PER 5 UNITS: Performed by: STUDENT IN AN ORGANIZED HEALTH CARE EDUCATION/TRAINING PROGRAM

## 2024-04-26 PROCEDURE — 25010000002 ENOXAPARIN PER 10 MG: Performed by: INTERNAL MEDICINE

## 2024-04-26 PROCEDURE — 99232 SBSQ HOSP IP/OBS MODERATE 35: CPT | Performed by: INTERNAL MEDICINE

## 2024-04-26 PROCEDURE — 25010000002 POTASSIUM CHLORIDE 10 MEQ/100ML SOLUTION: Performed by: INTERNAL MEDICINE

## 2024-04-26 PROCEDURE — 74230 X-RAY XM SWLNG FUNCJ C+: CPT

## 2024-04-26 PROCEDURE — 80202 ASSAY OF VANCOMYCIN: CPT | Performed by: STUDENT IN AN ORGANIZED HEALTH CARE EDUCATION/TRAINING PROGRAM

## 2024-04-26 PROCEDURE — 80048 BASIC METABOLIC PNL TOTAL CA: CPT | Performed by: STUDENT IN AN ORGANIZED HEALTH CARE EDUCATION/TRAINING PROGRAM

## 2024-04-26 PROCEDURE — 83735 ASSAY OF MAGNESIUM: CPT | Performed by: INTERNAL MEDICINE

## 2024-04-26 PROCEDURE — 25810000003 SODIUM CHLORIDE 0.9 % SOLUTION

## 2024-04-26 PROCEDURE — 92611 MOTION FLUOROSCOPY/SWALLOW: CPT

## 2024-04-26 PROCEDURE — 74230 X-RAY XM SWLNG FUNCJ C+: CPT | Performed by: RADIOLOGY

## 2024-04-26 PROCEDURE — 63710000001 INSULIN GLARGINE PER 5 UNITS: Performed by: STUDENT IN AN ORGANIZED HEALTH CARE EDUCATION/TRAINING PROGRAM

## 2024-04-26 PROCEDURE — 25010000002 VANCOMYCIN 5 G RECONSTITUTED SOLUTION

## 2024-04-26 PROCEDURE — 82948 REAGENT STRIP/BLOOD GLUCOSE: CPT

## 2024-04-26 PROCEDURE — 85027 COMPLETE CBC AUTOMATED: CPT | Performed by: STUDENT IN AN ORGANIZED HEALTH CARE EDUCATION/TRAINING PROGRAM

## 2024-04-26 PROCEDURE — 97165 OT EVAL LOW COMPLEX 30 MIN: CPT

## 2024-04-26 PROCEDURE — 99233 SBSQ HOSP IP/OBS HIGH 50: CPT | Performed by: NURSE PRACTITIONER

## 2024-04-26 PROCEDURE — 94761 N-INVAS EAR/PLS OXIMETRY MLT: CPT

## 2024-04-26 PROCEDURE — 94799 UNLISTED PULMONARY SVC/PX: CPT

## 2024-04-26 PROCEDURE — 99232 SBSQ HOSP IP/OBS MODERATE 35: CPT | Performed by: STUDENT IN AN ORGANIZED HEALTH CARE EDUCATION/TRAINING PROGRAM

## 2024-04-26 RX ORDER — ACETAMINOPHEN 325 MG/1
TABLET ORAL
Status: COMPLETED
Start: 2024-04-26 | End: 2024-04-26

## 2024-04-26 RX ORDER — POTASSIUM CHLORIDE 7.45 MG/ML
10 INJECTION INTRAVENOUS
Status: COMPLETED | OUTPATIENT
Start: 2024-04-26 | End: 2024-04-26

## 2024-04-26 RX ORDER — ACETAMINOPHEN 325 MG/1
650 TABLET ORAL EVERY 4 HOURS PRN
Status: DISCONTINUED | OUTPATIENT
Start: 2024-04-26 | End: 2024-04-30 | Stop reason: HOSPADM

## 2024-04-26 RX ORDER — NICOTINE 21 MG/24HR
1 PATCH, TRANSDERMAL 24 HOURS TRANSDERMAL
Status: DISCONTINUED | OUTPATIENT
Start: 2024-04-26 | End: 2024-04-30 | Stop reason: HOSPADM

## 2024-04-26 RX ADMIN — MUPIROCIN 1 APPLICATION: 20 OINTMENT TOPICAL at 08:50

## 2024-04-26 RX ADMIN — MONTELUKAST SODIUM 10 MG: 10 TABLET, COATED ORAL at 21:23

## 2024-04-26 RX ADMIN — ROPINIROLE HYDROCHLORIDE 2 MG: 1 TABLET, FILM COATED ORAL at 21:23

## 2024-04-26 RX ADMIN — LEVOTHYROXINE SODIUM 200 MCG: 100 TABLET ORAL at 08:49

## 2024-04-26 RX ADMIN — INSULIN LISPRO 7 UNITS: 100 INJECTION, SOLUTION INTRAVENOUS; SUBCUTANEOUS at 12:08

## 2024-04-26 RX ADMIN — ATORVASTATIN CALCIUM 20 MG: 20 TABLET, FILM COATED ORAL at 21:23

## 2024-04-26 RX ADMIN — Medication 10 ML: at 08:51

## 2024-04-26 RX ADMIN — MUPIROCIN 1 APPLICATION: 20 OINTMENT TOPICAL at 21:24

## 2024-04-26 RX ADMIN — OLANZAPINE 10 MG: 10 TABLET, ORALLY DISINTEGRATING ORAL at 08:50

## 2024-04-26 RX ADMIN — TOPIRAMATE 25 MG: 25 TABLET, FILM COATED ORAL at 08:49

## 2024-04-26 RX ADMIN — METRONIDAZOLE 500 MG: 250 TABLET ORAL at 06:14

## 2024-04-26 RX ADMIN — Medication 10 ML: at 21:24

## 2024-04-26 RX ADMIN — BUPROPION HYDROCHLORIDE 450 MG: 150 TABLET, EXTENDED RELEASE ORAL at 08:49

## 2024-04-26 RX ADMIN — POTASSIUM CHLORIDE 10 MEQ: 7.46 INJECTION, SOLUTION INTRAVENOUS at 04:27

## 2024-04-26 RX ADMIN — CETIRIZINE HYDROCHLORIDE 10 MG: 10 TABLET, FILM COATED ORAL at 08:49

## 2024-04-26 RX ADMIN — METRONIDAZOLE 500 MG: 250 TABLET ORAL at 21:23

## 2024-04-26 RX ADMIN — FLUOXETINE HYDROCHLORIDE 20 MG: 20 CAPSULE ORAL at 08:49

## 2024-04-26 RX ADMIN — FUROSEMIDE 20 MG: 20 TABLET ORAL at 08:51

## 2024-04-26 RX ADMIN — VANCOMYCIN HYDROCHLORIDE 1750 MG: 5 INJECTION, POWDER, LYOPHILIZED, FOR SOLUTION INTRAVENOUS at 15:54

## 2024-04-26 RX ADMIN — ACETAMINOPHEN 650 MG: 325 TABLET ORAL at 03:02

## 2024-04-26 RX ADMIN — VANCOMYCIN HYDROCHLORIDE 1750 MG: 5 INJECTION, POWDER, LYOPHILIZED, FOR SOLUTION INTRAVENOUS at 02:40

## 2024-04-26 RX ADMIN — GABAPENTIN 800 MG: 400 CAPSULE ORAL at 06:15

## 2024-04-26 RX ADMIN — GABAPENTIN 800 MG: 400 CAPSULE ORAL at 21:23

## 2024-04-26 RX ADMIN — ENOXAPARIN SODIUM 40 MG: 40 INJECTION SUBCUTANEOUS at 17:12

## 2024-04-26 RX ADMIN — INSULIN GLARGINE 10 UNITS: 100 INJECTION, SOLUTION SUBCUTANEOUS at 12:08

## 2024-04-26 RX ADMIN — POTASSIUM CHLORIDE 10 MEQ: 7.46 INJECTION, SOLUTION INTRAVENOUS at 06:36

## 2024-04-26 RX ADMIN — METRONIDAZOLE 500 MG: 250 TABLET ORAL at 15:54

## 2024-04-26 RX ADMIN — GABAPENTIN 800 MG: 400 CAPSULE ORAL at 13:41

## 2024-04-26 RX ADMIN — INSULIN LISPRO 2 UNITS: 100 INJECTION, SOLUTION INTRAVENOUS; SUBCUTANEOUS at 21:37

## 2024-04-26 RX ADMIN — FLUOXETINE HYDROCHLORIDE 40 MG: 20 CAPSULE ORAL at 08:51

## 2024-04-26 RX ADMIN — POTASSIUM CHLORIDE 10 MEQ: 7.46 INJECTION, SOLUTION INTRAVENOUS at 05:29

## 2024-04-26 RX ADMIN — BUPRENORPHINE HYDROCHLORIDE AND NALOXONE HYDROCHLORIDE DIHYDRATE 3 TABLET: 8; 2 TABLET SUBLINGUAL at 08:49

## 2024-04-26 RX ADMIN — Medication 1 PATCH: at 13:41

## 2024-04-26 RX ADMIN — POTASSIUM CHLORIDE 10 MEQ: 7.46 INJECTION, SOLUTION INTRAVENOUS at 03:42

## 2024-04-26 NOTE — PLAN OF CARE
Problem: Adult Inpatient Plan of Care  Goal: Plan of Care Review  Outcome: Ongoing, Progressing  Flowsheets (Taken 4/26/2024 1645)  Progress: no change  Goal: Patient-Specific Goal (Individualized)  Outcome: Ongoing, Progressing  Goal: Absence of Hospital-Acquired Illness or Injury  Outcome: Ongoing, Progressing  Intervention: Identify and Manage Fall Risk  Recent Flowsheet Documentation  Taken 4/26/2024 1600 by Doug Sidhu RN  Safety Promotion/Fall Prevention: safety round/check completed  Taken 4/26/2024 1500 by Doug Sidhu RN  Safety Promotion/Fall Prevention: safety round/check completed  Taken 4/26/2024 1400 by Doug Sidhu RN  Safety Promotion/Fall Prevention: safety round/check completed  Taken 4/26/2024 1300 by Doug Sidhu RN  Safety Promotion/Fall Prevention: safety round/check completed  Taken 4/26/2024 1200 by Doug Sidhu RN  Safety Promotion/Fall Prevention: safety round/check completed  Taken 4/26/2024 1100 by Doug Sidhu RN  Safety Promotion/Fall Prevention: safety round/check completed  Taken 4/26/2024 0900 by Doug Sidhu RN  Safety Promotion/Fall Prevention: safety round/check completed  Taken 4/26/2024 0700 by Doug Sidhu RN  Safety Promotion/Fall Prevention: safety round/check completed  Intervention: Prevent Skin Injury  Recent Flowsheet Documentation  Taken 4/26/2024 1600 by Doug Sidhu RN  Body Position: position changed independently  Taken 4/26/2024 1400 by Doug Sidhu RN  Body Position: position changed independently  Skin Protection: adhesive use limited  Taken 4/26/2024 1200 by Doug Sidhu RN  Body Position: position changed independently  Taken 4/26/2024 0800 by Doug Sidhu RN  Skin Protection: adhesive use limited  Intervention: Prevent and Manage VTE (Venous Thromboembolism) Risk  Recent Flowsheet Documentation  Taken 4/26/2024 1400 by Doug Sidhu  RN  Activity Management: activity encouraged  Taken 4/26/2024 0800 by Doug Sidhu RN  Activity Management: activity encouraged  Intervention: Prevent Infection  Recent Flowsheet Documentation  Taken 4/26/2024 1400 by Doug Sidhu RN  Infection Prevention: environmental surveillance performed  Taken 4/26/2024 0800 by Doug Sidhu RN  Infection Prevention: environmental surveillance performed  Goal: Optimal Comfort and Wellbeing  Outcome: Ongoing, Progressing  Intervention: Provide Person-Centered Care  Recent Flowsheet Documentation  Taken 4/26/2024 1400 by Doug Sidhu RN  Trust Relationship/Rapport:   care explained   reassurance provided  Taken 4/26/2024 0800 by Doug Sidhu RN  Trust Relationship/Rapport:   care explained   reassurance provided  Goal: Readiness for Transition of Care  Outcome: Ongoing, Progressing   Goal Outcome Evaluation:

## 2024-04-26 NOTE — THERAPY EVALUATION
Acute Care - Occupational Therapy Initial Evaluation   Jose     Patient Name: Jes Moreno  : 1985  MRN: [id number]  Today's Date: 2024             Admit Date: 2024       ICD-10-CM ICD-9-CM   1. Psychoactive substance-induced intoxication  F19.929 292.89     969.9   2. Diabetic ketoacidosis without coma associated with type 1 diabetes mellitus  E10.10 250.13   3. Acute respiratory failure, unspecified whether with hypoxia or hypercapnia  J96.00 518.81   4. Sepsis, due to unspecified organism, unspecified whether acute organ dysfunction present  A41.9 038.9     995.91     Patient Active Problem List   Diagnosis    Aspirin toxicity    Hypothyroidism    Type I diabetes mellitus    Chronic hepatitis C virus infection    Diabetic peripheral neuropathy    Gastroesophageal reflux disease without esophagitis    Celiac disease    Asthma    DKA (diabetic ketoacidosis)    Acute encephalopathy     Past Medical History:   Diagnosis Date    Asthma     Celiac disease     Diabetes mellitus type 1     Diabetic ketoacidosis     Disease of thyroid gland     Hepatitis C     Infectious viral hepatitis     hepititis C    Neuropathy     Reflux gastritis      Past Surgical History:   Procedure Laterality Date     SECTION      X 2         OT ASSESSMENT FLOWSHEET (Last 12 Hours)       OT Evaluation and Treatment       Row Name 24 1730                   OT Time and Intention    Document Type evaluation  -KR        Mode of Treatment occupational therapy  -KR        Patient Effort adequate  -KR           Cognition    Affect/Mental Status (Cognition) WFL  -KR        Orientation Status (Cognition) oriented to;person;situation  -KR        Follows Commands (Cognition) WFL  -KR           Range of Motion Comprehensive    Comment, General Range of Motion BUE equal/WFL  -KR           Strength Comprehensive (MMT)    Comment, General Manual Muscle Testing (MMT) Assessment BUE equal/WFL  -KR           Sensory     Additional Documentation --  No sensory/visual changes noted or reported at this time  -KR           Activities of Daily Living    BADL Assessment/Intervention --  Pt is able to perform simple self care tasks independently  -KR           Plan of Care Review    Plan of Care Reviewed With patient  -KR           Therapy Assessment/Plan (OT)    Comment, Therapy Assessment/Plan (OT) Pt presents near baseline function for BUE and self care tasks. No further need for skilled OT training at this time.  -KR                  User Key  (r) = Recorded By, (t) = Taken By, (c) = Cosigned By      Initials Name Effective Dates    Madhu Craig OT 06/16/21 -                            OT Recommendation and Plan     Plan of Care Review  Plan of Care Reviewed With: patient  Plan of Care Reviewed With: patient     Outcome Measures       Row Name 04/26/24 1733 04/26/24 1700          Modified [place] Scale    Pre-Stroke Modified [place] Scale 0 - No Symptoms at all.  -KR --     Modified [place] Scale 1 - No significant disability despite symptoms.  Able to carry out all usual duties and activities.  -KR --        Functional Assessment    Outcome Measure Options -- Modified [place]  -KR               User Key  (r) = Recorded By, (t) = Taken By, (c) = Cosigned By      Initials Name Provider Type    Madhu Craig, OT Occupational Therapist                    Time Calculation:     Therapy Charges for Today       Code Description Service Date Service Provider Modifiers Qty    52035341357  OT EVAL LOW COMPLEXITY 4 4/26/2024 Madhu Palmer OT GO 1                 Madhu Palmer OT  4/26/2024

## 2024-04-26 NOTE — PROGRESS NOTES
Chief Complaint:  Pulmonary and critical care is following for ventilator management and critical illness management        Subjective-overnight events reviewed.  All the labs medications ins and outs and vitals reviewed.    MDR done at bed side with RN, pharmacist, nutrition, , hospitalist manager  and RT  All the drips,other meds,  labs,ins and outs , abg, fio2 requirement reviewed and dicussed in rounds.   Repeat blood cultures negative.  Ins and outs net negative  Review of Systems:    Positive for generalized fatigue otherwise negative    Vital Signs  Temp:  [97.9 °F (36.6 °C)-98.3 °F (36.8 °C)] 98.3 °F (36.8 °C)  Heart Rate:  [] 99  Resp:  [11-20] 18  BP: (103-137)/(58-87) 127/83  Body mass index is 23.58 kg/m².    Intake/Output Summary (Last 24 hours) at 4/26/2024 1103  Last data filed at 4/26/2024 0800  Gross per 24 hour   Intake 2270 ml   Output 4450 ml   Net -2180 ml     I/O this shift:  In: 120 [P.O.:120]  Out: -     Physical Exam:-    General- normal in appearance, not in any acute distress    HEENT- pupils equally reactive to light, normal in size, no scleral icterus    Neck-supple    Respiratory-respirations normal-on auscultation no wheezing no crackles,     Cardiovascular-  NSR     GI-nontender nondistended bowel sounds positive    CNS-nonfocal    Musculoskeletal -no edema  Extremities- no obvious deformity noticed     Psychiatric-mood good, good eye contact, alert awake oriented  Skin- no visible rash       Results Review:     I reviewed the patient's new clinical results.  Results from last 7 days   Lab Units 04/26/24  0052 04/25/24  0100 04/24/24  0132   WBC 10*3/mm3 8.61 8.71 12.23*   HEMOGLOBIN g/dL 10.3* 9.6* 9.9*   PLATELETS 10*3/mm3 457* 422 403     Results from last 7 days   Lab Units 04/26/24  0052 04/25/24  0100 04/24/24  0132 04/23/24  0029 04/22/24  0722 04/22/24  0407   SODIUM mmol/L 144 141 143   < > 139 140   POTASSIUM mmol/L 3.3* 3.9 3.8   < > 4.2 4.3    CHLORIDE mmol/L 107 109* 110*   < > 109* 111*   CO2 mmol/L 28.7 26.1 24.8   < > 22.0 21.8*   BUN mg/dL 7 9 8   < > 2* 2*   CREATININE mg/dL 0.57 0.51* 0.54*   < > 0.59 0.60   CALCIUM mg/dL 8.7 8.1* 8.3*   < > 7.8* 7.7*   GLUCOSE mg/dL 55* 211* 128*   < > 159* 157*   MAGNESIUM mg/dL 1.7  --   --   --  1.8 1.8    < > = values in this interval not displayed.     Lab Results   Component Value Date    INR 1.04 04/19/2024    INR 0.93 02/21/2024    INR 1.00 08/12/2023    PROTIME 14.1 04/19/2024    PROTIME 13.0 02/21/2024    PROTIME 13.7 08/12/2023     Results from last 7 days   Lab Units 04/25/24  0100 04/20/24  0026   ALK PHOS U/L 114 112   BILIRUBIN mg/dL 0.2 <0.2   ALT (SGPT) U/L 16 22   AST (SGOT) U/L 19 26           Imaging Results (Last 24 Hours)       Procedure Component Value Units Date/Time    FL Video Swallow Single Contrast [[id number]] Resulted: 04/26/24 1059     Updated: 04/26/24 1059                 atorvastatin, 20 mg, Oral, Nightly  buprenorphine-naloxone, 3 tablet, Sublingual, Daily  buPROPion XL, 450 mg, Oral, Daily  cetirizine, 10 mg, Oral, Daily  chlorhexidine, 15 mL, Mouth/Throat, Q12H  enoxaparin, 40 mg, Subcutaneous, Q24H  FLUoxetine, 20 mg, Oral, Daily  FLUoxetine, 40 mg, Oral, Daily  furosemide, 20 mg, Oral, Daily  gabapentin, 800 mg, Oral, Q8H  insulin glargine, 10 Units, Subcutaneous, Daily  insulin lispro, 2-9 Units, Subcutaneous, 4x Daily AC & at Bedtime  levothyroxine, 200 mcg, Oral, Daily  metroNIDAZOLE, 500 mg, Oral, Q8H  montelukast, 10 mg, Oral, Nightly  mupirocin, 1 Application, Topical, Q12H  OLANZapine zydis, 10 mg, Oral, Daily  pantoprazole, 40 mg, Oral, Q AM  rOPINIRole, 2 mg, Oral, Nightly  senna-docusate sodium, 2 tablet, Oral, BID  sodium chloride, 10 mL, Intravenous, Q12H  sodium chloride, 10 mL, Intravenous, Q12H  sodium chloride, 10 mL, Intravenous, Q12H  topiramate, 25 mg, Oral, Daily  vancomycin, 1,750 mg, Intravenous, Q12H      dexmedetomidine, 0.2-1.5 mcg/kg/hr, Last Rate:  Stopped (04/23/24 0941)  dextrose 5 % and sodium chloride 0.45 %, 150 mL/hr  dextrose 5 % and sodium chloride 0.9 %, 150 mL/hr  sodium chloride, 250 mL/hr  sodium chloride, 250 mL/hr        Medication Review:    Latest Reference Range & Units 04/19/24 10:02   pH, Arterial 7.350 - 7.450 pH units 7.321 (L)   pCO2, Arterial 35.0 - 45.0 mm Hg 37.4   pO2, Arterial 83.0 - 108.0 mm Hg 142.0 (H)   HCO3, Arterial 20.0 - 26.0 mmol/L 19.3 (L)   Base Excess 0.0 - 2.0 mmol/L -6.2 (L)   O2 Saturation, Arterial 94.0 - 99.0 % >99.2 (H)   CO2 Content 22 - 33 mmol/L 20.4 (L)   A-a DO2 0.0 - 300.0 mmHg 54.3   Carboxyhemoglobin 0 - 5 % 0.9   Methemoglobin 0.00 - 3.00 % 0.20   Oxyhemoglobin 94 - 99 % 98.2   Hematocrit, Blood Gas 38.0 - 51.0 % 35.6 (L)   Hemoglobin, Blood Gas 13.5 - 17.5 g/dL 11.6 (L)   Site  Left Brachial   Saul's Test  N/A   Modality  Ventilator   FIO2 % 35   Ventilator Mode  VC/AC   Set Tidal Volume  450.00   Set Mech Resp Rate  18.0   PEEP  5.0   Barometric Pressure for Blood Gas mmHg 727   (L): Data is abnormally low  (H): Data is abnormally high  Microbiology Results (last 10 days)       Procedure Component Value - Date/Time    Chlamydia trachomatis, Neisseria gonorrhoeae, Trichomonas vaginalis, PCR - Urine, Urine, Clean Catch [[id number]]  (Abnormal) Collected: 04/24/24 1721    Lab Status: Final result Specimen: Urine, Clean Catch Updated: 04/24/24 1915     Chlamydia DNA by PCR Not Detected     Neisseria gonorrhoeae by PCR Not Detected     Trichomonas vaginalis PCR Detected    Urine Culture - Urine, Straight Cath [[id number]]  (Normal) Collected: 04/24/24 1220    Lab Status: Preliminary result Specimen: Urine from Straight Cath Updated: 04/25/24 0953     Urine Culture No growth    Blood Culture - Blood, Arm, Left [[id number]]  (Normal) Collected: 04/24/24 1039    Lab Status: Preliminary result Specimen: Blood from Arm, Left Updated: 04/26/24 1101     Blood Culture No growth at 2 days    Blood Culture - Blood, Arm,  Right [[id number]]  (Normal) Collected: 04/24/24 1038    Lab Status: Preliminary result Specimen: Blood from Arm, Right Updated: 04/26/24 1101     Blood Culture No growth at 2 days    Blood Culture - Blood, Arm, Right [[id number]]  (Abnormal) Collected: 04/22/24 1532    Lab Status: Final result Specimen: Blood from Arm, Right Updated: 04/26/24 0653     Blood Culture Staphylococcus aureus, MRSA     Comment:   Infectious disease consultation is highly recommended to rule out distant foci of infection.  Methicillin resistant Staphylococcus aureus, Patient may be an isolation risk.        Isolated from Aerobic Bottle     Gram Stain Aerobic Bottle Gram positive cocci in clusters    Narrative:      Refer to previous blood culture collected on 04/22/2024 1436 for MICs    No BCID performed, refer to previous blood culture specimen collected on  4/22/24    Blood Culture - Blood, Arm, Right [[id number]]  (Abnormal)  (Susceptibility) Collected: 04/22/24 1436    Lab Status: Final result Specimen: Blood from Arm, Right Updated: 04/26/24 0653     Blood Culture Staphylococcus aureus, MRSA     Comment:   Infectious disease consultation is highly recommended to rule out distant foci of infection.  Methicillin resistant Staphylococcus aureus, Patient may be an isolation risk.        Isolated from Aerobic Bottle     Gram Stain Aerobic Bottle Gram positive cocci in clusters    Susceptibility        Staphylococcus aureus, MRSA      ILIANA      Gentamicin Susceptible      Oxacillin Resistant      Rifampin Susceptible      Vancomycin Susceptible                           Blood Culture ID, PCR - Blood, Arm, Right [[id number]]  (Abnormal) Collected: 04/22/24 1436    Lab Status: Final result Specimen: Blood from Arm, Right Updated: 04/23/24 2208     BCID, PCR Staph aureus. mecA/C and MREJ (methicillin resistance gene) detected. Identification by BCID2 PCR.     BOTTLE TYPE Aerobic Bottle    Narrative:      Infectious disease consultation is highly  recommended to rule out distant foci of infection.    Blood Culture - Blood, Blood, Central Line [[id number]]  (Abnormal) Collected: 04/19/24 0947    Lab Status: Final result Specimen: Blood, Central Line Updated: 04/22/24 0610     Blood Culture Staphylococcus epidermidis     Isolated from Aerobic Bottle     Gram Stain Aerobic Bottle Gram positive cocci    Narrative:      Refer to previous blood culture collected on 04/19/2024 0947 for MICs      Blood Culture - Blood, Arm, Right [[id number]]  (Abnormal)  (Susceptibility) Collected: 04/19/24 0947    Lab Status: Final result Specimen: Blood from Arm, Right Updated: 04/22/24 0610     Blood Culture Staphylococcus epidermidis     Isolated from Aerobic Bottle     Gram Stain Aerobic Bottle Gram positive cocci    Susceptibility        Staphylococcus epidermidis      ILIANA      Oxacillin Resistant      Vancomycin Susceptible                           Blood Culture ID, PCR - Blood, Arm, Right [[id number]]  (Abnormal) Collected: 04/19/24 0947    Lab Status: Final result Specimen: Blood from Arm, Right Updated: 04/20/24 0521     BCID, PCR Staph spp, not aureus or lugdunensis. Identification by BCID2 PCR.     BOTTLE TYPE Aerobic Bottle            Latest Reference Range & Units 04/19/24 11:43 04/19/24 13:01   Acetone Negative  Small !!    Amphetamine, Urine Qual Negative   Positive !   Barbiturates Screen, Urine Negative   Negative   Benzodiazepine Screen, Urine Negative   Positive !   Buprenorphine, Screen, Urine Negative   Positive !   Cocaine Screen, Urine Negative   Negative   Fentanyl, Urine Negative   Negative   Methamphetamine, Ur Negative   Positive !   Methadone Screen , Urine Negative   Negative   Opiate Screen Negative   Negative   Oxycodone Screen, Urine Negative   Negative   Phencyclidine (PCP), Urine Negative   Negative   THC Screen, Urine Negative   Negative   Tricyclic Antidepressants Screen Negative   Negative   !!: Data is critical  !: Data is abnormal  Assessment &  Plan     Neurology-     CT head reviewed-no acute intracranial process identified.      Respiratory-  Extubated and doing well.  FiO2 requirement reviewed and adjusted for a sat of 88 to 92%.    Cardiology- hemodynamically -stable.    Continue to monitor HR- rate and rhythm, BP   Results for orders placed during the hospital encounter of 04/19/24    Adult Transthoracic Echo Complete W/ Cont if Necessary Per Protocol    Interpretation Summary    Left ventricular systolic function is normal. Left ventricular ejection fraction appears to be 66 - 70%.    Left ventricular diastolic function is consistent with (grade I) impaired relaxation.    Estimated right ventricular systolic pressure from tricuspid regurgitation is normal (<35 mmHg).  Repeat blood cultures negative.  From pulmonary standpoint of view patient can be discharged home to follow-up with primary care physician.  Continue to follow up the blood cultures.    Nephrology- Cr and BUN stable  I/O-reviewed.  Electrolytes reviewed and adjusted    GI- PPI prophylaxis  Continue current diet    Hematology- CBC  Hb  platelet  WBC    ID  Culture  And Antibiotics  Repeat blood cultures negative so far reviewed.  Repeat cultures drawn today and negative so far        Endocrinology- Maintain Blood sugar 140 -180  Continue insulin drip.  Latest blood sugars reviewed.      Electrolytes-   Mag and phos       DVT prophylaxis-continue    Bed side rounds were done with RT and patient's nurse. All the lab and clinical findings were discussed with them and plan was also discussed in great detail.          Acute encephalopathy               Tenzin Felton MD  04/26/24  11:03 EDT

## 2024-04-26 NOTE — PROGRESS NOTES
TH[facility] Physician - Brief Progress Note  PERMANENT  04/26/2024 02:03    [facility] - [facility] - [place] - CCU - [address] - [place], [place] ([facility])    INEZ ADAMS    Date of Service 04/26/2024 02:03    HPI/Events of Note [facility] Provider Intervention Note    Comments:  Pt c/o of headache and requesting tylenol. Thanks    Case reviewed.  Order placed.      _____   Video Assessment performed            Contact [facility] for any needs if bedside physician is not present.      Interventions Minor-Routine modifications to care plan (e.g. PRN medications for pain, fever)        Electronically Signed by: Isidro Dunn) on 04/26/2024 02:04

## 2024-04-26 NOTE — CASE MANAGEMENT/SOCIAL WORK
Discharge Planning Assessment   [place]     Patient Name: Jes Moreno  MRN: [id number]  Today's Date: 4/26/2024    Admit Date: 4/19/2024     Discharge Plan       Row Name 04/26/24 1538       Plan    Plan SS received consult for inubated. Pt self extubated on this date. SS spoke with pt mother casper on this date. Pt lives alone at [address] in [place], but her s/o Jose Alejandro has been staying off and on with pt. Pt does not utilize  services. Pt PCP Juany Sheehan. Pt has insulin pump, per mother pt does not utilize the insulin pump and dexcom via unknown provider. Pt states pt utilizes [facility] in [place]. Pt does not have POA or living will. Pt declines substance abuse resources and states she plans to see her PCP and counselor. Pt voiced her S/O will provide transportation. SS to follow and assist with discharge planning.               Irene Bose, YANELIW

## 2024-04-26 NOTE — PROGRESS NOTES
PROGRESS NOTE         Patient Identification:  Name:  Jes Moreno  Age:  38 y.oAreli  Sex:  female  :  1985  MRN:  [id number]  Visit Number:  [id number]  Primary Care Provider:  Juany Sheehan APRN         LOS: 7 days       ----------------------------------------------------------------------------------------------------------------------  Subjective       Chief Complaints:    Altered Mental Status        Interval History:      Patient sitting up on side of bed this morning.  Just finished taking a bath this morning.  Currently on room air with no apparent distress.  Lungs clear to auscultation bilaterally.  Abdomen soft, nontender.  Afebrile, denies diarrhea.  Urine culture from 2024 shows no growth.  WBC normal at 8.61.  SLP evaluation from 2024 reports gross aspiration of nectar thick and thin liquids.    Review of Systems:    Constitutional: no fever, chills and night sweats.  Generalized fatigue.  Eyes: no eye drainage, itching or redness.  HEENT: no mouth sores, dysphagia or nose bleed.  Respiratory: no for shortness of breath, cough or production of sputum.  Cardiovascular: no chest pain, no palpitations, no orthopnea.  Gastrointestinal: no nausea, vomiting or diarrhea. No abdominal pain, hematemesis or rectal bleeding.  Genitourinary: no dysuria or polyuria.  Hematologic/lymphatic: no lymph node abnormalities, no easy bruising or easy bleeding.  Musculoskeletal: no muscle or joint pain.  Skin: No rash and no itching.  Neurological: no loss of consciousness, no seizure, no headache.  Behavioral/Psych: no depression or suicidal ideation.  Endocrine: no hot flashes.  Immunologic: negative.    ----------------------------------------------------------------------------------------------------------------------      Objective       \[facility]\"" Meds:  atorvastatin, 20 mg, Oral, Nightly  buprenorphine-naloxone, 3 tablet, Sublingual, Daily  buPROPion XL, 450 mg, Oral,  Daily  cetirizine, 10 mg, Oral, Daily  chlorhexidine, 15 mL, Mouth/Throat, Q12H  enoxaparin, 40 mg, Subcutaneous, Q24H  FLUoxetine, 20 mg, Oral, Daily  FLUoxetine, 40 mg, Oral, Daily  furosemide, 20 mg, Oral, Daily  gabapentin, 800 mg, Oral, Q8H  insulin glargine, 10 Units, Subcutaneous, Daily  insulin lispro, 2-9 Units, Subcutaneous, 4x Daily AC & at Bedtime  levothyroxine, 200 mcg, Oral, Daily  metroNIDAZOLE, 500 mg, Oral, Q8H  montelukast, 10 mg, Oral, Nightly  mupirocin, 1 Application, Topical, Q12H  nicotine, 1 patch, Transdermal, Q24H  OLANZapine zydis, 10 mg, Oral, Daily  pantoprazole, 40 mg, Oral, Q AM  rOPINIRole, 2 mg, Oral, Nightly  senna-docusate sodium, 2 tablet, Oral, BID  sodium chloride, 10 mL, Intravenous, Q12H  sodium chloride, 10 mL, Intravenous, Q12H  sodium chloride, 10 mL, Intravenous, Q12H  topiramate, 25 mg, Oral, Daily  vancomycin, 1,750 mg, Intravenous, Q12H      dexmedetomidine, 0.2-1.5 mcg/kg/hr, Last Rate: Stopped (04/23/24 0941)  dextrose 5 % and sodium chloride 0.45 %, 150 mL/hr  dextrose 5 % and sodium chloride 0.9 %, 150 mL/hr  sodium chloride, 250 mL/hr  sodium chloride, 250 mL/hr      ----------------------------------------------------------------------------------------------------------------------    Vital Signs:  Temp:  [97.9 °F (36.6 °C)-98.6 °F (37 °C)] 98.6 °F (37 °C)  Heart Rate:  [] 81  Resp:  [14-18] 16  BP: (103-137)/(58-87) 125/81  Mean Arterial Pressure (Non-Invasive) for the past 24 hrs (Last 3 readings):   Noninvasive MAP (mmHg)   04/26/24 1300 105   04/26/24 1204 99   04/26/24 0900 102     SpO2 Percentage    04/26/24 1200 04/26/24 1204 04/26/24 1300   SpO2: 94% 95% 95%     SpO2:  [94 %-99 %] 95 %  on   ;   Device (Oxygen Therapy): room air    Body mass index is 23.58 kg/m².  Wt Readings from Last 3 Encounters:   04/25/24 68.3 kg (150 lb 9.6 oz)   02/21/24 66.1 kg (145 lb 12.8 oz)   08/13/23 73.5 kg (162 lb)        Intake/Output Summary (Last 24 hours) at  4/26/2024 1341  Last data filed at 4/26/2024 1200  Gross per 24 hour   Intake 2190 ml   Output 2600 ml   Net -410 ml     Diet: Regular/House, Diabetic; Consistent Carbohydrate; No Straw; Texture: Soft to Chew (NDD 3); Soft to Chew: Whole Meat; Fluid Consistency: Nectar Thick  ----------------------------------------------------------------------------------------------------------------------      Physical Exam:    Constitutional:  Well-developed and well-nourished.  No respiratory distress.  On room air.   Sitting up on side of bed this morning.  HENT:  Head: Normocephalic and atraumatic.  Mouth:  Moist mucous membranes.    Eyes:  Conjunctivae and EOM are normal.  No scleral icterus.  Neck:  Neck supple.  No JVD present.    Cardiovascular:  Normal rate, regular rhythm and normal heart sounds with 3/6 systolic ejection murmur. No edema.  Pulmonary/Chest:  No respiratory distress, no wheezes, no crackles, with normal breath sounds and good air movement.  Abdominal:  Soft.  Bowel sounds are normal.  No distension and no tenderness.  Left chest wall CBC without signs of infection.  Morton catheter in place.  Musculoskeletal:  No edema, no tenderness, and no deformity.  No swelling or redness of joints.  Neurological:  Alert and oriented to person, place, and time.  No facial droop.  No slurred speech.   Skin:  Skin is warm and dry.  No rash noted.  No pallor.   Psychiatric:  Normal mood and affect.  Behavior is normal.        ----------------------------------------------------------------------------------------------------------------------          Results from last 7 days   Lab Units 04/23/24  0029   TRIGLYCERIDES mg/dL 44           Results from last 7 days   Lab Units 04/26/24  0052 04/25/24  0100 04/24/24  0132 04/20/24  0025 04/19/24  1401   LACTATE mmol/L  --   --   --   --  1.4   WBC 10*3/mm3 8.61 8.71 12.23*   < >  --    HEMOGLOBIN g/dL 10.3* 9.6* 9.9*   < >  --    HEMATOCRIT % 32.7* 31.2* 30.1*   < >  --   "  MCV fL 91.9 92.6 91.5   < >  --    MCHC g/dL 31.5 30.8* 32.9   < >  --    PLATELETS 10*3/mm3 457* 422 403   < >  --     < > = values in this interval not displayed.     Results from last 7 days   Lab Units 04/26/24  0052 04/25/24  0100 04/24/24  0132 04/23/24  0029 04/22/24  0722 04/22/24  0407 04/20/24  0329 04/20/24  0026   SODIUM mmol/L 144 141 143   < > 139 140   < > 141  141   POTASSIUM mmol/L 3.3* 3.9 3.8   < > 4.2 4.3   < > 5.0  5.0   MAGNESIUM mg/dL 1.7  --   --   --  1.8 1.8   < > 2.1   CHLORIDE mmol/L 107 109* 110*   < > 109* 111*   < > 114*  114*   CO2 mmol/L 28.7 26.1 24.8   < > 22.0 21.8*   < > 19.1*  19.1*   BUN mg/dL 7 9 8   < > 2* 2*   < > 15  15   CREATININE mg/dL 0.57 0.51* 0.54*   < > 0.59 0.60   < > 0.72  0.72   CALCIUM mg/dL 8.7 8.1* 8.3*   < > 7.8* 7.7*   < > 8.3*  8.3*   GLUCOSE mg/dL 55* 211* 128*   < > 159* 157*   < > 134*  134*   ALBUMIN g/dL  --  2.5*  --   --   --   --   --  3.0*   BILIRUBIN mg/dL  --  0.2  --   --   --   --   --  <0.2   ALK PHOS U/L  --  114  --   --   --   --   --  112   AST (SGOT) U/L  --  19  --   --   --   --   --  26   ALT (SGPT) U/L  --  16  --   --   --   --   --  22    < > = values in this interval not displayed.   Estimated Creatinine Clearance: 147.1 mL/min (by C-G formula based on SCr of 0.57 mg/dL).  No results found for: \"AMMONIA\"    Glucose   Date/Time Value Ref Range Status   04/26/2024 1204 362 (H) 70 - 130 mg/dL Final   04/26/2024 0704 145 (H) 70 - 130 mg/dL Final   04/26/2024 0621 65 (L) 70 - 130 mg/dL Final   04/25/2024 2258 81 70 - 130 mg/dL Final   04/25/2024 2107 76 70 - 130 mg/dL Final   04/25/2024 1604 74 70 - 130 mg/dL Final   04/25/2024 1153 333 (H) 70 - 130 mg/dL Final   04/25/2024 0839 283 (H) 70 - 130 mg/dL Final     Lab Results   Component Value Date    HGBA1C 9.00 (H) 04/19/2024     Lab Results   Component Value Date    TSH 0.732 08/12/2023    FREET4 1.80 (H) 06/20/2021       Blood Culture   Date Value Ref Range Status " "  04/24/2024 No growth at 24 hours  Preliminary   04/24/2024 No growth at 24 hours  Preliminary   04/22/2024 Staphylococcus aureus, MRSA (C)  Preliminary     Comment:       Infectious disease consultation is highly recommended to rule out distant foci of infection.  Methicillin resistant Staphylococcus aureus, Patient may be an isolation risk.   04/22/2024 Staphylococcus aureus, MRSA (C)  Preliminary     Comment:       Infectious disease consultation is highly recommended to rule out distant foci of infection.  Methicillin resistant Staphylococcus aureus, Patient may be an isolation risk.   04/19/2024 Staphylococcus epidermidis (C)  Final   04/19/2024 Staphylococcus epidermidis (C)  Final     Urine Culture   Date Value Ref Range Status   04/24/2024 No growth  Preliminary     No results found for: \"WOUNDCX\"  No results found for: \"STOOLCX\"  No results found for: \"RESPCX\"  Pain Management Panel  More data exists         Latest Ref Rng & Units 4/19/2024 2/21/2024   Pain Management Panel   Amphetamine, Urine Qual Negative Positive  Negative    Barbiturates Screen, Urine Negative Negative  Negative    Benzodiazepine Screen, Urine Negative Positive  Negative    Buprenorphine, Screen, Urine Negative Positive  Positive    Cocaine Screen, Urine Negative Negative  Negative    Fentanyl, Urine Negative Negative  Negative    Methadone Screen , Urine Negative Negative  Negative    Methamphetamine, Ur Negative Positive  Negative          ----------------------------------------------------------------------------------------------------------------------  Imaging Results (Last 24 Hours)       Procedure Component Value Units Date/Time    FL Video Swallow Single Contrast [[id number]] Collected: 04/26/24 1302     Updated: 04/26/24 1305    Narrative:      EXAMINATION: FL VIDEO SWALLOW SINGLE-CONTRAST-      CLINICAL INDICATION:     Aspiration r/o; F19.929-Other psychoactive  substance use, unspecified with intoxication, unspecified; " E10.10-Type 1  diabetes mellitus with ketoacidosis without coma; J96.00-Acute  respiratory failure, unspecified whether with hypoxia or hypercapnia;  A41.9-Sepsis, unspecified organism     TECHNIQUE: Modified barium swallow was performed utilizing video  fluoroscopy in conjunction with the Speech Pathology team. The patient  ingested multiple barium media including thin barium, nectar, and honey  liquid as well as barium pudding and a barium pudding coated cracker.      FLUOROSCOPY TIME: 1.5 minutes     COMPARISON: NONE      FINDINGS:   Gross aspiration of nectar thick and thin liquids during the swallow.       Impression:      1. Gross aspiration of nectar thick and thin liquids.  2. Please see dysphasia notes for further details.        This report was finalized on 4/26/2024 1:03 PM by Dr. Madhu Crockett MD.               ----------------------------------------------------------------------------------------------------------------------    Pertinent Infectious Disease Results                Assessment/Plan       Assessment       MRSA bacteremia       Plan      Patient sitting up on side of bed this morning.  Just finished taking a bath this morning.  Currently on room air with no apparent distress.  Lungs clear to auscultation bilaterally.  Abdomen soft, nontender.  Afebrile, denies diarrhea.  Urine culture from 4/24/2024 shows no growth.  WBC normal at 8.61.  SLP evaluation from 4/26/2024 reports gross aspiration of nectar thick and thin liquids.    Trichomonas vaginalis diagnosed via PCR on 4/24/2024.     Blood cultures from 4/24/2024 show no growth thus far.     Metronidazole 500 mg p.o. 3 times daily was initiated in the setting of trichomonas to continue with vancomycin pharmacy to dose for MRSA and Staphylococcus epidermis bacteremia.  Although bacteremia cleared quickly in the setting of known IV drug use and murmur recommend KIMMIE when feasible.  Will continue to follow closely and adjust antibiotic  therapy as needed.      ANTIMICROBIAL THERAPY    metroNIDAZOLE - 250 MG  vancomycin     Code Status:   Code Status and Medical Interventions:   Ordered at: 04/19/24 1230     Code Status (Patient has no pulse and is not breathing):    CPR (Attempt to Resuscitate)     Medical Interventions (Patient has pulse or is breathing):    Full Support       GAURAV Baumann  04/26/24  13:41 EDT

## 2024-04-26 NOTE — PROGRESS NOTES
Pharmacokinetics Service Note:    Jes Moreno is a 38 y.o. female being managed by Pharmacy for vancomycin dosing.    Indication for Therapy: MRSA/MRSE bacteremia  Current Regimen: 1.75 gm q12hrs  Current Day of Therapy and Ordered Duration: day 3 of 5  Level Reported and Timing with Respect to Previous Dose: 12.5 mcg/mL collected 9.25 hrs post infusion  Calculated Steady State AUC: 545 mg/L*hr  PAUC: 99%    Assessment/Plan: The calculated steady state AUC is therapeutic for this indication and consistent with adequate clearance. Will continue the present regimen for now.    Monitoring Planned: Repeat a level in 5-7 days if the treatment course gets extended.    Thank you.  Radha Vela, Pharm.D.  4/26/2024  15:31 EDT

## 2024-04-26 NOTE — PROGRESS NOTES
THC Physician - Brief Progress Note  PERMANENT  04/26/2024 03:19    [facility] - CCU - 10 - [place], [place] ([facility])    INEZ ADAMS    Date of Service 04/26/2024 03:19    HPI/Events of Note [facility] Provider Intervention Note    Comments:  Rn reporting K 3.3. Cr 0.57, CVC access. RN requesting replacement orders. Thanks    reviewed, orders placed.    _____   Video Assessment performed            Contact [facility] for any needs if bedside physician is not present.      Interventions Intermediate-Electrolyte abnormality - evaluation and management        Electronically Signed by: Isidro Dunn) on 04/26/2024 03:21

## 2024-04-26 NOTE — MBS/VFSS/FEES
Acute Care - Speech Language Pathology   Swallow Re-Evaluation  [place]  Modified Barium Swallow Study     Patient Name: Jes Moreno  : 1985  MRN: [id number]  Today's Date: 2024     Admit Date: 2024    Jes Moreno  presented to the radiology suite this am from CCU-8 to participate in an instrumental MBS to objectively evaluate safety/efficacy of swallowing fnx, determine safest/least restrictive diet.    She is s/p MBSS on 24 during which she evidenced w/ a primarily oral dysphagia w/ prolonged bolus manipulation, delayed formation, and premature loss w/ nectar and thin liquids. Aspiration was evidenced during the swallow w/ nectar and thin liquids. She was initiated on a po diet of puree textures and HONEY THICK liquids w/ meds crushed in puree and no straw presentations. She has tolerated this modified po diet since that time. She has additionally begun formal dysphagia tx and reports independent performance of therapy tasks.     Ms Moreno was noted w/ a mild to moderate improvement in vocal quality and intensity during therapy tasks yesterday and demonstrated overt s/s aspiration w/ thin liquids in 50% of presentations.     She is now requesting diet advancement of both solid and liquid consistencies and is recommended for re-evaluation via MBSS to r/o aspiration.       Social History     Socioeconomic History    Marital status: Single   Tobacco Use    Smoking status: Every Day     Current packs/day: 1.00     Average packs/day: 1 pack/day for 3.0 years (3.0 ttl pk-yrs)     Types: Cigarettes    Smokeless tobacco: Never    Tobacco comments:     unable to assess    Vaping Use    Vaping status: Some Days    Substances: Nicotine   Substance and Sexual Activity    Alcohol use: No     Comment: unable to assess     Drug use: No     Comment: suboxone    Sexual activity: Yes     Partners: Male   Imaging:  No recent chest imaging is available for review.   Labs:   Latest Reference Range & Units  04/23/24 03:14 04/24/24 01:32 04/25/24 01:00 04/26/24 00:52   WBC 3.40 - 10.80 10*3/mm3 11.49 (H) 12.23 (H) 8.71 8.61   RBC 3.77 - 5.28 10*6/mm3 3.79 3.29 (L) 3.37 (L) 3.56 (L)   Hemoglobin 12.0 - 15.9 g/dL 10.9 (L) 9.9 (L) 9.6 (L) 10.3 (L)   Hematocrit 34.0 - 46.6 % 34.8 30.1 (L) 31.2 (L) 32.7 (L)   Platelets 140 - 450 10*3/mm3 372 403 422 457 (H)   RDW 12.3 - 15.4 % 13.5 13.6 13.7 13.2   MCV 79.0 - 97.0 fL 91.8 91.5 92.6 91.9   MCH 26.6 - 33.0 pg 28.8 30.1 28.5 28.9   MCHC 31.5 - 35.7 g/dL 31.3 (L) 32.9 30.8 (L) 31.5   MPV 6.0 - 12.0 fL 9.0 8.7 8.5 8.4   RDW-SD 37.0 - 54.0 fl 46.1 45.6 46.2 45.1   (H): Data is abnormally high  (L): Data is abnormally low  Diet Orders (active) (From admission, onward)       Start     Ordered    04/26/24 1122  Diet: Regular/House; No Straw; Texture: Soft to Chew (NDD 3); Soft to Chew: Whole Meat; Fluid Consistency: Nectar Thick  Diet Effective Now        Comments: Chin tuck w/ all liquids.    04/26/24 1121    04/25/24 1200  DIET MESSAGE No straws on trays please.  Daily With Breakfast, Lunch & Dinner      Comments: No straws on trays please.    04/25/24 1040    04/24/24 1139  DIET MESSAGE Pt has been NPO. Please send a lunch tray. No straws on tray please.  Once        Comments: Pt has been NPO. Please send a lunch tray.  No straws on tray please.    04/24/24 1139    04/19/24 1127  Patient is on Glucommander  Continuous         04/19/24 1126                  Ms Moreno reports eagerness for diet advancement to be allowed to have hamburgers, [employer], or meatball subs. She is noted w/ a significant improvement in overall vocal intensity and quality from initial MBSS on 4/24.     She continues on room air w/o complications across this evaluation.    Risks and benefits of the procedure were explained w/ patient verbalizing understanding/agreement to participate. Proceed per protocol.     She presented via wheelchair and is able to transfer to stationary chair w/ minimal assistance from staff.  She was positioned upright and centered in a chair to accept multiple po presentations of solid cracker, puree, honey thick, nectar thick, and thin liquids via spoon, cup and straw, along w/ whole placebo pill in puree. She was able to self provide all po trials.      All views are from the lateral plane.     Facial/oral structures were symmetrical upon observation w/o lingual deviation upon protrusion. Oral mucosa were moist, pink and clean. Secretions were clear, thin, and well controlled. OROM/MANDO was wfl to imitate oral postures. Gag was not assessed. Volitional cough was adequate in intensity, clear in quality, nonproductive. Vocal quality was mildly weak in intensity, raspy in quality w/ intelligible speech. She was a/a and cooperative to participate, oriented to person, place, and time, follows simple directives, and participates in simple conversational exchanges.     Upon po presentations, adequate bolus anticipation w/ good labial seal for bolus clearance via spoon bowl, cup rim stability and suction via straw.  Bolus formation and manipulation were mildly prolonged w/ solid consistency presentation only. Control was wfl w/ mixed phasic-rotary mastication pattern. A-p transit was noted w/ a slightly delay w/ all consistencies and presentation styles. Trace oral residue was evidenced w/ honey thick liquids only and was cleared w/ a spontaneous second swallow. Tongue base retraction was slightly delayed w/ premature loss the bilateral pyriforms w/ thin liquids. Linguavelar seal was adequate. No aspiration was evidenced before the swallow.     Pharyngeal swallow was slightly delayed w/ w/ adequate hyolaryngeal elevation and epiglottic inversion. Mild penetration occurs during the swallow w/ nectar liquids via cup w/ small bolus amounts self-provided. Penetration w/ aspiration occurs during the swallow w/ nectar liquids via straw w/ small bolus size and spontaneous throat clear elicited which clears. Gross  aspiration occurs during the swallow w/nectar liquids via cup w/ typical bolus amount provided w/ significant spontaneous cough elicited. Aspiration occurs during the swallow w/ thin liquids via spoon w/ throat clear immediately elicited. Gross aspiration occurs w/ thin liquids via cup w/ significant cough elicited. Pharyngeal contraction was adequate w/o significant residue. No laryngeal penetration or aspiration evidenced during or after the swallow.     Compensatory strategy of chin tuck with nectar liquids via cup was sufficient to extinguish aspiration of this consistency and presentation style. Ms Moreno was able to independently demonstrate this repeatedly.                Penetration-Aspiration Scale Scoring  Consistency: Teaspoon Bolus Cup Bolus Straw Bolus   Puree 1 --------- --------   Honey 1 defer --------   Nectar 1, 1 2(sm), 2(sm), 7(typ),  1(CT), 2(CT), 2(CT) 6(sm),    Thin 6(sm),  7(sm) defer   Solid 1     - small bolus amount (sm), typical bolus amount (typ), chin tuck (CT)        *Reference*      Full esophageal sweep reveals no mucosal abnormalities. Motility is wfl w/o retrograde flow noted.     Visit Dx:     ICD-10-CM ICD-9-CM   1. Psychoactive substance-induced intoxication  F19.929 292.89     969.9   2. Diabetic ketoacidosis without coma associated with type 1 diabetes mellitus  E10.10 250.13   3. Acute respiratory failure, unspecified whether with hypoxia or hypercapnia  J96.00 518.81   4. Sepsis, due to unspecified organism, unspecified whether acute organ dysfunction present  A41.9 038.9     995.91     Patient Active Problem List   Diagnosis    Aspirin toxicity    Hypothyroidism    Type I diabetes mellitus    Chronic hepatitis C virus infection    Diabetic peripheral neuropathy    Gastroesophageal reflux disease without esophagitis    Celiac disease    Asthma    DKA (diabetic ketoacidosis)    Acute encephalopathy     Past Medical History:   Diagnosis Date    Asthma     Celiac disease      Diabetes mellitus type 1     Diabetic ketoacidosis     Disease of thyroid gland     Hepatitis C     Infectious viral hepatitis     hepititis C    Neuropathy     Reflux gastritis      Past Surgical History:   Procedure Laterality Date     SECTION      X 2     Impression:     Ms Moreno presented w/ a significant improvement in oral swallow function and a continued pharyngeal dysphagia w/ a slight delay in pharyngeal swallow initiation resulting in loss from the valleculae during epiglottic inversion and gross aspiration of thin liquids and nectar thick liquids via straw or with a typical bolus amount. Small bolus amounts of nectar thick liquids via cup and straw were evidenced w/ either penetration during or aspiration though not as significant as with a typical bolus amount. Immediate and significant cough response is elicited following all gross aspiration events and throat clear is elicited for smaller aspiration events. Compensatory strategy of chin tuck w/ nectar liquids via cup is sufficient to extinguish aspiration. She is able to independently demonstrate this strategy across multiple trials.     Per this re-evaluation, Ms Moreno is felt to most benefit from diet advancement to soft to chew textures, whole meats, and NECTAR THICK LIQUIDS w/ use of compensatory CHIN TUCK and to continue no straw presentations.     Signs for visual reminders of liquid consistency and compensatory strategy are placed in room in pt room and on door for pt and staff reminders.     SLP Recommendation and Plan     1. Soft to chew textures, whole meats, NECTAR THICK LIQUIDS  2. Medications whole in puree/NECTARS.   3. No straws.   4. CHIN TUCK WITH ALL LIQUIDS  5. TOÑO precautions.   6. Oral care protocol.     SLP to f/u for continued dysphagia tx.     D/w patient results and recommendations w/ verbal understanding and agreement.     D/w RN results and recommendations w/ verbal understanding and agreement.     Thank you for  allowing me to participate in the care of your patient-  Lyric Bradford M.S., CCC-SLP        EDUCATION  The patient has been educated in the following areas:   Dysphagia (Swallowing Impairment) Oral Care/Hydration Modified Diet Instruction.        Time Calculation:       Therapy Charges for Today       Code Description Service Date Service Provider Modifiers Qty    [id number] HC ST TREATMENT SWALLOW 2 4/25/2024 Lyric Bradford, MS CCC-SLP GN 1    [id number]  ST EVAL ORAL PHARYNG SWALLOW 4 4/25/2024 Lyric Bradford, MS CCC-SLP GN 1                 Lyric Bradford MS CCC-SLP  4/26/2024

## 2024-04-26 NOTE — PLAN OF CARE
Problem: Skin Injury Risk Increased  Goal: Skin Health and Integrity  Outcome: Ongoing, Progressing  Intervention: Optimize Skin Protection  Recent Flowsheet Documentation  Taken 4/26/2024 0400 by Alvarado Dickerson RN  Head of Bed (HOB) Positioning: HOB elevated  Taken 4/26/2024 0200 by Alvarado Dickerson RN  Head of Bed (HOB) Positioning: HOB elevated  Taken 4/26/2024 0000 by Alvarado Dickerson RN  Head of Bed (HOB) Positioning: HOB elevated  Taken 4/25/2024 2200 by Alvarado Dickerson RN  Head of Bed (HOB) Positioning: HOB elevated  Taken 4/25/2024 2000 by Alvarado Dickerson RN  Head of Bed (HOB) Positioning: HOB elevated     Problem: Adult Inpatient Plan of Care  Goal: Plan of Care Review  Outcome: Ongoing, Progressing  Flowsheets (Taken 4/26/2024 0554)  Progress: improving  Plan of Care Reviewed With: patient  Outcome Evaluation: Pt AOx4 NSR on monitor. Up to bedside toilet this shift. VSS. WCTM.  Goal: Patient-Specific Goal (Individualized)  Outcome: Ongoing, Progressing  Goal: Absence of Hospital-Acquired Illness or Injury  Outcome: Ongoing, Progressing  Intervention: Identify and Manage Fall Risk  Recent Flowsheet Documentation  Taken 4/26/2024 0400 by Alvarado Dickerson RN  Safety Promotion/Fall Prevention:   safety round/check completed   fall prevention program maintained  Taken 4/26/2024 0300 by Alvarado Dickerson RN  Safety Promotion/Fall Prevention:   safety round/check completed   fall prevention program maintained  Taken 4/26/2024 0200 by Alvarado Dickerson RN  Safety Promotion/Fall Prevention:   safety round/check completed   fall prevention program maintained  Taken 4/26/2024 0130 by Alvarado Dickerson RN  Safety Promotion/Fall Prevention:   safety round/check completed   fall prevention program maintained  Taken 4/26/2024 0000 by Alvarado Dickerson RN  Safety Promotion/Fall Prevention:   safety round/check completed   fall prevention program maintained  Taken  4/25/2024 2300 by Alvarado Dickerson RN  Safety Promotion/Fall Prevention:   safety round/check completed   fall prevention program maintained  Taken 4/25/2024 2200 by Alvarado Dickerson RN  Safety Promotion/Fall Prevention:   safety round/check completed   fall prevention program maintained  Taken 4/25/2024 2100 by Alvarado Dickerson RN  Safety Promotion/Fall Prevention:   safety round/check completed   fall prevention program maintained  Taken 4/25/2024 2000 by Alvarado Dickerson RN  Safety Promotion/Fall Prevention:   safety round/check completed   fall prevention program maintained  Taken 4/25/2024 1900 by Alvarado Dickerson RN  Safety Promotion/Fall Prevention:   safety round/check completed   fall prevention program maintained  Intervention: Prevent Skin Injury  Recent Flowsheet Documentation  Taken 4/26/2024 0400 by Alvarado Dickerson RN  Body Position: position changed independently  Taken 4/26/2024 0200 by Alvarado Dickerson RN  Body Position: position changed independently  Taken 4/26/2024 0000 by Alvarado Dickerson RN  Body Position: position changed independently  Taken 4/25/2024 2200 by Alvarado Dickerson RN  Body Position: position changed independently  Taken 4/25/2024 2000 by Alvarado Dickerson RN  Body Position: position changed independently  Intervention: Prevent and Manage VTE (Venous Thromboembolism) Risk  Recent Flowsheet Documentation  Taken 4/25/2024 2000 by Alvarado Dickerson RN  Activity Management: (up to bedside toilet) ambulated in room  VTE Prevention/Management: (see mar) other (see comments)  Range of Motion: ROM (range of motion) performed  Goal: Optimal Comfort and Wellbeing  Outcome: Ongoing, Progressing  Intervention: Provide Person-Centered Care  Recent Flowsheet Documentation  Taken 4/25/2024 2000 by Alvarado Dickerson RN  Trust Relationship/Rapport:   care explained   choices provided   emotional support provided  Goal: Readiness for  Transition of Care  Outcome: Ongoing, Progressing     Problem: Fall Injury Risk  Goal: Absence of Fall and Fall-Related Injury  Outcome: Ongoing, Progressing  Intervention: Identify and Manage Contributors  Recent Flowsheet Documentation  Taken 4/26/2024 0400 by Alvarado Dickerson, RN  Medication Review/Management: medications reviewed  Taken 4/26/2024 0300 by Alvarado Dickerson, RN  Medication Review/Management: medications reviewed  Taken 4/26/2024 0200 by Alvarado Dickerson, RN  Medication Review/Management: medications reviewed  Taken 4/26/2024 0130 by Alvarado Dickerson, RN  Medication Review/Management: medications reviewed  Taken 4/26/2024 0000 by Alvarado Dickerson, RN  Medication Review/Management: medications reviewed  Taken 4/25/2024 2300 by Alvarado Dickerson, RN  Medication Review/Management: medications reviewed  Taken 4/25/2024 2200 by Alvarado Dickerson, RN  Medication Review/Management: medications reviewed  Taken 4/25/2024 2100 by Alvarado Dickerson, RN  Medication Review/Management: medications reviewed  Taken 4/25/2024 2000 by Alvarado Dickerson, RN  Medication Review/Management: medications reviewed  Taken 4/25/2024 1900 by Alvarado Dickerson, RN  Medication Review/Management: medications reviewed  Intervention: Promote Injury-Free Environment  Recent Flowsheet Documentation  Taken 4/26/2024 0400 by Alvarado Dickreson, RN  Safety Promotion/Fall Prevention:   safety round/check completed   fall prevention program maintained  Taken 4/26/2024 0300 by Alvarado Dickerson, RN  Safety Promotion/Fall Prevention:   safety round/check completed   fall prevention program maintained  Taken 4/26/2024 0200 by Alvarado Dickerson, RN  Safety Promotion/Fall Prevention:   safety round/check completed   fall prevention program maintained  Taken 4/26/2024 0130 by Alvarado Dickerson, RN  Safety Promotion/Fall Prevention:   safety round/check completed   fall prevention program  maintained  Taken 4/26/2024 0000 by Alvarado Dickerson, RN  Safety Promotion/Fall Prevention:   safety round/check completed   fall prevention program maintained  Taken 4/25/2024 2300 by Alvarado Dickerson, RN  Safety Promotion/Fall Prevention:   safety round/check completed   fall prevention program maintained  Taken 4/25/2024 2200 by Alvarado Dickerson, RN  Safety Promotion/Fall Prevention:   safety round/check completed   fall prevention program maintained  Taken 4/25/2024 2100 by Alvarado Dickerson, RN  Safety Promotion/Fall Prevention:   safety round/check completed   fall prevention program maintained  Taken 4/25/2024 2000 by Alvarado Dickerson, RN  Safety Promotion/Fall Prevention:   safety round/check completed   fall prevention program maintained  Taken 4/25/2024 1900 by Alvarado Dickerson, RN  Safety Promotion/Fall Prevention:   safety round/check completed   fall prevention program maintained     Problem: Adjustment to Illness (Sepsis/Septic Shock)  Goal: Optimal Coping  Outcome: Ongoing, Progressing  Intervention: Optimize Psychosocial Adjustment to Illness  Recent Flowsheet Documentation  Taken 4/25/2024 2000 by Alvarado Dickerson, RN  Family/Support System Care: support provided     Problem: Bleeding (Sepsis/Septic Shock)  Goal: Absence of Bleeding  Outcome: Ongoing, Progressing     Problem: Glycemic Control Impaired (Sepsis/Septic Shock)  Goal: Blood Glucose Level Within Desired Range  Outcome: Ongoing, Progressing     Problem: Infection Progression (Sepsis/Septic Shock)  Goal: Absence of Infection Signs and Symptoms  Outcome: Ongoing, Progressing  Intervention: Promote Recovery  Recent Flowsheet Documentation  Taken 4/25/2024 2000 by Alvarado Dickerson, RN  Activity Management: (up to bedside toilet) ambulated in room     Problem: Nutrition Impaired (Sepsis/Septic Shock)  Goal: Optimal Nutrition Intake  Outcome: Ongoing, Progressing     Problem: Communication Impairment (Mechanical  Ventilation, Invasive)  Goal: Effective Communication  Outcome: Ongoing, Progressing     Problem: Device-Related Complication Risk (Mechanical Ventilation, Invasive)  Goal: Optimal Device Function  Outcome: Ongoing, Progressing     Problem: Inability to Wean (Mechanical Ventilation, Invasive)  Goal: Mechanical Ventilation Liberation  Outcome: Ongoing, Progressing  Intervention: Promote Extubation and Mechanical Ventilation Liberation  Recent Flowsheet Documentation  Taken 4/26/2024 0400 by Alvarado Dickerson, RN  Medication Review/Management: medications reviewed  Taken 4/26/2024 0300 by Alvarado Dickerson, RN  Medication Review/Management: medications reviewed  Taken 4/26/2024 0200 by Alvarado Dickerson RN  Medication Review/Management: medications reviewed  Taken 4/26/2024 0130 by Alvarado Dickerson, RN  Medication Review/Management: medications reviewed  Taken 4/26/2024 0000 by Alvarado Dickerson RN  Medication Review/Management: medications reviewed  Taken 4/25/2024 2300 by Alvarado Dickerson, RN  Medication Review/Management: medications reviewed  Taken 4/25/2024 2200 by Alvarado Dickerson, RN  Medication Review/Management: medications reviewed  Taken 4/25/2024 2100 by Alvarado Dickerson RN  Medication Review/Management: medications reviewed  Taken 4/25/2024 2000 by Alvarado Dickerson, RN  Medication Review/Management: medications reviewed  Taken 4/25/2024 1900 by Alvarado Dickerson, RN  Medication Review/Management: medications reviewed     Problem: Nutrition Impairment (Mechanical Ventilation, Invasive)  Goal: Optimal Nutrition Delivery  Outcome: Ongoing, Progressing     Problem: Skin and Tissue Injury (Mechanical Ventilation, Invasive)  Goal: Absence of Device-Related Skin and Tissue Injury  Outcome: Ongoing, Progressing     Problem: Ventilator-Induced Lung Injury (Mechanical Ventilation, Invasive)  Goal: Absence of Ventilator-Induced Lung Injury  Outcome: Ongoing,  Progressing  Intervention: Prevent Ventilator-Associated Pneumonia  Recent Flowsheet Documentation  Taken 4/26/2024 0400 by Alvarado Dickerson, RN  Head of Bed ([facility]) Positioning: HOB elevated  Oral Care: patient refused intervention  Taken 4/26/2024 0200 by Alvarado Dickerson, RN  Head of Bed ([facility]) Positioning: HOB elevated  Taken 4/26/2024 0000 by Alvarado Dickerson, RN  Head of Bed ([facility]) Positioning: HOB elevated  Oral Care: oral rinse provided  Taken 4/25/2024 2200 by Alvarado Dickerson, RN  Head of Bed ([facility]) Positioning: HOB elevated  Taken 4/25/2024 2000 by Alvarado Dickerson, RN  Head of Bed ([facility]) Positioning: HOB elevated   Goal Outcome Evaluation:  Plan of Care Reviewed With: patient        Progress: improving  Outcome Evaluation: Pt AOx4 NSR on monitor. Up to bedside toilet this shift. VSS. WCTM.

## 2024-04-27 LAB
ANION GAP SERPL CALCULATED.3IONS-SCNC: 10.2 MMOL/L (ref 5–15)
BUN SERPL-MCNC: 11 MG/DL (ref 6–20)
BUN/CREAT SERPL: 15.1 (ref 7–25)
CALCIUM SPEC-SCNC: 9 MG/DL (ref 8.6–10.5)
CHLORIDE SERPL-SCNC: 106 MMOL/L (ref 98–107)
CO2 SERPL-SCNC: 26.8 MMOL/L (ref 22–29)
CREAT SERPL-MCNC: 0.73 MG/DL (ref 0.57–1)
DEPRECATED RDW RBC AUTO: 43.5 FL (ref 37–54)
EGFRCR SERPLBLD CKD-EPI 2021: 109.5 ML/MIN/1.73
ERYTHROCYTE [DISTWIDTH] IN BLOOD BY AUTOMATED COUNT: 13.1 % (ref 12.3–15.4)
GLUCOSE BLDC GLUCOMTR-MCNC: 104 MG/DL (ref 70–130)
GLUCOSE BLDC GLUCOMTR-MCNC: 117 MG/DL (ref 70–130)
GLUCOSE BLDC GLUCOMTR-MCNC: 213 MG/DL (ref 70–130)
GLUCOSE BLDC GLUCOMTR-MCNC: 285 MG/DL (ref 70–130)
GLUCOSE BLDC GLUCOMTR-MCNC: 309 MG/DL (ref 70–130)
GLUCOSE BLDC GLUCOMTR-MCNC: 365 MG/DL (ref 70–130)
GLUCOSE BLDC GLUCOMTR-MCNC: 72 MG/DL (ref 70–130)
GLUCOSE BLDC GLUCOMTR-MCNC: 78 MG/DL (ref 70–130)
GLUCOSE SERPL-MCNC: 60 MG/DL (ref 65–99)
HCT VFR BLD AUTO: 35.4 % (ref 34–46.6)
HGB BLD-MCNC: 11.3 G/DL (ref 12–15.9)
MCH RBC QN AUTO: 29.2 PG (ref 26.6–33)
MCHC RBC AUTO-ENTMCNC: 31.9 G/DL (ref 31.5–35.7)
MCV RBC AUTO: 91.5 FL (ref 79–97)
PLATELET # BLD AUTO: 565 10*3/MM3 (ref 140–450)
PMV BLD AUTO: 8.5 FL (ref 6–12)
POTASSIUM SERPL-SCNC: 4 MMOL/L (ref 3.5–5.2)
RBC # BLD AUTO: 3.87 10*6/MM3 (ref 3.77–5.28)
SODIUM SERPL-SCNC: 143 MMOL/L (ref 136–145)
WBC NRBC COR # BLD AUTO: 11.42 10*3/MM3 (ref 3.4–10.8)

## 2024-04-27 PROCEDURE — 82948 REAGENT STRIP/BLOOD GLUCOSE: CPT

## 2024-04-27 PROCEDURE — 99232 SBSQ HOSP IP/OBS MODERATE 35: CPT | Performed by: STUDENT IN AN ORGANIZED HEALTH CARE EDUCATION/TRAINING PROGRAM

## 2024-04-27 PROCEDURE — 80048 BASIC METABOLIC PNL TOTAL CA: CPT | Performed by: STUDENT IN AN ORGANIZED HEALTH CARE EDUCATION/TRAINING PROGRAM

## 2024-04-27 PROCEDURE — 99233 SBSQ HOSP IP/OBS HIGH 50: CPT | Performed by: NURSE PRACTITIONER

## 2024-04-27 PROCEDURE — 63710000001 INSULIN LISPRO (HUMAN) PER 5 UNITS: Performed by: STUDENT IN AN ORGANIZED HEALTH CARE EDUCATION/TRAINING PROGRAM

## 2024-04-27 PROCEDURE — 63710000001 INSULIN GLARGINE PER 5 UNITS: Performed by: STUDENT IN AN ORGANIZED HEALTH CARE EDUCATION/TRAINING PROGRAM

## 2024-04-27 PROCEDURE — 94761 N-INVAS EAR/PLS OXIMETRY MLT: CPT

## 2024-04-27 PROCEDURE — 25810000003 SODIUM CHLORIDE 0.9 % SOLUTION

## 2024-04-27 PROCEDURE — 99232 SBSQ HOSP IP/OBS MODERATE 35: CPT | Performed by: INTERNAL MEDICINE

## 2024-04-27 PROCEDURE — 99253 IP/OBS CNSLTJ NEW/EST LOW 45: CPT | Performed by: NURSE PRACTITIONER

## 2024-04-27 PROCEDURE — 85027 COMPLETE CBC AUTOMATED: CPT | Performed by: STUDENT IN AN ORGANIZED HEALTH CARE EDUCATION/TRAINING PROGRAM

## 2024-04-27 PROCEDURE — 25010000002 VANCOMYCIN 5 G RECONSTITUTED SOLUTION

## 2024-04-27 PROCEDURE — 94799 UNLISTED PULMONARY SVC/PX: CPT

## 2024-04-27 RX ORDER — VANCOMYCIN/0.9 % SOD CHLORIDE 1.5G/250ML
1500 PLASTIC BAG, INJECTION (ML) INTRAVENOUS EVERY 12 HOURS
Status: DISCONTINUED | OUTPATIENT
Start: 2024-04-27 | End: 2024-04-30 | Stop reason: HOSPADM

## 2024-04-27 RX ADMIN — PANTOPRAZOLE SODIUM 40 MG: 40 TABLET, DELAYED RELEASE ORAL at 06:23

## 2024-04-27 RX ADMIN — ATORVASTATIN CALCIUM 20 MG: 20 TABLET, FILM COATED ORAL at 21:14

## 2024-04-27 RX ADMIN — OLANZAPINE 10 MG: 10 TABLET, ORALLY DISINTEGRATING ORAL at 09:19

## 2024-04-27 RX ADMIN — MUPIROCIN 1 APPLICATION: 20 OINTMENT TOPICAL at 21:13

## 2024-04-27 RX ADMIN — MONTELUKAST SODIUM 10 MG: 10 TABLET, COATED ORAL at 21:14

## 2024-04-27 RX ADMIN — METRONIDAZOLE 500 MG: 250 TABLET ORAL at 21:14

## 2024-04-27 RX ADMIN — ACETAMINOPHEN 650 MG: 325 TABLET ORAL at 21:14

## 2024-04-27 RX ADMIN — METRONIDAZOLE 500 MG: 250 TABLET ORAL at 06:52

## 2024-04-27 RX ADMIN — Medication 1 PATCH: at 09:19

## 2024-04-27 RX ADMIN — CETIRIZINE HYDROCHLORIDE 10 MG: 10 TABLET, FILM COATED ORAL at 09:19

## 2024-04-27 RX ADMIN — FLUOXETINE HYDROCHLORIDE 20 MG: 20 CAPSULE ORAL at 09:18

## 2024-04-27 RX ADMIN — VANCOMYCIN HYDROCHLORIDE 1500 MG: 5 INJECTION, POWDER, LYOPHILIZED, FOR SOLUTION INTRAVENOUS at 14:00

## 2024-04-27 RX ADMIN — BUPRENORPHINE HYDROCHLORIDE AND NALOXONE HYDROCHLORIDE DIHYDRATE 3 TABLET: 8; 2 TABLET SUBLINGUAL at 09:16

## 2024-04-27 RX ADMIN — GABAPENTIN 800 MG: 400 CAPSULE ORAL at 18:09

## 2024-04-27 RX ADMIN — INSULIN LISPRO 8 UNITS: 100 INJECTION, SOLUTION INTRAVENOUS; SUBCUTANEOUS at 11:57

## 2024-04-27 RX ADMIN — GABAPENTIN 800 MG: 400 CAPSULE ORAL at 06:23

## 2024-04-27 RX ADMIN — Medication 10 ML: at 21:15

## 2024-04-27 RX ADMIN — BUPROPION HYDROCHLORIDE 450 MG: 150 TABLET, EXTENDED RELEASE ORAL at 09:19

## 2024-04-27 RX ADMIN — INSULIN GLARGINE 10 UNITS: 100 INJECTION, SOLUTION SUBCUTANEOUS at 09:24

## 2024-04-27 RX ADMIN — METRONIDAZOLE 500 MG: 250 TABLET ORAL at 13:55

## 2024-04-27 RX ADMIN — LEVOTHYROXINE SODIUM 200 MCG: 100 TABLET ORAL at 09:19

## 2024-04-27 RX ADMIN — ROPINIROLE HYDROCHLORIDE 2 MG: 1 TABLET, FILM COATED ORAL at 21:14

## 2024-04-27 RX ADMIN — FUROSEMIDE 20 MG: 20 TABLET ORAL at 09:19

## 2024-04-27 RX ADMIN — Medication 10 ML: at 21:14

## 2024-04-27 RX ADMIN — GABAPENTIN 800 MG: 400 CAPSULE ORAL at 21:14

## 2024-04-27 RX ADMIN — INSULIN LISPRO 7 UNITS: 100 INJECTION, SOLUTION INTRAVENOUS; SUBCUTANEOUS at 21:13

## 2024-04-27 RX ADMIN — MUPIROCIN 1 APPLICATION: 20 OINTMENT TOPICAL at 09:19

## 2024-04-27 RX ADMIN — VANCOMYCIN HYDROCHLORIDE 1750 MG: 5 INJECTION, POWDER, LYOPHILIZED, FOR SOLUTION INTRAVENOUS at 04:11

## 2024-04-27 RX ADMIN — FLUOXETINE HYDROCHLORIDE 40 MG: 20 CAPSULE ORAL at 09:19

## 2024-04-27 NOTE — PROGRESS NOTES
Chief Complaint:  Pulmonary and critical care is following for ventilator management and critical illness management        Subjective-overnight events reviewed.  All the labs medications and the notes vitals reviewed.  Patient resting in bed comfortably.  Repeat blood cultures negative.  This was discussed and shared with the patient.  Review of Systems:    Positive for generalized fatigue otherwise negative    Vital Signs  Temp:  [98.2 °F (36.8 °C)-98.6 °F (37 °C)] 98.2 °F (36.8 °C)  Heart Rate:  [] 63  Resp:  [14-25] 16  BP: (110-155)/(68-97) 143/93  Body mass index is 22.51 kg/m².    Intake/Output Summary (Last 24 hours) at 4/27/2024 0854  Last data filed at 4/27/2024 0737  Gross per 24 hour   Intake 2201.57 ml   Output 0 ml   Net 2201.57 ml     I/O this shift:  In: 360 [P.O.:360]  Out: -     Physical Exam:-    General-not in any acute distress    HEENT-atraumatic neck-no visible JVD   Respiratory-not in any respiratory distress  Cardiovascular-  NSR     GI-grossly normal    CNS-nonfocal    Musculoskeletal -no edema  Extremities- no obvious deformity noticed     Psychiatric-mood good, good eye contact, Skin- no visible rash       Results Review:     I reviewed the patient's new clinical results.  Results from last 7 days   Lab Units 04/27/24  0047 04/26/24  0052 04/25/24  0100   WBC 10*3/mm3 11.42* 8.61 8.71   HEMOGLOBIN g/dL 11.3* 10.3* 9.6*   PLATELETS 10*3/mm3 565* 457* 422     Results from last 7 days   Lab Units 04/27/24  0047 04/26/24  0052 04/25/24  0100 04/23/24  0029 04/22/24  0722 04/22/24  0407   SODIUM mmol/L 143 144 141   < > 139 140   POTASSIUM mmol/L 4.0 3.3* 3.9   < > 4.2 4.3   CHLORIDE mmol/L 106 107 109*   < > 109* 111*   CO2 mmol/L 26.8 28.7 26.1   < > 22.0 21.8*   BUN mg/dL 11 7 9   < > 2* 2*   CREATININE mg/dL 0.73 0.57 0.51*   < > 0.59 0.60   CALCIUM mg/dL 9.0 8.7 8.1*   < > 7.8* 7.7*   GLUCOSE mg/dL 60* 55* 211*   < > 159* 157*   MAGNESIUM mg/dL  --  1.7  --   --  1.8 1.8    <  > = values in this interval not displayed.     Lab Results   Component Value Date    INR 1.04 04/19/2024    INR 0.93 02/21/2024    INR 1.00 08/12/2023    PROTIME 14.1 04/19/2024    PROTIME 13.0 02/21/2024    PROTIME 13.7 08/12/2023     Results from last 7 days   Lab Units 04/25/24  0100   ALK PHOS U/L 114   BILIRUBIN mg/dL 0.2   ALT (SGPT) U/L 16   AST (SGOT) U/L 19           Imaging Results (Last 24 Hours)       Procedure Component Value Units Date/Time    FL Video Swallow Single Contrast [[id number]] Collected: 04/26/24 1302     Updated: 04/26/24 1305    Narrative:      EXAMINATION: FL VIDEO SWALLOW SINGLE-CONTRAST-      CLINICAL INDICATION:     Aspiration r/o; F19.929-Other psychoactive  substance use, unspecified with intoxication, unspecified; E10.10-Type 1  diabetes mellitus with ketoacidosis without coma; J96.00-Acute  respiratory failure, unspecified whether with hypoxia or hypercapnia;  A41.9-Sepsis, unspecified organism     TECHNIQUE: Modified barium swallow was performed utilizing video  fluoroscopy in conjunction with the Speech Pathology team. The patient  ingested multiple barium media including thin barium, nectar, and honey  liquid as well as barium pudding and a barium pudding coated cracker.      FLUOROSCOPY TIME: 1.5 minutes     COMPARISON: NONE      FINDINGS:   Gross aspiration of nectar thick and thin liquids during the swallow.       Impression:      1. Gross aspiration of nectar thick and thin liquids.  2. Please see dysphasia notes for further details.        This report was finalized on 4/26/2024 1:03 PM by Dr. Madhu Crockett MD.                    atorvastatin, 20 mg, Oral, Nightly  buprenorphine-naloxone, 3 tablet, Sublingual, Daily  buPROPion XL, 450 mg, Oral, Daily  cetirizine, 10 mg, Oral, Daily  chlorhexidine, 15 mL, Mouth/Throat, Q12H  enoxaparin, 40 mg, Subcutaneous, Q24H  FLUoxetine, 20 mg, Oral, Daily  FLUoxetine, 40 mg, Oral, Daily  furosemide, 20 mg, Oral, Daily  gabapentin, 800  mg, Oral, Q8H  insulin glargine, 10 Units, Subcutaneous, Daily  insulin lispro, 2-9 Units, Subcutaneous, 4x Daily AC & at Bedtime  levothyroxine, 200 mcg, Oral, Daily  metroNIDAZOLE, 500 mg, Oral, Q8H  montelukast, 10 mg, Oral, Nightly  mupirocin, 1 Application, Topical, Q12H  nicotine, 1 patch, Transdermal, Q24H  OLANZapine zydis, 10 mg, Oral, Daily  pantoprazole, 40 mg, Oral, Q AM  rOPINIRole, 2 mg, Oral, Nightly  senna-docusate sodium, 2 tablet, Oral, BID  sodium chloride, 10 mL, Intravenous, Q12H  sodium chloride, 10 mL, Intravenous, Q12H  sodium chloride, 10 mL, Intravenous, Q12H  topiramate, 25 mg, Oral, Daily  vancomycin, 1,750 mg, Intravenous, Q12H      dexmedetomidine, 0.2-1.5 mcg/kg/hr, Last Rate: Stopped (04/23/24 0941)  dextrose 5 % and sodium chloride 0.45 %, 150 mL/hr  dextrose 5 % and sodium chloride 0.9 %, 150 mL/hr  sodium chloride, 250 mL/hr  sodium chloride, 250 mL/hr        Medication Review:    Latest Reference Range & Units 04/19/24 10:02   pH, Arterial 7.350 - 7.450 pH units 7.321 (L)   pCO2, Arterial 35.0 - 45.0 mm Hg 37.4   pO2, Arterial 83.0 - 108.0 mm Hg 142.0 (H)   HCO3, Arterial 20.0 - 26.0 mmol/L 19.3 (L)   Base Excess 0.0 - 2.0 mmol/L -6.2 (L)   O2 Saturation, Arterial 94.0 - 99.0 % >99.2 (H)   CO2 Content 22 - 33 mmol/L 20.4 (L)   A-a DO2 0.0 - 300.0 mmHg 54.3   Carboxyhemoglobin 0 - 5 % 0.9   Methemoglobin 0.00 - 3.00 % 0.20   Oxyhemoglobin 94 - 99 % 98.2   Hematocrit, Blood Gas 38.0 - 51.0 % 35.6 (L)   Hemoglobin, Blood Gas 13.5 - 17.5 g/dL 11.6 (L)   Site  Left Brachial   Saul's Test  N/A   Modality  Ventilator   FIO2 % 35   Ventilator Mode  VC/AC   Set Tidal Volume  450.00   Set Mech Resp Rate  18.0   PEEP  5.0   Barometric Pressure for Blood Gas mmHg 727   (L): Data is abnormally low  (H): Data is abnormally high  Microbiology Results (last 10 days)       Procedure Component Value - Date/Time    Chlamydia trachomatis, Neisseria gonorrhoeae, Trichomonas vaginalis, PCR - Urine,  Urine, Clean Catch [[id number]]  (Abnormal) Collected: 04/24/24 1721    Lab Status: Final result Specimen: Urine, Clean Catch Updated: 04/24/24 1915     Chlamydia DNA by PCR Not Detected     Neisseria gonorrhoeae by PCR Not Detected     Trichomonas vaginalis PCR Detected    Urine Culture - Urine, Straight Cath [[id number]]  (Normal) Collected: 04/24/24 1220    Lab Status: Final result Specimen: Urine from Straight Cath Updated: 04/26/24 1137     Urine Culture No growth    Blood Culture - Blood, Arm, Left [[id number]]  (Normal) Collected: 04/24/24 1039    Lab Status: Preliminary result Specimen: Blood from Arm, Left Updated: 04/26/24 1101     Blood Culture No growth at 2 days    Blood Culture - Blood, Arm, Right [[id number]]  (Normal) Collected: 04/24/24 1038    Lab Status: Preliminary result Specimen: Blood from Arm, Right Updated: 04/26/24 1101     Blood Culture No growth at 2 days    Blood Culture - Blood, Arm, Right [[id number]]  (Abnormal) Collected: 04/22/24 1532    Lab Status: Final result Specimen: Blood from Arm, Right Updated: 04/26/24 0653     Blood Culture Staphylococcus aureus, MRSA     Comment:   Infectious disease consultation is highly recommended to rule out distant foci of infection.  Methicillin resistant Staphylococcus aureus, Patient may be an isolation risk.        Isolated from Aerobic Bottle     Gram Stain Aerobic Bottle Gram positive cocci in clusters    Narrative:      Refer to previous blood culture collected on 04/22/2024 1436 for MICs    No BCID performed, refer to previous blood culture specimen collected on  4/22/24    Blood Culture - Blood, Arm, Right [[id number]]  (Abnormal)  (Susceptibility) Collected: 04/22/24 1436    Lab Status: Final result Specimen: Blood from Arm, Right Updated: 04/26/24 0653     Blood Culture Staphylococcus aureus, MRSA     Comment:   Infectious disease consultation is highly recommended to rule out distant foci of infection.  Methicillin resistant Staphylococcus  aureus, Patient may be an isolation risk.        Isolated from Aerobic Bottle     Gram Stain Aerobic Bottle Gram positive cocci in clusters    Susceptibility        Staphylococcus aureus, MRSA      ILIANA      Gentamicin Susceptible      Oxacillin Resistant      Rifampin Susceptible      Vancomycin Susceptible                           Blood Culture ID, PCR - Blood, Arm, Right [[id number]]  (Abnormal) Collected: 04/22/24 1436    Lab Status: Final result Specimen: Blood from Arm, Right Updated: 04/23/24 2208     BCID, PCR Staph aureus. mecA/C and MREJ (methicillin resistance gene) detected. Identification by BCID2 PCR.     BOTTLE TYPE Aerobic Bottle    Narrative:      Infectious disease consultation is highly recommended to rule out distant foci of infection.    Blood Culture - Blood, Blood, Central Line [[id number]]  (Abnormal) Collected: 04/19/24 0947    Lab Status: Final result Specimen: Blood, Central Line Updated: 04/22/24 0610     Blood Culture Staphylococcus epidermidis     Isolated from Aerobic Bottle     Gram Stain Aerobic Bottle Gram positive cocci    Narrative:      Refer to previous blood culture collected on 04/19/2024 0947 for MICs      Blood Culture - Blood, Arm, Right [[id number]]  (Abnormal)  (Susceptibility) Collected: 04/19/24 0947    Lab Status: Final result Specimen: Blood from Arm, Right Updated: 04/22/24 0610     Blood Culture Staphylococcus epidermidis     Isolated from Aerobic Bottle     Gram Stain Aerobic Bottle Gram positive cocci    Susceptibility        Staphylococcus epidermidis      ILIANA      Oxacillin Resistant      Vancomycin Susceptible                           Blood Culture ID, PCR - Blood, Arm, Right [[id number]]  (Abnormal) Collected: 04/19/24 0947    Lab Status: Final result Specimen: Blood from Arm, Right Updated: 04/20/24 0521     BCID, PCR Staph spp, not aureus or lugdunensis. Identification by BCID2 PCR.     BOTTLE TYPE Aerobic Bottle            Latest Reference Range & Units  04/19/24 11:43 04/19/24 13:01   Acetone Negative  Small !!    Amphetamine, Urine Qual Negative   Positive !   Barbiturates Screen, Urine Negative   Negative   Benzodiazepine Screen, Urine Negative   Positive !   Buprenorphine, Screen, Urine Negative   Positive !   Cocaine Screen, Urine Negative   Negative   Fentanyl, Urine Negative   Negative   Methamphetamine, Ur Negative   Positive !   Methadone Screen , Urine Negative   Negative   Opiate Screen Negative   Negative   Oxycodone Screen, Urine Negative   Negative   Phencyclidine (PCP), Urine Negative   Negative   THC Screen, Urine Negative   Negative   Tricyclic Antidepressants Screen Negative   Negative   !!: Data is critical  !: Data is abnormal  Assessment & Plan     Neurology-     CT head reviewed-no acute intracranial process identified.      Respiratory-  Extubated and doing well.  FiO2 requirement reviewed and adjusted for a sat of 88 to 92%.    Cardiology- hemodynamically -stable.    Continue to monitor HR- rate and rhythm, BP   Results for orders placed during the hospital encounter of 04/19/24    Adult Transthoracic Echo Complete W/ Cont if Necessary Per Protocol    Interpretation Summary    Left ventricular systolic function is normal. Left ventricular ejection fraction appears to be 66 - 70%.    Left ventricular diastolic function is consistent with (grade I) impaired relaxation.    Estimated right ventricular systolic pressure from tricuspid regurgitation is normal (<35 mmHg).      Infectious disease recommended transesophageal echo.    Nephrology- Cr and BUN stable  I/O-reviewed.  Electrolytes reviewed and adjusted    GI- PPI prophylaxis  Continue current diet    Hematology- CBC  Hb  platelet  WBC    ID  Culture  And Antibiotics  Repeat blood cultures negative so far reviewed.  Repeat cultures drawn today and negative so far        Endocrinology-    .  Leading to hypoglycemia.  Adjust insulin.      Electrolytes-   Mag and phos       DVT  prophylaxis-continue  Case d/w nurse and team   This service is provided via audio video tele medicine   I am in [place] and patient is in [facility]          Acute encephalopathy               Tenzin Felton MD  04/27/24  08:54 EDT

## 2024-04-27 NOTE — PLAN OF CARE
Goal Outcome Evaluation:  Plan of Care Reviewed With: patient        Progress: improving  Outcome Evaluation: Patient A&Ox4. On RA. Significant other at bedside. NSR. Independent and ambulates to toilet. Refuses bed alarm. Call light within reach. No new needs noted at this time.         Problem: Skin Injury Risk Increased  Goal: Skin Health and Integrity  Outcome: Ongoing, Progressing  Intervention: Optimize Skin Protection  Recent Flowsheet Documentation  Taken 4/27/2024 0145 by Aundrea Luke RN  Pressure Reduction Techniques:   frequent weight shift encouraged   positioned off wounds   pressure points protected  Pressure Reduction Devices: pressure-redistributing mattress utilized  Skin Protection:   adhesive use limited   pulse oximeter probe site changed   tubing/devices free from skin contact   transparent dressing maintained     Problem: Adult Inpatient Plan of Care  Goal: Plan of Care Review  Outcome: Ongoing, Progressing  Flowsheets (Taken 4/27/2024 0415)  Progress: improving  Plan of Care Reviewed With: patient  Outcome Evaluation: Patient A&Ox4. On RA. Significant other at bedside. NSR. Independent and ambulates to toilet. Refuses bed alarm. Call light within reach. No new needs noted at this time.  Goal: Patient-Specific Goal (Individualized)  Outcome: Ongoing, Progressing  Goal: Absence of Hospital-Acquired Illness or Injury  Outcome: Ongoing, Progressing  Intervention: Identify and Manage Fall Risk  Recent Flowsheet Documentation  Taken 4/27/2024 0300 by Aundrea Luke RN  Safety Promotion/Fall Prevention:   safety round/check completed   room organization consistent   nonskid shoes/slippers when out of bed   lighting adjusted   fall prevention program maintained   clutter free environment maintained   assistive device/personal items within reach   activity supervised  Intervention: Prevent Skin Injury  Recent Flowsheet Documentation  Taken 4/27/2024 0145 by Aundrea Luke RN  Skin  Protection:   adhesive use limited   pulse oximeter probe site changed   tubing/devices free from skin contact   transparent dressing maintained  Intervention: Prevent and Manage VTE (Venous Thromboembolism) Risk  Recent Flowsheet Documentation  Taken 4/27/2024 0145 by Aundrea Luke RN  VTE Prevention/Management: (see MAR) other (see comments)  Range of Motion: active ROM (range of motion) encouraged  Intervention: Prevent Infection  Recent Flowsheet Documentation  Taken 4/27/2024 0300 by Aundrea Luke RN  Infection Prevention:   hand hygiene promoted   rest/sleep promoted   single patient room provided  Goal: Optimal Comfort and Wellbeing  Outcome: Ongoing, Progressing  Intervention: Provide Person-Centered Care  Recent Flowsheet Documentation  Taken 4/27/2024 0145 by Aundrea Luke RN  Trust Relationship/Rapport:   care explained   choices provided   thoughts/feelings acknowledged   reassurance provided  Goal: Readiness for Transition of Care  Outcome: Ongoing, Progressing     Problem: Fall Injury Risk  Goal: Absence of Fall and Fall-Related Injury  Outcome: Ongoing, Progressing  Intervention: Promote Injury-Free Environment  Recent Flowsheet Documentation  Taken 4/27/2024 0300 by Aundrea Luke RN  Safety Promotion/Fall Prevention:   safety round/check completed   room organization consistent   nonskid shoes/slippers when out of bed   lighting adjusted   fall prevention program maintained   clutter free environment maintained   assistive device/personal items within reach   activity supervised     Problem: Adjustment to Illness (Sepsis/Septic Shock)  Goal: Optimal Coping  Outcome: Ongoing, Progressing  Intervention: Optimize Psychosocial Adjustment to Illness  Recent Flowsheet Documentation  Taken 4/27/2024 0145 by Aundrea Luke RN  Family/Support System Care:   support provided   self-care encouraged     Problem: Bleeding (Sepsis/Septic Shock)  Goal: Absence of Bleeding  Outcome: Ongoing,  Progressing     Problem: Glycemic Control Impaired (Sepsis/Septic Shock)  Goal: Blood Glucose Level Within Desired Range  Outcome: Ongoing, Progressing     Problem: Infection Progression (Sepsis/Septic Shock)  Goal: Absence of Infection Signs and Symptoms  Outcome: Ongoing, Progressing  Intervention: Initiate Sepsis Management  Recent Flowsheet Documentation  Taken 4/27/2024 0300 by Aundrea Luke, RN  Infection Prevention:   hand hygiene promoted   rest/sleep promoted   single patient room provided     Problem: Nutrition Impaired (Sepsis/Septic Shock)  Goal: Optimal Nutrition Intake  Outcome: Ongoing, Progressing     Problem: Communication Impairment (Mechanical Ventilation, Invasive)  Goal: Effective Communication  Outcome: Ongoing, Progressing     Problem: Device-Related Complication Risk (Mechanical Ventilation, Invasive)  Goal: Optimal Device Function  Outcome: Ongoing, Progressing     Problem: Inability to Wean (Mechanical Ventilation, Invasive)  Goal: Mechanical Ventilation Liberation  Outcome: Ongoing, Progressing     Problem: Nutrition Impairment (Mechanical Ventilation, Invasive)  Goal: Optimal Nutrition Delivery  Outcome: Ongoing, Progressing     Problem: Skin and Tissue Injury (Mechanical Ventilation, Invasive)  Goal: Absence of Device-Related Skin and Tissue Injury  Outcome: Ongoing, Progressing  Intervention: Maintain Skin and Tissue Health  Recent Flowsheet Documentation  Taken 4/27/2024 0145 by Aundrea Lkue, RN  Device Skin Pressure Protection:   pressure points protected   adhesive use limited     Problem: Ventilator-Induced Lung Injury (Mechanical Ventilation, Invasive)  Goal: Absence of Ventilator-Induced Lung Injury  Outcome: Ongoing, Progressing

## 2024-04-27 NOTE — PROGRESS NOTES
[facility] HOSPITALIST PROGRESS NOTE     Patient Identification:  Name:  Jes Moreno  Age:  38 y.o.  Sex:  female  :  1985  MRN:  [id number]  Visit Number:  [id number]  ROOM: [place] [id number]     Primary Care Provider:  Juany Sheehan APRN    Length of stay in inpatient status:  8    Subjective     Chief Compliant:    Chief Complaint   Patient presents with    Altered Mental Status       History of Presenting Illness:    Patient seen and examined with significant other present at bedside. She denies shortness of breath, cough, or chest pain. She says that she has had a heart murmur since she was a child. No acute events noted overnight.    Objective     Current Hospital Meds:atorvastatin, 20 mg, Oral, Nightly  buprenorphine-naloxone, 3 tablet, Sublingual, Daily  buPROPion XL, 450 mg, Oral, Daily  cetirizine, 10 mg, Oral, Daily  chlorhexidine, 15 mL, Mouth/Throat, Q12H  enoxaparin, 40 mg, Subcutaneous, Q24H  FLUoxetine, 20 mg, Oral, Daily  FLUoxetine, 40 mg, Oral, Daily  furosemide, 20 mg, Oral, Daily  gabapentin, 800 mg, Oral, Q8H  insulin glargine, 10 Units, Subcutaneous, Daily  insulin lispro, 2-9 Units, Subcutaneous, 4x Daily AC & at Bedtime  levothyroxine, 200 mcg, Oral, Daily  metroNIDAZOLE, 500 mg, Oral, Q8H  montelukast, 10 mg, Oral, Nightly  mupirocin, 1 Application, Topical, Q12H  nicotine, 1 patch, Transdermal, Q24H  OLANZapine zydis, 10 mg, Oral, Daily  pantoprazole, 40 mg, Oral, Q AM  rOPINIRole, 2 mg, Oral, Nightly  senna-docusate sodium, 2 tablet, Oral, BID  sodium chloride, 10 mL, Intravenous, Q12H  sodium chloride, 10 mL, Intravenous, Q12H  sodium chloride, 10 mL, Intravenous, Q12H  topiramate, 25 mg, Oral, Daily  vancomycin, 1,500 mg, Intravenous, Q12H    dexmedetomidine, 0.2-1.5 mcg/kg/hr, Last Rate: Stopped (24 0941)  dextrose 5 % and sodium chloride 0.45 %, 150 mL/hr  dextrose 5 % and sodium chloride 0.9 %, 150 mL/hr  sodium chloride, 250 mL/hr  sodium chloride, 250  mL/hr        Current Antimicrobial Therapy:  Anti-Infectives (From admission, onward)      Ordered     Dose/Rate Route Frequency Start Stop    04/27/24 1032  vancomycin IVPB 1500 mg in 0.9% NaCl (Premix) 500 mL        Ordering Provider: Khushboo Howell APRN    1,500 mg  333.3 mL/hr over 90 Minutes Intravenous Every 12 Hours 04/27/24 1500 05/08/24 2359    04/24/24 1929  metroNIDAZOLE (FLAGYL) tablet 500 mg        Ordering Provider: Jose Johns MD    500 mg Oral Every 8 Hours Scheduled 04/24/24 2200 05/01/24 2159          Current Diuretic Therapy:  Diuretics (From admission, onward)      Ordered     Dose/Rate Route Frequency Start Stop    04/19/24 1726  furosemide (LASIX) tablet 20 mg        Ordering Provider: Jono Valencia DO    20 mg Oral Daily 04/20/24 0900            ----------------------------------------------------------------------------------------------------------------------  Vital Signs:  Temp:  [98.2 °F (36.8 °C)-99.5 °F (37.5 °C)] 98.7 °F (37.1 °C)  Heart Rate:  [] 97  Resp:  [15-25] 15  BP: (109-155)/() 123/74  SpO2:  [94 %-100 %] 96 %  on   ;   Device (Oxygen Therapy): room air  Body mass index is 22.51 kg/m².    Wt Readings from Last 3 Encounters:   04/27/24 65.2 kg (143 lb 11.8 oz)   02/21/24 66.1 kg (145 lb 12.8 oz)   08/13/23 73.5 kg (162 lb)     Intake & Output (last 3 days)         04/25 0701 04/26 0700 04/26 0701 04/27 0700 04/27 0701 04/28 0700    P.O. 1250 960 720    I.V. (mL/kg)  501.6 (6.3) 500 (6.3)    Other       IV Piggyback 1300 500     Total Intake(mL/kg) 2550 (31.9) 1961.6 (24.5) 1220 (15.3)    Urine (mL/kg/hr) 4450 (2.3) 900 (0.5)     Emesis/NG output  0     Stool 0 0     Total Output 4450 900     Net -1900 +1061.6 +1220           Urine Unmeasured Occurrence  8 x 3 x    Stool Unmeasured Occurrence 2 x 4 x 1 x          Diet: Regular/House, Diabetic; Consistent Carbohydrate; No Straw; Texture: Soft to Chew (NDD 3); Soft to Chew: Whole Meat; Fluid  Consistency: Nectar Thick  NPO Diet NPO Type: Sips with Meds  ----------------------------------------------------------------------------------------------------------------------  Physical exam:   Constitutional:  Well-developed and well-nourished.  No acute distress. Walking around the room.   HENT:  Head:  Normocephalic and atraumatic.    Pulmonary/Chest:  Normal rate and effort. No cough noted during exam today. Voice is slightly hoarse but improved.  Musculoskeletal:  No deformity.    Neurological: Awake, alert, no focal deficit on gross examination. No slurred speech or facial droop.   Skin:  Skin is warm and dry. No rash noted. No pallor.   Peripheral vascular:  No cyanosis  Edited by: Madalyn Villalba DO at 4/27/2024 1940  ----------------------------------------------------------------------------------------------------------------------  Results from last 7 days   Lab Units 04/27/24  0047 04/26/24  0052 04/25/24  0100   WBC 10*3/mm3 11.42* 8.61 8.71   HEMOGLOBIN g/dL 11.3* 10.3* 9.6*   HEMATOCRIT % 35.4 32.7* 31.2*   MCV fL 91.5 91.9 92.6   MCHC g/dL 31.9 31.5 30.8*   PLATELETS 10*3/mm3 565* 457* 422         Results from last 7 days   Lab Units 04/27/24  0047 04/26/24  0052 04/25/24  0100 04/23/24  0029 04/22/24  0722 04/22/24  0407 04/22/24  0022   SODIUM mmol/L 143 144 141   < > 139 140 140   POTASSIUM mmol/L 4.0 3.3* 3.9   < > 4.2 4.3 4.2   MAGNESIUM mg/dL  --  1.7  --   --  1.8 1.8 1.9   CHLORIDE mmol/L 106 107 109*   < > 109* 111* 109*   CO2 mmol/L 26.8 28.7 26.1   < > 22.0 21.8* 20.5*   BUN mg/dL 11 7 9   < > 2* 2* 2*   CREATININE mg/dL 0.73 0.57 0.51*   < > 0.59 0.60 0.62   CALCIUM mg/dL 9.0 8.7 8.1*   < > 7.8* 7.7* 7.6*   PHOSPHORUS mg/dL  --   --   --   --  4.0 4.1 4.1   GLUCOSE mg/dL 60* 55* 211*   < > 159* 157* 139*   ALBUMIN g/dL  --   --  2.5*  --   --   --   --    BILIRUBIN mg/dL  --   --  0.2  --   --   --   --    ALK PHOS U/L  --   --  114  --   --   --   --    AST (SGOT) U/L  --   --  19   "--   --   --   --    ALT (SGPT) U/L  --   --  16  --   --   --   --     < > = values in this interval not displayed.   Estimated Creatinine Clearance: 109.7 mL/min (by C-G formula based on SCr of 0.73 mg/dL).  No results found for: \"AMMONIA\"          Results from last 7 days   Lab Units 04/23/24  0029   TRIGLYCERIDES mg/dL 44     Glucose   Date/Time Value Ref Range Status   04/27/2024 1642 72 70 - 130 mg/dL Final   04/27/2024 1339 213 (H) 70 - 130 mg/dL Final   04/27/2024 1152 365 (H) 70 - 130 mg/dL Final   04/27/2024 0914 285 (H) 70 - 130 mg/dL Final   04/27/2024 0616 104 70 - 130 mg/dL Final   04/27/2024 0029 78 70 - 130 mg/dL Final   04/26/2024 2339 68 (L) 70 - 130 mg/dL Final   04/26/2024 2319 45 (C) 70 - 130 mg/dL Final     Lab Results   Component Value Date    TSH 0.732 08/12/2023    FREET4 1.80 (H) 06/20/2021     Lab Results   Component Value Date    PREGTESTUR Negative 08/30/2020     Pain Management Panel  More data exists         Latest Ref Rng & Units 4/19/2024 2/21/2024   Pain Management Panel   Amphetamine, Urine Qual Negative Positive  Negative    Barbiturates Screen, Urine Negative Negative  Negative    Benzodiazepine Screen, Urine Negative Positive  Negative    Buprenorphine, Screen, Urine Negative Positive  Positive    Cocaine Screen, Urine Negative Negative  Negative    Fentanyl, Urine Negative Negative  Negative    Methadone Screen , Urine Negative Negative  Negative    Methamphetamine, Ur Negative Positive  Negative      Brief Urine Lab Results  (Last result in the past 365 days)        Color   Clarity   Blood   Leuk Est   Nitrite   Protein   CREAT   Urine HCG        04/24/24 1220 Yellow   Cloudy   Negative   Moderate (2+)   Positive   Negative                 Blood Culture   Date Value Ref Range Status   04/24/2024 No growth at 3 days  Preliminary   04/24/2024 No growth at 3 days  Preliminary   04/22/2024 Staphylococcus aureus, MRSA (C)  Final     Comment:       Infectious disease consultation is " "highly recommended to rule out distant foci of infection.  Methicillin resistant Staphylococcus aureus, Patient may be an isolation risk.   04/22/2024 Staphylococcus aureus, MRSA (C)  Final     Comment:       Infectious disease consultation is highly recommended to rule out distant foci of infection.  Methicillin resistant Staphylococcus aureus, Patient may be an isolation risk.     Urine Culture   Date Value Ref Range Status   04/24/2024 No growth  Final     No results found for: \"WOUNDCX\"  No results found for: \"STOOLCX\"  No results found for: \"RESPCX\"  No results found for: \"AFBCX\"        I have personally looked at the labs and they are summarized above.  ----------------------------------------------------------------------------------------------------------------------  Detailed radiology reports for the last 24 hours:  Imaging Results (Last 24 Hours)       ** No results found for the last 24 hours. **          Assessment & Plan    #Acute encephalopathy  #Respiratory failure  #Polysubstance abuse  - Initially intubated for airway protection.  Now extubated and stable on room air.  - Pulmonology following, appreciate assistance.   - In the setting of chronic tobacco use there is possibility of undiagnosed COPD.  Received a one-time dose of Solu-Medrol 80 mg IV this admission.  Continue nebulizer treatments.  Continue Mucinex.  - Patient's mother wishes to pursue Nik's Law as patient has a long history of substance use and she is very worried for her daughter's safety. Social work/case management provided information to patient's mother.  - CXR 4/23 showed improvement in volume overload. Echo from 2019 reviewed which showed LV EF 61-65%, normal LV systolic function. Repeat echo this admission showed normal LV systolic function, LV EF 66-70%, grade I diastolic impaired relaxation.  - Re-evaluated by SLP and upgraded to nectar thick liquids, soft to chew textures.    # MRSA bacteremia  #Mitral murmur  -Blood " cultures from admission positive in 2 out of 2 samples for MRSA per [facility] ID.  Vancomycin started and infectious disease consulted, appreciate assistance.  Follow-up repeat blood culture results, currently no growth at 3 days.  Transthoracic echo above did not show any evidence of vegetation. Since patient has a murmur noted on exam, loudest at mitral location, KIMMIE recommended by ID to rule out endocarditis. Cardiology consulted, appreciate assistance.    # Trichomonas vaginalis infection  Green vaginal discharge noted by nursing staff.  STI PCR panel ordered and was positive for trichomonas vaginalis.  Continue Flagyl (day #4).     #DKA without coma  #Type I diabetes   -DKA now resolved.  Goal blood sugar 140-180. Monitor accuchecks.  Transitioned from IV to subcutaneous basal/bolus regimen.  Continue adjusting doses for appropriate glucose control. Glucose has been fairly labile, from 60 up to 365. Will continue to cautiously adjust doses. Continue hypoglycemia protocol prn.  - Hemoglobin A1c was 9.0 this admission indicating uncontrolled glucose in outpatient setting, but was improved from 10.2 in August 2023.  Edited by: Madalyn Villalba DO at 4/27/2024 1940    VTE Prophylaxis:   Mechanical Order History:       None          Pharmalogical Order History:        Ordered     Dose Route Frequency Stop    04/19/24 1633  Enoxaparin Sodium (LOVENOX) syringe 40 mg         40 mg SC Every 24 Hours --                    Dispo:  likely home at discharge pending clinical improvement     Madalyn Villalba DO  [facility] Hospitalist  04/27/24  19:40 EDT

## 2024-04-27 NOTE — PLAN OF CARE
Goal Outcome Evaluation:  Plan of Care Reviewed With: patient, significant other        Progress: no change  Outcome Evaluation: Pt. alert and oriented times 4. VSS. RA. SR.  Abx given per order. Glucose monitored. Ambulated around the unit. Refuses bed alarm. No needs noted at this time. Bed alarm refused. Call light within reach.         Problem: Skin Injury Risk Increased  Goal: Skin Health and Integrity  Outcome: Ongoing, Progressing  Intervention: Optimize Skin Protection  Recent Flowsheet Documentation  Taken 4/27/2024 0915 by Raquel Camarillo RN  Pressure Reduction Techniques: frequent weight shift encouraged  Pressure Reduction Devices: pressure-redistributing mattress utilized  Skin Protection:   adhesive use limited   transparent dressing maintained   tubing/devices free from skin contact     Problem: Adult Inpatient Plan of Care  Goal: Plan of Care Review  Outcome: Ongoing, Progressing  Flowsheets (Taken 4/27/2024 1442)  Progress: no change  Plan of Care Reviewed With:   patient   significant other  Outcome Evaluation: Pt. alert and oriented times 4. VSS. RA. SR.  Abx given per order. Glucose monitored. Ambulated around the unit. Refuses bed alarm. No needs noted at this time. Bed alarm refused. Call light within reach.  Goal: Patient-Specific Goal (Individualized)  Outcome: Ongoing, Progressing  Goal: Absence of Hospital-Acquired Illness or Injury  Outcome: Ongoing, Progressing  Intervention: Identify and Manage Fall Risk  Recent Flowsheet Documentation  Taken 4/27/2024 1300 by Raquel Camarillo RN  Safety Promotion/Fall Prevention:   nonskid shoes/slippers when out of bed   safety round/check completed  Taken 4/27/2024 1100 by Raquel Camarillo RN  Safety Promotion/Fall Prevention:   nonskid shoes/slippers when out of bed   safety round/check completed  Taken 4/27/2024 0915 by Raquel Camarillo RN  Safety Promotion/Fall Prevention:   activity supervised   fall prevention program maintained   safety round/check  completed  Taken 4/27/2024 0900 by Raquel Camarillo RN  Safety Promotion/Fall Prevention:   nonskid shoes/slippers when out of bed   safety round/check completed  Taken 4/27/2024 0700 by Raquel Camarillo RN  Safety Promotion/Fall Prevention:   nonskid shoes/slippers when out of bed   safety round/check completed  Intervention: Prevent Skin Injury  Recent Flowsheet Documentation  Taken 4/27/2024 0915 by Raquel Camarillo RN  Skin Protection:   adhesive use limited   transparent dressing maintained   tubing/devices free from skin contact  Intervention: Prevent and Manage VTE (Venous Thromboembolism) Risk  Recent Flowsheet Documentation  Taken 4/27/2024 0915 by Raquel Camarillo RN  Activity Management:   activity encouraged   ambulated to bathroom   ambulated in room  VTE Prevention/Management: (lovenox) other (see comments)  Goal: Optimal Comfort and Wellbeing  Outcome: Ongoing, Progressing  Intervention: Provide Person-Centered Care  Recent Flowsheet Documentation  Taken 4/27/2024 0915 by Raquel Camarillo RN  Trust Relationship/Rapport:   care explained   choices provided   emotional support provided   empathic listening provided   thoughts/feelings acknowledged  Goal: Readiness for Transition of Care  Outcome: Ongoing, Progressing     Problem: Fall Injury Risk  Goal: Absence of Fall and Fall-Related Injury  Outcome: Ongoing, Progressing  Intervention: Identify and Manage Contributors  Recent Flowsheet Documentation  Taken 4/27/2024 1300 by Raquel Camarillo RN  Medication Review/Management: medications reviewed  Taken 4/27/2024 1100 by Raquel Camarillo RN  Medication Review/Management: medications reviewed  Taken 4/27/2024 0915 by Raquel Camarillo RN  Medication Review/Management: medications reviewed  Taken 4/27/2024 0900 by Raquel Camarillo RN  Medication Review/Management: medications reviewed  Taken 4/27/2024 0700 by Raquel Camarillo RN  Medication Review/Management: medications reviewed  Intervention: Promote Injury-Free  Environment  Recent Flowsheet Documentation  Taken 4/27/2024 1300 by Raquel Camarillo RN  Safety Promotion/Fall Prevention:   nonskid shoes/slippers when out of bed   safety round/check completed  Taken 4/27/2024 1100 by Raquel Camarillo RN  Safety Promotion/Fall Prevention:   nonskid shoes/slippers when out of bed   safety round/check completed  Taken 4/27/2024 0915 by Raquel Camarillo RN  Safety Promotion/Fall Prevention:   activity supervised   fall prevention program maintained   safety round/check completed  Taken 4/27/2024 0900 by Raquel Camarillo RN  Safety Promotion/Fall Prevention:   nonskid shoes/slippers when out of bed   safety round/check completed  Taken 4/27/2024 0700 by Raquel Camarillo RN  Safety Promotion/Fall Prevention:   nonskid shoes/slippers when out of bed   safety round/check completed     Problem: Adjustment to Illness (Sepsis/Septic Shock)  Goal: Optimal Coping  Outcome: Ongoing, Progressing  Intervention: Optimize Psychosocial Adjustment to Illness  Recent Flowsheet Documentation  Taken 4/27/2024 0915 by Raquel Camarillo RN  Supportive Measures: active listening utilized  Family/Support System Care: support provided     Problem: Bleeding (Sepsis/Septic Shock)  Goal: Absence of Bleeding  Outcome: Ongoing, Progressing     Problem: Glycemic Control Impaired (Sepsis/Septic Shock)  Goal: Blood Glucose Level Within Desired Range  Outcome: Ongoing, Progressing  Intervention: Optimize Glycemic Control  Recent Flowsheet Documentation  Taken 4/27/2024 0915 by Raquel Camarillo RN  Glycemic Management: blood glucose monitored     Problem: Infection Progression (Sepsis/Septic Shock)  Goal: Absence of Infection Signs and Symptoms  Outcome: Ongoing, Progressing  Intervention: Promote Recovery  Recent Flowsheet Documentation  Taken 4/27/2024 0915 by Raquel Camarillo RN  Activity Management:   activity encouraged   ambulated to bathroom   ambulated in room  Sleep/Rest Enhancement: natural light exposure  provided  Intervention: Promote Stabilization  Recent Flowsheet Documentation  Taken 4/27/2024 0915 by Raquel Camarillo RN  Fluid/Electrolyte Management: fluids provided     Problem: Nutrition Impaired (Sepsis/Septic Shock)  Goal: Optimal Nutrition Intake  Outcome: Ongoing, Progressing     Problem: Communication Impairment (Mechanical Ventilation, Invasive)  Goal: Effective Communication  Outcome: Ongoing, Progressing  Intervention: Ensure Effective Communication  Recent Flowsheet Documentation  Taken 4/27/2024 0915 by Raquel Camarillo RN  Communication Enhancement Strategies: call light answered in person     Problem: Device-Related Complication Risk (Mechanical Ventilation, Invasive)  Goal: Optimal Device Function  Outcome: Ongoing, Progressing  Intervention: Optimize Device Care and Function  Recent Flowsheet Documentation  Taken 4/27/2024 0915 by Raquel Camarillo RN  Aspiration Precautions:   awake/alert before oral intake   food consistency adjusted   liquids thickened     Problem: Inability to Wean (Mechanical Ventilation, Invasive)  Goal: Mechanical Ventilation Liberation  Outcome: Ongoing, Progressing  Intervention: Promote Extubation and Mechanical Ventilation Liberation  Recent Flowsheet Documentation  Taken 4/27/2024 1300 by Raquel Camarillo RN  Medication Review/Management: medications reviewed  Taken 4/27/2024 1100 by Raquel Camarillo RN  Medication Review/Management: medications reviewed  Taken 4/27/2024 0915 by Raquel Camarillo RN  Environmental Support: calm environment promoted  Sleep/Rest Enhancement: natural light exposure provided  Medication Review/Management: medications reviewed  Taken 4/27/2024 0900 by Raquel Camarillo RN  Medication Review/Management: medications reviewed  Taken 4/27/2024 0700 by Raquel Camarillo RN  Medication Review/Management: medications reviewed     Problem: Nutrition Impairment (Mechanical Ventilation, Invasive)  Goal: Optimal Nutrition Delivery  Outcome: Ongoing, Progressing      Problem: Skin and Tissue Injury (Mechanical Ventilation, Invasive)  Goal: Absence of Device-Related Skin and Tissue Injury  Outcome: Ongoing, Progressing  Intervention: Maintain Skin and Tissue Health  Recent Flowsheet Documentation  Taken 4/27/2024 0915 by Raquel Camarillo RN  Device Skin Pressure Protection: adhesive use limited     Problem: Ventilator-Induced Lung Injury (Mechanical Ventilation, Invasive)  Goal: Absence of Ventilator-Induced Lung Injury  Outcome: Ongoing, Progressing

## 2024-04-27 NOTE — PROGRESS NOTES
PROGRESS NOTE         Patient Identification:  Name:  Jes Moreno  Age:  38 y.o.  Sex:  female  :  1985  MRN:  [id number]  Visit Number:  [id number]  Primary Care Provider:  Juany Sheehan APRN         LOS: 8 days       ----------------------------------------------------------------------------------------------------------------------  Subjective       Chief Complaints:    Altered Mental Status        Interval History:      Patient sitting up on side of bed.  Significant other at bedside.  Currently on room air with no apparent distress.  Overall feels well today.  Afebrile, denies diarrhea.  Lungs clear to auscultation bilaterally.  Patient reports she has had a murmur since childhood as well as a known leaky valve.  Possible KIMMIE planned for Monday.  WBC slightly elevated 11.42.    Review of Systems:    Constitutional: no fever, chills and night sweats.  Generalized fatigue.  Eyes: no eye drainage, itching or redness.  HEENT: no mouth sores, dysphagia or nose bleed.  Respiratory: no for shortness of breath, cough or production of sputum.  Cardiovascular: no chest pain, no palpitations, no orthopnea.  Gastrointestinal: no nausea, vomiting or diarrhea. No abdominal pain, hematemesis or rectal bleeding.  Genitourinary: no dysuria or polyuria.  Hematologic/lymphatic: no lymph node abnormalities, no easy bruising or easy bleeding.  Musculoskeletal: no muscle or joint pain.  Skin: No rash and no itching.  Neurological: no loss of consciousness, no seizure, no headache.  Behavioral/Psych: no depression or suicidal ideation.  Endocrine: no hot flashes.  Immunologic: negative.    ----------------------------------------------------------------------------------------------------------------------      Objective       [facility] Meds:  atorvastatin, 20 mg, Oral, Nightly  buprenorphine-naloxone, 3 tablet, Sublingual, Daily  buPROPion XL, 450 mg, Oral, Daily  cetirizine, 10 mg, Oral,  Daily  chlorhexidine, 15 mL, Mouth/Throat, Q12H  enoxaparin, 40 mg, Subcutaneous, Q24H  FLUoxetine, 20 mg, Oral, Daily  FLUoxetine, 40 mg, Oral, Daily  furosemide, 20 mg, Oral, Daily  gabapentin, 800 mg, Oral, Q8H  insulin glargine, 10 Units, Subcutaneous, Daily  insulin lispro, 2-9 Units, Subcutaneous, 4x Daily AC & at Bedtime  levothyroxine, 200 mcg, Oral, Daily  metroNIDAZOLE, 500 mg, Oral, Q8H  montelukast, 10 mg, Oral, Nightly  mupirocin, 1 Application, Topical, Q12H  nicotine, 1 patch, Transdermal, Q24H  OLANZapine zydis, 10 mg, Oral, Daily  pantoprazole, 40 mg, Oral, Q AM  rOPINIRole, 2 mg, Oral, Nightly  senna-docusate sodium, 2 tablet, Oral, BID  sodium chloride, 10 mL, Intravenous, Q12H  sodium chloride, 10 mL, Intravenous, Q12H  sodium chloride, 10 mL, Intravenous, Q12H  topiramate, 25 mg, Oral, Daily  vancomycin, 1,500 mg, Intravenous, Q12H      dexmedetomidine, 0.2-1.5 mcg/kg/hr, Last Rate: Stopped (04/23/24 0941)  dextrose 5 % and sodium chloride 0.45 %, 150 mL/hr  dextrose 5 % and sodium chloride 0.9 %, 150 mL/hr  sodium chloride, 250 mL/hr  sodium chloride, 250 mL/hr      ----------------------------------------------------------------------------------------------------------------------    Vital Signs:  Temp:  [98.2 °F (36.8 °C)-98.6 °F (37 °C)] 98.2 °F (36.8 °C)  Heart Rate:  [] 107  Resp:  [14-25] 18  BP: (110-155)/() 136/115  Mean Arterial Pressure (Non-Invasive) for the past 24 hrs (Last 3 readings):   Noninvasive MAP (mmHg)   04/27/24 0920 121   04/27/24 0800 93   04/27/24 0706 109     SpO2 Percentage    04/27/24 0300 04/27/24 0737 04/27/24 0800   SpO2: 94% 97% 96%     SpO2:  [94 %-99 %] 96 %  on   ;   Device (Oxygen Therapy): room air    Body mass index is 22.51 kg/m².  Wt Readings from Last 3 Encounters:   04/27/24 65.2 kg (143 lb 11.8 oz)   02/21/24 66.1 kg (145 lb 12.8 oz)   08/13/23 73.5 kg (162 lb)        Intake/Output Summary (Last 24 hours) at 4/27/2024 1126  Last data  filed at 4/27/2024 0737  Gross per 24 hour   Intake 2201.57 ml   Output 0 ml   Net 2201.57 ml     Diet: Regular/House, Diabetic; Consistent Carbohydrate; No Straw; Texture: Soft to Chew (NDD 3); Soft to Chew: Whole Meat; Fluid Consistency: Nectar Thick  ----------------------------------------------------------------------------------------------------------------------      Physical Exam:    Constitutional:  Well-developed and well-nourished.  No respiratory distress.  On room air.   Sitting up on side of bed this morning.  Significant other at bedside.  HENT:  Head: Normocephalic and atraumatic.  Mouth:  Moist mucous membranes.    Eyes:  Conjunctivae and EOM are normal.  No scleral icterus.  Neck:  Neck supple.  No JVD present.    Cardiovascular:  Normal rate, regular rhythm and normal heart sounds with 3/6 systolic ejection murmur. No edema.  Pulmonary/Chest:  No respiratory distress, no wheezes, no crackles, with normal breath sounds and good air movement.  Abdominal:  Soft.  Bowel sounds are normal.  No distension and no tenderness.  Left chest wall CBC without signs of infection.  Morton catheter in place.  Musculoskeletal:  No edema, no tenderness, and no deformity.  No swelling or redness of joints.  Neurological:  Alert and oriented to person, place, and time.  No facial droop.  No slurred speech.   Skin:  Skin is warm and dry.  No rash noted.  No pallor.   Psychiatric:  Normal mood and affect.  Behavior is normal.        ----------------------------------------------------------------------------------------------------------------------          Results from last 7 days   Lab Units 04/23/24  0029   TRIGLYCERIDES mg/dL 44           Results from last 7 days   Lab Units 04/27/24  0047 04/26/24  0052 04/25/24  0100   WBC 10*3/mm3 11.42* 8.61 8.71   HEMOGLOBIN g/dL 11.3* 10.3* 9.6*   HEMATOCRIT % 35.4 32.7* 31.2*   MCV fL 91.5 91.9 92.6   MCHC g/dL 31.9 31.5 30.8*   PLATELETS 10*3/mm3 565* 457* 422     Results  "from last 7 days   Lab Units 04/27/24  0047 04/26/24  0052 04/25/24  0100 04/23/24  0029 04/22/24  0722 04/22/24  0407   SODIUM mmol/L 143 144 141   < > 139 140   POTASSIUM mmol/L 4.0 3.3* 3.9   < > 4.2 4.3   MAGNESIUM mg/dL  --  1.7  --   --  1.8 1.8   CHLORIDE mmol/L 106 107 109*   < > 109* 111*   CO2 mmol/L 26.8 28.7 26.1   < > 22.0 21.8*   BUN mg/dL 11 7 9   < > 2* 2*   CREATININE mg/dL 0.73 0.57 0.51*   < > 0.59 0.60   CALCIUM mg/dL 9.0 8.7 8.1*   < > 7.8* 7.7*   GLUCOSE mg/dL 60* 55* 211*   < > 159* 157*   ALBUMIN g/dL  --   --  2.5*  --   --   --    BILIRUBIN mg/dL  --   --  0.2  --   --   --    ALK PHOS U/L  --   --  114  --   --   --    AST (SGOT) U/L  --   --  19  --   --   --    ALT (SGPT) U/L  --   --  16  --   --   --     < > = values in this interval not displayed.   Estimated Creatinine Clearance: 109.7 mL/min (by C-G formula based on SCr of 0.73 mg/dL).  No results found for: \"AMMONIA\"    Glucose   Date/Time Value Ref Range Status   04/27/2024 0914 285 (H) 70 - 130 mg/dL Final   04/27/2024 0616 104 70 - 130 mg/dL Final   04/27/2024 0029 78 70 - 130 mg/dL Final   04/26/2024 2339 68 (L) 70 - 130 mg/dL Final   04/26/2024 2319 45 (C) 70 - 130 mg/dL Final   04/26/2024 2129 165 (H) 70 - 130 mg/dL Final   04/26/2024 1559 122 70 - 130 mg/dL Final   04/26/2024 1204 362 (H) 70 - 130 mg/dL Final     Lab Results   Component Value Date    HGBA1C 9.00 (H) 04/19/2024     Lab Results   Component Value Date    TSH 0.732 08/12/2023    FREET4 1.80 (H) 06/20/2021       Blood Culture   Date Value Ref Range Status   04/24/2024 No growth at 24 hours  Preliminary   04/24/2024 No growth at 24 hours  Preliminary   04/22/2024 Staphylococcus aureus, MRSA (C)  Preliminary     Comment:       Infectious disease consultation is highly recommended to rule out distant foci of infection.  Methicillin resistant Staphylococcus aureus, Patient may be an isolation risk.   04/22/2024 Staphylococcus aureus, MRSA (C)  Preliminary     " "Comment:       Infectious disease consultation is highly recommended to rule out distant foci of infection.  Methicillin resistant Staphylococcus aureus, Patient may be an isolation risk.   04/19/2024 Staphylococcus epidermidis (C)  Final   04/19/2024 Staphylococcus epidermidis (C)  Final     Urine Culture   Date Value Ref Range Status   04/24/2024 No growth  Preliminary     No results found for: \"WOUNDCX\"  No results found for: \"STOOLCX\"  No results found for: \"RESPCX\"  Pain Management Panel  More data exists         Latest Ref Rng & Units 4/19/2024 2/21/2024   Pain Management Panel   Amphetamine, Urine Qual Negative Positive  Negative    Barbiturates Screen, Urine Negative Negative  Negative    Benzodiazepine Screen, Urine Negative Positive  Negative    Buprenorphine, Screen, Urine Negative Positive  Positive    Cocaine Screen, Urine Negative Negative  Negative    Fentanyl, Urine Negative Negative  Negative    Methadone Screen , Urine Negative Negative  Negative    Methamphetamine, Ur Negative Positive  Negative          ----------------------------------------------------------------------------------------------------------------------  Imaging Results (Last 24 Hours)       Procedure Component Value Units Date/Time    FL Video Swallow Single Contrast [[id number]] Collected: 04/26/24 1302     Updated: 04/26/24 1305    Narrative:      EXAMINATION: FL VIDEO SWALLOW SINGLE-CONTRAST-      CLINICAL INDICATION:     Aspiration r/o; F19.929-Other psychoactive  substance use, unspecified with intoxication, unspecified; E10.10-Type 1  diabetes mellitus with ketoacidosis without coma; J96.00-Acute  respiratory failure, unspecified whether with hypoxia or hypercapnia;  A41.9-Sepsis, unspecified organism     TECHNIQUE: Modified barium swallow was performed utilizing video  fluoroscopy in conjunction with the Speech Pathology team. The patient  ingested multiple barium media including thin barium, nectar, and honey  liquid as " well as barium pudding and a barium pudding coated cracker.      FLUOROSCOPY TIME: 1.5 minutes     COMPARISON: NONE      FINDINGS:   Gross aspiration of nectar thick and thin liquids during the swallow.       Impression:      1. Gross aspiration of nectar thick and thin liquids.  2. Please see dysphasia notes for further details.        This report was finalized on 4/26/2024 1:03 PM by Dr. Madhu Crockett MD.               ----------------------------------------------------------------------------------------------------------------------    Pertinent Infectious Disease Results                Assessment/Plan       Assessment       MRSA bacteremia       Plan        Patient sitting up on side of bed.  Significant other at bedside.  Currently on room air with no apparent distress.  Overall feels well today.  Afebrile, denies diarrhea.  Lungs clear to auscultation bilaterally.  Patient reports she has had a murmur since childhood as well as a known leaky valve.  Possible KIMMIE planned for Monday.  WBC slightly elevated 11.42.    Trichomonas vaginalis diagnosed via PCR on 4/24/2024.     Blood cultures from 4/24/2024 show no growth thus far.     Recommend to continue metronidazole 500 mg p.o. every 8 hours x 7 days through 5/1/2024 for treatment of trichomonas.  Recommend to continue vancomycin pharmacy to dose for MRSA and Staphylococcus epidermis bacteremia.  Although bacteremia cleared quickly in the setting of known IV drug use and murmur recommend KIMMIE when feasible.  If KIMMIE is negative Dalvance may be an option in the setting of patient's history of substance abuse.  We will continue to follow closely and adjust antibiotic therapy as needed.      ANTIMICROBIAL THERAPY    metroNIDAZOLE - 250 MG  Vancomycin HCl in NaCl solution - 1.5-0.9 GM/500ML-%     Code Status:   Code Status and Medical Interventions:   Ordered at: 04/19/24 1230     Code Status (Patient has no pulse and is not breathing):    CPR (Attempt to  Resuscitate)     Medical Interventions (Patient has pulse or is breathing):    Full Support       GAURAV Baumann  04/27/24  11:26 EDT

## 2024-04-27 NOTE — PROGRESS NOTES
Pharmacokinetics Service Note:    Jes Moreno is a 38 y.o. female being managed by Pharmacy for vancomycin dosing.    Indication for Therapy: Bacteremia  Current Regimen: 1750 mg Q 12 hrs  Current Day of Therapy and Ordered Duration: Day 4 of therapy continued through at least 5/8 pending KIMMIE results  Calculated Steady State AUC: 663 mg/L*hr  PAUC: 100%    Monitoring Planned: Patient's renal function has taken a bump and thrown off our previous regimen, will adjust accordingly to 1,500 mg Q 12 hrs with a predicted AUC of 569 mg/L*hr (99%) and will get a level in a few days to ensure appropriate levels.    Thank you,  José Good  Pharmacy Resident  4/27/2024  10:40 EDT

## 2024-04-27 NOTE — CONSULTS
Date of Admit: 2024  Date of Consult: 24  No ref. provider found        Acute encephalopathy      Assessment      MRSA bacteremia  Diabetes mellitus type 1  History of substance abuse      Recommendations     We discussed KIMMIE procedure with the patient. Will plan for KIMMIE on Monday, 24.  She will need to be NPO after midnight prior to the procedure.        Reason for consultation: KIMMIE     Subjective       Subjective       History of Present Illness    Jes Moreno is a 38 year old female with a past medical history of Diabetes mellitus type 1, who initially presented to [facility] due to acute encephalopathy secondary to substance abuse and DKA on 24. While inpatient was noted to have MRSA bacteremia. She was evaluated by infectious disease. KIMMIE was recommended to rule out endocarditis. Cardiology has been consulted for possible KIMMIE. Upon evaluation today, the patient denies any cardiac history. Denies chest pain or shortness of breath.     Cardiac risk factors:diabetes mellitus    Last Echo: 24    Left ventricular systolic function is normal. Left ventricular ejection fraction appears to be 66 - 70%.    Left ventricular diastolic function is consistent with (grade I) impaired relaxation.    Estimated right ventricular systolic pressure from tricuspid regurgitation is normal (<35 mmHg).      Past Medical History:   Diagnosis Date    Asthma     Celiac disease     Diabetes mellitus type 1     Diabetic ketoacidosis     Disease of thyroid gland     Hepatitis C     Infectious viral hepatitis     hepititis C    Neuropathy     Reflux gastritis      Past Surgical History:   Procedure Laterality Date     SECTION      X 2     Family History   Problem Relation Age of Onset    No Known Problems Mother     Lung cancer Father     No Known Problems Sister     No Known Problems Brother     Diabetes type I Maternal Grandmother     Breast cancer Maternal Grandmother     Hypertension Maternal Grandmother      No Known Problems Maternal Grandfather     No Known Problems Paternal Grandmother     Diabetes type II Paternal Grandfather     No Known Problems Daughter     No Known Problems Son     No Known Problems Cousin     Rheum arthritis Neg Hx     Osteoarthritis Neg Hx     Asthma Neg Hx     Diabetes Neg Hx     Heart failure Neg Hx     Hyperlipidemia Neg Hx     Migraines Neg Hx     Rashes / Skin problems Neg Hx     Seizures Neg Hx     Stroke Neg Hx     Thyroid disease Neg Hx      Social History     Tobacco Use    Smoking status: Every Day     Current packs/day: 1.00     Average packs/day: 1 pack/day for 3.0 years (3.0 ttl pk-yrs)     Types: Cigarettes    Smokeless tobacco: Never    Tobacco comments:     unable to assess    Vaping Use    Vaping status: Some Days    Substances: Nicotine   Substance Use Topics    Alcohol use: No     Comment: unable to assess     Drug use: No     Comment: suboxone     Medications Prior to Admission   Medication Sig Dispense Refill Last Dose    albuterol sulfate  (90 Base) MCG/ACT inhaler Inhale 1 puff Every 4 (Four) Hours As Needed for Wheezing or Shortness of Air.   Unknown    atorvastatin (LIPITOR) 20 MG tablet Take 1 tablet by mouth Every Night.   Unknown    Baqsimi Two Pack 3 MG/DOSE powder 1 spray into the nostril(s) as directed by provider As Needed (Low Blood Glucose).   Unknown    buprenorphine-naloxone (SUBOXONE) 8-2 MG per SL tablet Place 3 tablets under the tongue Daily.   Unknown    buPROPion XL (FORFIVO XL) 450 MG 24 hr tablet Take 1 tablet by mouth Every Morning.   Unknown    cetirizine (zyrTEC) 10 MG tablet Take 1 tablet by mouth Daily.   Unknown    docusate sodium (COLACE) 100 MG capsule Take 2 capsules by mouth Daily As Needed for Constipation.   Unknown    FLUoxetine (PROzac) 20 MG capsule Take 1 capsule by mouth Daily.   Unknown    FLUoxetine (PROzac) 40 MG capsule Take 1 capsule by mouth Daily.   Unknown    fluticasone (FLONASE) 50 MCG/ACT nasal spray 2 sprays by  Each Nare route Daily As Needed for Allergies.   Unknown    furosemide (LASIX) 20 MG tablet Take 1 tablet by mouth Daily. Take with potassium   Unknown    gabapentin (NEURONTIN) 800 MG tablet Take 1 tablet by mouth 3 (Three) Times a Day.   Unknown    glucose (DEX4) 4 GM chewable tablet Chew 1 tablet Daily As Needed for Low Blood Sugar (BG below 70).   Unknown    hydrOXYzine (ATARAX) 25 MG tablet Take 1 tablet by mouth Every 6 (Six) Hours As Needed for Anxiety.   Unknown    Insulin Glargine (BASAGLAR KWIKPEN) 100 UNIT/ML injection pen Inject 35 Units under the skin into the appropriate area as directed Every Night.   Unknown    Insulin Lispro, 1 Unit Dial, (HUMALOG) 100 UNIT/ML solution pen-injector Inject 16 Units under the skin into the appropriate area as directed 3 times a day.   Unknown    ipratropium-albuterol (COMBIVENT RESPIMAT)  MCG/ACT inhaler Inhale 1 puff Every 6 (Six) Hours As Needed for Wheezing.   Unknown    levothyroxine (SYNTHROID, LEVOTHROID) 200 MCG tablet Take 1 tablet by mouth Daily.   Unknown    Lidocaine Viscous HCl (XYLOCAINE) 2 % solution Take 10 mL by mouth Every 2 (Two) Hours As Needed for Mild Pain.   Unknown    montelukast (SINGULAIR) 10 MG tablet Take 1 tablet by mouth Every Night.   Unknown    ondansetron ODT (ZOFRAN-ODT) 4 MG disintegrating tablet Place 1 tablet on the tongue Every 8 (Eight) Hours As Needed for Nausea or Vomiting.   Unknown    pantoprazole (PROTONIX) 40 MG EC tablet Take 1 tablet by mouth Daily.   Unknown    PEG 3350 17 GM/SCOOP powder Take 17 g by mouth Daily.   Unknown    potassium chloride 10 MEQ CR tablet Take 1 tablet by mouth Daily. Take with lasix   Unknown    promethazine (PHENERGAN) 25 MG tablet Take 1 tablet by mouth Every 12 (Twelve) Hours As Needed for Nausea or Vomiting.   Unknown    rOPINIRole (REQUIP) 2 MG tablet Take 1 tablet by mouth Every Night.   Unknown    Semaglutide 3 MG tablet Take 1 tablet by mouth Daily.   Unknown    topiramate (TOPAMAX)  25 MG tablet Take 1 tablet by mouth Daily.   Unknown     Allergies:  Sulfa antibiotics    Review of Systems   Constitutional:  Negative for chills, fatigue and fever.   HENT:  Negative for congestion, ear pain, rhinorrhea and sore throat.    Respiratory:  Negative for cough, shortness of breath and wheezing.    Cardiovascular:  Negative for chest pain, palpitations and leg swelling.   Gastrointestinal:  Negative for abdominal pain, diarrhea, nausea and vomiting.   Genitourinary:  Negative for dysuria.   Musculoskeletal:  Negative for back pain and myalgias.   Skin:  Negative for rash and wound.   Neurological:  Negative for dizziness, light-headedness and headaches.   Psychiatric/Behavioral:  Negative for sleep disturbance. The patient is not nervous/anxious.        Objective       Objective      Vital Signs  Temp:  [98.2 °F (36.8 °C)-99.5 °F (37.5 °C)] 99.5 °F (37.5 °C)  Heart Rate:  [] 103  Resp:  [14-25] 19  BP: (109-155)/() 109/97  Vital Signs (last 72 hrs)         04/24 0700  04/25 0659 04/25 0700  04/26 0659 04/26 0700  04/27 0659 04/27 0700  04/27 1233   Most Recent      Temp (°F) 97.9 -  98.4    97.9 -  98.1    98.2 -  98.6    98.2 -  99.5     99.5 (37.5) 04/27 1200    Heart Rate 64 -  101    66 -  105    63 -  104    103 -  107     103 04/27 1200    Resp 14 - 18 11 - 26 14 - 25 18 - 19 19 04/27 1200    /70 -  142/95    103/65 -  137/96    110/87 -  155/92    109/97 -  143/93     109/97 04/27 1200    SpO2 (%) 93 -  100    94 -  99    94 -  99    96 -  100     100 04/27 1200          Body mass index is 22.51 kg/m².  Documented weights    04/19/24 0907 04/19/24 1616 04/20/24 0521 04/20/24 0942   Weight: 80 kg (176 lb 5.9 oz) 65.1 kg (143 lb 8 oz) 66.6 kg (146 lb 12.8 oz) 66.6 kg (146 lb 13.2 oz)    04/21/24 0429 04/22/24 0448 04/23/24 0411 04/24/24 0439   Weight: 69.4 kg (153 lb) 69.7 kg (153 lb 11.2 oz) 70.2 kg (154 lb 12.8 oz) 69.2 kg (152 lb 9.6 oz)    04/25/24 0600  04/27/24 0400   Weight: 68.3 kg (150 lb 9.6 oz) 65.2 kg (143 lb 11.8 oz)            Intake/Output Summary (Last 24 hours) at 4/27/2024 1233  Last data filed at 4/27/2024 1200  Gross per 24 hour   Intake 2321.57 ml   Output 0 ml   Net 2321.57 ml     Physical Exam  Constitutional:       Appearance: Normal appearance. She is well-developed.   HENT:      Head: Normocephalic and atraumatic.   Cardiovascular:      Rate and Rhythm: Regular rhythm.   Pulmonary:      Effort: Pulmonary effort is normal. No respiratory distress.   Abdominal:      General: Abdomen is flat. There is no distension.   Skin:     General: Skin is warm and dry.   Neurological:      General: No focal deficit present.      Mental Status: She is alert and oriented to person, place, and time.   Psychiatric:         Mood and Affect: Mood normal.         Behavior: Behavior normal.             Results review     Results Review:    I reviewed the patient's new clinical results.      Results from last 7 days   Lab Units 04/27/24  0047 04/26/24  0052 04/25/24  0100 04/24/24  0132 04/23/24  0314 04/22/24  0022 04/21/24  0027   WBC 10*3/mm3 11.42* 8.61 8.71 12.23* 11.49* 11.97* 11.08*   HEMOGLOBIN g/dL 11.3* 10.3* 9.6* 9.9* 10.9* 10.8* 10.5*   PLATELETS 10*3/mm3 565* 457* 422 403 372 329 316     Results from last 7 days   Lab Units 04/27/24  0047 04/26/24  0052 04/25/24  0100 04/24/24  0132 04/23/24  0029 04/22/24  0722 04/22/24  0407   SODIUM mmol/L 143 144 141 143 140 139 140   POTASSIUM mmol/L 4.0 3.3* 3.9 3.8 4.0 4.2 4.3   CHLORIDE mmol/L 106 107 109* 110* 106 109* 111*   CO2 mmol/L 26.8 28.7 26.1 24.8 25.7 22.0 21.8*   BUN mg/dL 11 7 9 8 3* 2* 2*   CREATININE mg/dL 0.73 0.57 0.51* 0.54* 0.65 0.59 0.60   CALCIUM mg/dL 9.0 8.7 8.1* 8.3* 8.2* 7.8* 7.7*   GLUCOSE mg/dL 60* 55* 211* 128* 126* 159* 157*   ALT (SGPT) U/L  --   --  16  --   --   --   --    AST (SGOT) U/L  --   --  19  --   --   --   --      Lab Results   Component Value Date    INR 1.04 04/19/2024  "   INR 0.93 02/21/2024    INR 1.00 08/12/2023    INR 1.17 (H) 03/14/2018     Lab Results   Component Value Date    MG 1.7 04/26/2024    MG 1.8 04/22/2024    MG 1.8 04/22/2024     Lab Results   Component Value Date    TSH 0.732 08/12/2023    TRIG 44 04/23/2024      No results found for: \"PROBNP\"    ECG  4/23/24           ECG/EMG Results (last 24 hours)       Procedure Component Value Units Date/Time    Telemetry Scan [[id number]] Resulted: 04/19/24     Updated: 04/26/24 1306    Telemetry Scan [[id number]] Resulted: 04/19/24     Updated: 04/26/24 1705    Telemetry Scan [[id number]] Resulted: 04/19/24     Updated: 04/27/24 0205    Telemetry Scan [[id number]] Resulted: 04/19/24     Updated: 04/27/24 0205    Telemetry Scan [[id number]] Resulted: 04/19/24     Updated: 04/27/24 0205    Telemetry Scan [[id number]] Resulted: 04/19/24     Updated: 04/27/24 0206    Telemetry Scan [[id number]] Resulted: 04/19/24     Updated: 04/27/24 0557    Telemetry Scan [[id number]] Resulted: 04/19/24     Updated: 04/27/24 0831    Telemetry Scan [[id number]] Resulted: 04/19/24     Updated: 04/27/24 1205            Imaging Results (Last 72 Hours)       Procedure Component Value Units Date/Time    FL Video Swallow Single Contrast [[id number]] Collected: 04/26/24 1302     Updated: 04/26/24 1305    Narrative:      EXAMINATION: FL VIDEO SWALLOW SINGLE-CONTRAST-      CLINICAL INDICATION:     Aspiration r/o; F19.929-Other psychoactive  substance use, unspecified with intoxication, unspecified; E10.10-Type 1  diabetes mellitus with ketoacidosis without coma; J96.00-Acute  respiratory failure, unspecified whether with hypoxia or hypercapnia;  A41.9-Sepsis, unspecified organism     TECHNIQUE: Modified barium swallow was performed utilizing video  fluoroscopy in conjunction with the Speech Pathology team. The patient  ingested multiple barium media including thin barium, nectar, and honey  liquid as well as barium pudding and a barium pudding coated cracker.    "   FLUOROSCOPY TIME: 1.5 minutes     COMPARISON: NONE      FINDINGS:   Gross aspiration of nectar thick and thin liquids during the swallow.       Impression:      1. Gross aspiration of nectar thick and thin liquids.  2. Please see dysphasia notes for further details.        This report was finalized on 4/26/2024 1:03 PM by Dr. Madhu Crockett MD.               I have discussed my impression and recommendations with the patient and family.    Thank you very much for asking us to be involved in this patient's care.  We will follow along with you.    I have seen and examined this patient with Dr. Ale Bedoya. Together, we have formed a plan of care for this patient.   Electronically signed by GAURVA Stack, 04/27/24, 12:38 PM EDT.      Please note that portions of this note were completed with a voice recognition program.    Patient seen and examined on rounds with Tanisha Quezada.  Patient with abnormal blood cultures with suspicion for endocarditis.  Will schedule for KIMMIE for Monday    .Electronically signed by Ale Bedoya MD, 04/27/24, 3:14 PM EDT.

## 2024-04-27 NOTE — PROGRESS NOTES
[facility] HOSPITALIST PROGRESS NOTE     Patient Identification:  Name:  Jes Moreno  Age:  38 y.o.  Sex:  female  :  1985  MRN:  [id number]  Visit Number:  [id number]  ROOM: [address]     Primary Care Provider:  Juany Sheehan APRN    Length of stay in inpatient status:  7    Subjective     Chief Compliant:    Chief Complaint   Patient presents with    Altered Mental Status       History of Presenting Illness:    Patient reported feeling stronger today and was able to get up and walk around the room today. Denied shortness of breath or productive cough.    Objective     Current Hospital Meds:atorvastatin, 20 mg, Oral, Nightly  buprenorphine-naloxone, 3 tablet, Sublingual, Daily  buPROPion XL, 450 mg, Oral, Daily  cetirizine, 10 mg, Oral, Daily  chlorhexidine, 15 mL, Mouth/Throat, Q12H  enoxaparin, 40 mg, Subcutaneous, Q24H  FLUoxetine, 20 mg, Oral, Daily  FLUoxetine, 40 mg, Oral, Daily  furosemide, 20 mg, Oral, Daily  gabapentin, 800 mg, Oral, Q8H  insulin glargine, 10 Units, Subcutaneous, Daily  insulin lispro, 2-9 Units, Subcutaneous, 4x Daily AC & at Bedtime  levothyroxine, 200 mcg, Oral, Daily  metroNIDAZOLE, 500 mg, Oral, Q8H  montelukast, 10 mg, Oral, Nightly  mupirocin, 1 Application, Topical, Q12H  nicotine, 1 patch, Transdermal, Q24H  OLANZapine zydis, 10 mg, Oral, Daily  pantoprazole, 40 mg, Oral, Q AM  rOPINIRole, 2 mg, Oral, Nightly  senna-docusate sodium, 2 tablet, Oral, BID  sodium chloride, 10 mL, Intravenous, Q12H  sodium chloride, 10 mL, Intravenous, Q12H  sodium chloride, 10 mL, Intravenous, Q12H  topiramate, 25 mg, Oral, Daily  vancomycin, 1,750 mg, Intravenous, Q12H    dexmedetomidine, 0.2-1.5 mcg/kg/hr, Last Rate: Stopped (24 0941)  dextrose 5 % and sodium chloride 0.45 %, 150 mL/hr  dextrose 5 % and sodium chloride 0.9 %, 150 mL/hr  sodium chloride, 250 mL/hr  sodium chloride, 250 mL/hr        Current Antimicrobial Therapy:  Anti-Infectives (From admission, onward)       Ordered     Dose/Rate Route Frequency Start Stop    04/25/24 1424  vancomycin 1750 mg/500 mL 0.9% NS IVPB ([facility])        Ordering Provider: Radha Vela, PharmD    1,750 mg  over 105 Minutes Intravenous Every 12 Hours 04/25/24 1500 04/29/24 0259    04/24/24 1929  metroNIDAZOLE (FLAGYL) tablet 500 mg        Ordering Provider: Jose Johns MD    500 mg Oral Every 8 Hours Scheduled 04/24/24 2200 05/01/24 2159          Current Diuretic Therapy:  Diuretics (From admission, onward)      Ordered     Dose/Rate Route Frequency Start Stop    04/19/24 1726  furosemide (LASIX) tablet 20 mg        Ordering Provider: Jono Valencia DO    20 mg Oral Daily 04/20/24 0900            ----------------------------------------------------------------------------------------------------------------------  Vital Signs:  Temp:  [97.9 °F (36.6 °C)-98.6 °F (37 °C)] 98.3 °F (36.8 °C)  Heart Rate:  [66-99] 96  Resp:  [14-21] 18  BP: (103-137)/(64-87) 131/80  SpO2:  [94 %-99 %] 95 %  on   ;   Device (Oxygen Therapy): room air  Body mass index is 23.58 kg/m².    Wt Readings from Last 3 Encounters:   04/25/24 68.3 kg (150 lb 9.6 oz)   02/21/24 66.1 kg (145 lb 12.8 oz)   08/13/23 73.5 kg (162 lb)     Intake & Output (last 3 days)         04/24 0701  04/25 0700 04/25 0701  04/26 0700 04/26 0701  04/27 0700    P.O.  1250 480    I.V. (mL/kg)       Other 10      IV Piggyback 500 1300 500    Total Intake(mL/kg) 510 (6.4) 2550 (31.9) 980 (12.3)    Urine (mL/kg/hr) 2500 (1.3) 4450 (2.3) 900 (0.8)    Emesis/NG output 0      Stool 0 0 0    Total Output 2500 4450 900    Net -1990 -1900 +80           Urine Unmeasured Occurrence   4 x    Stool Unmeasured Occurrence 0 x 2 x 3 x    Emesis Unmeasured Occurrence 0 x            Diet: Regular/House, Diabetic; Consistent Carbohydrate; No Straw; Texture: Soft to Chew (NDD 3); Soft to Chew: Whole Meat; Fluid Consistency: Nectar  Thick  ----------------------------------------------------------------------------------------------------------------------  Physical exam:   Constitutional:  Well-developed and well-nourished.  No acute distress. Up standing next to the bed, about to walk to the bathroom.    HENT:  Head:  Normocephalic and atraumatic.    Pulmonary/Chest:  Normal rate and effort. No cough noted during exam today. Voice is still hoarse but improving.  Musculoskeletal:  No deformity.    Neurological: Awake, alert, no focal deficit on gross examination. No slurred speech or facial droop.   Skin:  Skin is warm and dry. No rash noted. No pallor.   Peripheral vascular:  No cyanosis  Edited by: Madalyn Villalba DO at 4/26/2024 2137  ----------------------------------------------------------------------------------------------------------------------  Results from last 7 days   Lab Units 04/26/24 0052 04/25/24  0100 04/24/24  0132   WBC 10*3/mm3 8.61 8.71 12.23*   HEMOGLOBIN g/dL 10.3* 9.6* 9.9*   HEMATOCRIT % 32.7* 31.2* 30.1*   MCV fL 91.9 92.6 91.5   MCHC g/dL 31.5 30.8* 32.9   PLATELETS 10*3/mm3 457* 422 403         Results from last 7 days   Lab Units 04/26/24 0052 04/25/24  0100 04/24/24  0132 04/23/24  0029 04/22/24  0722 04/22/24  0407 04/22/24  0022 04/20/24  0329 04/20/24  0026   SODIUM mmol/L 144 141 143   < > 139 140 140   < > 141  141   POTASSIUM mmol/L 3.3* 3.9 3.8   < > 4.2 4.3 4.2   < > 5.0  5.0   MAGNESIUM mg/dL 1.7  --   --   --  1.8 1.8 1.9   < > 2.1   CHLORIDE mmol/L 107 109* 110*   < > 109* 111* 109*   < > 114*  114*   CO2 mmol/L 28.7 26.1 24.8   < > 22.0 21.8* 20.5*   < > 19.1*  19.1*   BUN mg/dL 7 9 8   < > 2* 2* 2*   < > 15  15   CREATININE mg/dL 0.57 0.51* 0.54*   < > 0.59 0.60 0.62   < > 0.72  0.72   CALCIUM mg/dL 8.7 8.1* 8.3*   < > 7.8* 7.7* 7.6*   < > 8.3*  8.3*   PHOSPHORUS mg/dL  --   --   --   --  4.0 4.1 4.1   < > 2.7   GLUCOSE mg/dL 55* 211* 128*   < > 159* 157* 139*   < > 134*  134*   ALBUMIN  "g/dL  --  2.5*  --   --   --   --   --   --  3.0*   BILIRUBIN mg/dL  --  0.2  --   --   --   --   --   --  <0.2   ALK PHOS U/L  --  114  --   --   --   --   --   --  112   AST (SGOT) U/L  --  19  --   --   --   --   --   --  26   ALT (SGPT) U/L  --  16  --   --   --   --   --   --  22    < > = values in this interval not displayed.   Estimated Creatinine Clearance: 147.1 mL/min (by C-G formula based on SCr of 0.57 mg/dL).  No results found for: \"AMMONIA\"          Results from last 7 days   Lab Units 04/23/24  0029   TRIGLYCERIDES mg/dL 44     Glucose   Date/Time Value Ref Range Status   04/26/2024 2129 165 (H) 70 - 130 mg/dL Final   04/26/2024 1559 122 70 - 130 mg/dL Final   04/26/2024 1204 362 (H) 70 - 130 mg/dL Final   04/26/2024 0704 145 (H) 70 - 130 mg/dL Final   04/26/2024 0621 65 (L) 70 - 130 mg/dL Final   04/25/2024 2258 81 70 - 130 mg/dL Final   04/25/2024 2107 76 70 - 130 mg/dL Final   04/25/2024 1604 74 70 - 130 mg/dL Final     Lab Results   Component Value Date    TSH 0.732 08/12/2023    FREET4 1.80 (H) 06/20/2021     Lab Results   Component Value Date    PREGTESTUR Negative 08/30/2020     Pain Management Panel  More data exists         Latest Ref Rng & Units 4/19/2024 2/21/2024   Pain Management Panel   Amphetamine, Urine Qual Negative Positive  Negative    Barbiturates Screen, Urine Negative Negative  Negative    Benzodiazepine Screen, Urine Negative Positive  Negative    Buprenorphine, Screen, Urine Negative Positive  Positive    Cocaine Screen, Urine Negative Negative  Negative    Fentanyl, Urine Negative Negative  Negative    Methadone Screen , Urine Negative Negative  Negative    Methamphetamine, Ur Negative Positive  Negative      Brief Urine Lab Results  (Last result in the past 365 days)        Color   Clarity   Blood   Leuk Est   Nitrite   Protein   CREAT   Urine HCG        04/24/24 1220 Yellow   Cloudy   Negative   Moderate (2+)   Positive   Negative                 Blood Culture   Date Value " "Ref Range Status   04/24/2024 No growth at 2 days  Preliminary   04/24/2024 No growth at 2 days  Preliminary   04/22/2024 Staphylococcus aureus, MRSA (C)  Final     Comment:       Infectious disease consultation is highly recommended to rule out distant foci of infection.  Methicillin resistant Staphylococcus aureus, Patient may be an isolation risk.   04/22/2024 Staphylococcus aureus, MRSA (C)  Final     Comment:       Infectious disease consultation is highly recommended to rule out distant foci of infection.  Methicillin resistant Staphylococcus aureus, Patient may be an isolation risk.     Urine Culture   Date Value Ref Range Status   04/24/2024 No growth  Final     No results found for: \"WOUNDCX\"  No results found for: \"STOOLCX\"  No results found for: \"RESPCX\"  No results found for: \"AFBCX\"        I have personally looked at the labs and they are summarized above.  ----------------------------------------------------------------------------------------------------------------------  Detailed radiology reports for the last 24 hours:  Imaging Results (Last 24 Hours)       Procedure Component Value Units Date/Time    FL Video Swallow Single Contrast [[id number]] Collected: 04/26/24 1302     Updated: 04/26/24 1305    Narrative:      EXAMINATION: FL VIDEO SWALLOW SINGLE-CONTRAST-      CLINICAL INDICATION:     Aspiration r/o; F19.929-Other psychoactive  substance use, unspecified with intoxication, unspecified; E10.10-Type 1  diabetes mellitus with ketoacidosis without coma; J96.00-Acute  respiratory failure, unspecified whether with hypoxia or hypercapnia;  A41.9-Sepsis, unspecified organism     TECHNIQUE: Modified barium swallow was performed utilizing video  fluoroscopy in conjunction with the Speech Pathology team. The patient  ingested multiple barium media including thin barium, nectar, and honey  liquid as well as barium pudding and a barium pudding coated cracker.      FLUOROSCOPY TIME: 1.5 minutes   "   COMPARISON: NONE      FINDINGS:   Gross aspiration of nectar thick and thin liquids during the swallow.       Impression:      1. Gross aspiration of nectar thick and thin liquids.  2. Please see dysphasia notes for further details.        This report was finalized on 4/26/2024 1:03 PM by Dr. Madhu Crockett MD.             Assessment & Plan    #Acute encephalopathy  #Respiratory failure  #Polysubstance abuse  - Initially intubated for airway protection.  Now extubated and stable on room air.  - Pulmonology following, appreciate assistance.   - In the setting of chronic tobacco use there is possibility of undiagnosed COPD.  Received a one-time dose of Solu-Medrol 80 mg IV this admission.  Continue nebulizer treatments.  Continue Mucinex.  - Patient's mother wishes to pursue Nik's Law as patient has a long history of substance use and she is very worried for her daughter's safety. Social work/case management consulted to assist with this process.   - CXR 4/23 showed improvement in volume overload. Echo from 2019 reviewed which showed LV EF 61-65%, normal LV systolic function. Repeat echo this admission showed normal LV systolic function, LV EF 66-70%, grade I diastolic impaired relaxation.  - Re-evaluated by SLP today and upgraded to nectar thick liquids, soft to chew textures.    # MRSA bacteremia  -Blood cultures from admission positive in 2 out of 2 samples for MRSA per BC ID.  Vancomycin started and infectious disease consulted, appreciate assistance.  Follow-up repeat blood culture results, currently no growth at 2 days.  Transthoracic echo above did not show any evidence of vegetation. Since patient has a murmur noted on exam, loudest at mitral location, KIMMIE recommended by ID to rule out endocarditis. Will consult Cardiology.    # Trichomonas vaginalis infection  Green vaginal discharge noted by nursing staff.  STI PCR panel ordered and was positive for trichomonas vaginalis.  Continue Flagyl (day #3).      #DKA without coma  #Type I diabetes   -DKA now resolved.  Goal blood sugar 140-180. Monitor accuchecks.  Transitioned from IV to subcutaneous basal/bolus regimen.  Continue adjusting doses for appropriate glucose control. Patient had hypoglycemia this morning so basal dose was decreased to 10 units. Continue sliding scale insulin with meals. Will likely need to increase doses as her PO intake increases, she is still not eating very much.  - Hemoglobin A1c was 9.0 this admission indicating uncontrolled glucose in outpatient setting, but was improved from 10.2 in August 2023.  Edited by: Madalyn Villalba DO at 4/26/2024 2137    VTE Prophylaxis:   Mechanical Order History:       None          Pharmalogical Order History:        Ordered     Dose Route Frequency Stop    04/19/24 1633  Enoxaparin Sodium (LOVENOX) syringe 40 mg         40 mg SC Every 24 Hours --                    Dispo: pending clinical course    Madalyn Villalba DO  [facility]  04/26/24  21:37 EDT

## 2024-04-28 LAB
AMPHET+METHAMPHET UR QL: NEGATIVE
AMPHETAMINES UR QL: NEGATIVE
BARBITURATES UR QL SCN: NEGATIVE
BENZODIAZ UR QL SCN: NEGATIVE
BUPRENORPHINE SERPL-MCNC: POSITIVE NG/ML
CANNABINOIDS SERPL QL: NEGATIVE
COCAINE UR QL: NEGATIVE
FENTANYL UR-MCNC: POSITIVE NG/ML
GLUCOSE BLDC GLUCOMTR-MCNC: 132 MG/DL (ref 70–130)
GLUCOSE BLDC GLUCOMTR-MCNC: 259 MG/DL (ref 70–130)
GLUCOSE BLDC GLUCOMTR-MCNC: 273 MG/DL (ref 70–130)
GLUCOSE BLDC GLUCOMTR-MCNC: 301 MG/DL (ref 70–130)
GLUCOSE BLDC GLUCOMTR-MCNC: 462 MG/DL (ref 70–130)
GLUCOSE BLDC GLUCOMTR-MCNC: 533 MG/DL (ref 70–130)
GLUCOSE BLDC GLUCOMTR-MCNC: 83 MG/DL (ref 70–130)
GLUCOSE BLDC GLUCOMTR-MCNC: 93 MG/DL (ref 70–130)
METHADONE UR QL SCN: NEGATIVE
OPIATES UR QL: NEGATIVE
OXYCODONE UR QL SCN: NEGATIVE
PCP UR QL SCN: NEGATIVE
TRICYCLICS UR QL SCN: NEGATIVE
VANCOMYCIN TROUGH SERPL-MCNC: 17.2 MCG/ML (ref 5–20)

## 2024-04-28 PROCEDURE — 63710000001 INSULIN LISPRO (HUMAN) PER 5 UNITS: Performed by: STUDENT IN AN ORGANIZED HEALTH CARE EDUCATION/TRAINING PROGRAM

## 2024-04-28 PROCEDURE — 25010000002 VANCOMYCIN 5 G RECONSTITUTED SOLUTION: Performed by: NURSE PRACTITIONER

## 2024-04-28 PROCEDURE — 99232 SBSQ HOSP IP/OBS MODERATE 35: CPT | Performed by: NURSE PRACTITIONER

## 2024-04-28 PROCEDURE — 80202 ASSAY OF VANCOMYCIN: CPT

## 2024-04-28 PROCEDURE — 63710000001 INSULIN GLARGINE PER 5 UNITS: Performed by: STUDENT IN AN ORGANIZED HEALTH CARE EDUCATION/TRAINING PROGRAM

## 2024-04-28 PROCEDURE — 99231 SBSQ HOSP IP/OBS SF/LOW 25: CPT | Performed by: INTERNAL MEDICINE

## 2024-04-28 PROCEDURE — 82948 REAGENT STRIP/BLOOD GLUCOSE: CPT

## 2024-04-28 PROCEDURE — 99232 SBSQ HOSP IP/OBS MODERATE 35: CPT | Performed by: STUDENT IN AN ORGANIZED HEALTH CARE EDUCATION/TRAINING PROGRAM

## 2024-04-28 PROCEDURE — 80307 DRUG TEST PRSMV CHEM ANLYZR: CPT | Performed by: STUDENT IN AN ORGANIZED HEALTH CARE EDUCATION/TRAINING PROGRAM

## 2024-04-28 PROCEDURE — 94761 N-INVAS EAR/PLS OXIMETRY MLT: CPT

## 2024-04-28 PROCEDURE — 25010000002 ENOXAPARIN PER 10 MG: Performed by: INTERNAL MEDICINE

## 2024-04-28 PROCEDURE — 94799 UNLISTED PULMONARY SVC/PX: CPT

## 2024-04-28 PROCEDURE — 25810000003 SODIUM CHLORIDE 0.9 % SOLUTION: Performed by: NURSE PRACTITIONER

## 2024-04-28 PROCEDURE — 94664 DEMO&/EVAL PT USE INHALER: CPT

## 2024-04-28 RX ADMIN — FUROSEMIDE 20 MG: 20 TABLET ORAL at 08:59

## 2024-04-28 RX ADMIN — Medication 10 ML: at 09:11

## 2024-04-28 RX ADMIN — ACETAMINOPHEN 650 MG: 325 TABLET ORAL at 10:46

## 2024-04-28 RX ADMIN — FLUOXETINE HYDROCHLORIDE 40 MG: 20 CAPSULE ORAL at 08:59

## 2024-04-28 RX ADMIN — METRONIDAZOLE 500 MG: 250 TABLET ORAL at 21:07

## 2024-04-28 RX ADMIN — INSULIN LISPRO 6 UNITS: 100 INJECTION, SOLUTION INTRAVENOUS; SUBCUTANEOUS at 06:34

## 2024-04-28 RX ADMIN — MONTELUKAST SODIUM 10 MG: 10 TABLET, COATED ORAL at 21:07

## 2024-04-28 RX ADMIN — GABAPENTIN 800 MG: 400 CAPSULE ORAL at 06:05

## 2024-04-28 RX ADMIN — FLUOXETINE HYDROCHLORIDE 20 MG: 20 CAPSULE ORAL at 08:59

## 2024-04-28 RX ADMIN — METRONIDAZOLE 500 MG: 250 TABLET ORAL at 13:54

## 2024-04-28 RX ADMIN — ACETAMINOPHEN 650 MG: 325 TABLET ORAL at 21:07

## 2024-04-28 RX ADMIN — LEVOTHYROXINE SODIUM 200 MCG: 100 TABLET ORAL at 08:59

## 2024-04-28 RX ADMIN — TOPIRAMATE 25 MG: 25 TABLET, FILM COATED ORAL at 08:59

## 2024-04-28 RX ADMIN — ACETAMINOPHEN 650 MG: 325 TABLET ORAL at 06:05

## 2024-04-28 RX ADMIN — Medication 10 ML: at 21:08

## 2024-04-28 RX ADMIN — INSULIN GLARGINE 10 UNITS: 100 INJECTION, SOLUTION SUBCUTANEOUS at 08:58

## 2024-04-28 RX ADMIN — METRONIDAZOLE 500 MG: 250 TABLET ORAL at 06:05

## 2024-04-28 RX ADMIN — VANCOMYCIN HYDROCHLORIDE 1500 MG: 5 INJECTION, POWDER, LYOPHILIZED, FOR SOLUTION INTRAVENOUS at 02:57

## 2024-04-28 RX ADMIN — ATORVASTATIN CALCIUM 20 MG: 20 TABLET, FILM COATED ORAL at 21:07

## 2024-04-28 RX ADMIN — PANTOPRAZOLE SODIUM 40 MG: 40 TABLET, DELAYED RELEASE ORAL at 06:05

## 2024-04-28 RX ADMIN — ROPINIROLE HYDROCHLORIDE 2 MG: 1 TABLET, FILM COATED ORAL at 21:07

## 2024-04-28 RX ADMIN — GABAPENTIN 800 MG: 400 CAPSULE ORAL at 13:55

## 2024-04-28 RX ADMIN — BUPRENORPHINE HYDROCHLORIDE AND NALOXONE HYDROCHLORIDE DIHYDRATE 3 TABLET: 8; 2 TABLET SUBLINGUAL at 08:59

## 2024-04-28 RX ADMIN — Medication 10 ML: at 21:09

## 2024-04-28 RX ADMIN — VANCOMYCIN HYDROCHLORIDE 1500 MG: 5 INJECTION, POWDER, LYOPHILIZED, FOR SOLUTION INTRAVENOUS at 16:47

## 2024-04-28 RX ADMIN — OLANZAPINE 10 MG: 10 TABLET, ORALLY DISINTEGRATING ORAL at 08:59

## 2024-04-28 RX ADMIN — GABAPENTIN 800 MG: 400 CAPSULE ORAL at 21:07

## 2024-04-28 RX ADMIN — ENOXAPARIN SODIUM 40 MG: 40 INJECTION SUBCUTANEOUS at 17:02

## 2024-04-28 RX ADMIN — CETIRIZINE HYDROCHLORIDE 10 MG: 10 TABLET, FILM COATED ORAL at 08:59

## 2024-04-28 RX ADMIN — BUPROPION HYDROCHLORIDE 450 MG: 150 TABLET, EXTENDED RELEASE ORAL at 08:59

## 2024-04-28 RX ADMIN — MUPIROCIN 1 APPLICATION: 20 OINTMENT TOPICAL at 21:09

## 2024-04-28 RX ADMIN — INSULIN LISPRO 9 UNITS: 100 INJECTION, SOLUTION INTRAVENOUS; SUBCUTANEOUS at 17:01

## 2024-04-28 RX ADMIN — MUPIROCIN 1 APPLICATION: 20 OINTMENT TOPICAL at 09:12

## 2024-04-28 RX ADMIN — Medication 1 PATCH: at 10:38

## 2024-04-28 NOTE — PROGRESS NOTES
Chief Complaint:  Pulmonary and critical care is following for ventilator management and critical illness management        Subjective-  Overnight events reviewed.  All the labs medications ins and outs and vitals reviewed.  Resting in bed comfortably.  Respiratory status remained stable    Review of Systems:    Positive for generalized fatigue otherwise negative    Vital Signs  Temp:  [97.8 °F (36.6 °C)-99.5 °F (37.5 °C)] 97.8 °F (36.6 °C)  Heart Rate:  [] 70  Resp:  [12-27] 27  BP: ()/() 118/71  Body mass index is 26.24 kg/m².    Intake/Output Summary (Last 24 hours) at 4/28/2024 0910  Last data filed at 4/28/2024 0800  Gross per 24 hour   Intake 1604.18 ml   Output --   Net 1604.18 ml     I/O this shift:  In: 236 [P.O.:236]  Out: -     Physical Exam:-    General-not in any acute distress    HEENT-atraumatic neck-no visible JVD   Respiratory-not in any respiratory distress  Cardiovascular-  NSR     GI-grossly normal    CNS-nonfocal    Musculoskeletal -no edema  Extremities- no obvious deformity noticed     Psychiatric-mood good, good eye contact, Skin- no visible rash       Results Review:     I reviewed the patient's new clinical results.  Results from last 7 days   Lab Units 04/27/24  0047 04/26/24  0052 04/25/24  0100   WBC 10*3/mm3 11.42* 8.61 8.71   HEMOGLOBIN g/dL 11.3* 10.3* 9.6*   PLATELETS 10*3/mm3 565* 457* 422     Results from last 7 days   Lab Units 04/27/24  0047 04/26/24  0052 04/25/24  0100 04/23/24  0029 04/22/24  0722 04/22/24  0407   SODIUM mmol/L 143 144 141   < > 139 140   POTASSIUM mmol/L 4.0 3.3* 3.9   < > 4.2 4.3   CHLORIDE mmol/L 106 107 109*   < > 109* 111*   CO2 mmol/L 26.8 28.7 26.1   < > 22.0 21.8*   BUN mg/dL 11 7 9   < > 2* 2*   CREATININE mg/dL 0.73 0.57 0.51*   < > 0.59 0.60   CALCIUM mg/dL 9.0 8.7 8.1*   < > 7.8* 7.7*   GLUCOSE mg/dL 60* 55* 211*   < > 159* 157*   MAGNESIUM mg/dL  --  1.7  --   --  1.8 1.8    < > = values in this interval not displayed.      Lab Results   Component Value Date    INR 1.04 04/19/2024    INR 0.93 02/21/2024    INR 1.00 08/12/2023    PROTIME 14.1 04/19/2024    PROTIME 13.0 02/21/2024    PROTIME 13.7 08/12/2023     Results from last 7 days   Lab Units 04/25/24  0100   ALK PHOS U/L 114   BILIRUBIN mg/dL 0.2   ALT (SGPT) U/L 16   AST (SGOT) U/L 19           Imaging Results (Last 24 Hours)       ** No results found for the last 24 hours. **                 atorvastatin, 20 mg, Oral, Nightly  buprenorphine-naloxone, 3 tablet, Sublingual, Daily  buPROPion XL, 450 mg, Oral, Daily  cetirizine, 10 mg, Oral, Daily  chlorhexidine, 15 mL, Mouth/Throat, Q12H  enoxaparin, 40 mg, Subcutaneous, Q24H  FLUoxetine, 20 mg, Oral, Daily  FLUoxetine, 40 mg, Oral, Daily  furosemide, 20 mg, Oral, Daily  gabapentin, 800 mg, Oral, Q8H  insulin glargine, 10 Units, Subcutaneous, Daily  insulin lispro, 2-9 Units, Subcutaneous, 4x Daily AC & at Bedtime  levothyroxine, 200 mcg, Oral, Daily  metroNIDAZOLE, 500 mg, Oral, Q8H  montelukast, 10 mg, Oral, Nightly  mupirocin, 1 Application, Topical, Q12H  nicotine, 1 patch, Transdermal, Q24H  OLANZapine zydis, 10 mg, Oral, Daily  pantoprazole, 40 mg, Oral, Q AM  rOPINIRole, 2 mg, Oral, Nightly  senna-docusate sodium, 2 tablet, Oral, BID  sodium chloride, 10 mL, Intravenous, Q12H  sodium chloride, 10 mL, Intravenous, Q12H  sodium chloride, 10 mL, Intravenous, Q12H  topiramate, 25 mg, Oral, Daily  vancomycin, 1,500 mg, Intravenous, Q12H      dexmedetomidine, 0.2-1.5 mcg/kg/hr, Last Rate: Stopped (04/23/24 0941)  dextrose 5 % and sodium chloride 0.45 %, 150 mL/hr  dextrose 5 % and sodium chloride 0.9 %, 150 mL/hr  sodium chloride, 250 mL/hr  sodium chloride, 250 mL/hr        Medication Review:    Latest Reference Range & Units 04/19/24 10:02   pH, Arterial 7.350 - 7.450 pH units 7.321 (L)   pCO2, Arterial 35.0 - 45.0 mm Hg 37.4   pO2, Arterial 83.0 - 108.0 mm Hg 142.0 (H)   HCO3, Arterial 20.0 - 26.0 mmol/L 19.3 (L)   Base  Excess 0.0 - 2.0 mmol/L -6.2 (L)   O2 Saturation, Arterial 94.0 - 99.0 % >99.2 (H)   CO2 Content 22 - 33 mmol/L 20.4 (L)   A-a DO2 0.0 - 300.0 mmHg 54.3   Carboxyhemoglobin 0 - 5 % 0.9   Methemoglobin 0.00 - 3.00 % 0.20   Oxyhemoglobin 94 - 99 % 98.2   Hematocrit, Blood Gas 38.0 - 51.0 % 35.6 (L)   Hemoglobin, Blood Gas 13.5 - 17.5 g/dL 11.6 (L)   Site  Left Brachial   Saul's Test  N/A   Modality  Ventilator   FIO2 % 35   Ventilator Mode  VC/AC   Set Tidal Volume  450.00   Set Mech Resp Rate  18.0   PEEP  5.0   Barometric Pressure for Blood Gas mmHg 727   (L): Data is abnormally low  (H): Data is abnormally high  Microbiology Results (last 10 days)       Procedure Component Value - Date/Time    Chlamydia trachomatis, Neisseria gonorrhoeae, Trichomonas vaginalis, PCR - Urine, Urine, Clean Catch [[id number]]  (Abnormal) Collected: 04/24/24 1721    Lab Status: Final result Specimen: Urine, Clean Catch Updated: 04/24/24 1915     Chlamydia DNA by PCR Not Detected     Neisseria gonorrhoeae by PCR Not Detected     Trichomonas vaginalis PCR Detected    Urine Culture - Urine, Straight Cath [[id number]]  (Normal) Collected: 04/24/24 1220    Lab Status: Final result Specimen: Urine from Straight Cath Updated: 04/26/24 1137     Urine Culture No growth    Blood Culture - Blood, Arm, Left [[id number]]  (Normal) Collected: 04/24/24 1039    Lab Status: Preliminary result Specimen: Blood from Arm, Left Updated: 04/27/24 1101     Blood Culture No growth at 3 days    Blood Culture - Blood, Arm, Right [[id number]]  (Normal) Collected: 04/24/24 1038    Lab Status: Preliminary result Specimen: Blood from Arm, Right Updated: 04/27/24 1101     Blood Culture No growth at 3 days    Blood Culture - Blood, Arm, Right [[id number]]  (Abnormal) Collected: 04/22/24 1532    Lab Status: Final result Specimen: Blood from Arm, Right Updated: 04/26/24 0653     Blood Culture Staphylococcus aureus, MRSA     Comment:   Infectious disease consultation is  highly recommended to rule out distant foci of infection.  Methicillin resistant Staphylococcus aureus, Patient may be an isolation risk.        Isolated from Aerobic Bottle     Gram Stain Aerobic Bottle Gram positive cocci in clusters    Narrative:      Refer to previous blood culture collected on 04/22/2024 1436 for MICs    No BCID performed, refer to previous blood culture specimen collected on  4/22/24    Blood Culture - Blood, Arm, Right [[id number]]  (Abnormal)  (Susceptibility) Collected: 04/22/24 1436    Lab Status: Final result Specimen: Blood from Arm, Right Updated: 04/26/24 0653     Blood Culture Staphylococcus aureus, MRSA     Comment:   Infectious disease consultation is highly recommended to rule out distant foci of infection.  Methicillin resistant Staphylococcus aureus, Patient may be an isolation risk.        Isolated from Aerobic Bottle     Gram Stain Aerobic Bottle Gram positive cocci in clusters    Susceptibility        Staphylococcus aureus, MRSA      ILIANA      Gentamicin Susceptible      Oxacillin Resistant      Rifampin Susceptible      Vancomycin Susceptible                           Blood Culture ID, PCR - Blood, Arm, Right [[id number]]  (Abnormal) Collected: 04/22/24 1436    Lab Status: Final result Specimen: Blood from Arm, Right Updated: 04/23/24 2208     BCID, PCR Staph aureus. mecA/C and MREJ (methicillin resistance gene) detected. Identification by BCID2 PCR.     BOTTLE TYPE Aerobic Bottle    Narrative:      Infectious disease consultation is highly recommended to rule out distant foci of infection.    Blood Culture - Blood, Blood, Central Line [[id number]]  (Abnormal) Collected: 04/19/24 0947    Lab Status: Final result Specimen: Blood, Central Line Updated: 04/22/24 0610     Blood Culture Staphylococcus epidermidis     Isolated from Aerobic Bottle     Gram Stain Aerobic Bottle Gram positive cocci    Narrative:      Refer to previous blood culture collected on 04/19/2024 0947 for MICs       Blood Culture - Blood, Arm, Right [[id number]]  (Abnormal)  (Susceptibility) Collected: 04/19/24 0947    Lab Status: Final result Specimen: Blood from Arm, Right Updated: 04/22/24 0610     Blood Culture Staphylococcus epidermidis     Isolated from Aerobic Bottle     Gram Stain Aerobic Bottle Gram positive cocci    Susceptibility        Staphylococcus epidermidis      ILIANA      Oxacillin Resistant      Vancomycin Susceptible                           Blood Culture ID, PCR - Blood, Arm, Right [[id number]]  (Abnormal) Collected: 04/19/24 0947    Lab Status: Final result Specimen: Blood from Arm, Right Updated: 04/20/24 0521     BCID, PCR Staph spp, not aureus or lugdunensis. Identification by BCID2 PCR.     BOTTLE TYPE Aerobic Bottle            Latest Reference Range & Units 04/19/24 11:43 04/19/24 13:01   Acetone Negative  Small !!    Amphetamine, Urine Qual Negative   Positive !   Barbiturates Screen, Urine Negative   Negative   Benzodiazepine Screen, Urine Negative   Positive !   Buprenorphine, Screen, Urine Negative   Positive !   Cocaine Screen, Urine Negative   Negative   Fentanyl, Urine Negative   Negative   Methamphetamine, Ur Negative   Positive !   Methadone Screen , Urine Negative   Negative   Opiate Screen Negative   Negative   Oxycodone Screen, Urine Negative   Negative   Phencyclidine (PCP), Urine Negative   Negative   THC Screen, Urine Negative   Negative   Tricyclic Antidepressants Screen Negative   Negative   !!: Data is critical  !: Data is abnormal  Assessment & Plan     Neurology-     CT head reviewed-no acute intracranial process identified.      Respiratory-  Extubated and doing well.  FiO2 requirement reviewed and adjusted for a sat of 88 to 92%.  Stable from pulmonary standpoint  Cardiology- hemodynamically -stable.    Continue to monitor HR- rate and rhythm, BP   Results for orders placed during the hospital encounter of 04/19/24    Adult Transthoracic Echo Complete W/ Cont if Necessary Per  Protocol    Interpretation Summary    Left ventricular systolic function is normal. Left ventricular ejection fraction appears to be 66 - 70%.    Left ventricular diastolic function is consistent with (grade I) impaired relaxation.    Estimated right ventricular systolic pressure from tricuspid regurgitation is normal (<35 mmHg).      Infectious disease recommended transesophageal echo.    Nephrology- Cr and BUN stable  I/O-reviewed.  Electrolytes reviewed and adjusted    GI- PPI prophylaxis  Continue current diet    Hematology- CBC  Hb  platelet  WBC    ID  Culture  And Antibiotics  Repeat blood cultures negative so far reviewed.  Repeat cultures drawn today and negative so far        Endocrinology-    .  Leading to hypoglycemia.  Adjust insulin.      Electrolytes-   Mag and phos       DVT prophylaxis-continue  Case d/w nurse and team   This service is provided via audio video tele medicine   [place] and patient [place]    Stable from pulmonary standpoint of view pulmonary and critical care will sign off.  Please feel free to call us with any questions      Acute encephalopathy               Tenzin Felton MD  04/28/24  09:10 EDT

## 2024-04-28 NOTE — PROGRESS NOTES
Pharmacokinetics Service Note:    Jes Moreno is a 38 y.o. female being managed by Pharmacy for vancomycin dosing.    Indication for Therapy: Bacteremia  Current Regimen: 1,500 mg Q 12 hrs  Current Day of Therapy and Ordered Duration: Day 5 through 5/8 at least  Level Reported and Timing with Respect to Previous Dose: 17.2 mcg/mL 11 hours post dose  Calculated Steady State AUC: 527 mg/L*hr  PAUC: 98%    Monitoring Planned: Level is acceptable and predicted AUC is within our goal. Will continue with this regimen for now and get another level several days from now, sooner if renal function fluctuates.    Thank you,  José Good  Pharmacy Resident  4/28/2024  14:38 EDT

## 2024-04-28 NOTE — PLAN OF CARE
Goal Outcome Evaluation:  Plan of Care Reviewed With: patient        Progress: improving  Outcome Evaluation: Pt A&Ox4 with significant other at bedside. VSS. On RA. NSR/Sinus Tach. Bed alarm refused and call light within reach. No new needs noted at this time.         Problem: Skin Injury Risk Increased  Goal: Skin Health and Integrity  Outcome: Ongoing, Progressing  Intervention: Optimize Skin Protection  Recent Flowsheet Documentation  Taken 4/27/2024 2114 by Aundrea Luke, RN  Pressure Reduction Techniques:   frequent weight shift encouraged   pressure points protected  Pressure Reduction Devices: pressure-redistributing mattress utilized  Skin Protection:   adhesive use limited   pulse oximeter probe site changed   transparent dressing maintained   tubing/devices free from skin contact     Problem: Adult Inpatient Plan of Care  Goal: Plan of Care Review  Outcome: Ongoing, Progressing  Flowsheets (Taken 4/28/2024 0405)  Progress: improving  Plan of Care Reviewed With: patient  Outcome Evaluation: Pt A&Ox4 with significant other at bedside. VSS. On RA. NSR/Sinus Tach. Bed alarm refused and call light within reach. No new needs noted at this time.  Goal: Patient-Specific Goal (Individualized)  Outcome: Ongoing, Progressing  Goal: Absence of Hospital-Acquired Illness or Injury  Outcome: Ongoing, Progressing  Intervention: Identify and Manage Fall Risk  Recent Flowsheet Documentation  Taken 4/28/2024 0300 by Aundrea Luke, RN  Safety Promotion/Fall Prevention:   safety round/check completed   room organization consistent   nonskid shoes/slippers when out of bed   lighting adjusted   fall prevention program maintained   clutter free environment maintained   assistive device/personal items within reach   activity supervised  Taken 4/28/2024 0100 by Aundrea Luke, RN  Safety Promotion/Fall Prevention:   safety round/check completed   room organization consistent   nonskid shoes/slippers when out of bed    lighting adjusted   fall prevention program maintained   clutter free environment maintained   assistive device/personal items within reach   activity supervised  Taken 4/27/2024 2300 by Aundrea Luke RN  Safety Promotion/Fall Prevention:   safety round/check completed   room organization consistent   nonskid shoes/slippers when out of bed   lighting adjusted   fall prevention program maintained   clutter free environment maintained   assistive device/personal items within reach   activity supervised  Taken 4/27/2024 2100 by Aundrea Luke RN  Safety Promotion/Fall Prevention:   safety round/check completed   room organization consistent   nonskid shoes/slippers when out of bed   lighting adjusted   fall prevention program maintained   clutter free environment maintained   assistive device/personal items within reach   activity supervised  Taken 4/27/2024 1900 by Aundrea Luke RN  Safety Promotion/Fall Prevention:   safety round/check completed   room organization consistent   nonskid shoes/slippers when out of bed   lighting adjusted   fall prevention program maintained   clutter free environment maintained   assistive device/personal items within reach   activity supervised  Intervention: Prevent Skin Injury  Recent Flowsheet Documentation  Taken 4/27/2024 2114 by Aundrea Luke RN  Skin Protection:   adhesive use limited   pulse oximeter probe site changed   transparent dressing maintained   tubing/devices free from skin contact  Intervention: Prevent and Manage VTE (Venous Thromboembolism) Risk  Recent Flowsheet Documentation  Taken 4/27/2024 2114 by Aundrea Luke RN  VTE Prevention/Management: (see MAR) other (see comments)  Range of Motion: active ROM (range of motion) encouraged  Intervention: Prevent Infection  Recent Flowsheet Documentation  Taken 4/28/2024 0300 by Aundrea Luke RN  Infection Prevention:   hand hygiene promoted   rest/sleep promoted   single patient room  provided  Taken 4/28/2024 0100 by Aundrea Luke RN  Infection Prevention:   hand hygiene promoted   rest/sleep promoted   single patient room provided  Taken 4/27/2024 2300 by Aundrea Luke RN  Infection Prevention:   hand hygiene promoted   rest/sleep promoted   single patient room provided  Taken 4/27/2024 2100 by Aundrea Luke RN  Infection Prevention:   hand hygiene promoted   rest/sleep promoted   single patient room provided  Taken 4/27/2024 1900 by Aundrea Luke RN  Infection Prevention:   hand hygiene promoted   rest/sleep promoted   single patient room provided  Goal: Optimal Comfort and Wellbeing  Outcome: Ongoing, Progressing  Intervention: Monitor Pain and Promote Comfort  Recent Flowsheet Documentation  Taken 4/27/2024 2144 by Aundrea Luke RN  Pain Management Interventions: see MAR  Intervention: Provide Person-Centered Care  Recent Flowsheet Documentation  Taken 4/27/2024 2114 by Aundrea Luke RN  Trust Relationship/Rapport:   care explained   choices provided   thoughts/feelings acknowledged   reassurance provided  Goal: Readiness for Transition of Care  Outcome: Ongoing, Progressing     Problem: Fall Injury Risk  Goal: Absence of Fall and Fall-Related Injury  Outcome: Ongoing, Progressing  Intervention: Promote Injury-Free Environment  Recent Flowsheet Documentation  Taken 4/28/2024 0300 by Aundrea Luke RN  Safety Promotion/Fall Prevention:   safety round/check completed   room organization consistent   nonskid shoes/slippers when out of bed   lighting adjusted   fall prevention program maintained   clutter free environment maintained   assistive device/personal items within reach   activity supervised  Taken 4/28/2024 0100 by Aundrea Luke RN  Safety Promotion/Fall Prevention:   safety round/check completed   room organization consistent   nonskid shoes/slippers when out of bed   lighting adjusted   fall prevention program maintained   clutter free environment  maintained   assistive device/personal items within reach   activity supervised  Taken 4/27/2024 2300 by Aundrea Luke, RN  Safety Promotion/Fall Prevention:   safety round/check completed   room organization consistent   nonskid shoes/slippers when out of bed   lighting adjusted   fall prevention program maintained   clutter free environment maintained   assistive device/personal items within reach   activity supervised  Taken 4/27/2024 2100 by Aundrea Luke RN  Safety Promotion/Fall Prevention:   safety round/check completed   room organization consistent   nonskid shoes/slippers when out of bed   lighting adjusted   fall prevention program maintained   clutter free environment maintained   assistive device/personal items within reach   activity supervised  Taken 4/27/2024 1900 by Aundrea Luke RN  Safety Promotion/Fall Prevention:   safety round/check completed   room organization consistent   nonskid shoes/slippers when out of bed   lighting adjusted   fall prevention program maintained   clutter free environment maintained   assistive device/personal items within reach   activity supervised     Problem: Adjustment to Illness (Sepsis/Septic Shock)  Goal: Optimal Coping  Outcome: Ongoing, Progressing  Intervention: Optimize Psychosocial Adjustment to Illness  Recent Flowsheet Documentation  Taken 4/27/2024 2114 by Aundrea Luke, RN  Family/Support System Care:   support provided   self-care encouraged     Problem: Bleeding (Sepsis/Septic Shock)  Goal: Absence of Bleeding  Outcome: Ongoing, Progressing     Problem: Glycemic Control Impaired (Sepsis/Septic Shock)  Goal: Blood Glucose Level Within Desired Range  Outcome: Ongoing, Progressing     Problem: Infection Progression (Sepsis/Septic Shock)  Goal: Absence of Infection Signs and Symptoms  Outcome: Ongoing, Progressing  Intervention: Initiate Sepsis Management  Recent Flowsheet Documentation  Taken 4/28/2024 0300 by Aundrea Luke, RN  Infection  Prevention:   hand hygiene promoted   rest/sleep promoted   single patient room provided  Taken 4/28/2024 0100 by Aundrea Luke RN  Infection Prevention:   hand hygiene promoted   rest/sleep promoted   single patient room provided  Taken 4/27/2024 2300 by Aundrea Luke RN  Infection Prevention:   hand hygiene promoted   rest/sleep promoted   single patient room provided  Taken 4/27/2024 2100 by Aundrea Luke RN  Infection Prevention:   hand hygiene promoted   rest/sleep promoted   single patient room provided  Taken 4/27/2024 1900 by Aundrea Luke RN  Infection Prevention:   hand hygiene promoted   rest/sleep promoted   single patient room provided     Problem: Nutrition Impaired (Sepsis/Septic Shock)  Goal: Optimal Nutrition Intake  Outcome: Ongoing, Progressing     Problem: Communication Impairment (Mechanical Ventilation, Invasive)  Goal: Effective Communication  Outcome: Ongoing, Progressing     Problem: Device-Related Complication Risk (Mechanical Ventilation, Invasive)  Goal: Optimal Device Function  Outcome: Ongoing, Progressing     Problem: Inability to Wean (Mechanical Ventilation, Invasive)  Goal: Mechanical Ventilation Liberation  Outcome: Ongoing, Progressing     Problem: Nutrition Impairment (Mechanical Ventilation, Invasive)  Goal: Optimal Nutrition Delivery  Outcome: Ongoing, Progressing     Problem: Skin and Tissue Injury (Mechanical Ventilation, Invasive)  Goal: Absence of Device-Related Skin and Tissue Injury  Outcome: Ongoing, Progressing  Intervention: Maintain Skin and Tissue Health  Recent Flowsheet Documentation  Taken 4/27/2024 2114 by Aundrea Luke RN  Device Skin Pressure Protection: adhesive use limited     Problem: Ventilator-Induced Lung Injury (Mechanical Ventilation, Invasive)  Goal: Absence of Ventilator-Induced Lung Injury  Outcome: Ongoing, Progressing

## 2024-04-28 NOTE — NURSING NOTE
Pt continues to leave floor with lines in place. Pt has been educated to not leave the floor. Pt continues to ignore education with central line intact.

## 2024-04-28 NOTE — PROGRESS NOTES
[facility] HOSPITALIST PROGRESS NOTE     Patient Identification:  Name:  Jes Moreno  Age:  38 y.o.  Sex:  female  :  1985  MRN:  [id number]  Visit Number:  [id number]  ROOM: [place] [address]     Primary Care Provider:  Juany Sheehan APRN    Length of stay in inpatient status:  9    Subjective     Chief Compliant:    Chief Complaint   Patient presents with    Altered Mental Status       History of Presenting Illness:    Patient seen and examined today with her significant other present at bedside. She denies shortness of breath or other complaints. No acute events noted overnight.    Objective     Current Hospital Meds:atorvastatin, 20 mg, Oral, Nightly  buprenorphine-naloxone, 3 tablet, Sublingual, Daily  buPROPion XL, 450 mg, Oral, Daily  cetirizine, 10 mg, Oral, Daily  chlorhexidine, 15 mL, Mouth/Throat, Q12H  enoxaparin, 40 mg, Subcutaneous, Q24H  FLUoxetine, 20 mg, Oral, Daily  FLUoxetine, 40 mg, Oral, Daily  furosemide, 20 mg, Oral, Daily  gabapentin, 800 mg, Oral, Q8H  insulin glargine, 10 Units, Subcutaneous, Daily  insulin lispro, 2-9 Units, Subcutaneous, 4x Daily AC & at Bedtime  levothyroxine, 200 mcg, Oral, Daily  metroNIDAZOLE, 500 mg, Oral, Q8H  montelukast, 10 mg, Oral, Nightly  mupirocin, 1 Application, Topical, Q12H  nicotine, 1 patch, Transdermal, Q24H  OLANZapine zydis, 10 mg, Oral, Daily  pantoprazole, 40 mg, Oral, Q AM  rOPINIRole, 2 mg, Oral, Nightly  senna-docusate sodium, 2 tablet, Oral, BID  sodium chloride, 10 mL, Intravenous, Q12H  sodium chloride, 10 mL, Intravenous, Q12H  sodium chloride, 10 mL, Intravenous, Q12H  topiramate, 25 mg, Oral, Daily  vancomycin, 1,500 mg, Intravenous, Q12H    dexmedetomidine, 0.2-1.5 mcg/kg/hr, Last Rate: Stopped (24 0941)  dextrose 5 % and sodium chloride 0.45 %, 150 mL/hr  dextrose 5 % and sodium chloride 0.9 %, 150 mL/hr  sodium chloride, 250 mL/hr  sodium chloride, 250 mL/hr        Current Antimicrobial Therapy:  Anti-Infectives (From  admission, onward)      Ordered     Dose/Rate Route Frequency Start Stop    04/27/24 1032  vancomycin IVPB 1500 mg in 0.9% NaCl (Premix) 500 mL        Ordering Provider: Khushboo Howell APRN    1,500 mg  333.3 mL/hr over 90 Minutes Intravenous Every 12 Hours 04/27/24 1500 05/08/24 2359    04/24/24 1929  metroNIDAZOLE (FLAGYL) tablet 500 mg        Ordering Provider: Jose Johns MD    500 mg Oral Every 8 Hours Scheduled 04/24/24 2200 05/01/24 2159          Current Diuretic Therapy:  Diuretics (From admission, onward)      Ordered     Dose/Rate Route Frequency Start Stop    04/19/24 1726  furosemide (LASIX) tablet 20 mg        Ordering Provider: Jono Valencia DO    20 mg Oral Daily 04/20/24 0900            ----------------------------------------------------------------------------------------------------------------------  Vital Signs:  Temp:  [97.8 °F (36.6 °C)-99 °F (37.2 °C)] 98.9 °F (37.2 °C)  Heart Rate:  [] 104  Resp:  [12-27] 25  BP: ()/(42-84) 119/65  SpO2:  [93 %-100 %] 99 %  on   ;   Device (Oxygen Therapy): room air  Body mass index is 26.24 kg/m².    Wt Readings from Last 3 Encounters:   04/28/24 76 kg (167 lb 8.8 oz)   02/21/24 66.1 kg (145 lb 12.8 oz)   08/13/23 73.5 kg (162 lb)     Intake & Output (last 3 days)         04/25 0701 04/26 0700 04/26 0701  04/27 0700 04/27 0701  04/28 0700 04/28 0701 04/29 0700    P.O. 1250 960 720 826    I.V. (mL/kg)  501.6 (6.3) 1008.2 (12.6)     Other        IV Piggyback 1300 500      Total Intake(mL/kg) 2550 (31.9) 1961.6 (24.5) 1728.2 (21.6) 826 (10.3)    Urine (mL/kg/hr) 4450 (2.3) 900 (0.5)      Emesis/NG output  0      Stool 0 0      Total Output 4450 900      Net -1900 +1061.6 +1728.2 +826            Urine Unmeasured Occurrence  8 x 6 x 2 x    Stool Unmeasured Occurrence 2 x 4 x 1 x           Diet: Regular/House, Diabetic; Consistent Carbohydrate; No Straw; Texture: Soft to Chew (NDD 3); Soft to Chew: Whole Meat; Fluid  Consistency: Nectar Thick  NPO Diet NPO Type: Sips with Meds  ----------------------------------------------------------------------------------------------------------------------  Physical exam:   Constitutional:  Well-developed and well-nourished.  No acute distress.   HENT:  Head:  Normocephalic and atraumatic.    Cardiac: normal rate, regular rhythm  Respiratory: breath sounds clear in anterior lung fields bilaterally  Pulmonary/Chest:  Normal rate and effort. Voice is much stronger than earlier in the week.  Musculoskeletal:  No deformity.    Neurological: Awake, alert, no focal deficit on gross examination. No slurred speech or facial droop.   Skin:  Skin is warm and dry. No rash noted. No pallor.   Peripheral vascular:  No cyanosis  Edited by: Madalyn Villalba DO at 4/28/2024 1509  ----------------------------------------------------------------------------------------------------------------------  Results from last 7 days   Lab Units 04/27/24  0047 04/26/24  0052 04/25/24  0100   WBC 10*3/mm3 11.42* 8.61 8.71   HEMOGLOBIN g/dL 11.3* 10.3* 9.6*   HEMATOCRIT % 35.4 32.7* 31.2*   MCV fL 91.5 91.9 92.6   MCHC g/dL 31.9 31.5 30.8*   PLATELETS 10*3/mm3 565* 457* 422         Results from last 7 days   Lab Units 04/27/24  0047 04/26/24  0052 04/25/24  0100 04/23/24  0029 04/22/24  0722 04/22/24  0407 04/22/24  0022   SODIUM mmol/L 143 144 141   < > 139 140 140   POTASSIUM mmol/L 4.0 3.3* 3.9   < > 4.2 4.3 4.2   MAGNESIUM mg/dL  --  1.7  --   --  1.8 1.8 1.9   CHLORIDE mmol/L 106 107 109*   < > 109* 111* 109*   CO2 mmol/L 26.8 28.7 26.1   < > 22.0 21.8* 20.5*   BUN mg/dL 11 7 9   < > 2* 2* 2*   CREATININE mg/dL 0.73 0.57 0.51*   < > 0.59 0.60 0.62   CALCIUM mg/dL 9.0 8.7 8.1*   < > 7.8* 7.7* 7.6*   PHOSPHORUS mg/dL  --   --   --   --  4.0 4.1 4.1   GLUCOSE mg/dL 60* 55* 211*   < > 159* 157* 139*   ALBUMIN g/dL  --   --  2.5*  --   --   --   --    BILIRUBIN mg/dL  --   --  0.2  --   --   --   --    ALK PHOS U/L  --    "--  114  --   --   --   --    AST (SGOT) U/L  --   --  19  --   --   --   --    ALT (SGPT) U/L  --   --  16  --   --   --   --     < > = values in this interval not displayed.   Estimated Creatinine Clearance: 113.4 mL/min (by C-G formula based on SCr of 0.73 mg/dL).  No results found for: \"AMMONIA\"          Results from last 7 days   Lab Units 04/23/24  0029   TRIGLYCERIDES mg/dL 44     Glucose   Date/Time Value Ref Range Status   04/28/2024 1341 301 (H) 70 - 130 mg/dL Final   04/28/2024 1038 132 (H) 70 - 130 mg/dL Final   04/28/2024 0559 259 (H) 70 - 130 mg/dL Final   04/27/2024 2356 93 70 - 130 mg/dL Final   04/27/2024 2249 117 70 - 130 mg/dL Final   04/27/2024 2105 309 (H) 70 - 130 mg/dL Final   04/27/2024 1642 72 70 - 130 mg/dL Final   04/27/2024 1339 213 (H) 70 - 130 mg/dL Final     Lab Results   Component Value Date    TSH 0.732 08/12/2023    FREET4 1.80 (H) 06/20/2021     Lab Results   Component Value Date    PREGTESTUR Negative 08/30/2020     Pain Management Panel  More data exists         Latest Ref Rng & Units 4/19/2024 2/21/2024   Pain Management Panel   Amphetamine, Urine Qual Negative Positive  Negative    Barbiturates Screen, Urine Negative Negative  Negative    Benzodiazepine Screen, Urine Negative Positive  Negative    Buprenorphine, Screen, Urine Negative Positive  Positive    Cocaine Screen, Urine Negative Negative  Negative    Fentanyl, Urine Negative Negative  Negative    Methadone Screen , Urine Negative Negative  Negative    Methamphetamine, Ur Negative Positive  Negative      Brief Urine Lab Results  (Last result in the past 365 days)        Color   Clarity   Blood   Leuk Est   Nitrite   Protein   CREAT   Urine HCG        04/24/24 1220 Yellow   Cloudy   Negative   Moderate (2+)   Positive   Negative                 Blood Culture   Date Value Ref Range Status   04/24/2024 No growth at 4 days  Preliminary   04/24/2024 No growth at 4 days  Preliminary   04/22/2024 Staphylococcus aureus, MRSA (C) " " Final     Comment:       Infectious disease consultation is highly recommended to rule out distant foci of infection.  Methicillin resistant Staphylococcus aureus, Patient may be an isolation risk.   04/22/2024 Staphylococcus aureus, MRSA (C)  Final     Comment:       Infectious disease consultation is highly recommended to rule out distant foci of infection.  Methicillin resistant Staphylococcus aureus, Patient may be an isolation risk.     Urine Culture   Date Value Ref Range Status   04/24/2024 No growth  Final     No results found for: \"WOUNDCX\"  No results found for: \"STOOLCX\"  No results found for: \"RESPCX\"  No results found for: \"AFBCX\"        I have personally looked at the labs and they are summarized above.  ----------------------------------------------------------------------------------------------------------------------  Detailed radiology reports for the last 24 hours:  Imaging Results (Last 24 Hours)       ** No results found for the last 24 hours. **          Assessment & Plan    #Acute encephalopathy  #Respiratory failure  #Polysubstance abuse  #Difficult IV access  - Initially intubated for airway protection.  Now extubated and stable on room air.   - In the setting of chronic tobacco use there is possibility of undiagnosed COPD.  Received a one-time dose of Solu-Medrol 80 mg IV this admission.  Continue nebulizer treatments.  Continue Mucinex.  - Patient's mother wishes to pursue [employer]'s Law as patient has a long history of substance use and she is very worried for her daughter's safety. Social work/case management provided information to patient's mother. Can provide resources for substance use treatment if patient is interested in this after discharge.  - CXR 4/23 showed improvement in volume overload. Echo from 2019 reviewed which showed LV EF 61-65%, normal LV systolic function. Repeat echo this admission showed normal LV systolic function, LV EF 66-70%, grade I diastolic impaired " relaxation.  - Re-evaluated by SLP and upgraded to nectar thick liquids, soft to chew textures.   - Patient still has a central line because attempts at peripheral and ultrasound guided IV have been unsuccessful. Midline could be used if needed now as blood cultures have cleared, but she is high risk for misuse of the midline due to history of ongoing substance abuse, so it will not be safe to discharge her with it in place.    # MRSA bacteremia  -Blood cultures from admission positive in 2 out of 2 samples for MRSA per [facility] ID.  Vancomycin started and infectious disease consulted, appreciate assistance.  Follow-up repeat blood culture results, currently no growth at 4 days.  Transthoracic echo above did not show any evidence of vegetation. Since patient has a murmur noted on exam, loudest at mitral location, KIMMIE recommended by ID to rule out endocarditis. Cardiology consulted, appreciate assistance. Plan for KIMMIE Monday. Will keep NPO after midnight tonight.  - If no evidence of infective endocarditis then she may be able to get a dose of dalvance which would complete her course of treatment.    # Trichomonas vaginalis infection  Green vaginal discharge noted by nursing staff.  STI PCR panel ordered and was positive for trichomonas vaginalis.  Continue Flagyl (day #5).     #DKA without coma  #Type I diabetes   -DKA now resolved.  Goal blood sugar 140-180. Monitor accuchecks.  Transitioned from IV to subcutaneous basal/bolus regimen.  Continue adjusting doses for appropriate glucose control. Glucose has been fairly labile, so I will not increase her basal dose today, but if glucose is staying elevated then we can increase it. Continue sliding scale insulin. Will continue to cautiously adjust doses. Continue hypoglycemia protocol prn.  - Hemoglobin A1c was 9.0 this admission indicating uncontrolled glucose in outpatient setting, but was improved from 10.2 in August 2023.  Edited by: Madalyn Villalba DO at 4/28/2024  1509    VTE Prophylaxis:   Mechanical Order History:       None          Pharmalogical Order History:        Ordered     Dose Route Frequency Stop    04/19/24 6873  Enoxaparin Sodium (LOVENOX) syringe 40 mg         40 mg SC Every 24 Hours --                    Dispo: pending clinical progress, possible discharge within 48 hours depending on KIMMIE results    Madalyn Villalba DO  [facility]  04/28/24  15:09 EDT

## 2024-04-28 NOTE — PLAN OF CARE
Goal Outcome Evaluation:  Plan of Care Reviewed With: patient           Outcome Evaluation: Pt A&Ox4. Pt has been educated to stay on the floor. Pt refuses bed alarm and has been educated on the fall risks. Pt has all items in reach, will continue to monitor as long as pt will remain on monitor.

## 2024-04-28 NOTE — NURSING NOTE
Pt was not present in room at shift change. Pt was paged overhead to return to room. Report given to oncoming RN.

## 2024-04-28 NOTE — PROGRESS NOTES
PROGRESS NOTE         Patient Identification:  Name:  Jes Moreno  Age:  38 y.o.  Sex:  female  :  1985  MRN:  [id number]  Visit Number:  [id number]  Primary Care Provider:  Juany Sheehan APRN         LOS: 9 days       ----------------------------------------------------------------------------------------------------------------------  Subjective       Chief Complaints:    Altered Mental Status        Interval History:      Sitting up on side of bed.  KIMMIE planned for tomorrow.  Overall doing well today.  Significant other at bedside.  Primary RN at bedside.  Currently on room air with no apparent distress.  Denies shortness of breath or cough.  Afebrile, denies diarrhea.  WBC slightly elevated 11.42.    Review of Systems:    Constitutional: no fever, chills and night sweats.  Generalized fatigue.  Eyes: no eye drainage, itching or redness.  HEENT: no mouth sores, dysphagia or nose bleed.  Respiratory: no for shortness of breath, cough or production of sputum.  Cardiovascular: no chest pain, no palpitations, no orthopnea.  Gastrointestinal: no nausea, vomiting or diarrhea. No abdominal pain, hematemesis or rectal bleeding.  Genitourinary: no dysuria or polyuria.  Hematologic/lymphatic: no lymph node abnormalities, no easy bruising or easy bleeding.  Musculoskeletal: no muscle or joint pain.  Skin: No rash and no itching.  Neurological: no loss of consciousness, no seizure, no headache.  Behavioral/Psych: no depression or suicidal ideation.  Endocrine: no hot flashes.  Immunologic: negative.    ----------------------------------------------------------------------------------------------------------------------      Objective       [facility] [place] Meds:  atorvastatin, 20 mg, Oral, Nightly  buprenorphine-naloxone, 3 tablet, Sublingual, Daily  buPROPion XL, 450 mg, Oral, Daily  cetirizine, 10 mg, Oral, Daily  chlorhexidine, 15 mL, Mouth/Throat, Q12H  enoxaparin, 40 mg, Subcutaneous,  Q24H  FLUoxetine, 20 mg, Oral, Daily  FLUoxetine, 40 mg, Oral, Daily  furosemide, 20 mg, Oral, Daily  gabapentin, 800 mg, Oral, Q8H  insulin glargine, 10 Units, Subcutaneous, Daily  insulin lispro, 2-9 Units, Subcutaneous, 4x Daily AC & at Bedtime  levothyroxine, 200 mcg, Oral, Daily  metroNIDAZOLE, 500 mg, Oral, Q8H  montelukast, 10 mg, Oral, Nightly  mupirocin, 1 Application, Topical, Q12H  nicotine, 1 patch, Transdermal, Q24H  OLANZapine zydis, 10 mg, Oral, Daily  pantoprazole, 40 mg, Oral, Q AM  rOPINIRole, 2 mg, Oral, Nightly  senna-docusate sodium, 2 tablet, Oral, BID  sodium chloride, 10 mL, Intravenous, Q12H  sodium chloride, 10 mL, Intravenous, Q12H  sodium chloride, 10 mL, Intravenous, Q12H  topiramate, 25 mg, Oral, Daily  vancomycin, 1,500 mg, Intravenous, Q12H      dexmedetomidine, 0.2-1.5 mcg/kg/hr, Last Rate: Stopped (04/23/24 0941)  dextrose 5 % and sodium chloride 0.45 %, 150 mL/hr  dextrose 5 % and sodium chloride 0.9 %, 150 mL/hr  sodium chloride, 250 mL/hr  sodium chloride, 250 mL/hr      ----------------------------------------------------------------------------------------------------------------------    Vital Signs:  Temp:  [97.8 °F (36.6 °C)-99 °F (37.2 °C)] 98.9 °F (37.2 °C)  Heart Rate:  [] 104  Resp:  [12-27] 25  BP: ()/(42-84) 119/65  Mean Arterial Pressure (Non-Invasive) for the past 24 hrs (Last 3 readings):   Noninvasive MAP (mmHg)   04/28/24 1300 87   04/28/24 1200 97   04/28/24 1100 75     SpO2 Percentage    04/28/24 1200 04/28/24 1300 04/28/24 1400   SpO2: 97% 97% 99%     SpO2:  [93 %-100 %] 99 %  on   ;   Device (Oxygen Therapy): room air    Body mass index is 26.24 kg/m².  Wt Readings from Last 3 Encounters:   04/28/24 76 kg (167 lb 8.8 oz)   02/21/24 66.1 kg (145 lb 12.8 oz)   08/13/23 73.5 kg (162 lb)        Intake/Output Summary (Last 24 hours) at 4/28/2024 1425  Last data filed at 4/28/2024 1200  Gross per 24 hour   Intake 1954.18 ml   Output --   Net 1954.18 ml      Diet: Regular/House, Diabetic; Consistent Carbohydrate; No Straw; Texture: Soft to Chew (NDD 3); Soft to Chew: Whole Meat; Fluid Consistency: Nectar Thick  NPO Diet NPO Type: Sips with Meds  ----------------------------------------------------------------------------------------------------------------------      Physical Exam:    Constitutional:  Well-developed and well-nourished.  No respiratory distress.  On room air.   Sitting up on side of bed this morning.  Significant other at bedside.  Primary RN at bedside.  HENT:  Head: Normocephalic and atraumatic.  Mouth:  Moist mucous membranes.    Eyes:  Conjunctivae and EOM are normal.  No scleral icterus.  Neck:  Neck supple.  No JVD present.    Cardiovascular:  Normal rate, regular rhythm and normal heart sounds with 3/6 systolic ejection murmur. No edema.  Pulmonary/Chest:  No respiratory distress, no wheezes, no crackles, with normal breath sounds and good air movement.  Abdominal:  Soft.  Bowel sounds are normal.  No distension and no tenderness.  Left chest wall CBC without signs of infection.  Morton catheter in place.  Musculoskeletal:  No edema, no tenderness, and no deformity.  No swelling or redness of joints.  Neurological:  Alert and oriented to person, place, and time.  No facial droop.  No slurred speech.   Skin:  Skin is warm and dry.  No rash noted.  No pallor.   Psychiatric:  Normal mood and affect.  Behavior is normal.        ----------------------------------------------------------------------------------------------------------------------          Results from last 7 days   Lab Units 04/23/24  0029   TRIGLYCERIDES mg/dL 44           Results from last 7 days   Lab Units 04/27/24  0047 04/26/24  0052 04/25/24  0100   WBC 10*3/mm3 11.42* 8.61 8.71   HEMOGLOBIN g/dL 11.3* 10.3* 9.6*   HEMATOCRIT % 35.4 32.7* 31.2*   MCV fL 91.5 91.9 92.6   MCHC g/dL 31.9 31.5 30.8*   PLATELETS 10*3/mm3 565* 457* 422     Results from last 7 days   Lab Units  "04/27/24  0047 04/26/24  0052 04/25/24  0100 04/23/24  0029 04/22/24  0722 04/22/24  0407   SODIUM mmol/L 143 144 141   < > 139 140   POTASSIUM mmol/L 4.0 3.3* 3.9   < > 4.2 4.3   MAGNESIUM mg/dL  --  1.7  --   --  1.8 1.8   CHLORIDE mmol/L 106 107 109*   < > 109* 111*   CO2 mmol/L 26.8 28.7 26.1   < > 22.0 21.8*   BUN mg/dL 11 7 9   < > 2* 2*   CREATININE mg/dL 0.73 0.57 0.51*   < > 0.59 0.60   CALCIUM mg/dL 9.0 8.7 8.1*   < > 7.8* 7.7*   GLUCOSE mg/dL 60* 55* 211*   < > 159* 157*   ALBUMIN g/dL  --   --  2.5*  --   --   --    BILIRUBIN mg/dL  --   --  0.2  --   --   --    ALK PHOS U/L  --   --  114  --   --   --    AST (SGOT) U/L  --   --  19  --   --   --    ALT (SGPT) U/L  --   --  16  --   --   --     < > = values in this interval not displayed.   Estimated Creatinine Clearance: 113.4 mL/min (by C-G formula based on SCr of 0.73 mg/dL).  No results found for: \"AMMONIA\"    Glucose   Date/Time Value Ref Range Status   04/28/2024 1341 301 (H) 70 - 130 mg/dL Final   04/28/2024 1038 132 (H) 70 - 130 mg/dL Final   04/28/2024 0559 259 (H) 70 - 130 mg/dL Final   04/27/2024 2356 93 70 - 130 mg/dL Final   04/27/2024 2249 117 70 - 130 mg/dL Final   04/27/2024 2105 309 (H) 70 - 130 mg/dL Final   04/27/2024 1642 72 70 - 130 mg/dL Final   04/27/2024 1339 213 (H) 70 - 130 mg/dL Final     Lab Results   Component Value Date    HGBA1C 9.00 (H) 04/19/2024     Lab Results   Component Value Date    TSH 0.732 08/12/2023    FREET4 1.80 (H) 06/20/2021       Blood Culture   Date Value Ref Range Status   04/24/2024 No growth at 24 hours  Preliminary   04/24/2024 No growth at 24 hours  Preliminary   04/22/2024 Staphylococcus aureus, MRSA (C)  Preliminary     Comment:       Infectious disease consultation is highly recommended to rule out distant foci of infection.  Methicillin resistant Staphylococcus aureus, Patient may be an isolation risk.   04/22/2024 Staphylococcus aureus, MRSA (C)  Preliminary     Comment:       Infectious disease " "consultation is highly recommended to rule out distant foci of infection.  Methicillin resistant Staphylococcus aureus, Patient may be an isolation risk.   04/19/2024 Staphylococcus epidermidis (C)  Final   04/19/2024 Staphylococcus epidermidis (C)  Final     Urine Culture   Date Value Ref Range Status   04/24/2024 No growth  Preliminary     No results found for: \"WOUNDCX\"  No results found for: \"STOOLCX\"  No results found for: \"RESPCX\"  Pain Management Panel  More data exists         Latest Ref Rng & Units 4/19/2024 2/21/2024   Pain Management Panel   Amphetamine, Urine Qual Negative Positive  Negative    Barbiturates Screen, Urine Negative Negative  Negative    Benzodiazepine Screen, Urine Negative Positive  Negative    Buprenorphine, Screen, Urine Negative Positive  Positive    Cocaine Screen, Urine Negative Negative  Negative    Fentanyl, Urine Negative Negative  Negative    Methadone Screen , Urine Negative Negative  Negative    Methamphetamine, Ur Negative Positive  Negative          ----------------------------------------------------------------------------------------------------------------------  Imaging Results (Last 24 Hours)       ** No results found for the last 24 hours. **            ----------------------------------------------------------------------------------------------------------------------    Pertinent Infectious Disease Results                Assessment/Plan       Assessment       MRSA bacteremia       Plan      Sitting up on side of bed.  KIMMIE planned for tomorrow.  Overall doing well today.  Significant other at bedside.  Primary RN at bedside.  Currently on room air with no apparent distress.  Denies shortness of breath or cough.  Afebrile, denies diarrhea.  WBC slightly elevated 11.42.      Trichomonas vaginalis diagnosed via PCR on 4/24/2024.     Blood cultures from 4/24/2024 show no growth thus far.     Recommend to continue metronidazole 500 mg p.o. every 8 hours x 7 days through " 5/1/2024 for treatment of trichomonas.  Recommend to continue vancomycin pharmacy to dose for MRSA and Staphylococcus epidermis bacteremia.  Although bacteremia cleared quickly in the setting of known IV drug use and murmur recommend KIMMIE when feasible.  If KIMMIE is negative Dalvance may be an option in the setting of patient's history of substance abuse.  We will continue to follow closely and adjust antibiotic therapy as needed.      ANTIMICROBIAL THERAPY    metroNIDAZOLE - 250 MG  Vancomycin HCl in NaCl solution - 1.5-0.9 GM/500ML-%     Code Status:   Code Status and Medical Interventions:   Ordered at: 04/19/24 1230     Code Status (Patient has no pulse and is not breathing):    CPR (Attempt to Resuscitate)     Medical Interventions (Patient has pulse or is breathing):    Full Support       GAURAV Baumann  04/28/24  14:25 EDT   You can access the FollowMyHealth Patient Portal offered by [facility] by registering at the following website: [email or web address]. By joining [employer]’s FollowMyHealth portal, you will also be able to view your health information using other applications (apps) compatible with our system.

## 2024-04-29 ENCOUNTER — ANESTHESIA (OUTPATIENT)
Dept: TELEMETRY | Facility: HOSPITAL | Age: 39
DRG: 894 | End: 2024-04-29
Payer: MEDICAID

## 2024-04-29 ENCOUNTER — ANESTHESIA EVENT (OUTPATIENT)
Dept: TELEMETRY | Facility: HOSPITAL | Age: 39
DRG: 894 | End: 2024-04-29
Payer: MEDICAID

## 2024-04-29 ENCOUNTER — APPOINTMENT (OUTPATIENT)
Dept: CARDIOLOGY | Facility: HOSPITAL | Age: 39
DRG: 894 | End: 2024-04-29
Payer: MEDICAID

## 2024-04-29 LAB
ANION GAP SERPL CALCULATED.3IONS-SCNC: 9.6 MMOL/L (ref 5–15)
BACTERIA SPEC AEROBE CULT: NORMAL
BACTERIA SPEC AEROBE CULT: NORMAL
BUN SERPL-MCNC: 14 MG/DL (ref 6–20)
BUN/CREAT SERPL: 19.7 (ref 7–25)
CALCIUM SPEC-SCNC: 8.2 MG/DL (ref 8.6–10.5)
CHLORIDE SERPL-SCNC: 103 MMOL/L (ref 98–107)
CO2 SERPL-SCNC: 27.4 MMOL/L (ref 22–29)
CREAT SERPL-MCNC: 0.71 MG/DL (ref 0.57–1)
DEPRECATED RDW RBC AUTO: 43.8 FL (ref 37–54)
EGFRCR SERPLBLD CKD-EPI 2021: 113.2 ML/MIN/1.73
ERYTHROCYTE [DISTWIDTH] IN BLOOD BY AUTOMATED COUNT: 13.2 % (ref 12.3–15.4)
GLUCOSE BLDC GLUCOMTR-MCNC: 155 MG/DL (ref 70–130)
GLUCOSE BLDC GLUCOMTR-MCNC: 205 MG/DL (ref 70–130)
GLUCOSE BLDC GLUCOMTR-MCNC: 253 MG/DL (ref 70–130)
GLUCOSE BLDC GLUCOMTR-MCNC: 333 MG/DL (ref 70–130)
GLUCOSE BLDC GLUCOMTR-MCNC: 345 MG/DL (ref 70–130)
GLUCOSE BLDC GLUCOMTR-MCNC: 356 MG/DL (ref 70–130)
GLUCOSE BLDC GLUCOMTR-MCNC: 367 MG/DL (ref 70–130)
GLUCOSE BLDC GLUCOMTR-MCNC: 43 MG/DL (ref 70–130)
GLUCOSE BLDC GLUCOMTR-MCNC: 89 MG/DL (ref 70–130)
GLUCOSE SERPL-MCNC: 299 MG/DL (ref 65–99)
HCT VFR BLD AUTO: 33.5 % (ref 34–46.6)
HGB BLD-MCNC: 10.5 G/DL (ref 12–15.9)
MCH RBC QN AUTO: 28.9 PG (ref 26.6–33)
MCHC RBC AUTO-ENTMCNC: 31.3 G/DL (ref 31.5–35.7)
MCV RBC AUTO: 92.3 FL (ref 79–97)
PLATELET # BLD AUTO: 512 10*3/MM3 (ref 140–450)
PMV BLD AUTO: 8.5 FL (ref 6–12)
POTASSIUM SERPL-SCNC: 4.1 MMOL/L (ref 3.5–5.2)
RBC # BLD AUTO: 3.63 10*6/MM3 (ref 3.77–5.28)
SODIUM SERPL-SCNC: 140 MMOL/L (ref 136–145)
WBC NRBC COR # BLD AUTO: 10.46 10*3/MM3 (ref 3.4–10.8)

## 2024-04-29 PROCEDURE — 25010000002 VANCOMYCIN 5 G RECONSTITUTED SOLUTION: Performed by: NURSE PRACTITIONER

## 2024-04-29 PROCEDURE — 97161 PT EVAL LOW COMPLEX 20 MIN: CPT

## 2024-04-29 PROCEDURE — 25810000003 SODIUM CHLORIDE 0.9 % SOLUTION: Performed by: NURSE PRACTITIONER

## 2024-04-29 PROCEDURE — 99232 SBSQ HOSP IP/OBS MODERATE 35: CPT | Performed by: INTERNAL MEDICINE

## 2024-04-29 PROCEDURE — 99233 SBSQ HOSP IP/OBS HIGH 50: CPT | Performed by: NURSE PRACTITIONER

## 2024-04-29 PROCEDURE — 85027 COMPLETE CBC AUTOMATED: CPT | Performed by: STUDENT IN AN ORGANIZED HEALTH CARE EDUCATION/TRAINING PROGRAM

## 2024-04-29 PROCEDURE — 63710000001 INSULIN LISPRO (HUMAN) PER 5 UNITS: Performed by: STUDENT IN AN ORGANIZED HEALTH CARE EDUCATION/TRAINING PROGRAM

## 2024-04-29 PROCEDURE — 94799 UNLISTED PULMONARY SVC/PX: CPT

## 2024-04-29 PROCEDURE — 82948 REAGENT STRIP/BLOOD GLUCOSE: CPT

## 2024-04-29 PROCEDURE — 94761 N-INVAS EAR/PLS OXIMETRY MLT: CPT

## 2024-04-29 PROCEDURE — 80048 BASIC METABOLIC PNL TOTAL CA: CPT | Performed by: STUDENT IN AN ORGANIZED HEALTH CARE EDUCATION/TRAINING PROGRAM

## 2024-04-29 PROCEDURE — 25010000002 ENOXAPARIN PER 10 MG: Performed by: INTERNAL MEDICINE

## 2024-04-29 RX ADMIN — Medication 10 ML: at 09:47

## 2024-04-29 RX ADMIN — FUROSEMIDE 20 MG: 20 TABLET ORAL at 09:46

## 2024-04-29 RX ADMIN — NICOTINE POLACRILEX 2 MG: 2 GUM, CHEWING BUCCAL at 21:48

## 2024-04-29 RX ADMIN — VANCOMYCIN HYDROCHLORIDE 1500 MG: 5 INJECTION, POWDER, LYOPHILIZED, FOR SOLUTION INTRAVENOUS at 15:37

## 2024-04-29 RX ADMIN — CETIRIZINE HYDROCHLORIDE 10 MG: 10 TABLET, FILM COATED ORAL at 09:46

## 2024-04-29 RX ADMIN — DEXTROSE MONOHYDRATE 25 G: 25 INJECTION, SOLUTION INTRAVENOUS at 09:43

## 2024-04-29 RX ADMIN — INSULIN LISPRO 8 UNITS: 100 INJECTION, SOLUTION INTRAVENOUS; SUBCUTANEOUS at 06:36

## 2024-04-29 RX ADMIN — Medication 1 PATCH: at 09:16

## 2024-04-29 RX ADMIN — INSULIN LISPRO 6 UNITS: 100 INJECTION, SOLUTION INTRAVENOUS; SUBCUTANEOUS at 21:48

## 2024-04-29 RX ADMIN — ACETAMINOPHEN 650 MG: 325 TABLET ORAL at 13:29

## 2024-04-29 RX ADMIN — ENOXAPARIN SODIUM 40 MG: 40 INJECTION SUBCUTANEOUS at 18:16

## 2024-04-29 RX ADMIN — GABAPENTIN 800 MG: 400 CAPSULE ORAL at 21:48

## 2024-04-29 RX ADMIN — METRONIDAZOLE 500 MG: 250 TABLET ORAL at 13:29

## 2024-04-29 RX ADMIN — ATORVASTATIN CALCIUM 20 MG: 20 TABLET, FILM COATED ORAL at 21:48

## 2024-04-29 RX ADMIN — PANTOPRAZOLE SODIUM 40 MG: 40 TABLET, DELAYED RELEASE ORAL at 06:36

## 2024-04-29 RX ADMIN — BUPRENORPHINE HYDROCHLORIDE AND NALOXONE HYDROCHLORIDE DIHYDRATE 3 TABLET: 8; 2 TABLET SUBLINGUAL at 09:45

## 2024-04-29 RX ADMIN — LEVOTHYROXINE SODIUM 200 MCG: 100 TABLET ORAL at 09:46

## 2024-04-29 RX ADMIN — FLUOXETINE HYDROCHLORIDE 40 MG: 20 CAPSULE ORAL at 09:47

## 2024-04-29 RX ADMIN — TOPIRAMATE 25 MG: 25 TABLET, FILM COATED ORAL at 09:46

## 2024-04-29 RX ADMIN — GABAPENTIN 800 MG: 400 CAPSULE ORAL at 06:41

## 2024-04-29 RX ADMIN — ROPINIROLE HYDROCHLORIDE 2 MG: 1 TABLET, FILM COATED ORAL at 21:48

## 2024-04-29 RX ADMIN — BUPROPION HYDROCHLORIDE 450 MG: 150 TABLET, EXTENDED RELEASE ORAL at 09:46

## 2024-04-29 RX ADMIN — INSULIN LISPRO 7 UNITS: 100 INJECTION, SOLUTION INTRAVENOUS; SUBCUTANEOUS at 16:43

## 2024-04-29 RX ADMIN — MONTELUKAST SODIUM 10 MG: 10 TABLET, COATED ORAL at 21:48

## 2024-04-29 RX ADMIN — METRONIDAZOLE 500 MG: 250 TABLET ORAL at 06:36

## 2024-04-29 RX ADMIN — FLUOXETINE HYDROCHLORIDE 20 MG: 20 CAPSULE ORAL at 09:47

## 2024-04-29 RX ADMIN — METRONIDAZOLE 500 MG: 250 TABLET ORAL at 22:43

## 2024-04-29 RX ADMIN — OLANZAPINE 10 MG: 10 TABLET, ORALLY DISINTEGRATING ORAL at 09:46

## 2024-04-29 RX ADMIN — GABAPENTIN 800 MG: 400 CAPSULE ORAL at 13:29

## 2024-04-29 RX ADMIN — Medication 10 ML: at 21:49

## 2024-04-29 RX ADMIN — VANCOMYCIN HYDROCHLORIDE 1500 MG: 5 INJECTION, POWDER, LYOPHILIZED, FOR SOLUTION INTRAVENOUS at 03:18

## 2024-04-29 NOTE — NURSING NOTE
Pt A&Ox4 and independent with ambulation. Pt educated on hospital and unit policy on staying on the floor. Pt reports understanding but refusing to comply at this time. Pt currently awaiting KIMMIE from cardiology.

## 2024-04-29 NOTE — PLAN OF CARE
Goal Outcome Evaluation:  Plan of Care Reviewed With: patient        Progress: improving  Outcome Evaluation: Patient A&Ox4. Central line dressing changed this shift. VSS. On RA. NSR. Educated on importance of staying on telemetry. Refuses bed alarm. Call light within reach. No new needs noted at this time.         Problem: Skin Injury Risk Increased  Goal: Skin Health and Integrity  Outcome: Ongoing, Progressing  Intervention: Optimize Skin Protection  Recent Flowsheet Documentation  Taken 4/28/2024 2107 by Aundrea Luke RN  Pressure Reduction Techniques:   frequent weight shift encouraged   positioned off wounds   pressure points protected  Pressure Reduction Devices: pressure-redistributing mattress utilized  Skin Protection:   adhesive use limited   pulse oximeter probe site changed   tubing/devices free from skin contact   transparent dressing maintained     Problem: Adult Inpatient Plan of Care  Goal: Plan of Care Review  Outcome: Ongoing, Progressing  Flowsheets (Taken 4/29/2024 0402)  Progress: improving  Plan of Care Reviewed With: patient  Outcome Evaluation: Patient A&Ox4. Central line dressing changed this shift. VSS. On RA. NSR. Educated on importance of staying on telemetry. Refuses bed alarm. Call light within reach. No new needs noted at this time.  Goal: Patient-Specific Goal (Individualized)  Outcome: Ongoing, Progressing  Goal: Absence of Hospital-Acquired Illness or Injury  Outcome: Ongoing, Progressing  Intervention: Identify and Manage Fall Risk  Recent Flowsheet Documentation  Taken 4/29/2024 0300 by Aundrea Luke, RN  Safety Promotion/Fall Prevention:   safety round/check completed   room organization consistent   nonskid shoes/slippers when out of bed   lighting adjusted   fall prevention program maintained   clutter free environment maintained   assistive device/personal items within reach   activity supervised  Taken 4/29/2024 0100 by Aundrea Luke, RN  Safety Promotion/Fall  Prevention:   safety round/check completed   room organization consistent   nonskid shoes/slippers when out of bed   lighting adjusted   fall prevention program maintained   clutter free environment maintained   assistive device/personal items within reach   activity supervised  Taken 4/28/2024 2300 by Aundrea Luke RN  Safety Promotion/Fall Prevention:   safety round/check completed   room organization consistent   nonskid shoes/slippers when out of bed   lighting adjusted   fall prevention program maintained   clutter free environment maintained   assistive device/personal items within reach   activity supervised  Taken 4/28/2024 2100 by Aundrea Luke RN  Safety Promotion/Fall Prevention:   safety round/check completed   room organization consistent   nonskid shoes/slippers when out of bed   lighting adjusted   fall prevention program maintained   clutter free environment maintained   assistive device/personal items within reach   activity supervised  Intervention: Prevent Skin Injury  Recent Flowsheet Documentation  Taken 4/28/2024 2107 by Aundrea Luke RN  Skin Protection:   adhesive use limited   pulse oximeter probe site changed   tubing/devices free from skin contact   transparent dressing maintained  Intervention: Prevent and Manage VTE (Venous Thromboembolism) Risk  Recent Flowsheet Documentation  Taken 4/28/2024 2107 by Aundrea Luke RN  VTE Prevention/Management: (see MAR) other (see comments)  Range of Motion: active ROM (range of motion) encouraged  Intervention: Prevent Infection  Recent Flowsheet Documentation  Taken 4/29/2024 0300 by Aundrea Luke RN  Infection Prevention:   hand hygiene promoted   rest/sleep promoted   single patient room provided  Taken 4/29/2024 0100 by Aundrea Luke RN  Infection Prevention:   hand hygiene promoted   rest/sleep promoted   single patient room provided  Taken 4/28/2024 2300 by Aundrea Luke RN  Infection Prevention:   hand hygiene  promoted   rest/sleep promoted   single patient room provided  Taken 4/28/2024 2100 by Aundrea Luke RN  Infection Prevention:   hand hygiene promoted   rest/sleep promoted   single patient room provided  Goal: Optimal Comfort and Wellbeing  Outcome: Ongoing, Progressing  Intervention: Monitor Pain and Promote Comfort  Recent Flowsheet Documentation  Taken 4/28/2024 2107 by Aundrea Luke RN  Pain Management Interventions: see MAR  Intervention: Provide Person-Centered Care  Recent Flowsheet Documentation  Taken 4/28/2024 2107 by Aundrea Luke RN  Trust Relationship/Rapport:   care explained   choices provided   thoughts/feelings acknowledged   reassurance provided  Goal: Readiness for Transition of Care  Outcome: Ongoing, Progressing     Problem: Fall Injury Risk  Goal: Absence of Fall and Fall-Related Injury  Outcome: Ongoing, Progressing  Intervention: Promote Injury-Free Environment  Recent Flowsheet Documentation  Taken 4/29/2024 0300 by Aundrea Luke RN  Safety Promotion/Fall Prevention:   safety round/check completed   room organization consistent   nonskid shoes/slippers when out of bed   lighting adjusted   fall prevention program maintained   clutter free environment maintained   assistive device/personal items within reach   activity supervised  Taken 4/29/2024 0100 by Aundrea Luke RN  Safety Promotion/Fall Prevention:   safety round/check completed   room organization consistent   nonskid shoes/slippers when out of bed   lighting adjusted   fall prevention program maintained   clutter free environment maintained   assistive device/personal items within reach   activity supervised  Taken 4/28/2024 2300 by Aundrea Luke RN  Safety Promotion/Fall Prevention:   safety round/check completed   room organization consistent   nonskid shoes/slippers when out of bed   lighting adjusted   fall prevention program maintained   clutter free environment maintained   assistive device/personal  items within reach   activity supervised  Taken 4/28/2024 2100 by Aundrea Luke RN  Safety Promotion/Fall Prevention:   safety round/check completed   room organization consistent   nonskid shoes/slippers when out of bed   lighting adjusted   fall prevention program maintained   clutter free environment maintained   assistive device/personal items within reach   activity supervised     Problem: Adjustment to Illness (Sepsis/Septic Shock)  Goal: Optimal Coping  Outcome: Ongoing, Progressing  Intervention: Optimize Psychosocial Adjustment to Illness  Recent Flowsheet Documentation  Taken 4/28/2024 2107 by Aundrea Luke RN  Family/Support System Care:   support provided   self-care encouraged     Problem: Bleeding (Sepsis/Septic Shock)  Goal: Absence of Bleeding  Outcome: Ongoing, Progressing     Problem: Glycemic Control Impaired (Sepsis/Septic Shock)  Goal: Blood Glucose Level Within Desired Range  Outcome: Ongoing, Progressing     Problem: Infection Progression (Sepsis/Septic Shock)  Goal: Absence of Infection Signs and Symptoms  Outcome: Ongoing, Progressing  Intervention: Initiate Sepsis Management  Recent Flowsheet Documentation  Taken 4/29/2024 0300 by Aundrea Luke RN  Infection Prevention:   hand hygiene promoted   rest/sleep promoted   single patient room provided  Taken 4/29/2024 0100 by Aundrea Luke RN  Infection Prevention:   hand hygiene promoted   rest/sleep promoted   single patient room provided  Taken 4/28/2024 2300 by Aundrea Luke RN  Infection Prevention:   hand hygiene promoted   rest/sleep promoted   single patient room provided  Taken 4/28/2024 2100 by Aundrea Luke RN  Infection Prevention:   hand hygiene promoted   rest/sleep promoted   single patient room provided     Problem: Nutrition Impaired (Sepsis/Septic Shock)  Goal: Optimal Nutrition Intake  Outcome: Ongoing, Progressing     Problem: Communication Impairment (Mechanical Ventilation, Invasive)  Goal: Effective  Communication  Outcome: Ongoing, Progressing     Problem: Device-Related Complication Risk (Mechanical Ventilation, Invasive)  Goal: Optimal Device Function  Outcome: Ongoing, Progressing     Problem: Inability to Wean (Mechanical Ventilation, Invasive)  Goal: Mechanical Ventilation Liberation  Outcome: Ongoing, Progressing     Problem: Nutrition Impairment (Mechanical Ventilation, Invasive)  Goal: Optimal Nutrition Delivery  Outcome: Ongoing, Progressing     Problem: Skin and Tissue Injury (Mechanical Ventilation, Invasive)  Goal: Absence of Device-Related Skin and Tissue Injury  Outcome: Ongoing, Progressing  Intervention: Maintain Skin and Tissue Health  Recent Flowsheet Documentation  Taken 4/28/2024 2107 by Aundrea Luke, RN  Device Skin Pressure Protection: adhesive use limited     Problem: Ventilator-Induced Lung Injury (Mechanical Ventilation, Invasive)  Goal: Absence of Ventilator-Induced Lung Injury  Outcome: Ongoing, Progressing

## 2024-04-29 NOTE — PLAN OF CARE
Goal Outcome Evaluation:           Progress: no change  Outcome Evaluation: Pt remains A&Ox4 this shift. She has been ambulating independently and has been observed leaving the floor, regardless of education of staying on the floor, as well as hospital policies. Pt has been awaiting KIMMIE and consent has been signed. Pt is currently ON the cardiac monitor, although she frequently takes it off herself. Pt is to have KIMMIE tomorrow IF cardiology remains available. Pt currently eating diet ordered. Pt had episodic hypoglycemia this morning which was relative to her being NPO and having had sub q insulin.

## 2024-04-29 NOTE — PROGRESS NOTES
PROGRESS NOTE         Patient Identification:  Name:  Jes Moreno  Age:  38 y.o.  Sex:  female  :  1985  MRN:  [id number]  Visit Number:  [id number]  Primary Care Provider:  Juany Sheehan APRN         LOS: 10 days       ----------------------------------------------------------------------------------------------------------------------  Subjective       Chief Complaints:    Altered Mental Status        Interval History:      Patient sitting up on side of bed this morning.  Anxious this morning.  Plans for KIMMIE today.  Currently on room air with no apparent distress.  Lungs clear to auscultation bilaterally.  Abdomen soft, nontender.  Afebrile, denies diarrhea.  WBC improved at 10.46.    Review of Systems:    Constitutional: no fever, chills and night sweats.  Generalized fatigue.  Eyes: no eye drainage, itching or redness.  HEENT: no mouth sores, dysphagia or nose bleed.  Respiratory: no for shortness of breath, cough or production of sputum.  Cardiovascular: no chest pain, no palpitations, no orthopnea.  Gastrointestinal: no nausea, vomiting or diarrhea. No abdominal pain, hematemesis or rectal bleeding.  Genitourinary: no dysuria or polyuria.  Hematologic/lymphatic: no lymph node abnormalities, no easy bruising or easy bleeding.  Musculoskeletal: no muscle or joint pain.  Skin: No rash and no itching.  Neurological: no loss of consciousness, no seizure, no headache.  Behavioral/Psych: no depression or suicidal ideation.  Endocrine: no hot flashes.  Immunologic: negative.    ----------------------------------------------------------------------------------------------------------------------      Objective       [facility] Meds:  atorvastatin, 20 mg, Oral, Nightly  buprenorphine-naloxone, 3 tablet, Sublingual, Daily  buPROPion XL, 450 mg, Oral, Daily  cetirizine, 10 mg, Oral, Daily  enoxaparin, 40 mg, Subcutaneous, Q24H  FLUoxetine, 20 mg, Oral, Daily  FLUoxetine, 40 mg, Oral,  Daily  furosemide, 20 mg, Oral, Daily  gabapentin, 800 mg, Oral, Q8H  insulin glargine, 10 Units, Subcutaneous, Daily  insulin lispro, 2-9 Units, Subcutaneous, 4x Daily AC & at Bedtime  levothyroxine, 200 mcg, Oral, Daily  metroNIDAZOLE, 500 mg, Oral, Q8H  montelukast, 10 mg, Oral, Nightly  mupirocin, 1 Application, Topical, Q12H  nicotine, 1 patch, Transdermal, Q24H  OLANZapine zydis, 10 mg, Oral, Daily  pantoprazole, 40 mg, Oral, Q AM  rOPINIRole, 2 mg, Oral, Nightly  senna-docusate sodium, 2 tablet, Oral, BID  sodium chloride, 10 mL, Intravenous, Q12H  sodium chloride, 10 mL, Intravenous, Q12H  sodium chloride, 10 mL, Intravenous, Q12H  topiramate, 25 mg, Oral, Daily  vancomycin, 1,500 mg, Intravenous, Q12H      dextrose 5 % and sodium chloride 0.45 %, 150 mL/hr  dextrose 5 % and sodium chloride 0.9 %, 150 mL/hr  sodium chloride, 250 mL/hr  sodium chloride, 250 mL/hr      ----------------------------------------------------------------------------------------------------------------------    Vital Signs:  Temp:  [97.6 °F (36.4 °C)-98.9 °F (37.2 °C)] 98.6 °F (37 °C)  Heart Rate:  [] 111  Resp:  [10-28] 15  BP: ()/(48-97) 123/84  Mean Arterial Pressure (Non-Invasive) for the past 24 hrs (Last 3 readings):   Noninvasive MAP (mmHg)   04/29/24 1100 100   04/29/24 1000 95   04/29/24 0900 104     SpO2 Percentage    04/29/24 0900 04/29/24 1000 04/29/24 1100   SpO2: 98% 97% 100%     SpO2:  [93 %-100 %] 100 %  on   ;   Device (Oxygen Therapy): room air    Body mass index is 23.13 kg/m².  Wt Readings from Last 3 Encounters:   04/29/24 67 kg (147 lb 11.3 oz)   02/21/24 66.1 kg (145 lb 12.8 oz)   08/13/23 73.5 kg (162 lb)        Intake/Output Summary (Last 24 hours) at 4/29/2024 1113  Last data filed at 4/29/2024 0638  Gross per 24 hour   Intake 2265.26 ml   Output --   Net 2265.26 ml     NPO Diet NPO Type: Sips with  Meds  ----------------------------------------------------------------------------------------------------------------------      Physical Exam:    Constitutional:  Well-developed and well-nourished.  No respiratory distress.  On room air.   Sitting up on side of bed this morning.  Significant other at bedside.    HENT:  Head: Normocephalic and atraumatic.  Mouth:  Moist mucous membranes.    Eyes:  Conjunctivae and EOM are normal.  No scleral icterus.  Neck:  Neck supple.  No JVD present.    Cardiovascular:  Normal rate, regular rhythm and normal heart sounds with 3/6 systolic ejection murmur. No edema.  Pulmonary/Chest:  No respiratory distress, no wheezes, no crackles, with normal breath sounds and good air movement.  Abdominal:  Soft.  Bowel sounds are normal.  No distension and no tenderness.  Left chest wall CBC without signs of infection.  Morton catheter in place.  Musculoskeletal:  No edema, no tenderness, and no deformity.  No swelling or redness of joints.  Neurological:  Alert and oriented to person, place, and time.  No facial droop.  No slurred speech.   Skin:  Skin is warm and dry.  No rash noted.  No pallor.   Psychiatric:  Normal mood and affect.  Behavior is normal.        ----------------------------------------------------------------------------------------------------------------------          Results from last 7 days   Lab Units 04/23/24  0029   TRIGLYCERIDES mg/dL 44           Results from last 7 days   Lab Units 04/29/24  0117 04/27/24  0047 04/26/24  0052   WBC 10*3/mm3 10.46 11.42* 8.61   HEMOGLOBIN g/dL 10.5* 11.3* 10.3*   HEMATOCRIT % 33.5* 35.4 32.7*   MCV fL 92.3 91.5 91.9   MCHC g/dL 31.3* 31.9 31.5   PLATELETS 10*3/mm3 512* 565* 457*     Results from last 7 days   Lab Units 04/29/24  0117 04/27/24  0047 04/26/24  0052 04/25/24  0100   SODIUM mmol/L 140 143 144 141   POTASSIUM mmol/L 4.1 4.0 3.3* 3.9   MAGNESIUM mg/dL  --   --  1.7  --    CHLORIDE mmol/L 103 106 107 109*   CO2 mmol/L  "27.4 26.8 28.7 26.1   BUN mg/dL 14 11 7 9   CREATININE mg/dL 0.71 0.73 0.57 0.51*   CALCIUM mg/dL 8.2* 9.0 8.7 8.1*   GLUCOSE mg/dL 299* 60* 55* 211*   ALBUMIN g/dL  --   --   --  2.5*   BILIRUBIN mg/dL  --   --   --  0.2   ALK PHOS U/L  --   --   --  114   AST (SGOT) U/L  --   --   --  19   ALT (SGPT) U/L  --   --   --  16   Estimated Creatinine Clearance: 115.9 mL/min (by C-G formula based on SCr of 0.71 mg/dL).  No results found for: \"AMMONIA\"    Glucose   Date/Time Value Ref Range Status   04/29/2024 1004 205 (H) 70 - 130 mg/dL Final   04/29/2024 0940 43 (C) 70 - 130 mg/dL Final   04/29/2024 0838 89 70 - 130 mg/dL Final   04/29/2024 0621 367 (H) 70 - 130 mg/dL Final   04/28/2024 2105 83 70 - 130 mg/dL Final   04/28/2024 1807 273 (H) 70 - 130 mg/dL Final   04/28/2024 1701 462 (C) 70 - 130 mg/dL Final   04/28/2024 1646 533 (C) 70 - 130 mg/dL Final     Lab Results   Component Value Date    HGBA1C 9.00 (H) 04/19/2024     Lab Results   Component Value Date    TSH 0.732 08/12/2023    FREET4 1.80 (H) 06/20/2021       Blood Culture   Date Value Ref Range Status   04/24/2024 No growth at 24 hours  Preliminary   04/24/2024 No growth at 24 hours  Preliminary   04/22/2024 Staphylococcus aureus, MRSA (C)  Preliminary     Comment:       Infectious disease consultation is highly recommended to rule out distant foci of infection.  Methicillin resistant Staphylococcus aureus, Patient may be an isolation risk.   04/22/2024 Staphylococcus aureus, MRSA (C)  Preliminary     Comment:       Infectious disease consultation is highly recommended to rule out distant foci of infection.  Methicillin resistant Staphylococcus aureus, Patient may be an isolation risk.   04/19/2024 Staphylococcus epidermidis (C)  Final   04/19/2024 Staphylococcus epidermidis (C)  Final     Urine Culture   Date Value Ref Range Status   04/24/2024 No growth  Preliminary     No results found for: \"WOUNDCX\"  No results found for: \"STOOLCX\"  No results found for: " "\"RESPCX\"  Pain Management Panel  More data exists         Latest Ref Rng & Units 4/28/2024 4/19/2024   Pain Management Panel   Amphetamine, Urine Qual Negative Negative  Positive    Barbiturates Screen, Urine Negative Negative  Negative    Benzodiazepine Screen, Urine Negative Negative  Positive    Buprenorphine, Screen, Urine Negative Positive  Positive    Cocaine Screen, Urine Negative Negative  Negative    Fentanyl, Urine Negative Positive  Negative    Methadone Screen , Urine Negative Negative  Negative    Methamphetamine, Ur Negative Negative  Positive          ----------------------------------------------------------------------------------------------------------------------  Imaging Results (Last 24 Hours)       ** No results found for the last 24 hours. **            ----------------------------------------------------------------------------------------------------------------------    Pertinent Infectious Disease Results                Assessment/Plan       Assessment       MRSA bacteremia       Plan          Patient sitting up on side of bed this morning.  Anxious this morning.  Plans for KIMMIE today.  Currently on room air with no apparent distress.  Lungs clear to auscultation bilaterally.  Abdomen soft, nontender.  Afebrile, denies diarrhea.  WBC improved at 10.46.    Trichomonas vaginalis diagnosed via PCR on 4/24/2024.     Blood cultures from 4/24/2024 show no growth thus far.     Recommend to continue metronidazole 500 mg p.o. every 8 hours x 7 days through 5/1/2024 for treatment of trichomonas.  Recommend to continue vancomycin pharmacy to dose for MRSA and Staphylococcus epidermis bacteremia.  Although bacteremia cleared quickly in the setting of known IV drug use and murmur recommend KIMMIE.     If KIMMIE is negative for vegetation recommend Vancomycin pharmacy to dose or for easier outpatient administration Daptomycin 8 mg/kg IV every 24 hours continue 2 weeks from first negative blood culture through " 5/8/2024 or Dalvance 1500 mg IV x 1 dose.    If KIMMIE is positive for vegetation confirming endocarditis patient will require 6 weeks of IV antibiotic therapy from first negative blood culture with either vancomycin pharmacy to dose or daptomycin 8 mg/kg IV every 24 hours to continue through 6/5/2024.  We will continue to follow closely and adjust antibiotic therapy as needed.      ANTIMICROBIAL THERAPY    metroNIDAZOLE - 250 MG  Vancomycin HCl in NaCl solution - 1.5-0.9 GM/500ML-%     Code Status:   Code Status and Medical Interventions:   Ordered at: 04/19/24 1230     Code Status (Patient has no pulse and is not breathing):    CPR (Attempt to Resuscitate)     Medical Interventions (Patient has pulse or is breathing):    Full Support       GAURAV Baumann  04/29/24  11:13 EDT

## 2024-04-29 NOTE — ANESTHESIA PREPROCEDURE EVALUATION
Anesthesia Evaluation     Patient summary reviewed and Nursing notes reviewed   no history of anesthetic complications:   NPO Solid Status: > 8 hours  NPO Liquid Status: > 8 hours           Airway   Mallampati: II  TM distance: >3 FB  Neck ROM: full  No difficulty expected  Dental - normal exam     Pulmonary - normal exam    breath sounds clear to auscultation  (+) asthma,  Cardiovascular - negative cardio ROS and normal exam    Rhythm: regular  Rate: normal        Neuro/Psych  (+) numbness (Diabetic neuropathy)  GI/Hepatic/Renal/Endo    (+) GERD, hepatitis C, liver disease, diabetes mellitus type 1 poorly controlled, thyroid problem hypothyroidism    Musculoskeletal (-) negative ROS    Abdominal  - normal exam   Substance History   (+) drug use (Suboxone)     OB/GYN negative ob/gyn ROS         Other - negative ROS                         Anesthesia Plan    ASA 3     general   total IV anesthesia  intravenous induction     Anesthetic plan, risks, benefits, and alternatives have been provided, discussed and informed consent has been obtained with: patient.    Use of blood products discussed with patient  Consented to blood products.    Plan discussed with CRNA.        CODE STATUS:    Code Status (Patient has no pulse and is not breathing): CPR (Attempt to Resuscitate)  Medical Interventions (Patient has pulse or is breathing): Full Support

## 2024-04-29 NOTE — PROGRESS NOTES
[facility] HOSPITALIST PROGRESS NOTE     Patient Identification:  Name:  Jes Moreno  Age:  38 y.o.  Sex:  female  :  1985  MRN:  [id number]  Visit Number:  [id number]  Primary Care Provider:  Juany Sheehan APRN    Length of stay:  10    Chief complaint: None    Subjective:    Patient seen and examined at bedside with significant other present.  Patient states she is eager to return home and feels back to her baseline functioning status.  I did explain to patient her recent diagnosis of bacteremia and need to rule out endocarditis.  Patient was planned to have transesophageal echo performed today but unfortunately this could not be accommodated by the anesthesia.  Will plan for transesophageal echo tomorrow.  ----------------------------------------------------------------------------------------------------------------------  Current Hospital Meds:  atorvastatin, 20 mg, Oral, Nightly  buprenorphine-naloxone, 3 tablet, Sublingual, Daily  buPROPion XL, 450 mg, Oral, Daily  cetirizine, 10 mg, Oral, Daily  enoxaparin, 40 mg, Subcutaneous, Q24H  FLUoxetine, 20 mg, Oral, Daily  FLUoxetine, 40 mg, Oral, Daily  furosemide, 20 mg, Oral, Daily  gabapentin, 800 mg, Oral, Q8H  insulin glargine, 10 Units, Subcutaneous, Daily  insulin lispro, 2-9 Units, Subcutaneous, 4x Daily AC & at Bedtime  levothyroxine, 200 mcg, Oral, Daily  metroNIDAZOLE, 500 mg, Oral, Q8H  montelukast, 10 mg, Oral, Nightly  mupirocin, 1 Application, Topical, Q12H  nicotine, 1 patch, Transdermal, Q24H  OLANZapine zydis, 10 mg, Oral, Daily  pantoprazole, 40 mg, Oral, Q AM  rOPINIRole, 2 mg, Oral, Nightly  senna-docusate sodium, 2 tablet, Oral, BID  sodium chloride, 10 mL, Intravenous, Q12H  sodium chloride, 10 mL, Intravenous, Q12H  sodium chloride, 10 mL, Intravenous, Q12H  topiramate, 25 mg, Oral, Daily  vancomycin, 1,500 mg, Intravenous, Q12H      dextrose 5 % and sodium chloride 0.45 %, 150 mL/hr  dextrose 5 % and sodium chloride  0.9 %, 150 mL/hr  sodium chloride, 250 mL/hr  sodium chloride, 250 mL/hr      ----------------------------------------------------------------------------------------------------------------------  Vital Signs:  Temp:  [97.6 °F (36.4 °C)-99.3 °F (37.4 °C)] 99.3 °F (37.4 °C)  Heart Rate:  [] 78  Resp:  [10-28] 20  BP: ()/(48-97) 130/85      04/27/24  0400 04/28/24  0400 04/29/24  0400   Weight: 65.2 kg (143 lb 11.8 oz) 76 kg (167 lb 8.8 oz) 67 kg (147 lb 11.3 oz)     Body mass index is 23.13 kg/m².    Intake/Output Summary (Last 24 hours) at 4/29/2024 1954  Last data filed at 4/29/2024 0638  Gross per 24 hour   Intake 1675.26 ml   Output --   Net 1675.26 ml     NPO Diet NPO Type: Strict NPO  Diet: Regular/House, Diabetic; Consistent Carbohydrate; No Straw; Fluid Consistency: Nectar Thick  ----------------------------------------------------------------------------------------------------------------------  Physical exam:  Constitutional:  female appearing older than stated age in no apparent distress.     HENT:  Head:  Normocephalic and atraumatic.  Mouth:  Moist mucous membranes.    Eyes:  Conjunctivae and EOM are normal.  Pupils are equal, round, and reactive to light.  No scleral icterus.    Neck:  Neck supple. No thyromegaly.  No JVD present.    Cardiovascular:  Regular rate and rhythm with no murmurs, rubs, clicks or gallops appreciated.  Pulmonary/Chest:  Clear to auscultation bilaterally with no crackles, wheezes or rhonchi appreciated.  Abdominal:  Soft. Nontender. Nondistended  Bowel sounds are normal in all four quadrants. No organomegally appreciated.   Musculoskeletal:  5/5 muscle strength bilateral upper and lower extremities with equal muscle tone and bulk. No edema, no tenderness, and no deformity.  No red or swollen joints anywhere.    Neurological:  Alert, Cranial nerves II-XII intact with no focal deficits.  No facial droop.  No slurred speech.   Skin:  Warm and dry to palpation  "with no rashes or lesions appreciated.  Peripheral vascular:  2+ radial and pedal pulses in bilateral upper and lower extremities.  Psychiatric:  Alert and oriented x3, demonstrates appropriate judgment and insight.  -----------------------------------------------------------------------------------  ----------------------------------------------------------------------------------------------------------------------      Results from last 7 days   Lab Units 04/29/24 0117 04/27/24 0047 04/26/24  0052   WBC 10*3/mm3 10.46 11.42* 8.61   HEMOGLOBIN g/dL 10.5* 11.3* 10.3*   HEMATOCRIT % 33.5* 35.4 32.7*   MCV fL 92.3 91.5 91.9   MCHC g/dL 31.3* 31.9 31.5   PLATELETS 10*3/mm3 512* 565* 457*         Results from last 7 days   Lab Units 04/29/24 0117 04/27/24 0047 04/26/24 0052 04/25/24  0100   SODIUM mmol/L 140 143 144 141   POTASSIUM mmol/L 4.1 4.0 3.3* 3.9   MAGNESIUM mg/dL  --   --  1.7  --    CHLORIDE mmol/L 103 106 107 109*   CO2 mmol/L 27.4 26.8 28.7 26.1   BUN mg/dL 14 11 7 9   CREATININE mg/dL 0.71 0.73 0.57 0.51*   CALCIUM mg/dL 8.2* 9.0 8.7 8.1*   GLUCOSE mg/dL 299* 60* 55* 211*   ALBUMIN g/dL  --   --   --  2.5*   BILIRUBIN mg/dL  --   --   --  0.2   ALK PHOS U/L  --   --   --  114   AST (SGOT) U/L  --   --   --  19   ALT (SGPT) U/L  --   --   --  16   Estimated Creatinine Clearance: 115.9 mL/min (by C-G formula based on SCr of 0.71 mg/dL).    No results found for: \"AMMONIA\"  Results from last 7 days   Lab Units 04/23/24  0029   TRIGLYCERIDES mg/dL 44     Blood Culture   Date Value Ref Range Status   04/24/2024 No growth at 5 days  Final   04/24/2024 No growth at 5 days  Final     Urine Culture   Date Value Ref Range Status   04/24/2024 No growth  Final     No results found for: \"WOUNDCX\"  No results found for: \"STOOLCX\"    I have personally looked at the labs and they are summarized " above.  ----------------------------------------------------------------------------------------------------------------------  Imaging Results (Last 24 Hours)       ** No results found for the last 24 hours. **          ----------------------------------------------------------------------------------------------------------------------  Assessment and Plan:    Acute encephalopathy -likely secondary to acute drug ingestion, now resolved.    2.  MRSA bacteremia -initial blood cultures dated 4/19/2024 demonstrates 1 of 2 cultures positive for Staphylococcus epidermidis.  Repeat cultures obtained 4/22/2024 demonstrate MRSA bacteremia.  Will continue current antibiotic regimen with vancomycin.  Plan for KIMMIE in the morning.  If no evidence of endocarditis is noted, will transition to daptomycin 8 mg/kilograms IV every 24 hours x 2 weeks or possible Dalvance 1500 mg IV x 1 dose prior to discharge.    3.  Trichomonas vaginalis -continue Flagyl 500 mg p.o. every 8 hours to be completed on 5/1/2024    4.  DKA -now resolved, continue subcutaneous insulin    5.  Polysubstance abuse -encourage cessation    Disposition likely discharge tomorrow    Jono Valencia DO   04/29/24   19:54 EDT

## 2024-04-29 NOTE — THERAPY EVALUATION
Acute Care - Physical Therapy Initial Evaluation   [place]     Patient Name: Jes Moreno  : 1985  MRN: [id number]  Today's Date: 2024   Onset of Illness/Injury or Date of Surgery: 24  Visit Dx:     ICD-10-CM ICD-9-CM   1. Psychoactive substance-induced intoxication  F19.929 292.89     969.9   2. Diabetic ketoacidosis without coma associated with type 1 diabetes mellitus  E10.10 250.13   3. Acute respiratory failure, unspecified whether with hypoxia or hypercapnia  J96.00 518.81   4. Sepsis, due to unspecified organism, unspecified whether acute organ dysfunction present  A41.9 038.9     995.91     Patient Active Problem List   Diagnosis    Aspirin toxicity    Hypothyroidism    Type I diabetes mellitus    Chronic hepatitis C virus infection    Diabetic peripheral neuropathy    Gastroesophageal reflux disease without esophagitis    Celiac disease    Asthma    DKA (diabetic ketoacidosis)    Acute encephalopathy     Past Medical History:   Diagnosis Date    Asthma     Celiac disease     Diabetes mellitus type 1     Diabetic ketoacidosis     Disease of thyroid gland     Hepatitis C     Infectious viral hepatitis     hepititis C    Neuropathy     Reflux gastritis      Past Surgical History:   Procedure Laterality Date     SECTION      X 2     PT Assessment (Last 12 Hours)       PT Evaluation and Treatment       Row Name 24 1503          Physical Therapy Time and Intention    Subjective Information no complaints  -AG     Document Type evaluation  -AG     Mode of Treatment physical therapy  -AG     Patient Effort good  -AG     Symptoms Noted During/After Treatment none  -AG       Row Name 24 1503          General Information    Patient Profile Reviewed yes  -AG     Onset of Illness/Injury or Date of Surgery 24  -AG     Referring Physician Dr. Bedoya  -AG     Patient Observations agree to therapy;cooperative;alert  -AG     Patient/Family/Caregiver Comments/Observations pt. sitting  EOB on room air, PCU bed.  S.O. at bedside.  -AG     General Observations of Patient pt. currently NPO for KIMMIE  -AG     Prior Level of Function independent:;community mobility  -AG     Equipment Currently Used at Home none  -AG     Pertinent History of Current Functional Problem pt. admitted with acute encephalopathy d/t methamphetamine; DKA  -AG       Row Name 04/29/24 1503          Previous Level of Function/Home Environm    Community Ambulation, Premorbid Functional Level independent  -AG       Row Name 04/29/24 1503          Living Environment    Current Living Arrangements apartment  -AG     Home Accessibility wheelchair accessible  -AG     Primary Care Provided by self  -AG       Row Name 04/29/24 1503          Pain    Pretreatment Pain Rating 0/10 - no pain  -AG     Posttreatment Pain Rating 0/10 - no pain  -AG       Row Name 04/29/24 1503          Cognition    Affect/Mental Status (Cognition) anxious  -AG     Orientation Status (Cognition) oriented x 3  -AG     Follows Commands (Cognition) WFL  -     Personal Safety Interventions nonskid shoes/slippers when out of bed  -AG       Row Name 04/29/24 1503          Range of Motion Comprehensive    General Range of Motion no range of motion deficits identified;bilateral lower extremity ROM WFL  -AG       Row Name 04/29/24 1503          Strength (Manual Muscle Testing)    Strength (Manual Muscle Testing) strength is WFL;bilateral lower extremities  -AG       Row Name 04/29/24 1503          Sensory    Hearing Status WFL  -       Row Name 04/29/24 1503          Vision Assessment/Intervention    Visual Impairment/Limitations WFL  -AG       Row Name 04/29/24 1503          Bed Mobility    Bed Mobility bed mobility (all) activities  -AG     All Activities, [place] (Bed Mobility) independent  -AG       Row Name 04/29/24 1503          Transfers    Transfers sit-stand transfer;stand-sit transfer;stand pivot/stand step transfer  -AG       Row Name 04/29/24 1503           Sit-Stand Transfer    Sit-Stand [place] (Transfers) independent  -AG       Row Name 04/29/24 1503          Stand-Sit Transfer    Stand-Sit [place] (Transfers) independent  -AG       Row Name 04/29/24 1503          Stand Pivot/Stand Step Transfer    Stand Pivot/Stand Step [place] (Transfers) independent  -AG       Row Name 04/29/24 1503          Gait/Stairs (Locomotion)    [place] Level (Gait) independent  -AG     Assistive Device (Gait) --  none  -AG     Patient was able to Ambulate yes  -AG     Distance in Feet (Gait) --  ambulating about room/ unit.  Per RN, she has ambulated outside the hospital but she has been asked to not leave the floor now.  -AG       Row Name 04/29/24 1503          Balance    Balance Assessment sitting static balance;sitting dynamic balance;sit to stand dynamic balance;standing static balance;standing dynamic balance  -AG     Static Sitting Balance independent  -AG     Position, Sitting Balance unsupported;sitting edge of bed  -AG     Static Standing Balance independent  -AG     Dynamic Standing Balance independent  -AG     Position/Device Used, Standing Balance --  none  -AG       Row Name 04/29/24 1503          Coping    Observed Emotional State cooperative  -AG     Verbalized Emotional State acceptance  -AG     Trust Relationship/Rapport care explained;choices provided;thoughts/feelings acknowledged  -AG     Family/Support Persons significant other  -AG     Involvement in Care at bedside;attentive to patient  -AG     Family/Support System Care support provided;self-care encouraged  -AG       Row Name 04/29/24 1509          Plan of Care Review    Plan of Care Reviewed With patient  -AG     Outcome Evaluation PT evaluation complete.  Pt. independently ambulating without assistive device or significant LOB/ gait deviation.  She is anxious and asking to leave the floor and RN states she has been observed leaving the floor with S.O. but has been asked to remain on  the floor.  Pt. currently ambulating about room anxiously.  No further skilled PT is planned at this time.  -AG       Row Name 04/29/24 1507          Positioning and Restraints    Pre-Treatment Position in bed  -AG     Post Treatment Position bed  -AG     In Bed sitting EOB;call light within reach;encouraged to call for assist;with family/caregiver  -AG       Row Name 04/29/24 1507          Therapy Assessment/Plan (PT)    Patient/Family Therapy Goals Statement (PT) go home  -AG     Functional Level at Time of Evaluation (PT) independent  -AG     Criteria for Skilled Interventions Met (PT) no;no problems identified which require skilled intervention  -AG     Therapy Frequency (PT) evaluation only  -AG       Row Name 04/29/24 150          Therapy Plan Review/Discharge Plan (PT)    Therapy Plan Review (PT) evaluation/treatment results reviewed;patient;participants included;participants voiced agreement with care plan  -AG               User Key  (r) = Recorded By, (t) = Taken By, (c) = Cosigned By      Initials Name Provider Type    Willa Veras, PT Physical Therapist                      PT Recommendation and Plan  Anticipated Discharge Disposition (PT): home  Therapy Frequency (PT): evaluation only  Plan of Care Reviewed With: patient  Outcome Evaluation: PT evaluation complete.  Pt. independently ambulating without assistive device or significant LOB/ gait deviation.  She is anxious and asking to leave the floor and RN states she has been observed leaving the floor with S.O. but has been asked to remain on the floor.  Pt. currently ambulating about room anxiously.  No further skilled PT is planned at this time.   Outcome Measures       Row Name 04/26/24 1733 04/26/24 1700          Modified Juice Scale    Pre-Stroke Modified Hardeman Scale 0 - No Symptoms at all.  -KR --     Modified Hardeman Scale 1 - No significant disability despite symptoms.  Able to carry out all usual duties and activities.  -KR --         Functional Assessment    Outcome Measure Options -- Modified Lane  -KR               User Key  (r) = Recorded By, (t) = Taken By, (c) = Cosigned By      Initials Name Provider Type    Madhu Craig, OT Occupational Therapist                     Time Calculation:    PT Charges       Row Name 04/29/24 1503             Time Calculation    PT Received On 04/29/24  -                User Key  (r) = Recorded By, (t) = Taken By, (c) = Cosigned By      Initials Name Provider Type     Willa You, PT Physical Therapist                  Therapy Charges for Today       Code Description Service Date Service Provider Modifiers Qty    [id number]  PT EVAL LOW COMPLEXITY 4 4/29/2024 Willa You, PT GP 1            PT G-Codes  Outcome Measure Options: Modified Lane  AM-PAC 6 Clicks Score (PT): 24  Modified Lane Scale: 1 - No significant disability despite symptoms.  Able to carry out all usual duties and activities.    Willa You, PT  4/29/2024

## 2024-04-29 NOTE — NURSING NOTE
Discussed with pt about staying on the floor, she is agreeable at this time. She is understanding that if she leaves again it is considered an elopement. Pt reports understanding.

## 2024-04-29 NOTE — CASE MANAGEMENT/SOCIAL WORK
Discharge Planning Assessment   [place]     Patient Name: Jes Moreno  MRN: [id number]  Today's Date: 4/29/2024    Admit Date: 4/19/2024     Discharge Plan       Row Name 04/29/24 1706       Plan    Plan SS noted pt to have KIMMIE tomorrow 4/30/24. Pt lives alone at [address] in [facility], but her s/o Jose Alejandro has been staying off and on with pt. Pt does not utilize  services. Pt PCP Juany Sheehan. Pt has insulin pump, per mother pt does not utilize the insulin pump and dexcom via unknown provider. Pt does not have POA or living will. Pt declines substance abuse resources and states she plans to see her PCP and counselor. Pt voiced her S/O will provide transportation. SS to follow and assist with discharge planning.            Irene Bose, YANELIW

## 2024-04-29 NOTE — NURSING NOTE
Pt ambulated off the floor at this time and paged overhead to return to room, awaiting pt at this time.

## 2024-04-29 NOTE — SIGNIFICANT NOTE
Chart reviewed this am.     Ms Moreno is currently planned for KIMMIE this am and is NPO awaiting this procedure.   SLP to f/u for formal dysphagia tx when clear to accept po intake.     Thank you-  Lyric Bradford M.S., CCC-SLP

## 2024-04-30 ENCOUNTER — ANESTHESIA EVENT (OUTPATIENT)
Dept: CARDIOLOGY | Facility: HOSPITAL | Age: 39
DRG: 894 | End: 2024-04-30
Payer: MEDICAID

## 2024-04-30 ENCOUNTER — APPOINTMENT (OUTPATIENT)
Dept: CARDIOLOGY | Facility: HOSPITAL | Age: 39
DRG: 894 | End: 2024-04-30
Payer: MEDICAID

## 2024-04-30 ENCOUNTER — ANESTHESIA (OUTPATIENT)
Dept: CARDIOLOGY | Facility: HOSPITAL | Age: 39
DRG: 894 | End: 2024-04-30
Payer: MEDICAID

## 2024-04-30 VITALS
WEIGHT: 150.35 LBS | BODY MASS INDEX: 23.6 KG/M2 | SYSTOLIC BLOOD PRESSURE: 107 MMHG | RESPIRATION RATE: 21 BRPM | TEMPERATURE: 98.7 F | OXYGEN SATURATION: 100 % | HEIGHT: 67 IN | HEART RATE: 99 BPM | DIASTOLIC BLOOD PRESSURE: 95 MMHG

## 2024-04-30 LAB
ANION GAP SERPL CALCULATED.3IONS-SCNC: 9 MMOL/L (ref 5–15)
BUN SERPL-MCNC: 10 MG/DL (ref 6–20)
BUN/CREAT SERPL: 14.9 (ref 7–25)
CALCIUM SPEC-SCNC: 8.5 MG/DL (ref 8.6–10.5)
CHLORIDE SERPL-SCNC: 104 MMOL/L (ref 98–107)
CO2 SERPL-SCNC: 28 MMOL/L (ref 22–29)
CREAT SERPL-MCNC: 0.67 MG/DL (ref 0.57–1)
DEPRECATED RDW RBC AUTO: 44.1 FL (ref 37–54)
EGFRCR SERPLBLD CKD-EPI 2021: 116.3 ML/MIN/1.73
ERYTHROCYTE [DISTWIDTH] IN BLOOD BY AUTOMATED COUNT: 13.2 % (ref 12.3–15.4)
GLUCOSE BLDC GLUCOMTR-MCNC: 343 MG/DL (ref 70–130)
GLUCOSE SERPL-MCNC: 190 MG/DL (ref 65–99)
HCT VFR BLD AUTO: 31.5 % (ref 34–46.6)
HGB BLD-MCNC: 10.1 G/DL (ref 12–15.9)
MCH RBC QN AUTO: 29.6 PG (ref 26.6–33)
MCHC RBC AUTO-ENTMCNC: 32.1 G/DL (ref 31.5–35.7)
MCV RBC AUTO: 92.4 FL (ref 79–97)
PLATELET # BLD AUTO: 520 10*3/MM3 (ref 140–450)
PMV BLD AUTO: 8.8 FL (ref 6–12)
POTASSIUM SERPL-SCNC: 3.7 MMOL/L (ref 3.5–5.2)
RBC # BLD AUTO: 3.41 10*6/MM3 (ref 3.77–5.28)
SODIUM SERPL-SCNC: 141 MMOL/L (ref 136–145)
WBC NRBC COR # BLD AUTO: 11.33 10*3/MM3 (ref 3.4–10.8)

## 2024-04-30 PROCEDURE — 63710000001 INSULIN LISPRO (HUMAN) PER 5 UNITS: Performed by: STUDENT IN AN ORGANIZED HEALTH CARE EDUCATION/TRAINING PROGRAM

## 2024-04-30 PROCEDURE — 93312 ECHO TRANSESOPHAGEAL: CPT | Performed by: INTERNAL MEDICINE

## 2024-04-30 PROCEDURE — 99239 HOSP IP/OBS DSCHRG MGMT >30: CPT | Performed by: INTERNAL MEDICINE

## 2024-04-30 PROCEDURE — 63710000001 INSULIN GLARGINE PER 5 UNITS: Performed by: STUDENT IN AN ORGANIZED HEALTH CARE EDUCATION/TRAINING PROGRAM

## 2024-04-30 PROCEDURE — 93325 DOPPLER ECHO COLOR FLOW MAPG: CPT | Performed by: INTERNAL MEDICINE

## 2024-04-30 PROCEDURE — 25010000002 PROPOFOL 10 MG/ML EMULSION: Performed by: NURSE ANESTHETIST, CERTIFIED REGISTERED

## 2024-04-30 PROCEDURE — 25810000003 SODIUM CHLORIDE 0.9 % SOLUTION: Performed by: NURSE PRACTITIONER

## 2024-04-30 PROCEDURE — 25010000002 VANCOMYCIN 5 G RECONSTITUTED SOLUTION: Performed by: NURSE PRACTITIONER

## 2024-04-30 PROCEDURE — 94761 N-INVAS EAR/PLS OXIMETRY MLT: CPT

## 2024-04-30 PROCEDURE — 94799 UNLISTED PULMONARY SVC/PX: CPT

## 2024-04-30 PROCEDURE — 80048 BASIC METABOLIC PNL TOTAL CA: CPT | Performed by: INTERNAL MEDICINE

## 2024-04-30 PROCEDURE — 82948 REAGENT STRIP/BLOOD GLUCOSE: CPT

## 2024-04-30 PROCEDURE — 99233 SBSQ HOSP IP/OBS HIGH 50: CPT | Performed by: NURSE PRACTITIONER

## 2024-04-30 PROCEDURE — 93325 DOPPLER ECHO COLOR FLOW MAPG: CPT

## 2024-04-30 PROCEDURE — 25810000003 SODIUM CHLORIDE 0.9 % SOLUTION: Performed by: NURSE ANESTHETIST, CERTIFIED REGISTERED

## 2024-04-30 PROCEDURE — 93312 ECHO TRANSESOPHAGEAL: CPT

## 2024-04-30 PROCEDURE — 85027 COMPLETE CBC AUTOMATED: CPT | Performed by: INTERNAL MEDICINE

## 2024-04-30 RX ORDER — LIDOCAINE HYDROCHLORIDE 20 MG/ML
INJECTION, SOLUTION EPIDURAL; INFILTRATION; INTRACAUDAL; PERINEURAL AS NEEDED
Status: DISCONTINUED | OUTPATIENT
Start: 2024-04-30 | End: 2024-04-30 | Stop reason: SURG

## 2024-04-30 RX ORDER — SODIUM CHLORIDE 9 MG/ML
INJECTION, SOLUTION INTRAVENOUS CONTINUOUS PRN
Status: DISCONTINUED | OUTPATIENT
Start: 2024-04-30 | End: 2024-04-30 | Stop reason: SURG

## 2024-04-30 RX ADMIN — BUPROPION HYDROCHLORIDE 450 MG: 150 TABLET, EXTENDED RELEASE ORAL at 08:29

## 2024-04-30 RX ADMIN — LIDOCAINE HYDROCHLORIDE 60 MG: 20 INJECTION, SOLUTION EPIDURAL; INFILTRATION; INTRACAUDAL; PERINEURAL at 09:45

## 2024-04-30 RX ADMIN — Medication 1 PATCH: at 08:26

## 2024-04-30 RX ADMIN — Medication 10 ML: at 08:29

## 2024-04-30 RX ADMIN — MUPIROCIN 1 APPLICATION: 20 OINTMENT TOPICAL at 08:27

## 2024-04-30 RX ADMIN — LEVOTHYROXINE SODIUM 200 MCG: 100 TABLET ORAL at 08:27

## 2024-04-30 RX ADMIN — GABAPENTIN 800 MG: 400 CAPSULE ORAL at 05:26

## 2024-04-30 RX ADMIN — PROPOFOL 100 MCG/KG/MIN: 10 INJECTION, EMULSION INTRAVENOUS at 09:45

## 2024-04-30 RX ADMIN — FLUOXETINE HYDROCHLORIDE 20 MG: 20 CAPSULE ORAL at 08:28

## 2024-04-30 RX ADMIN — BUPRENORPHINE HYDROCHLORIDE AND NALOXONE HYDROCHLORIDE DIHYDRATE 3 TABLET: 8; 2 TABLET SUBLINGUAL at 08:28

## 2024-04-30 RX ADMIN — CETIRIZINE HYDROCHLORIDE 10 MG: 10 TABLET, FILM COATED ORAL at 08:28

## 2024-04-30 RX ADMIN — INSULIN LISPRO 7 UNITS: 100 INJECTION, SOLUTION INTRAVENOUS; SUBCUTANEOUS at 06:48

## 2024-04-30 RX ADMIN — ACETAMINOPHEN 650 MG: 325 TABLET ORAL at 08:28

## 2024-04-30 RX ADMIN — SODIUM CHLORIDE: 0.9 INJECTION, SOLUTION INTRAVENOUS at 09:45

## 2024-04-30 RX ADMIN — METRONIDAZOLE 500 MG: 250 TABLET ORAL at 08:27

## 2024-04-30 RX ADMIN — OLANZAPINE 10 MG: 10 TABLET, ORALLY DISINTEGRATING ORAL at 08:27

## 2024-04-30 RX ADMIN — INSULIN GLARGINE 10 UNITS: 100 INJECTION, SOLUTION SUBCUTANEOUS at 08:25

## 2024-04-30 RX ADMIN — VANCOMYCIN HYDROCHLORIDE 1500 MG: 5 INJECTION, POWDER, LYOPHILIZED, FOR SOLUTION INTRAVENOUS at 03:42

## 2024-04-30 RX ADMIN — TOPIRAMATE 25 MG: 25 TABLET, FILM COATED ORAL at 08:28

## 2024-04-30 RX ADMIN — PANTOPRAZOLE SODIUM 40 MG: 40 TABLET, DELAYED RELEASE ORAL at 05:26

## 2024-04-30 RX ADMIN — FUROSEMIDE 20 MG: 20 TABLET ORAL at 08:28

## 2024-04-30 RX ADMIN — FLUOXETINE HYDROCHLORIDE 40 MG: 20 CAPSULE ORAL at 08:27

## 2024-04-30 NOTE — PLAN OF CARE
SIGNIFICANT EVENT NOTE    Ms Moreno is s/p most recent MBSS on 4/26 w/ recommendations for nectar thick liquids per observed aspiration of thin liquids.     Per discussion w/ RN and EMR review, pt has been medically non-compliant w/ recommendations since transfer from CCU.     She was unable to participate in SLP evaluation on 4/29 due to NPO for planned KIMMIE. This was unable to be completed on that date however pt not returned to po diet within time for SLP evaluation to be completed that date per shift end.     Ms Moreno was NPO this am for KIMMIE this date. She has continued to not follow medical recommendations and is leaving AMA this date. RN relates that per MD, MBSS to be deferred as pt is leaving AMA.     Per this status, SLP is unable to re-evaluate Ms Moreno via instrumental evaluation.      Thank you-   Lyric Bradford M.S., CCC-SLP

## 2024-04-30 NOTE — ANESTHESIA PREPROCEDURE EVALUATION
Anesthesia Evaluation     Patient summary reviewed and Nursing notes reviewed   no history of anesthetic complications:   NPO Solid Status: > 8 hours  NPO Liquid Status: > 8 hours           Airway   Mallampati: II  TM distance: >3 FB  Neck ROM: full  No difficulty expected  Dental - normal exam     Pulmonary - normal exam    breath sounds clear to auscultation  (+) asthma,  Cardiovascular - negative cardio ROS and normal exam    ECG reviewed  Rhythm: regular  Rate: normal    (+) hypertension, hyperlipidemia      Neuro/Psych  (+) numbness (Diabetic neuropathy), psychiatric history Anxiety and Depression  GI/Hepatic/Renal/Endo    (+) GERD, hepatitis C, liver disease, diabetes mellitus type 1 poorly controlled using insulin, thyroid problem hypothyroidism    Musculoskeletal (-) negative ROS    Abdominal  - normal exam   Substance History   (+) drug use (Suboxone)     OB/GYN negative ob/gyn ROS         Other - negative ROS                         Anesthesia Plan    ASA 3     general   total IV anesthesia  intravenous induction     Anesthetic plan, risks, benefits, and alternatives have been provided, discussed and informed consent has been obtained with: patient.    Use of blood products discussed with patient  Consented to blood products.    Plan discussed with CRNA.        CODE STATUS:    Code Status (Patient has no pulse and is not breathing): CPR (Attempt to Resuscitate)  Medical Interventions (Patient has pulse or is breathing): Full Support

## 2024-04-30 NOTE — NURSING NOTE
Yesterday, patient was informed after leaving the unit several times that PCU is a closed unit and she was not to leave, especially with the CVC in her neck. MD told patient if she continued to leave the floor, it would be considered elopement and she would have to sign out AMA. Post KIMMIE, patient stated she was going to disconnect from the monitor and use the restroom. She stayed off the monitor and left the unit once again. She was seen outside in the parking lot smoking a cigarette. She was also educated just this morning on the risks of smoking while using nicotine patches by both RN and MD. Security was called. Patient returned to room on her own. MD notified and stated to have patient sign AMA form due to noncompliance. RN x2 went to patient room and discussed AMA with patient.We explained risks of leaving the floor and going against medical advice. Patient signed AMA form and central line was then removed. Patient agreed to lay flat for the 30 minutes before leaving. Follow up appointment for ID was made and given to patient and patient stated she would follow up with her PCP as well. Pharmacy called and home meds to be returned. Patient left with significant other at 1240.

## 2024-04-30 NOTE — DISCHARGE SUMMARY
[facility] HOSPITALIST MEDICINE DISCHARGE SUMMARY    Patient Identification:  Name:  Jes Moreno  Age:  38 y.o.  Sex:  female  :  1985  MRN:  [id number]  Visit Number:  [id number]    Date of Admission: 2024  Date of Discharge: 2024    PCP: Juany Sheehan APRN    DISCHARGE DIAGNOSIS   Acute encephalopathy  MRSA bacteremia  Trichomonas vaginalis  DKA  Acute methamphetamine intoxication  Polysubstance abuse      CONSULTS  Dr. Felton, Pulmonology  Dr. Adrian      PROCEDURES PERFORMED   None      HOSPITAL COURSE  Ms. Moreno is a 38 y.o. female who presented to [facility] emergency department via EMS secondary to altered mental status.  It should be noted patient was intubated and sedated at time of initial interview.  According to emergency room provider, patient was found on the side of the road acting irrational and a passing by motorist did call local authorities.  When local authorities approached the patient, she reportedly became agitated and combative, requiring several police officers to subdue her and bring her to the emergency department for further treatment and evaluation.  Per emergency room provider, upon initial evaluation the patient did appear to have excessive lacrimation and salivation as well as flushing of the skin.  There was concern for acute drug intoxication.  Patient did require large amounts of sedating medications, raising concern for ability to protect her airway.  As such, patient was emergently intubated and placed on sedating medications.  Patient was then admitted for further treatment and evaluation for suspected acute drug intoxication.     Patient remained in the intensive care unit, gently sedated on mechanical ventilation up until 2024 where she damaged the cuff on her ET tube during a spontaneous awakening trial.  This necessitated extubation and patient did well after extubation.  Blood cultures from admission did demonstrate MRSA  "bacteremia, 2 out of 2 cultures.  Patient was started on vancomycin and infectious disease was consulted.  On 4/24/2024, a green vaginal discharge was also noted and chlamydia/trichomonas PCR panel was obtained which was positive for trichomonas.  Patient was started on Flagyl at that time.  Infectious disease did recommend continued IV antibiotic therapy with vancomycin or daptomycin for a minimum of 2 weeks.  As patient did have MRSA bacteremia, recommendation was made to obtain transesophageal echocardiogram to rule out endocarditis.  Patient did receive transesophageal echocardiogram on 4/30/2024 which ruled out endocarditis.  As such, recommendation was made to continue vancomycin or daptomycin for a duration of 2 weeks.  However, patient is a high risk for continued IV drug use and as such an additional recommendation was made to consider Dalvance 1005 mg IV x 1 dose prior to discharge.  Case management was contacted to set up Dalvance infusion in the outpatient infusion clinic.  Unfortunately, immediately after transesophageal echo was completed, patient did awaken and left the progressive care unit without notifying nursing staff.  It should be noted patient had left the progressive care unit on multiple occasions on the days prior.  Patient had been strongly cautioned that the progressive care unit was a closed unit and she was not permitted to leave the floor until discharge.  She was also strongly encouraged to not leave the hospital as this opened herself up to potential misuse of central line as she does have recent history of illicit drug use.  Patient was also strongly cautioned against continued tobacco abuse as she was currently receiving a nicotine patch.  Patient expressed understanding of all of these concerns.  Unfortunately, patient decided to leave the progressive care unit immediately after completion of KIMMIE to \"go outside and smoke\".  When patient returned to her room, she was informed that " she had left AGAINST MEDICAL ADVICE and was considered an elopement from the hospital.  As such, patient did sign AMA papers and had central line removed.  Patient was strongly encouraged to follow-up with infectious disease in the outpatient setting for further follow-up of MRSA bacteremia.    VITAL SIGNS:      04/28/24  0400 04/29/24  0400 04/30/24  0400   Weight: 76 kg (167 lb 8.8 oz) 67 kg (147 lb 11.3 oz) 68.2 kg (150 lb 5.7 oz)     Body mass index is 23.54 kg/m².    PHYSICAL EXAM:  Constitutional:  female appearing older than stated age in no apparent distress.     HENT:  Head:  Normocephalic and atraumatic.  Mouth:  Moist mucous membranes.    Eyes:  Conjunctivae and EOM are normal.  Pupils are equal, round, and reactive to light.  No scleral icterus.    Neck:  Neck supple. No thyromegaly.  No JVD present.    Cardiovascular:  Regular rate and rhythm with no murmurs, rubs, clicks or gallops appreciated.  Pulmonary/Chest:  Clear to auscultation bilaterally with no crackles, wheezes or rhonchi appreciated.  Abdominal:  Soft. Nontender. Nondistended  Bowel sounds are normal in all four quadrants. No organomegally appreciated.   Musculoskeletal:  5/5 muscle strength bilateral upper and lower extremities with equal muscle tone and bulk. No edema, no tenderness, and no deformity.  No red or swollen joints anywhere.    Neurological:  Alert, Cranial nerves II-XII intact with no focal deficits.  No facial droop.  No slurred speech.   Skin:  Warm and dry to palpation with no rashes or lesions appreciated.  Peripheral vascular:  2+ radial and pedal pulses in bilateral upper and lower extremities.  Psychiatric:  Alert and oriented x3, demonstrates appropriate judgment and insight.    DISCHARGE DISPOSITION   Stable    DISCHARGE MEDICATIONS:     Discharge Medications        ASK your doctor about these medications        Instructions Start Date   albuterol sulfate  (90 Base) MCG/ACT inhaler  Commonly known as:  PROVENTIL HFA;VENTOLIN HFA;PROAIR HFA  Ask about: Which instructions should I use?   1 puff, Inhalation, Every 4 Hours PRN      atorvastatin 20 MG tablet  Commonly known as: LIPITOR   20 mg, Oral, Nightly      Baqsimi Two Pack 3 MG/DOSE powder  Generic drug: Glucagon   1 spray into the nostril(s) as directed by provider As Needed (Low Blood Glucose).      BASAGLAR KWIKPEN 100 UNIT/ML injection pen  Ask about: Which instructions should I use?   35 Units, Subcutaneous, Nightly      buprenorphine-naloxone 8-2 MG per SL tablet  Commonly known as: SUBOXONE   3 tablets, Sublingual, Daily      buPROPion  MG 24 hr tablet  Commonly known as: FORFIVO XL  Ask about: Which instructions should I use?   450 mg, Oral, Every Morning      cetirizine 10 MG tablet  Commonly known as: zyrTEC   1 tablet, Oral, Daily      docusate sodium 100 MG capsule  Commonly known as: COLACE   200 mg, Oral, Daily PRN      FLUoxetine 20 MG capsule  Commonly known as: PROzac   20 mg, Oral, Daily      FLUoxetine 40 MG capsule  Commonly known as: PROzac   40 mg, Oral, Daily      fluticasone 50 MCG/ACT nasal spray  Commonly known as: FLONASE   2 sprays, Each Nare, Daily PRN      furosemide 20 MG tablet  Commonly known as: LASIX   20 mg, Oral, Daily, Take with potassium      gabapentin 800 MG tablet  Commonly known as: NEURONTIN  Ask about: Which instructions should I use?   800 mg, Oral, 3 Times Daily      glucose 4 GM chewable tablet  Commonly known as: DEX4   4 g, Oral, Daily PRN      hydrOXYzine 25 MG tablet  Commonly known as: ATARAX  Ask about: Which instructions should I use?   25 mg, Oral, Every 6 Hours PRN      Insulin Lispro (1 Unit Dial) 100 UNIT/ML solution pen-injector  Commonly known as: HUMALOG  Ask about: Which instructions should I use?   16 Units, Subcutaneous, 3 times daily      ipratropium-albuterol  MCG/ACT inhaler  Commonly known as: COMBIVENT RESPIMAT   1 puff, Inhalation, Every 6 Hours PRN      levothyroxine 200 MCG  tablet  Commonly known as: SYNTHROID, LEVOTHROID  Ask about: Which instructions should I use?   200 mcg, Oral, Daily      Lidocaine Viscous HCl 2 % solution  Commonly known as: XYLOCAINE   10 mL, Oral, Every 2 Hours PRN      montelukast 10 MG tablet  Commonly known as: SINGULAIR   10 mg, Oral, Nightly      ondansetron ODT 4 MG disintegrating tablet  Commonly known as: ZOFRAN-ODT   4 mg, Translingual, Every 8 Hours PRN      pantoprazole 40 MG EC tablet  Commonly known as: PROTONIX   1 tablet, Oral, Daily      polyethylene glycol 17 GM/SCOOP powder  Commonly known as: MIRALAX   17 g, Oral, Daily      potassium chloride 10 MEQ CR tablet   10 mEq, Oral, Daily, Take with lasix      promethazine 25 MG tablet  Commonly known as: PHENERGAN   25 mg, Oral, Every 12 Hours PRN      rOPINIRole 2 MG tablet  Commonly known as: REQUIP   2 mg, Oral, Nightly      Semaglutide 3 MG tablet   3 mg, Oral, Daily      topiramate 25 MG tablet  Commonly known as: TOPAMAX   25 mg, Oral, Daily                 Your Scheduled Appointments      May 07, 2024  8:45 AM  Follow Up with Alma Adrian MD  [facility] INFECTIOUS DISEASES ([place]) [address] [address] [phone]   Arrive 15 minutes prior to appointment.         Jul 16, 2024 10:15 AM  New Patient with GAURAV Romero  [facility] ENDOCRINOLOGY ([place]) [address] [id number] [phone]   Bring all previous medical records and films, along with current medications and insurance information.       Additional instructions:    Follow up with ID on May 7, @ 8:45 AM             Follow-up Information       Alma Adrian MD. Schedule an appointment as soon as possible for a visit in 1 week(s).    Specialty: Infectious Diseases  Contact information:  [address] [address]  [phone]               Juany Sheehan APRN .    Specialty: Nurse Practitioner  Contact  information:  [address] [facility] [address] [address]  [phone]                             TEST  RESULTS PENDING AT DISCHARGE       Jono Valencia DO  04/30/24  18:31 EDT    Please note that this discharge summary required more than 30 minutes to complete.    Please send a copy of this dictation to the following providers:  Juany Sheehan APRN

## 2024-04-30 NOTE — PLAN OF CARE
Goal Outcome Evaluation:  Plan of Care Reviewed With: patient           Outcome Evaluation: Patient is resting in bed, VSS on RA. No acute changes or complaints, pt compliant with staying on unit while ambulation. Continuing POC

## 2024-04-30 NOTE — CASE MANAGEMENT/SOCIAL WORK
Continued Stay Note   Jose     Patient Name: Jes Moreno  MRN: [id number]  Today's Date: 4/30/2024    Admit Date: 4/19/2024    Plan: CM notified that patient left AMA.  CM contacted [facility]fusion [facility] and spoke with Willa to cancel Infusion today at 1:00 p.m.   Discharge Plan       Row Name 04/30/24 1212       Plan    Plan CM notified that patient left AMA.  CM contacted Infusion Clinic and spoke with Willa to cancel Infusion today at 1:00 p.m.    Final Discharge Disposition Code 07 - left AMA    Final Note Patient left AMA on this date 4/30/24.      Row Name 04/30/24 1036       Plan    Plan ROSALIO received message from MD r/t patient having IV Dalvance infusion after discharge today.  CM phone [facility] Ext. [phone] and spoke with Willa who states patient to arrive in Infusion Clinic today at 1:00 p.m. and                   Discharge Codes    No documentation.                 Expected Discharge Date and Time       Expected Discharge Date Expected Discharge Time    Apr 30, 2024               Sana Hay RN

## 2024-04-30 NOTE — CASE MANAGEMENT/SOCIAL WORK
Case Management Discharge Note      Final Note: Patient left AMA on this date 4/30/24.         Selected Continued Care - Admitted Since 4/19/2024               Final Discharge Disposition Code: 07 - left AMA

## 2024-04-30 NOTE — PROGRESS NOTES
PROGRESS NOTE         Patient Identification:  Name:  Jes Moreno  Age:  38 y.o.  Sex:  female  :  1985  MRN:  [id number]  Visit Number:  [id number]  Primary Care Provider:  Juany Sheehan APRN         LOS: 11 days       ----------------------------------------------------------------------------------------------------------------------  Subjective       Chief Complaints:    Altered Mental Status        Interval History:      Patient sitting up on side of bed this morning.  Spouse at bedside.  Currently on room air with no apparent distress.  Lungs clear to auscultation bilaterally.  Abdomen soft, nontender.  KIMMIE was unable to be performed due to anesthesia issue yesterday.  KIMMIE planned for today.  WBC 11.33.    Review of Systems:    Constitutional: no fever, chills and night sweats.  Generalized fatigue.  Eyes: no eye drainage, itching or redness.  HEENT: no mouth sores, dysphagia or nose bleed.  Respiratory: no for shortness of breath, cough or production of sputum.  Cardiovascular: no chest pain, no palpitations, no orthopnea.  Gastrointestinal: no nausea, vomiting or diarrhea. No abdominal pain, hematemesis or rectal bleeding.  Genitourinary: no dysuria or polyuria.  Hematologic/lymphatic: no lymph node abnormalities, no easy bruising or easy bleeding.  Musculoskeletal: no muscle or joint pain.  Skin: No rash and no itching.  Neurological: no loss of consciousness, no seizure, no headache.  Behavioral/Psych: no depression or suicidal ideation.  Endocrine: no hot flashes.  Immunologic: negative.    ----------------------------------------------------------------------------------------------------------------------      Objective       [facility] Meds:  atorvastatin, 20 mg, Oral, Nightly  buprenorphine-naloxone, 3 tablet, Sublingual, Daily  buPROPion XL, 450 mg, Oral, Daily  cetirizine, 10 mg, Oral, Daily  enoxaparin, 40 mg, Subcutaneous, Q24H  FLUoxetine, 20 mg, Oral, Daily  FLUoxetine,  40 mg, Oral, Daily  furosemide, 20 mg, Oral, Daily  gabapentin, 800 mg, Oral, Q8H  insulin glargine, 10 Units, Subcutaneous, Daily  insulin lispro, 2-9 Units, Subcutaneous, 4x Daily AC & at Bedtime  levothyroxine, 200 mcg, Oral, Daily  metroNIDAZOLE, 500 mg, Oral, Q8H  montelukast, 10 mg, Oral, Nightly  mupirocin, 1 Application, Topical, Q12H  nicotine, 1 patch, Transdermal, Q24H  OLANZapine zydis, 10 mg, Oral, Daily  pantoprazole, 40 mg, Oral, Q AM  rOPINIRole, 2 mg, Oral, Nightly  senna-docusate sodium, 2 tablet, Oral, BID  sodium chloride, 10 mL, Intravenous, Q12H  sodium chloride, 10 mL, Intravenous, Q12H  sodium chloride, 10 mL, Intravenous, Q12H  topiramate, 25 mg, Oral, Daily  vancomycin, 1,500 mg, Intravenous, Q12H      dextrose 5 % and sodium chloride 0.45 %, 150 mL/hr  dextrose 5 % and sodium chloride 0.9 %, 150 mL/hr  sodium chloride, 250 mL/hr  sodium chloride, 250 mL/hr      ----------------------------------------------------------------------------------------------------------------------    Vital Signs:  Temp:  [97.6 °F (36.4 °C)-99.3 °F (37.4 °C)] 98.7 °F (37.1 °C)  Heart Rate:  [] 109  Resp:  [10-25] 16  BP: ()/(55-88) 127/83  Mean Arterial Pressure (Non-Invasive) for the past 24 hrs (Last 3 readings):   Noninvasive MAP (mmHg)   04/30/24 0900 95   04/30/24 0500 78   04/30/24 0400 70     SpO2 Percentage    04/30/24 0300 04/30/24 0400 04/30/24 0500   SpO2: 96% 98% 92%     SpO2:  [90 %-100 %] 92 %  on   ;   Device (Oxygen Therapy): room air    Body mass index is 23.54 kg/m².  Wt Readings from Last 3 Encounters:   04/30/24 68.2 kg (150 lb 5.7 oz)   02/21/24 66.1 kg (145 lb 12.8 oz)   08/13/23 73.5 kg (162 lb)        Intake/Output Summary (Last 24 hours) at 4/30/2024 0927  Last data filed at 4/30/2024 0800  Gross per 24 hour   Intake 501 ml   Output --   Net 501 ml     NPO Diet NPO Type: Strict  NPO  ----------------------------------------------------------------------------------------------------------------------      Physical Exam:    Constitutional:  Well-developed and well-nourished.  No respiratory distress.  On room air.   Sitting up on side of bed this morning.  Significant other at bedside.    HENT:  Head: Normocephalic and atraumatic.  Mouth:  Moist mucous membranes.    Eyes:  Conjunctivae and EOM are normal.  No scleral icterus.  Neck:  Neck supple.  No JVD present.    Cardiovascular:  Normal rate, regular rhythm and normal heart sounds with 3/6 systolic ejection murmur. No edema.  Pulmonary/Chest:  No respiratory distress, no wheezes, no crackles, with normal breath sounds and good air movement.  Abdominal:  Soft.  Bowel sounds are normal.  No distension and no tenderness.  Left chest wall CBC without signs of infection.  Morton catheter in place.  Musculoskeletal:  No edema, no tenderness, and no deformity.  No swelling or redness of joints.  Neurological:  Alert and oriented to person, place, and time.  No facial droop.  No slurred speech.   Skin:  Skin is warm and dry.  No rash noted.  No pallor.   Psychiatric:  Normal mood and affect.  Behavior is normal. Anxious.        ----------------------------------------------------------------------------------------------------------------------                      Results from last 7 days   Lab Units 04/30/24  0029 04/29/24  0117 04/27/24  0047   WBC 10*3/mm3 11.33* 10.46 11.42*   HEMOGLOBIN g/dL 10.1* 10.5* 11.3*   HEMATOCRIT % 31.5* 33.5* 35.4   MCV fL 92.4 92.3 91.5   MCHC g/dL 32.1 31.3* 31.9   PLATELETS 10*3/mm3 520* 512* 565*     Results from last 7 days   Lab Units 04/30/24  0029 04/29/24  0117 04/27/24  0047 04/26/24  0052 04/25/24  0100   SODIUM mmol/L 141 140 143 144 141   POTASSIUM mmol/L 3.7 4.1 4.0 3.3* 3.9   MAGNESIUM mg/dL  --   --   --  1.7  --    CHLORIDE mmol/L 104 103 106 107 109*   CO2 mmol/L 28.0 27.4 26.8 28.7 26.1   BUN  "mg/dL 10 14 11 7 9   CREATININE mg/dL 0.67 0.71 0.73 0.57 0.51*   CALCIUM mg/dL 8.5* 8.2* 9.0 8.7 8.1*   GLUCOSE mg/dL 190* 299* 60* 55* 211*   ALBUMIN g/dL  --   --   --   --  2.5*   BILIRUBIN mg/dL  --   --   --   --  0.2   ALK PHOS U/L  --   --   --   --  114   AST (SGOT) U/L  --   --   --   --  19   ALT (SGPT) U/L  --   --   --   --  16   Estimated Creatinine Clearance: 125 mL/min (by C-G formula based on SCr of 0.67 mg/dL).  No results found for: \"AMMONIA\"    Glucose   Date/Time Value Ref Range Status   04/29/2024 2109 333 (H) 70 - 130 mg/dL Final   04/29/2024 2002 253 (H) 70 - 130 mg/dL Final   04/29/2024 1638 345 (H) 70 - 130 mg/dL Final   04/29/2024 1516 356 (H) 70 - 130 mg/dL Final   04/29/2024 1156 155 (H) 70 - 130 mg/dL Final   04/29/2024 1004 205 (H) 70 - 130 mg/dL Final   04/29/2024 0940 43 (C) 70 - 130 mg/dL Final   04/29/2024 0838 89 70 - 130 mg/dL Final     Lab Results   Component Value Date    HGBA1C 9.00 (H) 04/19/2024     Lab Results   Component Value Date    TSH 0.732 08/12/2023    FREET4 1.80 (H) 06/20/2021       Blood Culture   Date Value Ref Range Status   04/24/2024 No growth at 24 hours  Preliminary   04/24/2024 No growth at 24 hours  Preliminary   04/22/2024 Staphylococcus aureus, MRSA (C)  Preliminary     Comment:       Infectious disease consultation is highly recommended to rule out distant foci of infection.  Methicillin resistant Staphylococcus aureus, Patient may be an isolation risk.   04/22/2024 Staphylococcus aureus, MRSA (C)  Preliminary     Comment:       Infectious disease consultation is highly recommended to rule out distant foci of infection.  Methicillin resistant Staphylococcus aureus, Patient may be an isolation risk.   04/19/2024 Staphylococcus epidermidis (C)  Final   04/19/2024 Staphylococcus epidermidis (C)  Final     Urine Culture   Date Value Ref Range Status   04/24/2024 No growth  Preliminary     No results found for: \"WOUNDCX\"  No results found for: \"STOOLCX\"  No " "results found for: \"RESPCX\"  Pain Management Panel  More data exists         Latest Ref Rng & Units 4/28/2024 4/19/2024   Pain Management Panel   Amphetamine, Urine Qual Negative Negative  Positive    Barbiturates Screen, Urine Negative Negative  Negative    Benzodiazepine Screen, Urine Negative Negative  Positive    Buprenorphine, Screen, Urine Negative Positive  Positive    Cocaine Screen, Urine Negative Negative  Negative    Fentanyl, Urine Negative Positive  Negative    Methadone Screen , Urine Negative Negative  Negative    Methamphetamine, Ur Negative Negative  Positive          ----------------------------------------------------------------------------------------------------------------------  Imaging Results (Last 24 Hours)       ** No results found for the last 24 hours. **            ----------------------------------------------------------------------------------------------------------------------    Pertinent Infectious Disease Results                Assessment/Plan       Assessment       MRSA bacteremia       Plan      Patient sitting up on side of bed this morning.  Spouse at bedside.  Currently on room air with no apparent distress.  Lungs clear to auscultation bilaterally.  Abdomen soft, nontender.  KIMMIE was unable to be performed due to anesthesia issue yesterday.  KIMMIE planned for today.  WBC 11.33.    Trichomonas vaginalis diagnosed via PCR on 4/24/2024.     Blood cultures from 4/24/2024 show no growth thus far.     Recommend to continue metronidazole 500 mg p.o. every 8 hours x 7 days through 5/1/2024 for treatment of trichomonas.  Recommend to continue vancomycin pharmacy to dose for MRSA and Staphylococcus epidermis bacteremia.  Although bacteremia cleared quickly in the setting of known IV drug use and murmur recommend KIMMIE.     If KIMMIE is negative for vegetation recommend Vancomycin pharmacy to dose or for easier outpatient administration Daptomycin 8 mg/kg IV every 24 hours to continue 2 " weeks from first negative blood culture through 5/8/2024 or Dalvance 1500 mg IV x 1 dose.    If KIMMIE is positive for vegetation confirming endocarditis patient will require 6 weeks of IV antibiotic therapy from first negative blood culture with either vancomycin pharmacy to dose or daptomycin 8 mg/kg IV every 24 hours to continue through 6/5/2024.  We will continue to follow closely and adjust antibiotic therapy as needed.      ANTIMICROBIAL THERAPY    metroNIDAZOLE - 250 MG  Vancomycin HCl in NaCl solution - 1.5-0.9 GM/500ML-%     Code Status:   Code Status and Medical Interventions:   Ordered at: 04/19/24 1230     Code Status (Patient has no pulse and is not breathing):    CPR (Attempt to Resuscitate)     Medical Interventions (Patient has pulse or is breathing):    Full Support       GAURAV Baumann  04/30/24  09:27 EDT

## 2024-04-30 NOTE — SIGNIFICANT NOTE
[facility]  SIGNIFICANT EVENT NOTE      Patient information:  Name: Jes Moreno  Age/Sex: 38 y.o. female  :  1985        PCP: Juany Sheehan APRN  Attending: Madalyn Villalba DO  MRN:  [id number]  Visit Number:  [id number]    LOS:  LOS: 11 days   CODE STATUS:   Code Status and Medical Interventions:   Ordered at: 24 1230     Code Status (Patient has no pulse and is not breathing):    CPR (Attempt to Resuscitate)     Medical Interventions (Patient has pulse or is breathing):    Full Support       PROBLEM LIST:Principal Problem:    Acute encephalopathy      Reason for Cardiology follow-up: KIMMIE     Subjective Data:   EVENT/HPI:    Irene BAUM from Cath [facility] needed a new order placed for KIMMIE scheduled for today.    Objective Data:   Temp:  [97.6 °F (36.4 °C)-99.3 °F (37.4 °C)] 98.7 °F (37.1 °C)  Heart Rate:  [] 98  Resp:  [10-25] 24  BP: ()/(55-88) 133/86  Device (Oxygen Therapy): room air  Vital Signs (last 72 hrs)          0700   0659  0700   0659  07 0659  07 09   Most Recent      Temp (°F) 98.1 -  99.5    97.6 -  98.9    97.6 -  99.3      98.7     98.7 (37.1)  08    Heart Rate 70 -  117    62 -  118    58 -  111    98 -  109     98  09    Resp 12 -  27    10 -  25    10 -  28    16 -  24     24 927    BP 82/44 -  143/93    87/71 -  148/78    96/55 -  130/85    127/83 -  133/86     133/86 927    SpO2 (%) 93 -  100    93 -  100    90 -  100      100     100 927          Body mass index is 23.54 kg/m².      24  0400 24  040   Weight: 67 kg (147 lb 11.3 oz) 68.2 kg (150 lb 5.7 oz)     Results Reviewed:     Assessment and Plan:   New order placed for KIMMIE to be done today per Dr. Mata      atorvastatin, 20 mg, Oral, Nightly  buprenorphine-naloxone, 3 tablet, Sublingual, Daily  buPROPion XL, 450 mg, Oral, Daily  cetirizine, 10 mg, Oral, Daily  enoxaparin,  40 mg, Subcutaneous, Q24H  FLUoxetine, 20 mg, Oral, Daily  FLUoxetine, 40 mg, Oral, Daily  furosemide, 20 mg, Oral, Daily  gabapentin, 800 mg, Oral, Q8H  insulin glargine, 10 Units, Subcutaneous, Daily  insulin lispro, 2-9 Units, Subcutaneous, 4x Daily AC & at Bedtime  levothyroxine, 200 mcg, Oral, Daily  metroNIDAZOLE, 500 mg, Oral, Q8H  montelukast, 10 mg, Oral, Nightly  mupirocin, 1 Application, Topical, Q12H  nicotine, 1 patch, Transdermal, Q24H  OLANZapine zydis, 10 mg, Oral, Daily  pantoprazole, 40 mg, Oral, Q AM  rOPINIRole, 2 mg, Oral, Nightly  senna-docusate sodium, 2 tablet, Oral, BID  sodium chloride, 10 mL, Intravenous, Q12H  sodium chloride, 10 mL, Intravenous, Q12H  sodium chloride, 10 mL, Intravenous, Q12H  topiramate, 25 mg, Oral, Daily  vancomycin, 1,500 mg, Intravenous, Q12H      dextrose 5 % and sodium chloride 0.45 %, 150 mL/hr  dextrose 5 % and sodium chloride 0.9 %, 150 mL/hr  sodium chloride, 250 mL/hr  sodium chloride, 250 mL/hr        GAURAV Jimenez (Robin)   [facility] 09:43 EDT  4/30/2024    Electronically signed by GAURAV Barboza, 04/30/24, 9:46 AM EDT.           Please note that portions of this note were copied and has been reviewed and is accurate as of 4/30/2024 .      Please note that portions of this note were completed with a voice recognition program.

## 2024-04-30 NOTE — SIGNIFICANT NOTE
Chart reviewed this am.     Ms Moreno is currently planned for KIMMIE this am and is NPO awaiting this procedure.   Pt will need repeat MBS to r/o aspiration and advance po diet away from nectar thick liquids.     SLP to f/u for re-evaluation when clear to accept po intake.     Thank you-  Lyric Bradford M.S., CCC-SLP

## 2024-04-30 NOTE — CASE MANAGEMENT/SOCIAL WORK
Continued Stay Note   Jose     Patient Name: Jes Moreno  MRN: [id number]  Today's Date: 4/30/2024    Admit Date: 4/19/2024    Plan: ROSALIO received message from MD r/armida patient having IV Dalvance infusion after discharge today.  ROSALIO phone [facility] Ext. [phone] and spoke with Willa who states patient to arrive in [facility]fusion Clinic today at 1:00 p.m. and   Discharge Plan       Row Name 04/30/24 1036       Plan    Plan CM received message from MD r/armida patient having IV Dalvance infusion after discharge today.  ROSALIO phone [facility] Ext. [phone] and spoke with Willa who states patient to arrive in Infusion Clinic today at 1:00 p.m. and                   Discharge Codes    No documentation.                 Expected Discharge Date and Time       Expected Discharge Date Expected Discharge Time    Apr 30, 2024               Sana Hay RN

## 2024-05-07 ENCOUNTER — HOSPITAL ENCOUNTER (EMERGENCY)
Facility: HOSPITAL | Age: 39
Discharge: HOME | End: 2024-05-07
Attending: STUDENT IN AN ORGANIZED HEALTH CARE EDUCATION/TRAINING PROGRAM
Payer: MEDICAID

## 2024-05-07 ENCOUNTER — HOSPITAL ENCOUNTER (OUTPATIENT)
Dept: INFUSION THERAPY | Facility: HOSPITAL | Age: 39
Discharge: HOME | End: 2024-05-07
Admitting: INTERNAL MEDICINE
Payer: MEDICAID

## 2024-05-07 ENCOUNTER — OFFICE VISIT (OUTPATIENT)
Dept: INFECTIOUS DISEASES | Facility: CLINIC | Age: 39
End: 2024-05-07
Payer: MEDICAID

## 2024-05-07 VITALS — HEART RATE: 96 BPM | DIASTOLIC BLOOD PRESSURE: 73 MMHG | OXYGEN SATURATION: 97 % | SYSTOLIC BLOOD PRESSURE: 108 MMHG

## 2024-05-07 VITALS
TEMPERATURE: 96.5 F | OXYGEN SATURATION: 97 % | BODY MASS INDEX: 21.75 KG/M2 | DIASTOLIC BLOOD PRESSURE: 87 MMHG | SYSTOLIC BLOOD PRESSURE: 139 MMHG | WEIGHT: 138.6 LBS | HEART RATE: 100 BPM | HEIGHT: 67 IN

## 2024-05-07 VITALS
HEIGHT: 67 IN | TEMPERATURE: 98.8 F | OXYGEN SATURATION: 98 % | WEIGHT: 135 LBS | DIASTOLIC BLOOD PRESSURE: 72 MMHG | SYSTOLIC BLOOD PRESSURE: 112 MMHG | HEART RATE: 95 BPM | RESPIRATION RATE: 20 BRPM | BODY MASS INDEX: 21.19 KG/M2

## 2024-05-07 DIAGNOSIS — R78.81 MRSA BACTEREMIA: Primary | ICD-10-CM

## 2024-05-07 DIAGNOSIS — E16.2 HYPOGLYCEMIA: Primary | ICD-10-CM

## 2024-05-07 DIAGNOSIS — R78.81 MRSA BACTEREMIA: ICD-10-CM

## 2024-05-07 DIAGNOSIS — G93.40 ACUTE ENCEPHALOPATHY: Primary | ICD-10-CM

## 2024-05-07 DIAGNOSIS — B95.62 MRSA BACTEREMIA: ICD-10-CM

## 2024-05-07 DIAGNOSIS — B95.62 MRSA BACTEREMIA: Primary | ICD-10-CM

## 2024-05-07 PROBLEM — I33.0 ACUTE BACTERIAL ENDOCARDITIS: Status: ACTIVE | Noted: 2024-05-07

## 2024-05-07 PROBLEM — I33.0 ACUTE BACTERIAL ENDOCARDITIS: Status: RESOLVED | Noted: 2024-05-07 | Resolved: 2024-05-07

## 2024-05-07 LAB
ALBUMIN SERPL-MCNC: 4.1 G/DL (ref 3.5–5.2)
ALBUMIN/GLOB SERPL: 1.3 G/DL
ALP SERPL-CCNC: 144 U/L (ref 39–117)
ALT SERPL W P-5'-P-CCNC: 22 U/L (ref 1–33)
ANION GAP SERPL CALCULATED.3IONS-SCNC: 9.8 MMOL/L (ref 5–15)
AST SERPL-CCNC: 33 U/L (ref 1–32)
BASOPHILS # BLD AUTO: 0.03 10*3/MM3 (ref 0–0.2)
BASOPHILS NFR BLD AUTO: 0.2 % (ref 0–1.5)
BILIRUB SERPL-MCNC: <0.2 MG/DL (ref 0–1.2)
BUN SERPL-MCNC: 13 MG/DL (ref 6–20)
BUN/CREAT SERPL: 14.8 (ref 7–25)
CALCIUM SPEC-SCNC: 9.4 MG/DL (ref 8.6–10.5)
CHLORIDE SERPL-SCNC: 103 MMOL/L (ref 98–107)
CO2 SERPL-SCNC: 28.2 MMOL/L (ref 22–29)
CREAT SERPL-MCNC: 0.88 MG/DL (ref 0.57–1)
DEPRECATED RDW RBC AUTO: 46.9 FL (ref 37–54)
EGFRCR SERPLBLD CKD-EPI 2021: 87.5 ML/MIN/1.73
EOSINOPHIL # BLD AUTO: 0.12 10*3/MM3 (ref 0–0.4)
EOSINOPHIL NFR BLD AUTO: 0.8 % (ref 0.3–6.2)
ERYTHROCYTE [DISTWIDTH] IN BLOOD BY AUTOMATED COUNT: 13.8 % (ref 12.3–15.4)
GLOBULIN UR ELPH-MCNC: 3.2 GM/DL
GLUCOSE BLDC GLUCOMTR-MCNC: 185 MG/DL (ref 70–130)
GLUCOSE BLDC GLUCOMTR-MCNC: 189 MG/DL (ref 70–130)
GLUCOSE SERPL-MCNC: 158 MG/DL (ref 65–99)
HCT VFR BLD AUTO: 43.7 % (ref 34–46.6)
HGB BLD-MCNC: 13.6 G/DL (ref 12–15.9)
HOLD SPECIMEN: NORMAL
HOLD SPECIMEN: NORMAL
IMM GRANULOCYTES # BLD AUTO: 0.04 10*3/MM3 (ref 0–0.05)
IMM GRANULOCYTES NFR BLD AUTO: 0.3 % (ref 0–0.5)
LYMPHOCYTES # BLD AUTO: 0.7 10*3/MM3 (ref 0.7–3.1)
LYMPHOCYTES NFR BLD AUTO: 4.6 % (ref 19.6–45.3)
MCH RBC QN AUTO: 28.8 PG (ref 26.6–33)
MCHC RBC AUTO-ENTMCNC: 31.1 G/DL (ref 31.5–35.7)
MCV RBC AUTO: 92.4 FL (ref 79–97)
MONOCYTES # BLD AUTO: 0.5 10*3/MM3 (ref 0.1–0.9)
MONOCYTES NFR BLD AUTO: 3.3 % (ref 5–12)
NEUTROPHILS NFR BLD AUTO: 13.73 10*3/MM3 (ref 1.7–7)
NEUTROPHILS NFR BLD AUTO: 90.8 % (ref 42.7–76)
NRBC BLD AUTO-RTO: 0 /100 WBC (ref 0–0.2)
PLATELET # BLD AUTO: 577 10*3/MM3 (ref 140–450)
PMV BLD AUTO: 8.5 FL (ref 6–12)
POTASSIUM SERPL-SCNC: 3.7 MMOL/L (ref 3.5–5.2)
PROT SERPL-MCNC: 7.3 G/DL (ref 6–8.5)
RBC # BLD AUTO: 4.73 10*6/MM3 (ref 3.77–5.28)
SODIUM SERPL-SCNC: 141 MMOL/L (ref 136–145)
WBC NRBC COR # BLD AUTO: 15.12 10*3/MM3 (ref 3.4–10.8)
WHOLE BLOOD HOLD COAG: NORMAL
WHOLE BLOOD HOLD SPECIMEN: NORMAL

## 2024-05-07 PROCEDURE — 99283 EMERGENCY DEPT VISIT LOW MDM: CPT

## 2024-05-07 PROCEDURE — 80053 COMPREHEN METABOLIC PANEL: CPT | Performed by: STUDENT IN AN ORGANIZED HEALTH CARE EDUCATION/TRAINING PROGRAM

## 2024-05-07 PROCEDURE — 85025 COMPLETE CBC W/AUTO DIFF WBC: CPT | Performed by: STUDENT IN AN ORGANIZED HEALTH CARE EDUCATION/TRAINING PROGRAM

## 2024-05-07 PROCEDURE — 36415 COLL VENOUS BLD VENIPUNCTURE: CPT

## 2024-05-07 PROCEDURE — 0 DEXTROSE 5 % SOLUTION: Performed by: INTERNAL MEDICINE

## 2024-05-07 PROCEDURE — 96365 THER/PROPH/DIAG IV INF INIT: CPT

## 2024-05-07 PROCEDURE — 25010000002 DALBAVANCIN 500 MG RECONSTITUTED SOLUTION: Performed by: INTERNAL MEDICINE

## 2024-05-07 PROCEDURE — 82948 REAGENT STRIP/BLOOD GLUCOSE: CPT

## 2024-05-07 RX ORDER — ONDANSETRON 2 MG/ML
4 INJECTION INTRAMUSCULAR; INTRAVENOUS ONCE
Status: DISCONTINUED | OUTPATIENT
Start: 2024-05-07 | End: 2024-05-07 | Stop reason: HOSPADM

## 2024-05-07 RX ORDER — DEXTROSE MONOHYDRATE 50 MG/ML
100 INJECTION, SOLUTION INTRAVENOUS CONTINUOUS
Status: CANCELLED
Start: 2024-05-07

## 2024-05-07 RX ORDER — DEXTROSE MONOHYDRATE 50 MG/ML
100 INJECTION, SOLUTION INTRAVENOUS CONTINUOUS
Status: DISCONTINUED | OUTPATIENT
Start: 2024-05-07 | End: 2024-05-09 | Stop reason: HOSPADM

## 2024-05-07 RX ADMIN — DEXTROSE MONOHYDRATE 100 ML: 50 INJECTION, SOLUTION INTRAVENOUS at 10:08

## 2024-05-07 RX ADMIN — DALBAVANCIN 1500 MG: 500 INJECTION, POWDER, FOR SOLUTION INTRAVENOUS at 10:08

## 2024-05-07 NOTE — ED NOTES
Went to start ultrasound guided IV on patient. Patient wants to sign out against medical advice after her glucose is checked.

## 2024-05-07 NOTE — ED NOTES
Patient naked and screaming of being cold. Does not wanted treated until warm. Warm blankets and gown provided. Will reattempt patient care when patient verbalizes readiness.

## 2024-05-07 NOTE — ED PROVIDER NOTES
Subjective   History of Present Illness  Patient is a 38-year-old female with history significant for type 1 diabetes mellitus, HCV who comes to the ER with hypoglycemia.  Patient's significant other states she was here earlier for antibiotics, and her glucose was too high to read.  He gave her 3 units of short acting and 4 units of long-acting.  On the way home she got nauseous.  Check glucose at home and it was in the 40s.  He tried to get her to eat something, checked it again and it was like 48 so he brought her to the ER.  He says she was nauseous and vomiting during these episodes.  No seizure activity.  On arrival, she is awake, alert and orient x 3, says she is nauseous.      Review of Systems   Constitutional:  Negative for chills, fatigue and fever.   HENT:  Negative for ear pain, sinus pain and sore throat.    Respiratory:  Negative for cough, chest tightness, shortness of breath and wheezing.    Cardiovascular:  Negative for chest pain, palpitations and leg swelling.   Gastrointestinal:  Positive for nausea and vomiting. Negative for abdominal pain, constipation and diarrhea.   Genitourinary:  Negative for dysuria, hematuria and urgency.   Musculoskeletal:  Negative for arthralgias and myalgias.   Neurological:  Negative for dizziness, syncope and light-headedness.   Psychiatric/Behavioral:  Negative for confusion.        Past Medical History:   Diagnosis Date    Asthma     Celiac disease     Diabetes mellitus type 1     Diabetic ketoacidosis     Disease of thyroid gland     Hepatitis C     Infectious viral hepatitis     hepititis C    Neuropathy     Reflux gastritis        Allergies   Allergen Reactions    Sulfa Antibiotics        Past Surgical History:   Procedure Laterality Date     SECTION      X 2       Family History   Problem Relation Age of Onset    No Known Problems Mother     Lung cancer Father     No Known Problems Sister     No Known Problems Brother     Diabetes type I Maternal  Grandmother     Breast cancer Maternal Grandmother     Hypertension Maternal Grandmother     No Known Problems Maternal Grandfather     No Known Problems Paternal Grandmother     Diabetes type II Paternal Grandfather     No Known Problems Daughter     No Known Problems Son     No Known Problems Cousin     Rheum arthritis Neg Hx     Osteoarthritis Neg Hx     Asthma Neg Hx     Diabetes Neg Hx     Heart failure Neg Hx     Hyperlipidemia Neg Hx     Migraines Neg Hx     Rashes / Skin problems Neg Hx     Seizures Neg Hx     Stroke Neg Hx     Thyroid disease Neg Hx        Social History     Socioeconomic History    Marital status: Single   Tobacco Use    Smoking status: Every Day     Current packs/day: 1.00     Average packs/day: 1 pack/day for 3.0 years (3.0 ttl pk-yrs)     Types: Cigarettes    Smokeless tobacco: Never    Tobacco comments:     unable to assess    Vaping Use    Vaping status: Some Days    Substances: Nicotine   Substance and Sexual Activity    Alcohol use: No     Comment: unable to assess     Drug use: No     Comment: suboxone    Sexual activity: Yes     Partners: Male           Objective   Physical Exam  Vitals and nursing note reviewed. Exam conducted with a chaperone present.   Constitutional:       Appearance: Normal appearance. She is normal weight.   HENT:      Head: Normocephalic and atraumatic.      Nose: Nose normal.      Mouth/Throat:      Mouth: Mucous membranes are moist.      Pharynx: Oropharynx is clear.   Eyes:      Extraocular Movements: Extraocular movements intact.      Conjunctiva/sclera: Conjunctivae normal.      Pupils: Pupils are equal, round, and reactive to light.   Cardiovascular:      Rate and Rhythm: Normal rate and regular rhythm.   Pulmonary:      Effort: Pulmonary effort is normal.      Breath sounds: Normal breath sounds.   Abdominal:      General: Bowel sounds are normal.      Palpations: Abdomen is soft.   Musculoskeletal:         General: Normal range of motion.       Cervical back: Normal range of motion and neck supple.   Skin:     General: Skin is warm and dry.      Capillary Refill: Capillary refill takes less than 2 seconds.   Neurological:      General: No focal deficit present.      Mental Status: She is alert and oriented to person, place, and time.         Procedures           ED Course                                             Medical Decision Making  --On arrival, glucose 189  --Labs unremarkable  --IV fluids, Zofran  -- Glucose remained stable, patient was ready to leave, follow-up outpatient      Problems Addressed:  Hypoglycemia: complicated acute illness or injury    Amount and/or Complexity of Data Reviewed  Labs: ordered.    Risk  Prescription drug management.        Final diagnoses:   Hypoglycemia       ED Disposition  ED Disposition       ED Disposition   Discharge    Condition   Stable    Comment   --               Sophia Hi, APRN  [address] [facility], [address] [address] [phone]               Medication List      No changes were made to your prescriptions during this visit.            Reggie Sexton DO  05/07/24 1730

## 2024-05-13 NOTE — PROGRESS NOTES
"Enter Query Response Below      Query Response: Required supplemental O2 via NC after extubation in the setting of likely exacerbation of undiagnosed COPD, but no hypoxia was recorded so unable to determine              If applicable, please update the problem list.     Patient: Jes Moreno        : 1985  Account: [id number]           Admit Date: 2024        How to Respond to this query:       a. Click New Note     b. Answer query within the yellow box.                c. Update the Problem List, if applicable.      If you have any questions about this query contact me at: [phone]     Dr. Villalba:    38 y.o. female presented  due to altered mental status and diagnosed with acute methamphetamine intoxication.  Nursing documentation notes unable to obtain vital signs due to patient condition and uncooperative.  Due to patient's acute psychosis and increasing concerns for airway compromise, the patient was intubated for maintaining airway security per ED documentation.   Respiratory flowsheet states sp02 92% on room air ( at 0907) and noted to be intubated  at 0912).  ABGs pH 7.321, pC02 37.4, p02 142.0, FI02 35% ventilator.  ED documentation and progress notes include respiratory failure.  Patient remained on ventilator with extubation on .  Discharge summary states \"Patient did require large amounts of sedating medications, raising concern for ability to protect her airway.\"    After study, was acute respiratory failure clinically supported during this admission?    Acute respiratory failure was supported with additional clinical indicators:____________  Acute respiratory failure was not supported; patient intubated for airway protection only  Other- specify_____________  Unable to determine         By submitting this query, we are merely seeking further clarification of documentation to accurately reflect all conditions that you are monitoring, evaluating, treating or that extend " the hospitalization or utilize additional resources of care. Please utilize your independent clinical judgment when addressing the question(s) above.     This query and your response, once completed, will be entered into the legal medical record.    Sincerely,  Conchita Garzon RN, MSN  Clinical Documentation Integrity Program   [email or web address]

## 2024-05-23 NOTE — ANESTHESIA POSTPROCEDURE EVALUATION
Patient: Jes Moreno    Procedure Summary       Date: 04/30/24 Room / Location: [facility] NONINVASIVE LAB    Anesthesia Start: 0945 Anesthesia Stop: 0957    Procedure: ADULT TRANSESOPHAGEAL ECHO (KIMMIE) W/ CONT IF NECESSARY PER PROTOCOL Diagnosis:       (Endocarditis)      (Positive Blood Cultures)      (Bacteremia)    Scheduled Providers:  Provider: Derik Santoyo MD    Anesthesia Type: general ASA Status: 3            Anesthesia Type: general    Vitals  Vitals Value Taken Time   /70 04/30/24 1300   Temp 98.8 °F (37.1 °C) 04/30/24 1300   Pulse 93 04/30/24 1300   Resp 18 04/30/24 1300   SpO2 98 % 04/30/24 1300           Post Anesthesia Care and Evaluation    Patient location during evaluation: bedside  Patient participation: complete - patient participated  Level of consciousness: awake  Pain score: 1  Pain management: adequate    Airway patency: patent  Anesthetic complications: No anesthetic complications  PONV Status: controlled  Cardiovascular status: acceptable and blood pressure returned to baseline  Respiratory status: acceptable and room air  Hydration status: acceptable

## 2024-06-21 ENCOUNTER — HOSPITAL ENCOUNTER (EMERGENCY)
Facility: HOSPITAL | Age: 39
Discharge: HOME | End: 2024-06-21
Attending: EMERGENCY MEDICINE
Payer: MEDICAID

## 2024-06-21 ENCOUNTER — APPOINTMENT (OUTPATIENT)
Dept: GENERAL RADIOLOGY | Facility: HOSPITAL | Age: 39
End: 2024-06-21
Payer: MEDICAID

## 2024-06-21 VITALS
SYSTOLIC BLOOD PRESSURE: 122 MMHG | HEART RATE: 110 BPM | WEIGHT: 144 LBS | RESPIRATION RATE: 16 BRPM | HEIGHT: 67 IN | BODY MASS INDEX: 22.6 KG/M2 | TEMPERATURE: 98 F | OXYGEN SATURATION: 97 % | DIASTOLIC BLOOD PRESSURE: 76 MMHG

## 2024-06-21 DIAGNOSIS — E11.65 HYPERGLYCEMIA DUE TO DIABETES MELLITUS: Primary | ICD-10-CM

## 2024-06-21 LAB
ACETONE BLD QL: NEGATIVE
ALBUMIN SERPL-MCNC: 4.6 G/DL (ref 3.5–5.2)
ALBUMIN/GLOB SERPL: 1.4 G/DL
ALP SERPL-CCNC: 184 U/L (ref 39–117)
ALT SERPL W P-5'-P-CCNC: 25 U/L (ref 1–33)
ANION GAP SERPL CALCULATED.3IONS-SCNC: 13.6 MMOL/L (ref 5–15)
AST SERPL-CCNC: 24 U/L (ref 1–32)
BASOPHILS # BLD AUTO: 0.02 10*3/MM3 (ref 0–0.2)
BASOPHILS NFR BLD AUTO: 0.3 % (ref 0–1.5)
BILIRUB SERPL-MCNC: 0.2 MG/DL (ref 0–1.2)
BILIRUB UR QL STRIP: NEGATIVE
BUN SERPL-MCNC: 6 MG/DL (ref 6–20)
BUN/CREAT SERPL: 6.8 (ref 7–25)
CALCIUM SPEC-SCNC: 9.6 MG/DL (ref 8.6–10.5)
CHLORIDE SERPL-SCNC: 100 MMOL/L (ref 98–107)
CLARITY UR: CLEAR
CO2 SERPL-SCNC: 25.4 MMOL/L (ref 22–29)
COLOR UR: YELLOW
CREAT SERPL-MCNC: 0.88 MG/DL (ref 0.57–1)
DEPRECATED RDW RBC AUTO: 47.6 FL (ref 37–54)
EGFRCR SERPLBLD CKD-EPI 2021: 87.5 ML/MIN/1.73
EOSINOPHIL # BLD AUTO: 0.02 10*3/MM3 (ref 0–0.4)
EOSINOPHIL NFR BLD AUTO: 0.3 % (ref 0.3–6.2)
ERYTHROCYTE [DISTWIDTH] IN BLOOD BY AUTOMATED COUNT: 14.4 % (ref 12.3–15.4)
GLOBULIN UR ELPH-MCNC: 3.3 GM/DL
GLUCOSE BLDC GLUCOMTR-MCNC: 380 MG/DL (ref 70–130)
GLUCOSE BLDC GLUCOMTR-MCNC: 96 MG/DL (ref 70–130)
GLUCOSE SERPL-MCNC: 238 MG/DL (ref 65–99)
GLUCOSE UR STRIP-MCNC: ABNORMAL MG/DL
HCT VFR BLD AUTO: 41.8 % (ref 34–46.6)
HGB BLD-MCNC: 13.2 G/DL (ref 12–15.9)
HGB UR QL STRIP.AUTO: NEGATIVE
HOLD SPECIMEN: NORMAL
HOLD SPECIMEN: NORMAL
IMM GRANULOCYTES # BLD AUTO: 0.01 10*3/MM3 (ref 0–0.05)
IMM GRANULOCYTES NFR BLD AUTO: 0.2 % (ref 0–0.5)
KETONES UR QL STRIP: NEGATIVE
LEUKOCYTE ESTERASE UR QL STRIP.AUTO: NEGATIVE
LYMPHOCYTES # BLD AUTO: 1.13 10*3/MM3 (ref 0.7–3.1)
LYMPHOCYTES NFR BLD AUTO: 19.1 % (ref 19.6–45.3)
MAGNESIUM SERPL-MCNC: 1.7 MG/DL (ref 1.6–2.6)
MCH RBC QN AUTO: 28.5 PG (ref 26.6–33)
MCHC RBC AUTO-ENTMCNC: 31.6 G/DL (ref 31.5–35.7)
MCV RBC AUTO: 90.3 FL (ref 79–97)
MONOCYTES # BLD AUTO: 0.25 10*3/MM3 (ref 0.1–0.9)
MONOCYTES NFR BLD AUTO: 4.2 % (ref 5–12)
NEUTROPHILS NFR BLD AUTO: 4.5 10*3/MM3 (ref 1.7–7)
NEUTROPHILS NFR BLD AUTO: 75.9 % (ref 42.7–76)
NITRITE UR QL STRIP: NEGATIVE
NRBC BLD AUTO-RTO: 0 /100 WBC (ref 0–0.2)
PH UR STRIP.AUTO: 5.5 [PH] (ref 5–8)
PLATELET # BLD AUTO: 402 10*3/MM3 (ref 140–450)
PMV BLD AUTO: 8.9 FL (ref 6–12)
POTASSIUM SERPL-SCNC: 3.5 MMOL/L (ref 3.5–5.2)
PROT SERPL-MCNC: 7.9 G/DL (ref 6–8.5)
PROT UR QL STRIP: NEGATIVE
QT INTERVAL: 342 MS
QTC INTERVAL: 462 MS
RBC # BLD AUTO: 4.63 10*6/MM3 (ref 3.77–5.28)
SODIUM SERPL-SCNC: 139 MMOL/L (ref 136–145)
SP GR UR STRIP: 1.02 (ref 1–1.03)
TROPONIN T SERPL HS-MCNC: 8 NG/L
UROBILINOGEN UR QL STRIP: ABNORMAL
WBC NRBC COR # BLD AUTO: 5.93 10*3/MM3 (ref 3.4–10.8)
WHOLE BLOOD HOLD COAG: NORMAL
WHOLE BLOOD HOLD SPECIMEN: NORMAL

## 2024-06-21 PROCEDURE — 93010 ELECTROCARDIOGRAM REPORT: CPT | Performed by: INTERNAL MEDICINE

## 2024-06-21 PROCEDURE — 81003 URINALYSIS AUTO W/O SCOPE: CPT | Performed by: EMERGENCY MEDICINE

## 2024-06-21 PROCEDURE — 80053 COMPREHEN METABOLIC PANEL: CPT | Performed by: EMERGENCY MEDICINE

## 2024-06-21 PROCEDURE — 71045 X-RAY EXAM CHEST 1 VIEW: CPT | Performed by: RADIOLOGY

## 2024-06-21 PROCEDURE — 71045 X-RAY EXAM CHEST 1 VIEW: CPT

## 2024-06-21 PROCEDURE — 84484 ASSAY OF TROPONIN QUANT: CPT | Performed by: EMERGENCY MEDICINE

## 2024-06-21 PROCEDURE — 82948 REAGENT STRIP/BLOOD GLUCOSE: CPT

## 2024-06-21 PROCEDURE — 99284 EMERGENCY DEPT VISIT MOD MDM: CPT

## 2024-06-21 PROCEDURE — 36415 COLL VENOUS BLD VENIPUNCTURE: CPT

## 2024-06-21 PROCEDURE — 93005 ELECTROCARDIOGRAM TRACING: CPT | Performed by: EMERGENCY MEDICINE

## 2024-06-21 PROCEDURE — 83735 ASSAY OF MAGNESIUM: CPT | Performed by: EMERGENCY MEDICINE

## 2024-06-21 PROCEDURE — 82009 KETONE BODYS QUAL: CPT | Performed by: NURSE PRACTITIONER

## 2024-06-21 PROCEDURE — 85025 COMPLETE CBC W/AUTO DIFF WBC: CPT | Performed by: EMERGENCY MEDICINE

## 2024-06-21 RX ORDER — SODIUM CHLORIDE 0.9 % (FLUSH) 0.9 %
10 SYRINGE (ML) INJECTION AS NEEDED
Status: DISCONTINUED | OUTPATIENT
Start: 2024-06-21 | End: 2024-06-22 | Stop reason: HOSPADM

## 2024-06-22 ENCOUNTER — HOSPITAL ENCOUNTER (EMERGENCY)
Facility: HOSPITAL | Age: 39
Discharge: LEFT BEFORE DISCHARGE | End: 2024-06-22
Payer: MEDICAID

## 2024-06-22 LAB
ALBUMIN SERPL-MCNC: 4.7 G/DL (ref 3.5–5.2)
ALBUMIN/GLOB SERPL: 1.5 G/DL
ALP SERPL-CCNC: 177 U/L (ref 39–117)
ALT SERPL W P-5'-P-CCNC: 23 U/L (ref 1–33)
ANION GAP SERPL CALCULATED.3IONS-SCNC: 12.6 MMOL/L (ref 5–15)
AST SERPL-CCNC: 24 U/L (ref 1–32)
BASOPHILS # BLD AUTO: 0.03 10*3/MM3 (ref 0–0.2)
BASOPHILS NFR BLD AUTO: 0.4 % (ref 0–1.5)
BILIRUB SERPL-MCNC: 0.3 MG/DL (ref 0–1.2)
BILIRUB UR QL STRIP: NEGATIVE
BUN SERPL-MCNC: 9 MG/DL (ref 6–20)
BUN/CREAT SERPL: 10 (ref 7–25)
CALCIUM SPEC-SCNC: 10.5 MG/DL (ref 8.6–10.5)
CHLORIDE SERPL-SCNC: 96 MMOL/L (ref 98–107)
CLARITY UR: CLEAR
CO2 SERPL-SCNC: 27.4 MMOL/L (ref 22–29)
COLOR UR: YELLOW
CREAT SERPL-MCNC: 0.9 MG/DL (ref 0.57–1)
DEPRECATED RDW RBC AUTO: 46.6 FL (ref 37–54)
EGFRCR SERPLBLD CKD-EPI 2021: 85.1 ML/MIN/1.73
EOSINOPHIL # BLD AUTO: 0.12 10*3/MM3 (ref 0–0.4)
EOSINOPHIL NFR BLD AUTO: 1.5 % (ref 0.3–6.2)
ERYTHROCYTE [DISTWIDTH] IN BLOOD BY AUTOMATED COUNT: 14.3 % (ref 12.3–15.4)
GLOBULIN UR ELPH-MCNC: 3.1 GM/DL
GLUCOSE BLDC GLUCOMTR-MCNC: 112 MG/DL (ref 70–130)
GLUCOSE SERPL-MCNC: 178 MG/DL (ref 65–99)
GLUCOSE UR STRIP-MCNC: ABNORMAL MG/DL
HCG SERPL QL: NEGATIVE
HCT VFR BLD AUTO: 42.8 % (ref 34–46.6)
HGB BLD-MCNC: 13.6 G/DL (ref 12–15.9)
HGB UR QL STRIP.AUTO: NEGATIVE
HOLD SPECIMEN: NORMAL
IMM GRANULOCYTES # BLD AUTO: 0.02 10*3/MM3 (ref 0–0.05)
IMM GRANULOCYTES NFR BLD AUTO: 0.2 % (ref 0–0.5)
KETONES UR QL STRIP: NEGATIVE
LEUKOCYTE ESTERASE UR QL STRIP.AUTO: NEGATIVE
LYMPHOCYTES # BLD AUTO: 2.2 10*3/MM3 (ref 0.7–3.1)
LYMPHOCYTES NFR BLD AUTO: 27.3 % (ref 19.6–45.3)
MCH RBC QN AUTO: 28.5 PG (ref 26.6–33)
MCHC RBC AUTO-ENTMCNC: 31.8 G/DL (ref 31.5–35.7)
MCV RBC AUTO: 89.5 FL (ref 79–97)
MONOCYTES # BLD AUTO: 0.68 10*3/MM3 (ref 0.1–0.9)
MONOCYTES NFR BLD AUTO: 8.4 % (ref 5–12)
NEUTROPHILS NFR BLD AUTO: 5 10*3/MM3 (ref 1.7–7)
NEUTROPHILS NFR BLD AUTO: 62.2 % (ref 42.7–76)
NITRITE UR QL STRIP: NEGATIVE
NRBC BLD AUTO-RTO: 0 /100 WBC (ref 0–0.2)
PH UR STRIP.AUTO: 6 [PH] (ref 5–8)
PLATELET # BLD AUTO: 384 10*3/MM3 (ref 140–450)
PMV BLD AUTO: 9.5 FL (ref 6–12)
POTASSIUM SERPL-SCNC: 3.7 MMOL/L (ref 3.5–5.2)
PROT SERPL-MCNC: 7.8 G/DL (ref 6–8.5)
PROT UR QL STRIP: NEGATIVE
RBC # BLD AUTO: 4.78 10*6/MM3 (ref 3.77–5.28)
SODIUM SERPL-SCNC: 136 MMOL/L (ref 136–145)
SP GR UR STRIP: 1.01 (ref 1–1.03)
UROBILINOGEN UR QL STRIP: ABNORMAL
WBC NRBC COR # BLD AUTO: 8.05 10*3/MM3 (ref 3.4–10.8)
WHOLE BLOOD HOLD COAG: NORMAL
WHOLE BLOOD HOLD SPECIMEN: NORMAL

## 2024-06-22 PROCEDURE — 36415 COLL VENOUS BLD VENIPUNCTURE: CPT

## 2024-06-22 PROCEDURE — 80053 COMPREHEN METABOLIC PANEL: CPT | Performed by: EMERGENCY MEDICINE

## 2024-06-22 PROCEDURE — 81003 URINALYSIS AUTO W/O SCOPE: CPT | Performed by: EMERGENCY MEDICINE

## 2024-06-22 PROCEDURE — 99283 EMERGENCY DEPT VISIT LOW MDM: CPT

## 2024-06-22 PROCEDURE — 99211 OFF/OP EST MAY X REQ PHY/QHP: CPT

## 2024-06-22 PROCEDURE — 82948 REAGENT STRIP/BLOOD GLUCOSE: CPT

## 2024-06-22 PROCEDURE — 84703 CHORIONIC GONADOTROPIN ASSAY: CPT | Performed by: EMERGENCY MEDICINE

## 2024-06-22 PROCEDURE — 85025 COMPLETE CBC W/AUTO DIFF WBC: CPT | Performed by: EMERGENCY MEDICINE

## 2024-06-22 NOTE — ED NOTES
IV access unsuccessful after 4 different nurses attempts, provider aware. He states pt should drink PO fluids. Water jug provided to patient.

## 2024-06-22 NOTE — ED NOTES
MEDICAL SCREENING:    Reason for Visit: Hyperglycemia    Patient initially seen in triage.  The patient was advised further evaluation and diagnostic testing will be needed, some of the treatment and testing will be initiated in the lobby in order to begin the process.  The patient will be returned to the waiting area for the time being and possibly be re-assessed by a subsequent ED provider.  The patient will be brought back to the treatment area in as timely manner as possible.       Ladarius Humphreys, DO  06/21/24 2100

## 2024-06-22 NOTE — ED PROVIDER NOTES
Subjective   History of Present Illness  Patient is a 38 year old female with PMH significant for type 1 diabetes, hepatitis C, celiac disease, neuropathy, and thyroid disease. She presents to the ED with complaints of high blood sugar and generalized weakness. She reports her BG has been staying in the 300's at home. Her PCP advised her to come to the ED to have labs and fluids.        Review of Systems   Constitutional: Negative.  Negative for fever.   HENT: Negative.     Respiratory: Negative.     Cardiovascular: Negative.  Negative for chest pain.   Gastrointestinal: Negative.  Negative for abdominal pain.   Endocrine: Negative.    Genitourinary: Negative.  Negative for dysuria.   Skin: Negative.    Neurological: Negative.    Psychiatric/Behavioral: Negative.     All other systems reviewed and are negative.      Past Medical History:   Diagnosis Date    Asthma     Celiac disease     Diabetes mellitus type 1     Diabetic ketoacidosis     Disease of thyroid gland     Hepatitis C     Infectious viral hepatitis     hepititis C    Neuropathy     Reflux gastritis        Allergies   Allergen Reactions    Sulfa Antibiotics        Past Surgical History:   Procedure Laterality Date     SECTION      X 2       Family History   Problem Relation Age of Onset    No Known Problems Mother     Lung cancer Father     No Known Problems Sister     No Known Problems Brother     Diabetes type I Maternal Grandmother     Breast cancer Maternal Grandmother     Hypertension Maternal Grandmother     No Known Problems Maternal Grandfather     No Known Problems Paternal Grandmother     Diabetes type II Paternal Grandfather     No Known Problems Daughter     No Known Problems Son     No Known Problems Cousin     Rheum arthritis Neg Hx     Osteoarthritis Neg Hx     Asthma Neg Hx     Diabetes Neg Hx     Heart failure Neg Hx     Hyperlipidemia Neg Hx     Migraines Neg Hx     Rashes / Skin problems Neg Hx     Seizures Neg Hx     Stroke  Neg Hx     Thyroid disease Neg Hx        Social History     Socioeconomic History    Marital status: Single   Tobacco Use    Smoking status: Every Day     Current packs/day: 1.00     Average packs/day: 1 pack/day for 3.0 years (3.0 ttl pk-yrs)     Types: Cigarettes    Smokeless tobacco: Never    Tobacco comments:     unable to assess    Vaping Use    Vaping status: Some Days    Substances: Nicotine   Substance and Sexual Activity    Alcohol use: No     Comment: unable to assess     Drug use: No     Comment: suboxone    Sexual activity: Yes     Partners: Male           Objective   Physical Exam  Vitals and nursing note reviewed.   Constitutional:       General: She is not in acute distress.     Appearance: She is well-developed. She is not diaphoretic.   HENT:      Head: Normocephalic and atraumatic.      Right Ear: External ear normal.      Left Ear: External ear normal.      Nose: Nose normal.   Eyes:      Conjunctiva/sclera: Conjunctivae normal.      Pupils: Pupils are equal, round, and reactive to light.   Neck:      Vascular: No JVD.      Trachea: No tracheal deviation.   Cardiovascular:      Rate and Rhythm: Normal rate and regular rhythm.      Heart sounds: Normal heart sounds. No murmur heard.  Pulmonary:      Effort: Pulmonary effort is normal. No respiratory distress.      Breath sounds: Normal breath sounds. No wheezing.   Abdominal:      General: Bowel sounds are normal.      Palpations: Abdomen is soft.      Tenderness: There is no abdominal tenderness.   Musculoskeletal:         General: No deformity. Normal range of motion.      Cervical back: Normal range of motion and neck supple.   Skin:     General: Skin is warm and dry.      Coloration: Skin is not pale.      Findings: No erythema or rash.   Neurological:      Mental Status: She is alert and oriented to person, place, and time.      Cranial Nerves: No cranial nerve deficit.   Psychiatric:         Behavior: Behavior normal.         Thought Content:  Thought content normal.         Procedures       Results for orders placed or performed during the hospital encounter of 06/21/24   Comprehensive Metabolic Panel    Specimen: Arm, Right; Blood   Result Value Ref Range    Glucose 238 (H) 65 - 99 mg/dL    BUN 6 6 - 20 mg/dL    Creatinine 0.88 0.57 - 1.00 mg/dL    Sodium 139 136 - 145 mmol/L    Potassium 3.5 3.5 - 5.2 mmol/L    Chloride 100 98 - 107 mmol/L    CO2 25.4 22.0 - 29.0 mmol/L    Calcium 9.6 8.6 - 10.5 mg/dL    Total Protein 7.9 6.0 - 8.5 g/dL    Albumin 4.6 3.5 - 5.2 g/dL    ALT (SGPT) 25 1 - 33 U/L    AST (SGOT) 24 1 - 32 U/L    Alkaline Phosphatase 184 (H) 39 - 117 U/L    Total Bilirubin 0.2 0.0 - 1.2 mg/dL    Globulin 3.3 gm/dL    A/G Ratio 1.4 g/dL    BUN/Creatinine Ratio 6.8 (L) 7.0 - 25.0    Anion Gap 13.6 5.0 - 15.0 mmol/L    eGFR 87.5 >60.0 mL/min/1.73   Single High Sensitivity Troponin T    Specimen: Arm, Right; Blood   Result Value Ref Range    HS Troponin T 8 <14 ng/L   Magnesium    Specimen: Arm, Right; Blood   Result Value Ref Range    Magnesium 1.7 1.6 - 2.6 mg/dL   Urinalysis With Culture If Indicated - Urine, Clean Catch    Specimen: Urine, Clean Catch   Result Value Ref Range    Color, UA Yellow Yellow, Straw    Appearance, UA Clear Clear    pH, UA 5.5 5.0 - 8.0    Specific Gravity, UA 1.020 1.005 - 1.030    Glucose, UA >=1000 mg/dL (3+) (A) Negative    Ketones, UA Negative Negative    Bilirubin, UA Negative Negative    Blood, UA Negative Negative    Protein, UA Negative Negative    Leuk Esterase, UA Negative Negative    Nitrite, UA Negative Negative    Urobilinogen, UA 0.2 E.U./dL 0.2 - 1.0 E.U./dL   CBC Auto Differential    Specimen: Arm, Right; Blood   Result Value Ref Range    WBC 5.93 3.40 - 10.80 10*3/mm3    RBC 4.63 3.77 - 5.28 10*6/mm3    Hemoglobin 13.2 12.0 - 15.9 g/dL    Hematocrit 41.8 34.0 - 46.6 %    MCV 90.3 79.0 - 97.0 fL    MCH 28.5 26.6 - 33.0 pg    MCHC 31.6 31.5 - 35.7 g/dL    RDW 14.4 12.3 - 15.4 %    RDW-SD 47.6 37.0 -  54.0 fl    MPV 8.9 6.0 - 12.0 fL    Platelets 402 140 - 450 10*3/mm3    Neutrophil % 75.9 42.7 - 76.0 %    Lymphocyte % 19.1 (L) 19.6 - 45.3 %    Monocyte % 4.2 (L) 5.0 - 12.0 %    Eosinophil % 0.3 0.3 - 6.2 %    Basophil % 0.3 0.0 - 1.5 %    Immature Grans % 0.2 0.0 - 0.5 %    Neutrophils, Absolute 4.50 1.70 - 7.00 10*3/mm3    Lymphocytes, Absolute 1.13 0.70 - 3.10 10*3/mm3    Monocytes, Absolute 0.25 0.10 - 0.90 10*3/mm3    Eosinophils, Absolute 0.02 0.00 - 0.40 10*3/mm3    Basophils, Absolute 0.02 0.00 - 0.20 10*3/mm3    Immature Grans, Absolute 0.01 0.00 - 0.05 10*3/mm3    nRBC 0.0 0.0 - 0.2 /100 WBC   Acetone    Specimen: Arm, Right; Blood   Result Value Ref Range    Acetone Negative Negative   POC Glucose Once    Specimen: Blood   Result Value Ref Range    Glucose 380 (H) 70 - 130 mg/dL   POC Glucose Once    Specimen: Blood   Result Value Ref Range    Glucose 96 70 - 130 mg/dL   ECG 12 Lead ED Triage Standing Order; Weak / Dizzy / AMS   Result Value Ref Range    QT Interval 342 ms    QTC Interval 462 ms   Green Top (Gel)   Result Value Ref Range    Extra Tube Hold for add-ons.    Lavender Top   Result Value Ref Range    Extra Tube hold for add-on    Gold Top - SST   Result Value Ref Range    Extra Tube Hold for add-ons.    Light Blue Top   Result Value Ref Range    Extra Tube Hold for add-ons.         ED Course  ED Course as of 06/21/24 2317 Fri Jun 21, 2024 2015 EKG notes sinus tachycardia.  110 bpm.  No acute ST elevation.  QTc 462.  Electronically signed by Ladarius Humphreys DO, 06/21/24, 8:15 PM EDT.   [SF]   3146 Report given to LEIDY URIBE [MB]   1826 CXR rad interpreted:  1.  Coarsened bronchovascular pattern to the lungs  2.  Probable chronic interstitial lung disease.  3.  Contribution from underlying interstitial pneumonitis is possible.  4.  No pleural effusion.  5.  No lobar consolidation.  6.  No fracture or foreign body.   [RB]      ED Course User Index  [MB] Lauren Ramirez APRN  [RB] Telma,  RONY Massey II  [SF] Ladarius Humphreys, DO                                             Medical Decision Making  38-year-old female with type 1 diabetes hyperglycemia    Problems Addressed:  Hyperglycemia due to diabetes mellitus: acute illness or injury     Details: No findings of DKA on laboratory exam.  Patient is able to eat and drink.  Outpatient follow-up with PCP.    Amount and/or Complexity of Data Reviewed  Labs: ordered.  Radiology: ordered. Decision-making details documented in ED Course.  ECG/medicine tests: ordered. Decision-making details documented in ED Course.    Risk  Prescription drug management.        Final diagnoses:   Hyperglycemia due to diabetes mellitus       ED Disposition  ED Disposition       ED Disposition   Discharge    Condition   Stable    Comment   --               Sophia Hi, APRN  [address] [facility], [address] [address] [phone]    Schedule an appointment as soon as possible for a visit            Medication List      No changes were made to your prescriptions during this visit.            Bryson Hollis II, PA  06/21/24 4481

## 2024-06-23 VITALS
TEMPERATURE: 98.1 F | HEART RATE: 99 BPM | DIASTOLIC BLOOD PRESSURE: 84 MMHG | SYSTOLIC BLOOD PRESSURE: 118 MMHG | HEIGHT: 67 IN | OXYGEN SATURATION: 98 % | BODY MASS INDEX: 22.6 KG/M2 | RESPIRATION RATE: 16 BRPM | WEIGHT: 144 LBS

## 2024-06-23 NOTE — ED NOTES
"Patient approached Triage Desk states, \"I am leaving. I will be going to [facility].\" Patient declines to wait for Triage Nurse to print LWBS after Triage documentation. Notified Provider/Lead RN. No iv access established during this visit.   "

## 2024-06-23 NOTE — ED PROVIDER NOTES
MEDICAL SCREENING:    Reason for Visit: hypoglycemia      Patient initially seen in triage.  The patient was advised further evaluation and diagnostic testing will be needed, some of the treatment and testing will be initiated in the lobby in order to begin the process.  The patient will be returned to the waiting area for the time being and possibly be re-assessed by a subsequent ED provider.  The patient will be brought back to the treatment area in as timely manner as possible.      Nabil Lobato PA-C  06/22/24 6969

## 2024-07-02 ENCOUNTER — APPOINTMENT (OUTPATIENT)
Dept: GENERAL RADIOLOGY | Facility: HOSPITAL | Age: 39
End: 2024-07-02
Payer: MEDICAID

## 2024-07-02 ENCOUNTER — APPOINTMENT (OUTPATIENT)
Dept: CT IMAGING | Facility: HOSPITAL | Age: 39
End: 2024-07-02
Payer: MEDICAID

## 2024-07-02 ENCOUNTER — HOSPITAL ENCOUNTER (EMERGENCY)
Facility: HOSPITAL | Age: 39
Discharge: LEFT BEFORE DISCHARGE | End: 2024-07-02
Attending: STUDENT IN AN ORGANIZED HEALTH CARE EDUCATION/TRAINING PROGRAM
Payer: MEDICAID

## 2024-07-02 VITALS
RESPIRATION RATE: 16 BRPM | TEMPERATURE: 98.1 F | HEART RATE: 102 BPM | BODY MASS INDEX: 22.6 KG/M2 | HEIGHT: 67 IN | WEIGHT: 144 LBS | OXYGEN SATURATION: 98 % | DIASTOLIC BLOOD PRESSURE: 76 MMHG | SYSTOLIC BLOOD PRESSURE: 131 MMHG

## 2024-07-02 DIAGNOSIS — G40.909 SEIZURE DISORDER: Primary | ICD-10-CM

## 2024-07-02 LAB
ALBUMIN SERPL-MCNC: 4 G/DL (ref 3.5–5.2)
ALBUMIN/GLOB SERPL: 1.8 G/DL
ALP SERPL-CCNC: 130 U/L (ref 39–117)
ALT SERPL W P-5'-P-CCNC: 20 U/L (ref 1–33)
AMMONIA BLD-SCNC: 16 UMOL/L (ref 11–51)
AMPHET+METHAMPHET UR QL: NEGATIVE
AMPHETAMINES UR QL: NEGATIVE
ANION GAP SERPL CALCULATED.3IONS-SCNC: 13.7 MMOL/L (ref 5–15)
AST SERPL-CCNC: 26 U/L (ref 1–32)
BARBITURATES UR QL SCN: NEGATIVE
BASOPHILS # BLD AUTO: 0.04 10*3/MM3 (ref 0–0.2)
BASOPHILS NFR BLD AUTO: 0.6 % (ref 0–1.5)
BENZODIAZ UR QL SCN: NEGATIVE
BILIRUB SERPL-MCNC: <0.2 MG/DL (ref 0–1.2)
BILIRUB UR QL STRIP: NEGATIVE
BUN SERPL-MCNC: 10 MG/DL (ref 6–20)
BUN/CREAT SERPL: 11.5 (ref 7–25)
BUPRENORPHINE SERPL-MCNC: POSITIVE NG/ML
CALCIUM SPEC-SCNC: 8.9 MG/DL (ref 8.6–10.5)
CANNABINOIDS SERPL QL: NEGATIVE
CHLORIDE SERPL-SCNC: 101 MMOL/L (ref 98–107)
CLARITY UR: CLEAR
CO2 SERPL-SCNC: 28.3 MMOL/L (ref 22–29)
COCAINE UR QL: NEGATIVE
COLOR UR: YELLOW
CREAT SERPL-MCNC: 0.87 MG/DL (ref 0.57–1)
DEPRECATED RDW RBC AUTO: 49 FL (ref 37–54)
EGFRCR SERPLBLD CKD-EPI 2021: 88.7 ML/MIN/1.73
EOSINOPHIL # BLD AUTO: 0.24 10*3/MM3 (ref 0–0.4)
EOSINOPHIL NFR BLD AUTO: 3.8 % (ref 0.3–6.2)
ERYTHROCYTE [DISTWIDTH] IN BLOOD BY AUTOMATED COUNT: 15 % (ref 12.3–15.4)
ETHANOL BLD-MCNC: <10 MG/DL (ref 0–10)
ETHANOL UR QL: <0.01 %
FENTANYL UR-MCNC: NEGATIVE NG/ML
GLOBULIN UR ELPH-MCNC: 2.2 GM/DL
GLUCOSE BLDC GLUCOMTR-MCNC: 269 MG/DL (ref 70–130)
GLUCOSE SERPL-MCNC: 189 MG/DL (ref 65–99)
GLUCOSE UR STRIP-MCNC: ABNORMAL MG/DL
HCG SERPL QL: NEGATIVE
HCT VFR BLD AUTO: 36.6 % (ref 34–46.6)
HGB BLD-MCNC: 11.5 G/DL (ref 12–15.9)
HGB UR QL STRIP.AUTO: NEGATIVE
HOLD SPECIMEN: NORMAL
IMM GRANULOCYTES # BLD AUTO: 0.02 10*3/MM3 (ref 0–0.05)
IMM GRANULOCYTES NFR BLD AUTO: 0.3 % (ref 0–0.5)
KETONES UR QL STRIP: NEGATIVE
LEUKOCYTE ESTERASE UR QL STRIP.AUTO: NEGATIVE
LYMPHOCYTES # BLD AUTO: 2.18 10*3/MM3 (ref 0.7–3.1)
LYMPHOCYTES NFR BLD AUTO: 34.1 % (ref 19.6–45.3)
MCH RBC QN AUTO: 28.1 PG (ref 26.6–33)
MCHC RBC AUTO-ENTMCNC: 31.4 G/DL (ref 31.5–35.7)
MCV RBC AUTO: 89.5 FL (ref 79–97)
METHADONE UR QL SCN: NEGATIVE
MONOCYTES # BLD AUTO: 0.49 10*3/MM3 (ref 0.1–0.9)
MONOCYTES NFR BLD AUTO: 7.7 % (ref 5–12)
NEUTROPHILS NFR BLD AUTO: 3.42 10*3/MM3 (ref 1.7–7)
NEUTROPHILS NFR BLD AUTO: 53.5 % (ref 42.7–76)
NITRITE UR QL STRIP: NEGATIVE
NRBC BLD AUTO-RTO: 0 /100 WBC (ref 0–0.2)
OPIATES UR QL: NEGATIVE
OXYCODONE UR QL SCN: NEGATIVE
PCP UR QL SCN: NEGATIVE
PH UR STRIP.AUTO: 6.5 [PH] (ref 5–8)
PLATELET # BLD AUTO: 363 10*3/MM3 (ref 140–450)
PMV BLD AUTO: 8.8 FL (ref 6–12)
POTASSIUM SERPL-SCNC: 3.3 MMOL/L (ref 3.5–5.2)
PROT SERPL-MCNC: 6.2 G/DL (ref 6–8.5)
PROT UR QL STRIP: NEGATIVE
RBC # BLD AUTO: 4.09 10*6/MM3 (ref 3.77–5.28)
SODIUM SERPL-SCNC: 143 MMOL/L (ref 136–145)
SP GR UR STRIP: 1.01 (ref 1–1.03)
TRICYCLICS UR QL SCN: NEGATIVE
UROBILINOGEN UR QL STRIP: ABNORMAL
WBC NRBC COR # BLD AUTO: 6.39 10*3/MM3 (ref 3.4–10.8)
WHOLE BLOOD HOLD COAG: NORMAL
WHOLE BLOOD HOLD SPECIMEN: NORMAL

## 2024-07-02 PROCEDURE — 82140 ASSAY OF AMMONIA: CPT | Performed by: STUDENT IN AN ORGANIZED HEALTH CARE EDUCATION/TRAINING PROGRAM

## 2024-07-02 PROCEDURE — 85025 COMPLETE CBC W/AUTO DIFF WBC: CPT | Performed by: STUDENT IN AN ORGANIZED HEALTH CARE EDUCATION/TRAINING PROGRAM

## 2024-07-02 PROCEDURE — 71045 X-RAY EXAM CHEST 1 VIEW: CPT

## 2024-07-02 PROCEDURE — 84703 CHORIONIC GONADOTROPIN ASSAY: CPT | Performed by: STUDENT IN AN ORGANIZED HEALTH CARE EDUCATION/TRAINING PROGRAM

## 2024-07-02 PROCEDURE — 81003 URINALYSIS AUTO W/O SCOPE: CPT | Performed by: STUDENT IN AN ORGANIZED HEALTH CARE EDUCATION/TRAINING PROGRAM

## 2024-07-02 PROCEDURE — 96375 TX/PRO/DX INJ NEW DRUG ADDON: CPT

## 2024-07-02 PROCEDURE — 99283 EMERGENCY DEPT VISIT LOW MDM: CPT

## 2024-07-02 PROCEDURE — 36415 COLL VENOUS BLD VENIPUNCTURE: CPT

## 2024-07-02 PROCEDURE — 82948 REAGENT STRIP/BLOOD GLUCOSE: CPT

## 2024-07-02 PROCEDURE — 80053 COMPREHEN METABOLIC PANEL: CPT | Performed by: STUDENT IN AN ORGANIZED HEALTH CARE EDUCATION/TRAINING PROGRAM

## 2024-07-02 PROCEDURE — 25810000003 SODIUM CHLORIDE 0.9 % SOLUTION: Performed by: STUDENT IN AN ORGANIZED HEALTH CARE EDUCATION/TRAINING PROGRAM

## 2024-07-02 PROCEDURE — 80307 DRUG TEST PRSMV CHEM ANLYZR: CPT | Performed by: STUDENT IN AN ORGANIZED HEALTH CARE EDUCATION/TRAINING PROGRAM

## 2024-07-02 PROCEDURE — 71045 X-RAY EXAM CHEST 1 VIEW: CPT | Performed by: RADIOLOGY

## 2024-07-02 PROCEDURE — 25010000002 DIPHENHYDRAMINE PER 50 MG: Performed by: STUDENT IN AN ORGANIZED HEALTH CARE EDUCATION/TRAINING PROGRAM

## 2024-07-02 PROCEDURE — 82077 ASSAY SPEC XCP UR&BREATH IA: CPT | Performed by: STUDENT IN AN ORGANIZED HEALTH CARE EDUCATION/TRAINING PROGRAM

## 2024-07-02 PROCEDURE — C1751 CATH, INF, PER/CENT/MIDLINE: HCPCS

## 2024-07-02 PROCEDURE — 25010000002 DEXAMETHASONE SODIUM PHOSPHATE 10 MG/ML SOLUTION: Performed by: STUDENT IN AN ORGANIZED HEALTH CARE EDUCATION/TRAINING PROGRAM

## 2024-07-02 PROCEDURE — 96374 THER/PROPH/DIAG INJ IV PUSH: CPT

## 2024-07-02 PROCEDURE — P9612 CATHETERIZE FOR URINE SPEC: HCPCS

## 2024-07-02 PROCEDURE — 25010000002 KETOROLAC TROMETHAMINE PER 15 MG: Performed by: STUDENT IN AN ORGANIZED HEALTH CARE EDUCATION/TRAINING PROGRAM

## 2024-07-02 RX ORDER — KETOROLAC TROMETHAMINE 30 MG/ML
30 INJECTION, SOLUTION INTRAMUSCULAR; INTRAVENOUS ONCE
Status: COMPLETED | OUTPATIENT
Start: 2024-07-02 | End: 2024-07-02

## 2024-07-02 RX ORDER — ACETAMINOPHEN 500 MG
1000 TABLET ORAL ONCE
Status: COMPLETED | OUTPATIENT
Start: 2024-07-02 | End: 2024-07-02

## 2024-07-02 RX ORDER — DIPHENHYDRAMINE HYDROCHLORIDE 50 MG/ML
12.5 INJECTION INTRAMUSCULAR; INTRAVENOUS ONCE
Status: COMPLETED | OUTPATIENT
Start: 2024-07-02 | End: 2024-07-02

## 2024-07-02 RX ORDER — DEXAMETHASONE SODIUM PHOSPHATE 10 MG/ML
5 INJECTION, SOLUTION INTRAMUSCULAR; INTRAVENOUS ONCE
Status: COMPLETED | OUTPATIENT
Start: 2024-07-02 | End: 2024-07-02

## 2024-07-02 RX ORDER — SODIUM CHLORIDE 0.9 % (FLUSH) 0.9 %
10 SYRINGE (ML) INJECTION AS NEEDED
Status: DISCONTINUED | OUTPATIENT
Start: 2024-07-02 | End: 2024-07-02 | Stop reason: HOSPADM

## 2024-07-02 RX ADMIN — DIPHENHYDRAMINE HYDROCHLORIDE 12.5 MG: 50 INJECTION, SOLUTION INTRAMUSCULAR; INTRAVENOUS at 15:57

## 2024-07-02 RX ADMIN — SODIUM CHLORIDE 1000 ML: 9 INJECTION, SOLUTION INTRAVENOUS at 15:56

## 2024-07-02 RX ADMIN — DEXAMETHASONE SODIUM PHOSPHATE 5 MG: 10 INJECTION INTRAMUSCULAR; INTRAVENOUS at 15:58

## 2024-07-02 RX ADMIN — KETOROLAC TROMETHAMINE 30 MG: 30 INJECTION, SOLUTION INTRAMUSCULAR; INTRAVENOUS at 15:57

## 2024-07-02 RX ADMIN — ACETAMINOPHEN 1000 MG: 500 TABLET ORAL at 15:56

## 2024-07-02 NOTE — ED NOTES
Patient informed of risk of signing out against medical advice, up to and including death. Patient verbalized understanding. Ambulated from facility with family.

## 2024-07-02 NOTE — ED PROVIDER NOTES
Subjective   History of Present Illness  38-year-old female with past medical history of diabetes hepatitis C and nonepileptiform seizure disorder presents to the ER with her mother due to concerns for multiple seizure-like episodes today due to erratic glucose levels.  Patient and patient's significant other noted that she has seizures when her glucose levels get below 100.  Patient has no chest pain or shortness of breath.  Patient is awake, alert, oriented x 3 on arrival to the ER.  No obvious focal neurological deficits.  No signs of postictal lethargy.  Stable.  Afebrile      Review of Systems   Neurological:  Positive for seizures.   All other systems reviewed and are negative.      Past Medical History:   Diagnosis Date    Asthma     Celiac disease     Diabetes mellitus type 1     Diabetic ketoacidosis     Disease of thyroid gland     Hepatitis C     Infectious viral hepatitis     hepititis C    Neuropathy     Reflux gastritis        Allergies   Allergen Reactions    Sulfa Antibiotics        Past Surgical History:   Procedure Laterality Date     SECTION      X 2       Family History   Problem Relation Age of Onset    No Known Problems Mother     Lung cancer Father     No Known Problems Sister     No Known Problems Brother     Diabetes type I Maternal Grandmother     Breast cancer Maternal Grandmother     Hypertension Maternal Grandmother     No Known Problems Maternal Grandfather     No Known Problems Paternal Grandmother     Diabetes type II Paternal Grandfather     No Known Problems Daughter     No Known Problems Son     No Known Problems Cousin     Rheum arthritis Neg Hx     Osteoarthritis Neg Hx     Asthma Neg Hx     Diabetes Neg Hx     Heart failure Neg Hx     Hyperlipidemia Neg Hx     Migraines Neg Hx     Rashes / Skin problems Neg Hx     Seizures Neg Hx     Stroke Neg Hx     Thyroid disease Neg Hx        Social History     Socioeconomic History    Marital status: Single   Tobacco Use    Smoking  status: Every Day     Current packs/day: 1.00     Average packs/day: 1 pack/day for 3.0 years (3.0 ttl pk-yrs)     Types: Cigarettes    Smokeless tobacco: Never    Tobacco comments:     unable to assess    Vaping Use    Vaping status: Some Days    Substances: Nicotine   Substance and Sexual Activity    Alcohol use: No     Comment: unable to assess     Drug use: No     Comment: suboxone    Sexual activity: Yes     Partners: Male           Objective   Physical Exam  Constitutional:       General: She is not in acute distress.     Appearance: Normal appearance. She is not ill-appearing.   HENT:      Head: Normocephalic and atraumatic.      Right Ear: External ear normal.      Left Ear: External ear normal.      Nose: Nose normal.      Mouth/Throat:      Mouth: Mucous membranes are moist.   Eyes:      Extraocular Movements: Extraocular movements intact.      Pupils: Pupils are equal, round, and reactive to light.   Cardiovascular:      Rate and Rhythm: Normal rate and regular rhythm.      Heart sounds: No murmur heard.  Pulmonary:      Effort: Pulmonary effort is normal. No respiratory distress.      Breath sounds: Normal breath sounds. No wheezing.   Abdominal:      General: Bowel sounds are normal.      Palpations: Abdomen is soft.      Tenderness: There is no abdominal tenderness.   Musculoskeletal:         General: No deformity or signs of injury. Normal range of motion.      Cervical back: Normal range of motion and neck supple.   Skin:     General: Skin is warm and dry.      Findings: No erythema.   Neurological:      General: No focal deficit present.      Mental Status: She is alert and oriented to person, place, and time. Mental status is at baseline.      Cranial Nerves: No cranial nerve deficit.   Psychiatric:         Mood and Affect: Mood normal.         Behavior: Behavior normal.         Thought Content: Thought content normal.         Procedures           ED Course                                 Results  for orders placed or performed during the hospital encounter of 07/02/24   Comprehensive Metabolic Panel    Specimen: Arm, Right; Blood   Result Value Ref Range    Glucose 189 (H) 65 - 99 mg/dL    BUN 10 6 - 20 mg/dL    Creatinine 0.87 0.57 - 1.00 mg/dL    Sodium 143 136 - 145 mmol/L    Potassium 3.3 (L) 3.5 - 5.2 mmol/L    Chloride 101 98 - 107 mmol/L    CO2 28.3 22.0 - 29.0 mmol/L    Calcium 8.9 8.6 - 10.5 mg/dL    Total Protein 6.2 6.0 - 8.5 g/dL    Albumin 4.0 3.5 - 5.2 g/dL    ALT (SGPT) 20 1 - 33 U/L    AST (SGOT) 26 1 - 32 U/L    Alkaline Phosphatase 130 (H) 39 - 117 U/L    Total Bilirubin <0.2 0.0 - 1.2 mg/dL    Globulin 2.2 gm/dL    A/G Ratio 1.8 g/dL    BUN/Creatinine Ratio 11.5 7.0 - 25.0    Anion Gap 13.7 5.0 - 15.0 mmol/L    eGFR 88.7 >60.0 mL/min/1.73   Urinalysis With Microscopic If Indicated (No Culture) - Urine, Catheter    Specimen: Urine, Catheter   Result Value Ref Range    Color, UA Yellow Yellow, Straw    Appearance, UA Clear Clear    pH, UA 6.5 5.0 - 8.0    Specific Gravity, UA 1.010 1.005 - 1.030    Glucose,  mg/dL (2+) (A) Negative    Ketones, UA Negative Negative    Bilirubin, UA Negative Negative    Blood, UA Negative Negative    Protein, UA Negative Negative    Leuk Esterase, UA Negative Negative    Nitrite, UA Negative Negative    Urobilinogen, UA 0.2 E.U./dL 0.2 - 1.0 E.U./dL   hCG, Serum, Qualitative    Specimen: Arm, Right; Blood   Result Value Ref Range    HCG Qualitative Negative Negative   Urine Drug Screen - Urine, Clean Catch    Specimen: Urine, Clean Catch   Result Value Ref Range    THC, Screen, Urine Negative Negative    Phencyclidine (PCP), Urine Negative Negative    Cocaine Screen, Urine Negative Negative    Methamphetamine, Ur Negative Negative    Opiate Screen Negative Negative    Amphetamine Screen, Urine Negative Negative    Benzodiazepine Screen, Urine Negative Negative    Tricyclic Antidepressants Screen Negative Negative    Methadone Screen, Urine Negative  Negative    Barbiturates Screen, Urine Negative Negative    Oxycodone Screen, Urine Negative Negative    Buprenorphine, Screen, Urine Positive (A) Negative   Ethanol    Specimen: Arm, Right; Blood   Result Value Ref Range    Ethanol <10 0 - 10 mg/dL    Ethanol % <0.010 %   Ammonia    Specimen: Arm, Right; Blood   Result Value Ref Range    Ammonia 16 11 - 51 umol/L   CBC Auto Differential    Specimen: Arm, Right; Blood   Result Value Ref Range    WBC 6.39 3.40 - 10.80 10*3/mm3    RBC 4.09 3.77 - 5.28 10*6/mm3    Hemoglobin 11.5 (L) 12.0 - 15.9 g/dL    Hematocrit 36.6 34.0 - 46.6 %    MCV 89.5 79.0 - 97.0 fL    MCH 28.1 26.6 - 33.0 pg    MCHC 31.4 (L) 31.5 - 35.7 g/dL    RDW 15.0 12.3 - 15.4 %    RDW-SD 49.0 37.0 - 54.0 fl    MPV 8.8 6.0 - 12.0 fL    Platelets 363 140 - 450 10*3/mm3    Neutrophil % 53.5 42.7 - 76.0 %    Lymphocyte % 34.1 19.6 - 45.3 %    Monocyte % 7.7 5.0 - 12.0 %    Eosinophil % 3.8 0.3 - 6.2 %    Basophil % 0.6 0.0 - 1.5 %    Immature Grans % 0.3 0.0 - 0.5 %    Neutrophils, Absolute 3.42 1.70 - 7.00 10*3/mm3    Lymphocytes, Absolute 2.18 0.70 - 3.10 10*3/mm3    Monocytes, Absolute 0.49 0.10 - 0.90 10*3/mm3    Eosinophils, Absolute 0.24 0.00 - 0.40 10*3/mm3    Basophils, Absolute 0.04 0.00 - 0.20 10*3/mm3    Immature Grans, Absolute 0.02 0.00 - 0.05 10*3/mm3    nRBC 0.0 0.0 - 0.2 /100 WBC   Fentanyl, Urine - Urine, Clean Catch    Specimen: Urine, Clean Catch   Result Value Ref Range    Fentanyl, Urine Negative Negative   POC Glucose Once    Specimen: Blood   Result Value Ref Range    Glucose 269 (H) 70 - 130 mg/dL   Green Top (Gel)   Result Value Ref Range    Extra Tube Hold for add-ons.    Lavender Top   Result Value Ref Range    Extra Tube hold for add-on    Light Blue Top   Result Value Ref Range    Extra Tube Hold for add-ons.                  Medical Decision Making  Patient informed the staff that they wished to leave against medical advice.  The risks of this decision were discussed throughly  with the patient.  The risks included, but were not limited to worsening medical status, sepsis, shock, respiratory failure, severe neurological deficit, and cardiac death.  The patient expressed full understanding of the risk of this decision.  Patient was counseled to immediately return to the ER if symptoms worsened, and to follow up with their PCP promptly.  Vitals stable at AMA.    Problems Addressed:  Seizure disorder: complicated acute illness or injury    Amount and/or Complexity of Data Reviewed  Labs: ordered. Decision-making details documented in ED Course.  Radiology: ordered.    Risk  OTC drugs.  Prescription drug management.        Final diagnoses:   Seizure disorder       ED Disposition  ED Disposition       ED Disposition   AMA    Condition   --    Comment   --               No follow-up provider specified.       Medication List      No changes were made to your prescriptions during this visit.            Ladarius Humphreys,   07/02/24 1652       Ladarius Humphreys,   07/02/24 1652

## 2024-07-24 VITALS
TEMPERATURE: 98.6 F | HEART RATE: 103 BPM | WEIGHT: 148 LBS | SYSTOLIC BLOOD PRESSURE: 136 MMHG | HEIGHT: 67 IN | DIASTOLIC BLOOD PRESSURE: 74 MMHG | OXYGEN SATURATION: 98 % | RESPIRATION RATE: 18 BRPM | BODY MASS INDEX: 23.23 KG/M2

## 2024-07-24 LAB — GLUCOSE BLDC GLUCOMTR-MCNC: 181 MG/DL (ref 70–130)

## 2024-07-24 PROCEDURE — 82948 REAGENT STRIP/BLOOD GLUCOSE: CPT

## 2024-07-24 PROCEDURE — 99211 OFF/OP EST MAY X REQ PHY/QHP: CPT

## 2024-07-25 ENCOUNTER — HOSPITAL ENCOUNTER (EMERGENCY)
Facility: HOSPITAL | Age: 39
Discharge: LEFT BEFORE DISCHARGE | End: 2024-07-25
Payer: MEDICAID

## 2025-02-09 LAB
HAV AB SER QL IA: NEGATIVE
HBV CORE AB SERPL QL IA: NEGATIVE

## 2025-02-11 LAB
A2 MACROGLOB SERPL-MCNC: 313 MG/DL (ref 110–276)
ALT SERPL W P-5'-P-CCNC: 14 IU/L (ref 0–40)
APO A-I SERPL-MCNC: 147 MG/DL (ref 116–209)
BILIRUB SERPL-MCNC: <0.1 MG/DL (ref 0–1.2)
FIBROSIS SCORING:: ABNORMAL
FIBROSIS STAGE SERPL QL: ABNORMAL
GGT SERPL-CCNC: 18 IU/L (ref 0–60)
HAPTOGLOB SERPL-MCNC: 148 MG/DL (ref 33–278)
HCV AB SER QL: ABNORMAL
LABORATORY COMMENT REPORT: ABNORMAL
LIVER FIBR SCORE SERPL CALC.FIBROSURE: 0.07 (ref 0–0.21)
NECROINFLAMM ACTIVITY SCORING:: ABNORMAL
NECROINFLAMMATORY ACT GRADE SERPL QL: ABNORMAL
NECROINFLAMMATORY ACT SCORE SERPL: 0.03 (ref 0–0.17)
SERVICE CMNT-IMP: ABNORMAL
TEST PERFORMANCE INFO SPEC: ABNORMAL